# Patient Record
Sex: MALE | Race: WHITE | NOT HISPANIC OR LATINO | Employment: OTHER | URBAN - METROPOLITAN AREA
[De-identification: names, ages, dates, MRNs, and addresses within clinical notes are randomized per-mention and may not be internally consistent; named-entity substitution may affect disease eponyms.]

---

## 2017-03-13 ENCOUNTER — OFFICE VISIT (OUTPATIENT)
Dept: FAMILY MEDICINE CLINIC | Facility: CLINIC | Age: 62
End: 2017-03-13

## 2017-03-13 VITALS
TEMPERATURE: 98.1 F | RESPIRATION RATE: 16 BRPM | HEIGHT: 70 IN | DIASTOLIC BLOOD PRESSURE: 79 MMHG | BODY MASS INDEX: 34.36 KG/M2 | SYSTOLIC BLOOD PRESSURE: 138 MMHG | WEIGHT: 240 LBS | HEART RATE: 76 BPM

## 2017-03-13 DIAGNOSIS — M15.9 PRIMARY OSTEOARTHRITIS INVOLVING MULTIPLE JOINTS: ICD-10-CM

## 2017-03-13 DIAGNOSIS — F51.01 PRIMARY INSOMNIA: Primary | ICD-10-CM

## 2017-03-13 DIAGNOSIS — E03.9 HYPOTHYROIDISM, ACQUIRED: ICD-10-CM

## 2017-03-13 DIAGNOSIS — Z00.00 ANNUAL PHYSICAL EXAM: ICD-10-CM

## 2017-03-13 PROCEDURE — 99214 OFFICE O/P EST MOD 30 MIN: CPT | Performed by: FAMILY MEDICINE

## 2017-03-13 RX ORDER — LEVOTHYROXINE SODIUM 0.07 MG/1
75 TABLET ORAL DAILY
Qty: 30 TABLET | Refills: 5 | Status: SHIPPED | OUTPATIENT
Start: 2017-03-13 | End: 2017-10-03 | Stop reason: SDUPTHER

## 2017-03-13 RX ORDER — ZOLPIDEM TARTRATE 10 MG/1
10 TABLET ORAL NIGHTLY PRN
Qty: 30 TABLET | Refills: 2 | Status: SHIPPED | OUTPATIENT
Start: 2017-03-13 | End: 2017-06-14 | Stop reason: SDUPTHER

## 2017-03-13 RX ORDER — MELOXICAM 15 MG/1
15 TABLET ORAL DAILY
Qty: 30 TABLET | Refills: 5 | Status: SHIPPED | OUTPATIENT
Start: 2017-03-13 | End: 2017-08-16 | Stop reason: HOSPADM

## 2017-03-13 NOTE — PROGRESS NOTES
"Subjective   Luigi Steel is a 61 y.o. male.     History of Present Illness     Chief Complaint:   Chief Complaint   Patient presents with   • Hypothyroidism     MED REFILL - NO LABS DONE - NO DAILY ASA / NON SMOKER / PHQ2 DONE    • Insomnia   • Arthritis       Luigi Steel 61 y.o. male who presents today for Medical Management of the below listed issues and medication refills.  he has a history of   Patient Active Problem List   Diagnosis   • Colon cancer   • Elevated blood pressure reading without diagnosis of hypertension   • Hypothyroidism, acquired   • Insomnia   • Osteoarthritis, multiple sites   • Hyperlipidemia   .  Since the last visit, he has overall felt well.  he has been compliant with   Current Outpatient Prescriptions:   •  levothyroxine (SYNTHROID, LEVOTHROID) 75 MCG tablet, Take 1 tablet by mouth Daily., Disp: 30 tablet, Rfl: 5  •  meloxicam (MOBIC) 15 MG tablet, Take 1 tablet by mouth Daily., Disp: 30 tablet, Rfl: 5  •  zolpidem (AMBIEN) 10 MG tablet, Take 1 tablet by mouth At Night As Needed for sleep., Disp: 30 tablet, Rfl: 2.  he denies medication side effects.    All of the chronic condition(s) listed above are stable w/o issues.    Visit Vitals   • /79   • Pulse 76   • Temp 98.1 °F (36.7 °C) (Oral)   • Resp 16   • Ht 70\" (177.8 cm)   • Wt 240 lb (109 kg)   • BMI 34.44 kg/m2       Results for orders placed or performed in visit on 03/28/16   CBC and Differential   Result Value Ref Range    WBC 6.82 4.50 - 10.70 10*3/mm3    RBC 4.74 4.60 - 6.00 10*6/mm3    Hemoglobin 14.5 13.7 - 17.6 g/dL    Hematocrit 44.5 40.4 - 52.2 %    MCV 93.9 79.8 - 96.2 fL    MCH 30.6 27.0 - 32.7 pg    MCHC 32.6 32.6 - 36.4 g/dL    RDW 13.2 11.5 - 14.5 %    Platelets 151 140 - 500 10*3/mm3    Neutrophil Rel % 61.0 42.7 - 76.0 %    Lymphocyte Rel % 28.0 19.6 - 45.3 %    Monocyte Rel % 10.0 5.0 - 12.0 %    Eosinophil Rel % 0.6 0.3 - 6.2 %    Basophil Rel % 0.1 0.0 - 1.5 %    Neutrophils Absolute 4.16 1.90 - 8.10 " 10*3/mm3    Lymphocytes Absolute 1.91 0.90 - 4.80 10*3/mm3    Monocytes Absolute 0.68 0.20 - 1.20 10*3/mm3    Eosinophils Absolute 0.04 0.00 - 0.70 10*3/mm3    Basophils Absolute 0.01 0.00 - 0.20 10*3/mm3    Immature Granulocyte Rel % 0.3 0.0 - 0.5 %    Immature Grans Absolute 0.02 0.00 - 0.03 10*3/mm3         The following portions of the patient's history were reviewed and updated as appropriate: allergies, current medications, past family history, past medical history, past social history, past surgical history and problem list.    Review of Systems   Constitutional: Negative for activity change, chills, fatigue and fever.   Respiratory: Negative for cough and chest tightness.    Cardiovascular: Negative for chest pain and palpitations.   Gastrointestinal: Negative for abdominal pain and nausea.   Endocrine: Negative for cold intolerance and polydipsia.   Psychiatric/Behavioral: Negative for behavioral problems and dysphoric mood.   All other systems reviewed and are negative.      Objective   Physical Exam   Constitutional: He appears well-developed and well-nourished.   Neck: Neck supple. No thyromegaly present.   Cardiovascular: Normal rate and regular rhythm.    No murmur heard.  Pulmonary/Chest: Effort normal and breath sounds normal.   Abdominal: Bowel sounds are normal.   Psychiatric: He has a normal mood and affect. His behavior is normal.   Nursing note and vitals reviewed.    The patient has read and signed the [facility] Controlled Substance Contract.  I will continue to see patient for regular follow up appointments.  They are well controlled on their medication.  JESUS has been reviewed by me and is updated every 3 months. The patient is aware of the potential for addiction and dependence.      Assessment/Plan   Luigi was seen today for hypothyroidism, insomnia and arthritis.    Diagnoses and all orders for this visit:    Primary insomnia  -     zolpidem (AMBIEN) 10 MG tablet; Take 1 tablet by  mouth At Night As Needed for sleep.    Hypothyroidism, acquired  -     levothyroxine (SYNTHROID, LEVOTHROID) 75 MCG tablet; Take 1 tablet by mouth Daily.  -     TSH  -     T4, Free    Primary osteoarthritis involving multiple joints  -     meloxicam (MOBIC) 15 MG tablet; Take 1 tablet by mouth Daily.    Annual physical exam  Comments:  for labs only  Orders:  -     Comprehensive metabolic panel  -     Lipid panel  -     CBC and Differential  -     PSA

## 2017-03-28 LAB
ALBUMIN SERPL-MCNC: 4.8 G/DL (ref 3.5–5.2)
ALBUMIN/GLOB SERPL: 1.9 G/DL
ALP SERPL-CCNC: 93 U/L (ref 39–117)
ALT SERPL-CCNC: 21 U/L (ref 1–41)
AST SERPL-CCNC: 17 U/L (ref 1–40)
BASOPHILS # BLD AUTO: 0 10*3/MM3 (ref 0–0.2)
BASOPHILS NFR BLD AUTO: 0 % (ref 0–1.5)
BILIRUB SERPL-MCNC: 0.6 MG/DL (ref 0.1–1.2)
BUN SERPL-MCNC: 19 MG/DL (ref 8–23)
BUN/CREAT SERPL: 14.7 (ref 7–25)
CALCIUM SERPL-MCNC: 10.2 MG/DL (ref 8.6–10.5)
CHLORIDE SERPL-SCNC: 103 MMOL/L (ref 98–107)
CHOLEST SERPL-MCNC: 204 MG/DL (ref 0–200)
CO2 SERPL-SCNC: 25.4 MMOL/L (ref 22–29)
CREAT SERPL-MCNC: 1.29 MG/DL (ref 0.76–1.27)
EOSINOPHIL # BLD AUTO: 0.05 10*3/MM3 (ref 0–0.7)
EOSINOPHIL NFR BLD AUTO: 0.7 % (ref 0.3–6.2)
ERYTHROCYTE [DISTWIDTH] IN BLOOD BY AUTOMATED COUNT: 13.3 % (ref 11.5–14.5)
GLOBULIN SER CALC-MCNC: 2.5 GM/DL
GLUCOSE SERPL-MCNC: 82 MG/DL (ref 65–99)
HCT VFR BLD AUTO: 45.8 % (ref 40.4–52.2)
HDLC SERPL-MCNC: 37 MG/DL (ref 40–60)
HGB BLD-MCNC: 14.9 G/DL (ref 13.7–17.6)
IMM GRANULOCYTES # BLD: 0.02 10*3/MM3 (ref 0–0.03)
IMM GRANULOCYTES NFR BLD: 0.3 % (ref 0–0.5)
LDLC SERPL CALC-MCNC: 138 MG/DL (ref 0–100)
LYMPHOCYTES # BLD AUTO: 2.29 10*3/MM3 (ref 0.9–4.8)
LYMPHOCYTES NFR BLD AUTO: 33.9 % (ref 19.6–45.3)
MCH RBC QN AUTO: 30.5 PG (ref 27–32.7)
MCHC RBC AUTO-ENTMCNC: 32.5 G/DL (ref 32.6–36.4)
MCV RBC AUTO: 93.9 FL (ref 79.8–96.2)
MONOCYTES # BLD AUTO: 0.58 10*3/MM3 (ref 0.2–1.2)
MONOCYTES NFR BLD AUTO: 8.6 % (ref 5–12)
NEUTROPHILS # BLD AUTO: 3.81 10*3/MM3 (ref 1.9–8.1)
NEUTROPHILS NFR BLD AUTO: 56.5 % (ref 42.7–76)
PLATELET # BLD AUTO: 153 10*3/MM3 (ref 140–500)
POTASSIUM SERPL-SCNC: 4.8 MMOL/L (ref 3.5–5.2)
PROT SERPL-MCNC: 7.3 G/DL (ref 6–8.5)
PSA SERPL-MCNC: 0.34 NG/ML (ref 0–4)
RBC # BLD AUTO: 4.88 10*6/MM3 (ref 4.6–6)
SODIUM SERPL-SCNC: 143 MMOL/L (ref 136–145)
T4 FREE SERPL-MCNC: 1.23 NG/DL (ref 0.93–1.7)
TRIGL SERPL-MCNC: 143 MG/DL (ref 0–150)
TSH SERPL DL<=0.005 MIU/L-ACNC: 4 MIU/ML (ref 0.27–4.2)
VLDLC SERPL CALC-MCNC: 28.6 MG/DL (ref 5–40)
WBC # BLD AUTO: 6.75 10*3/MM3 (ref 4.5–10.7)

## 2017-06-14 ENCOUNTER — OFFICE VISIT (OUTPATIENT)
Dept: FAMILY MEDICINE CLINIC | Facility: CLINIC | Age: 62
End: 2017-06-14

## 2017-06-14 VITALS
DIASTOLIC BLOOD PRESSURE: 80 MMHG | SYSTOLIC BLOOD PRESSURE: 132 MMHG | RESPIRATION RATE: 16 BRPM | WEIGHT: 239 LBS | BODY MASS INDEX: 34.22 KG/M2 | HEIGHT: 70 IN | TEMPERATURE: 98.1 F | HEART RATE: 78 BPM

## 2017-06-14 DIAGNOSIS — J40 BRONCHITIS: Primary | ICD-10-CM

## 2017-06-14 DIAGNOSIS — F51.01 PRIMARY INSOMNIA: ICD-10-CM

## 2017-06-14 PROCEDURE — 99213 OFFICE O/P EST LOW 20 MIN: CPT | Performed by: FAMILY MEDICINE

## 2017-06-14 RX ORDER — ZOLPIDEM TARTRATE 10 MG/1
10 TABLET ORAL NIGHTLY PRN
Qty: 30 TABLET | Refills: 2 | Status: SHIPPED | OUTPATIENT
Start: 2017-06-14 | End: 2017-09-08 | Stop reason: SDUPTHER

## 2017-06-14 RX ORDER — AZITHROMYCIN 250 MG/1
TABLET, FILM COATED ORAL
Qty: 6 TABLET | Refills: 0 | Status: SHIPPED | OUTPATIENT
Start: 2017-06-14 | End: 2017-08-16 | Stop reason: HOSPADM

## 2017-06-14 NOTE — PROGRESS NOTES
"Subjective   Luigi Steel is a 62 y.o. male.     History of Present Illness     Chief Complaint:   Chief Complaint   Patient presents with   • Insomnia     med refill - nora / csa updated    • Nasal Congestion   • chest congestion   • Cough       Luigi Steel 62 y.o. male who presents today for Medical Management of the below listed issues and medication refills.  he has a history of   Patient Active Problem List   Diagnosis   • Colon cancer   • Elevated blood pressure reading without diagnosis of hypertension   • Hypothyroidism, acquired   • Insomnia   • Osteoarthritis, multiple sites   • Hyperlipidemia   .  Since the last visit, he has overall felt well until 10 days ago when he developed chest congestion and cough (colored sputum) that is just not letting up. No F/C.  he has been compliant with   Current Outpatient Prescriptions:   •  zolpidem (AMBIEN) 10 MG tablet, Take 1 tablet by mouth At Night As Needed for Sleep., Disp: 30 tablet, Rfl: 2  •  azithromycin (ZITHROMAX Z-PEGGY) 250 MG tablet, Take 2 tablets the first day, then 1 tablet daily for 4 days., Disp: 6 tablet, Rfl: 0  •  levothyroxine (SYNTHROID, LEVOTHROID) 75 MCG tablet, Take 1 tablet by mouth Daily., Disp: 30 tablet, Rfl: 5  •  meloxicam (MOBIC) 15 MG tablet, Take 1 tablet by mouth Daily., Disp: 30 tablet, Rfl: 5.  he denies medication side effects.    All of the chronic condition(s) listed above are stable w/o issues.    /80  Pulse 78  Temp 98.1 °F (36.7 °C) (Oral)   Resp 16  Ht 70\" (177.8 cm)  Wt 239 lb (108 kg)  BMI 34.29 kg/m2    Results for orders placed or performed in visit on 03/13/17   Comprehensive metabolic panel   Result Value Ref Range    Glucose 82 65 - 99 mg/dL    BUN 19 8 - 23 mg/dL    Creatinine 1.29 (H) 0.76 - 1.27 mg/dL    eGFR Non African Am 56 (L) >60 mL/min/1.73    eGFR African Am 68 >60 mL/min/1.73    BUN/Creatinine Ratio 14.7 7.0 - 25.0    Sodium 143 136 - 145 mmol/L    Potassium 4.8 3.5 - 5.2 mmol/L    " Chloride 103 98 - 107 mmol/L    Total CO2 25.4 22.0 - 29.0 mmol/L    Calcium 10.2 8.6 - 10.5 mg/dL    Total Protein 7.3 6.0 - 8.5 g/dL    Albumin 4.80 3.50 - 5.20 g/dL    Globulin 2.5 gm/dL    A/G Ratio 1.9 g/dL    Total Bilirubin 0.6 0.1 - 1.2 mg/dL    Alkaline Phosphatase 93 39 - 117 U/L    AST (SGOT) 17 1 - 40 U/L    ALT (SGPT) 21 1 - 41 U/L   Lipid panel   Result Value Ref Range    Total Cholesterol 204 (H) 0 - 200 mg/dL    Triglycerides 143 0 - 150 mg/dL    HDL Cholesterol 37 (L) 40 - 60 mg/dL    VLDL Cholesterol 28.6 5 - 40 mg/dL    LDL Cholesterol  138 (H) 0 - 100 mg/dL   TSH   Result Value Ref Range    TSH 4.000 0.270 - 4.200 mIU/mL   PSA   Result Value Ref Range    PSA 0.344 0.000 - 4.000 ng/mL   T4, Free   Result Value Ref Range    Free T4 1.23 0.93 - 1.70 ng/dL   CBC and Differential   Result Value Ref Range    WBC 6.75 4.50 - 10.70 10*3/mm3    RBC 4.88 4.60 - 6.00 10*6/mm3    Hemoglobin 14.9 13.7 - 17.6 g/dL    Hematocrit 45.8 40.4 - 52.2 %    MCV 93.9 79.8 - 96.2 fL    MCH 30.5 27.0 - 32.7 pg    MCHC 32.5 (L) 32.6 - 36.4 g/dL    RDW 13.3 11.5 - 14.5 %    Platelets 153 140 - 500 10*3/mm3    Neutrophil Rel % 56.5 42.7 - 76.0 %    Lymphocyte Rel % 33.9 19.6 - 45.3 %    Monocyte Rel % 8.6 5.0 - 12.0 %    Eosinophil Rel % 0.7 0.3 - 6.2 %    Basophil Rel % 0.0 0.0 - 1.5 %    Neutrophils Absolute 3.81 1.90 - 8.10 10*3/mm3    Lymphocytes Absolute 2.29 0.90 - 4.80 10*3/mm3    Monocytes Absolute 0.58 0.20 - 1.20 10*3/mm3    Eosinophils Absolute 0.05 0.00 - 0.70 10*3/mm3    Basophils Absolute 0.00 0.00 - 0.20 10*3/mm3    Immature Granulocyte Rel % 0.3 0.0 - 0.5 %    Immature Grans Absolute 0.02 0.00 - 0.03 10*3/mm3         The following portions of the patient's history were reviewed and updated as appropriate: allergies, current medications, past family history, past medical history, past social history, past surgical history and problem list.    Review of Systems   Constitutional: Negative for activity change,  chills, fatigue and fever.   HENT: Positive for congestion and postnasal drip. Negative for sinus pressure.    Respiratory: Positive for cough. Negative for chest tightness.    Cardiovascular: Negative for chest pain and palpitations.   Gastrointestinal: Negative for abdominal pain and nausea.   Endocrine: Negative for cold intolerance and polydipsia.   Psychiatric/Behavioral: Negative for behavioral problems and dysphoric mood.   All other systems reviewed and are negative.      Objective   Physical Exam   Constitutional: He appears well-developed and well-nourished.   HENT:   Nose: Right sinus exhibits no maxillary sinus tenderness. Left sinus exhibits no maxillary sinus tenderness.   Neck: Neck supple. No thyromegaly present.   Cardiovascular: Normal rate and regular rhythm.    No murmur heard.  Pulmonary/Chest: Effort normal and breath sounds normal.   Abdominal: Bowel sounds are normal.   Psychiatric: He has a normal mood and affect. His behavior is normal.   Nursing note and vitals reviewed.    The patient has read and signed the [facility] Controlled Substance Contract.  I will continue to see patient for regular follow up appointments.  They are well controlled on their medication.  JESUS has been reviewed by me and is updated every 3 months. The patient is aware of the potential for addiction and dependence.    Assessment/Plan   Luigi was seen today for insomnia, nasal congestion, chest congestion and cough.    Diagnoses and all orders for this visit:    Bronchitis  -     azithromycin (ZITHROMAX Z-PEGGY) 250 MG tablet; Take 2 tablets the first day, then 1 tablet daily for 4 days.    Primary insomnia  -     zolpidem (AMBIEN) 10 MG tablet; Take 1 tablet by mouth At Night As Needed for Sleep.

## 2017-08-13 ENCOUNTER — APPOINTMENT (OUTPATIENT)
Dept: CARDIOLOGY | Facility: HOSPITAL | Age: 62
End: 2017-08-13
Attending: INTERNAL MEDICINE

## 2017-08-13 ENCOUNTER — HOSPITAL ENCOUNTER (INPATIENT)
Facility: HOSPITAL | Age: 62
LOS: 3 days | Discharge: HOME | End: 2017-08-16
Attending: EMERGENCY MEDICINE | Admitting: INTERNAL MEDICINE

## 2017-08-13 ENCOUNTER — APPOINTMENT (OUTPATIENT)
Dept: GENERAL RADIOLOGY | Facility: HOSPITAL | Age: 62
End: 2017-08-13

## 2017-08-13 DIAGNOSIS — I21.3 ACUTE ST ELEVATION MYOCARDIAL INFARCTION (STEMI), UNSPECIFIED ARTERY (HCC): Primary | ICD-10-CM

## 2017-08-13 PROBLEM — I21.09 ANTERIOR MYOCARDIAL INFARCTION (HCC): Status: ACTIVE | Noted: 2017-08-13

## 2017-08-13 LAB
ALBUMIN SERPL-MCNC: 4.1 G/DL (ref 3.5–5.2)
ALBUMIN/GLOB SERPL: 1.6 G/DL
ALP SERPL-CCNC: 85 U/L (ref 39–117)
ALT SERPL W P-5'-P-CCNC: 20 U/L (ref 1–41)
ANION GAP SERPL CALCULATED.3IONS-SCNC: 10.1 MMOL/L
ANION GAP SERPL CALCULATED.3IONS-SCNC: 13.2 MMOL/L
AST SERPL-CCNC: 16 U/L (ref 1–40)
BASOPHILS # BLD AUTO: 0.01 10*3/MM3 (ref 0–0.2)
BASOPHILS NFR BLD AUTO: 0.1 % (ref 0–1.5)
BH CV ECHO MEAS - ACS: 2 CM
BH CV ECHO MEAS - AI DEC SLOPE: 106 CM/SEC^2
BH CV ECHO MEAS - AI MAX PG: 9 MMHG
BH CV ECHO MEAS - AI MAX VEL: 150 CM/SEC
BH CV ECHO MEAS - AI P1/2T: 414.5 MSEC
BH CV ECHO MEAS - AO MAX PG: 4 MMHG
BH CV ECHO MEAS - AO MEAN PG (FULL): -1 MMHG
BH CV ECHO MEAS - AO MEAN PG: 1 MMHG
BH CV ECHO MEAS - AO ROOT AREA (BSA CORRECTED): 1.5
BH CV ECHO MEAS - AO ROOT AREA: 9.6 CM^2
BH CV ECHO MEAS - AO ROOT DIAM: 3.5 CM
BH CV ECHO MEAS - AO V2 MAX: 80 CM/SEC
BH CV ECHO MEAS - AO V2 MEAN: 48.9 CM/SEC
BH CV ECHO MEAS - AO V2 VTI: 14 CM
BH CV ECHO MEAS - AVA(I,A): 4.8 CM^2
BH CV ECHO MEAS - AVA(I,D): 4.8 CM^2
BH CV ECHO MEAS - BSA(HAYCOCK): 2.4 M^2
BH CV ECHO MEAS - BSA: 2.3 M^2
BH CV ECHO MEAS - BZI_BMI: 35.3 KILOGRAMS/M^2
BH CV ECHO MEAS - BZI_METRIC_HEIGHT: 177.8 CM
BH CV ECHO MEAS - BZI_METRIC_WEIGHT: 111.6 KG
BH CV ECHO MEAS - CONTRAST EF (2CH): 60.7 ML/M^2
BH CV ECHO MEAS - CONTRAST EF 4CH: 45.1 ML/M^2
BH CV ECHO MEAS - EDV(CUBED): 148.9 ML
BH CV ECHO MEAS - EDV(MOD-SP2): 117 ML
BH CV ECHO MEAS - EDV(MOD-SP4): 164 ML
BH CV ECHO MEAS - EDV(TEICH): 135.3 ML
BH CV ECHO MEAS - EF(CUBED): 89.5 %
BH CV ECHO MEAS - EF(MOD-SP2): 60.7 %
BH CV ECHO MEAS - EF(MOD-SP4): 45.1 %
BH CV ECHO MEAS - EF(TEICH): 83.5 %
BH CV ECHO MEAS - ESV(CUBED): 15.6 ML
BH CV ECHO MEAS - ESV(MOD-SP2): 46 ML
BH CV ECHO MEAS - ESV(MOD-SP4): 90 ML
BH CV ECHO MEAS - ESV(TEICH): 22.3 ML
BH CV ECHO MEAS - FS: 52.8 %
BH CV ECHO MEAS - IVS/LVPW: 1
BH CV ECHO MEAS - IVSD: 1.1 CM
BH CV ECHO MEAS - LAT PEAK E' VEL: 5 CM/SEC
BH CV ECHO MEAS - LV DIASTOLIC VOL/BSA (35-75): 72 ML/M^2
BH CV ECHO MEAS - LV MASS(C)D: 227.7 GRAMS
BH CV ECHO MEAS - LV MASS(C)DI: 99.9 GRAMS/M^2
BH CV ECHO MEAS - LV MEAN PG: 2 MMHG
BH CV ECHO MEAS - LV SYSTOLIC VOL/BSA (12-30): 39.5 ML/M^2
BH CV ECHO MEAS - LV V1 MEAN: 58 CM/SEC
BH CV ECHO MEAS - LV V1 VTI: 16.1 CM
BH CV ECHO MEAS - LVIDD: 5.3 CM
BH CV ECHO MEAS - LVIDS: 2.5 CM
BH CV ECHO MEAS - LVLD AP2: 8.7 CM
BH CV ECHO MEAS - LVLD AP4: 8.2 CM
BH CV ECHO MEAS - LVLS AP2: 7.6 CM
BH CV ECHO MEAS - LVLS AP4: 7.5 CM
BH CV ECHO MEAS - LVOT AREA (M): 4.2 CM^2
BH CV ECHO MEAS - LVOT AREA: 4.2 CM^2
BH CV ECHO MEAS - LVOT DIAM: 2.3 CM
BH CV ECHO MEAS - LVPWD: 1.1 CM
BH CV ECHO MEAS - MED PEAK E' VEL: 6 CM/SEC
BH CV ECHO MEAS - MR MAX PG: 25.2 MMHG
BH CV ECHO MEAS - MR MAX VEL: 251 CM/SEC
BH CV ECHO MEAS - MV A DUR: 123 SEC
BH CV ECHO MEAS - MV A MAX VEL: 88.8 CM/SEC
BH CV ECHO MEAS - MV DEC SLOPE: 220 CM/SEC^2
BH CV ECHO MEAS - MV DEC TIME: 225 SEC
BH CV ECHO MEAS - MV E MAX VEL: 55.4 CM/SEC
BH CV ECHO MEAS - MV E/A: 0.62
BH CV ECHO MEAS - MV MEAN PG: 1 MMHG
BH CV ECHO MEAS - MV P1/2T MAX VEL: 68.2 CM/SEC
BH CV ECHO MEAS - MV P1/2T: 90.8 MSEC
BH CV ECHO MEAS - MV V2 MEAN: 52.8 CM/SEC
BH CV ECHO MEAS - MV V2 VTI: 25.8 CM
BH CV ECHO MEAS - MVA P1/2T LCG: 3.2 CM^2
BH CV ECHO MEAS - MVA(P1/2T): 2.4 CM^2
BH CV ECHO MEAS - MVA(VTI): 2.6 CM^2
BH CV ECHO MEAS - PA ACC SLOPE: 1051 CM/SEC^2
BH CV ECHO MEAS - PA ACC TIME: 0.09 SEC
BH CV ECHO MEAS - PA MAX PG: 3.8 MMHG
BH CV ECHO MEAS - PA PR(ACCEL): 37.6 MMHG
BH CV ECHO MEAS - PA V2 MAX: 97 CM/SEC
BH CV ECHO MEAS - PULM A REVS DUR: 123 SEC
BH CV ECHO MEAS - PULM A REVS VEL: 26.2 CM/SEC
BH CV ECHO MEAS - PULM DIAS VEL: 39.3 CM/SEC
BH CV ECHO MEAS - PULM S/D: 1.1
BH CV ECHO MEAS - PULM SYS VEL: 42.4 CM/SEC
BH CV ECHO MEAS - QP/QS: 1.4
BH CV ECHO MEAS - RAP SYSTOLE: 15 MMHG
BH CV ECHO MEAS - RV MEAN PG: 2 MMHG
BH CV ECHO MEAS - RV V1 MEAN: 61.2 CM/SEC
BH CV ECHO MEAS - RV V1 VTI: 18.9 CM
BH CV ECHO MEAS - RVOT AREA: 4.9 CM^2
BH CV ECHO MEAS - RVOT DIAM: 2.5 CM
BH CV ECHO MEAS - SI(AO): 59.1 ML/M^2
BH CV ECHO MEAS - SI(CUBED): 58.5 ML/M^2
BH CV ECHO MEAS - SI(LVOT): 29.3 ML/M^2
BH CV ECHO MEAS - SI(MOD-SP2): 31.2 ML/M^2
BH CV ECHO MEAS - SI(MOD-SP4): 32.5 ML/M^2
BH CV ECHO MEAS - SI(TEICH): 49.6 ML/M^2
BH CV ECHO MEAS - SV(AO): 134.7 ML
BH CV ECHO MEAS - SV(CUBED): 133.3 ML
BH CV ECHO MEAS - SV(LVOT): 66.9 ML
BH CV ECHO MEAS - SV(MOD-SP2): 71 ML
BH CV ECHO MEAS - SV(MOD-SP4): 74 ML
BH CV ECHO MEAS - SV(RVOT): 92.8 ML
BH CV ECHO MEAS - SV(TEICH): 113 ML
BH CV ECHO MEAS - TAPSE (>1.6): 2.7 CM2
BH CV VAS BP RIGHT ARM: NORMAL MMHG
BH CV XLRA - RV BASE: 4.3 CM
BH CV XLRA - RV LENGTH: 7.2 CM
BH CV XLRA - RV MID: 3.3 CM
BH CV XLRA - TDI S': 19 CM/SEC
BH CV XLRA MEAS - DIST GSV CALF DIST LEFT: 0.18 CM
BH CV XLRA MEAS - DIST GSV CALF DIST RIGHT: 0.18 CM
BH CV XLRA MEAS - DIST GSV THIGH DIST LEFT: 0.24 CM
BH CV XLRA MEAS - DIST GSV THIGH DIST RIGHT: 0.23 CM
BH CV XLRA MEAS - GSV ANKLE DIST LEFT: 0.26 CM
BH CV XLRA MEAS - GSV ANKLE DIST RIGHT: 0.26 CM
BH CV XLRA MEAS - GSV KNEE DIST LEFT: 0.26 CM
BH CV XLRA MEAS - GSV KNEE DIST RIGHT: 0.26 CM
BH CV XLRA MEAS - GSV ORIGIN DIST LEFT: 0.4 CM
BH CV XLRA MEAS - GSV ORIGIN DIST RIGHT: 0.17 CM
BH CV XLRA MEAS - MID GSV CALF LEFT: 0.17 CM
BH CV XLRA MEAS - MID GSV CALF RIGHT: 0.21 CM
BH CV XLRA MEAS - MID GSV THIGH  LEFT: 0.25 CM
BH CV XLRA MEAS - MID GSV THIGH  RIGHT: 0.2 CM
BH CV XLRA MEAS - PROX GSV CALF DIST LEFT: 0.25 CM
BH CV XLRA MEAS - PROX GSV CALF DIST RIGHT: 0.26 CM
BH CV XLRA MEAS - PROX GSV THIGH  LEFT: 0.24 CM
BH CV XLRA MEAS - PROX GSV THIGH  RIGHT: 0.2 CM
BILIRUB SERPL-MCNC: 0.5 MG/DL (ref 0.1–1.2)
BUN BLD-MCNC: 18 MG/DL (ref 8–23)
BUN BLD-MCNC: 20 MG/DL (ref 8–23)
BUN/CREAT SERPL: 14.4 (ref 7–25)
BUN/CREAT SERPL: 16.5 (ref 7–25)
CALCIUM SPEC-SCNC: 9.5 MG/DL (ref 8.6–10.5)
CALCIUM SPEC-SCNC: 9.6 MG/DL (ref 8.6–10.5)
CHLORIDE SERPL-SCNC: 106 MMOL/L (ref 98–107)
CHLORIDE SERPL-SCNC: 108 MMOL/L (ref 98–107)
CHOLEST SERPL-MCNC: 158 MG/DL (ref 0–200)
CO2 SERPL-SCNC: 22.8 MMOL/L (ref 22–29)
CO2 SERPL-SCNC: 24.9 MMOL/L (ref 22–29)
CREAT BLD-MCNC: 1.21 MG/DL (ref 0.76–1.27)
CREAT BLD-MCNC: 1.25 MG/DL (ref 0.76–1.27)
DEPRECATED RDW RBC AUTO: 43.9 FL (ref 37–54)
DEPRECATED RDW RBC AUTO: 44.4 FL (ref 37–54)
E/E' RATIO: 11
EOSINOPHIL # BLD AUTO: 0.07 10*3/MM3 (ref 0–0.7)
EOSINOPHIL NFR BLD AUTO: 0.9 % (ref 0.3–6.2)
ERYTHROCYTE [DISTWIDTH] IN BLOOD BY AUTOMATED COUNT: 12.9 % (ref 11.5–14.5)
ERYTHROCYTE [DISTWIDTH] IN BLOOD BY AUTOMATED COUNT: 12.9 % (ref 11.5–14.5)
GFR SERPL CREATININE-BSD FRML MDRD: 59 ML/MIN/1.73
GFR SERPL CREATININE-BSD FRML MDRD: 61 ML/MIN/1.73
GLOBULIN UR ELPH-MCNC: 2.5 GM/DL
GLUCOSE BLD-MCNC: 123 MG/DL (ref 65–99)
GLUCOSE BLD-MCNC: 135 MG/DL (ref 65–99)
GLUCOSE BLDC GLUCOMTR-MCNC: 125 MG/DL (ref 70–130)
HBA1C MFR BLD: 5.51 % (ref 4.8–5.6)
HCT VFR BLD AUTO: 42.6 % (ref 40.4–52.2)
HCT VFR BLD AUTO: 43.2 % (ref 40.4–52.2)
HDLC SERPL-MCNC: 36 MG/DL (ref 40–60)
HGB BLD-MCNC: 13.8 G/DL (ref 13.7–17.6)
HGB BLD-MCNC: 14.2 G/DL (ref 13.7–17.6)
HOLD SPECIMEN: NORMAL
HOLD SPECIMEN: NORMAL
IMM GRANULOCYTES # BLD: 0.02 10*3/MM3 (ref 0–0.03)
IMM GRANULOCYTES NFR BLD: 0.3 % (ref 0–0.5)
INR PPP: 0.93 (ref 0.9–1.1)
LDLC SERPL CALC-MCNC: 96 MG/DL (ref 0–100)
LDLC/HDLC SERPL: 2.67 {RATIO}
LEFT ATRIUM VOLUME INDEX: 13 ML/M2
LV EF 2D ECHO EST: 45 %
LYMPHOCYTES # BLD AUTO: 1.88 10*3/MM3 (ref 0.9–4.8)
LYMPHOCYTES NFR BLD AUTO: 24.2 % (ref 19.6–45.3)
MCH RBC QN AUTO: 30.7 PG (ref 27–32.7)
MCH RBC QN AUTO: 30.8 PG (ref 27–32.7)
MCHC RBC AUTO-ENTMCNC: 32.4 G/DL (ref 32.6–36.4)
MCHC RBC AUTO-ENTMCNC: 32.9 G/DL (ref 32.6–36.4)
MCV RBC AUTO: 93.7 FL (ref 79.8–96.2)
MCV RBC AUTO: 94.9 FL (ref 79.8–96.2)
MONOCYTES # BLD AUTO: 0.52 10*3/MM3 (ref 0.2–1.2)
MONOCYTES NFR BLD AUTO: 6.7 % (ref 5–12)
NEUTROPHILS # BLD AUTO: 5.26 10*3/MM3 (ref 1.9–8.1)
NEUTROPHILS NFR BLD AUTO: 67.8 % (ref 42.7–76)
PLATELET # BLD AUTO: 121 10*3/MM3 (ref 140–500)
PLATELET # BLD AUTO: 133 10*3/MM3 (ref 140–500)
PMV BLD AUTO: 12 FL (ref 6–12)
PMV BLD AUTO: 12.1 FL (ref 6–12)
POTASSIUM BLD-SCNC: 4.1 MMOL/L (ref 3.5–5.2)
POTASSIUM BLD-SCNC: 5 MMOL/L (ref 3.5–5.2)
PROT SERPL-MCNC: 6.6 G/DL (ref 6–8.5)
PROTHROMBIN TIME: 12.1 SECONDS (ref 11.7–14.2)
RBC # BLD AUTO: 4.49 10*6/MM3 (ref 4.6–6)
RBC # BLD AUTO: 4.61 10*6/MM3 (ref 4.6–6)
SODIUM BLD-SCNC: 141 MMOL/L (ref 136–145)
SODIUM BLD-SCNC: 144 MMOL/L (ref 136–145)
TRIGL SERPL-MCNC: 129 MG/DL (ref 0–150)
TROPONIN T SERPL-MCNC: 0.01 NG/ML (ref 0–0.03)
TROPONIN T SERPL-MCNC: 0.62 NG/ML (ref 0–0.03)
VLDLC SERPL-MCNC: 25.8 MG/DL (ref 5–40)
WBC NRBC COR # BLD: 7.19 10*3/MM3 (ref 4.5–10.7)
WBC NRBC COR # BLD: 7.76 10*3/MM3 (ref 4.5–10.7)
WHOLE BLOOD HOLD SPECIMEN: NORMAL
WHOLE BLOOD HOLD SPECIMEN: NORMAL

## 2017-08-13 PROCEDURE — 84484 ASSAY OF TROPONIN QUANT: CPT | Performed by: INTERNAL MEDICINE

## 2017-08-13 PROCEDURE — B2151ZZ FLUOROSCOPY OF LEFT HEART USING LOW OSMOLAR CONTRAST: ICD-10-PCS | Performed by: INTERNAL MEDICINE

## 2017-08-13 PROCEDURE — 85025 COMPLETE CBC W/AUTO DIFF WBC: CPT | Performed by: EMERGENCY MEDICINE

## 2017-08-13 PROCEDURE — 85610 PROTHROMBIN TIME: CPT | Performed by: EMERGENCY MEDICINE

## 2017-08-13 PROCEDURE — 25010000002 ONDANSETRON PER 1 MG: Performed by: EMERGENCY MEDICINE

## 2017-08-13 PROCEDURE — C1725 CATH, TRANSLUMIN NON-LASER: HCPCS | Performed by: INTERNAL MEDICINE

## 2017-08-13 PROCEDURE — 93010 ELECTROCARDIOGRAM REPORT: CPT | Performed by: INTERNAL MEDICINE

## 2017-08-13 PROCEDURE — C1769 GUIDE WIRE: HCPCS | Performed by: INTERNAL MEDICINE

## 2017-08-13 PROCEDURE — 93306 TTE W/DOPPLER COMPLETE: CPT | Performed by: INTERNAL MEDICINE

## 2017-08-13 PROCEDURE — 84484 ASSAY OF TROPONIN QUANT: CPT | Performed by: EMERGENCY MEDICINE

## 2017-08-13 PROCEDURE — 80053 COMPREHEN METABOLIC PANEL: CPT | Performed by: EMERGENCY MEDICINE

## 2017-08-13 PROCEDURE — B2111ZZ FLUOROSCOPY OF MULTIPLE CORONARY ARTERIES USING LOW OSMOLAR CONTRAST: ICD-10-PCS | Performed by: INTERNAL MEDICINE

## 2017-08-13 PROCEDURE — 4A023N7 MEASUREMENT OF CARDIAC SAMPLING AND PRESSURE, LEFT HEART, PERCUTANEOUS APPROACH: ICD-10-PCS | Performed by: INTERNAL MEDICINE

## 2017-08-13 PROCEDURE — 80061 LIPID PANEL: CPT | Performed by: INTERNAL MEDICINE

## 2017-08-13 PROCEDURE — 83036 HEMOGLOBIN GLYCOSYLATED A1C: CPT | Performed by: INTERNAL MEDICINE

## 2017-08-13 PROCEDURE — 93458 L HRT ARTERY/VENTRICLE ANGIO: CPT | Performed by: INTERNAL MEDICINE

## 2017-08-13 PROCEDURE — 94799 UNLISTED PULMONARY SVC/PX: CPT

## 2017-08-13 PROCEDURE — C1725 CATH, TRANSLUMIN NON-LASER: HCPCS

## 2017-08-13 PROCEDURE — 25010000002 HEPARIN (PORCINE) PER 1000 UNITS: Performed by: EMERGENCY MEDICINE

## 2017-08-13 PROCEDURE — C1887 CATHETER, GUIDING: HCPCS | Performed by: INTERNAL MEDICINE

## 2017-08-13 PROCEDURE — C1894 INTRO/SHEATH, NON-LASER: HCPCS | Performed by: INTERNAL MEDICINE

## 2017-08-13 PROCEDURE — 93306 TTE W/DOPPLER COMPLETE: CPT

## 2017-08-13 PROCEDURE — 0 IOPAMIDOL PER 1 ML: Performed by: INTERNAL MEDICINE

## 2017-08-13 PROCEDURE — 92941 PRQ TRLML REVSC TOT OCCL AMI: CPT | Performed by: INTERNAL MEDICINE

## 2017-08-13 PROCEDURE — 71010 HC CHEST PA OR AP: CPT

## 2017-08-13 PROCEDURE — 93005 ELECTROCARDIOGRAM TRACING: CPT | Performed by: INTERNAL MEDICINE

## 2017-08-13 PROCEDURE — 93005 ELECTROCARDIOGRAM TRACING: CPT | Performed by: EMERGENCY MEDICINE

## 2017-08-13 PROCEDURE — C9606 PERC D-E COR REVASC W AMI S: HCPCS | Performed by: INTERNAL MEDICINE

## 2017-08-13 PROCEDURE — 25010000002 HEPARIN (PORCINE) PER 1000 UNITS: Performed by: INTERNAL MEDICINE

## 2017-08-13 PROCEDURE — 80048 BASIC METABOLIC PNL TOTAL CA: CPT | Performed by: INTERNAL MEDICINE

## 2017-08-13 PROCEDURE — 25010000002 MORPHINE PER 10 MG: Performed by: EMERGENCY MEDICINE

## 2017-08-13 PROCEDURE — 99284 EMERGENCY DEPT VISIT MOD MDM: CPT

## 2017-08-13 PROCEDURE — C1757 CATH, THROMBECTOMY/EMBOLECT: HCPCS | Performed by: INTERNAL MEDICINE

## 2017-08-13 PROCEDURE — 027034Z DILATION OF CORONARY ARTERY, ONE ARTERY WITH DRUG-ELUTING INTRALUMINAL DEVICE, PERCUTANEOUS APPROACH: ICD-10-PCS | Performed by: INTERNAL MEDICINE

## 2017-08-13 PROCEDURE — 99222 1ST HOSP IP/OBS MODERATE 55: CPT | Performed by: INTERNAL MEDICINE

## 2017-08-13 PROCEDURE — 85347 COAGULATION TIME ACTIVATED: CPT

## 2017-08-13 PROCEDURE — 82962 GLUCOSE BLOOD TEST: CPT

## 2017-08-13 PROCEDURE — 85027 COMPLETE CBC AUTOMATED: CPT | Performed by: INTERNAL MEDICINE

## 2017-08-13 PROCEDURE — C1874 STENT, COATED/COV W/DEL SYS: HCPCS | Performed by: INTERNAL MEDICINE

## 2017-08-13 DEVICE — XIENCE ALPINE EVEROLIMUS ELUTING CORONARY STENT SYSTEM 3.00 MM X 15 MM / RAPID-EXCHANGE
Type: IMPLANTABLE DEVICE | Site: CORONARY | Status: FUNCTIONAL
Brand: XIENCE ALPINE

## 2017-08-13 RX ORDER — SODIUM CHLORIDE 9 MG/ML
INJECTION, SOLUTION INTRAVENOUS CONTINUOUS PRN
Status: DISCONTINUED | OUTPATIENT
Start: 2017-08-13 | End: 2017-08-13 | Stop reason: HOSPADM

## 2017-08-13 RX ORDER — CLOPIDOGREL BISULFATE 75 MG/1
600 TABLET ORAL ONCE
Status: COMPLETED | OUTPATIENT
Start: 2017-08-13 | End: 2017-08-13

## 2017-08-13 RX ORDER — HEPARIN SODIUM 1000 [USP'U]/ML
INJECTION, SOLUTION INTRAVENOUS; SUBCUTANEOUS AS NEEDED
Status: DISCONTINUED | OUTPATIENT
Start: 2017-08-13 | End: 2017-08-13 | Stop reason: HOSPADM

## 2017-08-13 RX ORDER — HYDRALAZINE HYDROCHLORIDE 20 MG/ML
10 INJECTION INTRAMUSCULAR; INTRAVENOUS EVERY 4 HOURS PRN
Status: DISCONTINUED | OUTPATIENT
Start: 2017-08-13 | End: 2017-08-16 | Stop reason: HOSPADM

## 2017-08-13 RX ORDER — CLOPIDOGREL BISULFATE 75 MG/1
75 TABLET ORAL DAILY
Status: DISCONTINUED | OUTPATIENT
Start: 2017-08-14 | End: 2017-08-16 | Stop reason: HOSPADM

## 2017-08-13 RX ORDER — SODIUM CHLORIDE 0.9 % (FLUSH) 0.9 %
10 SYRINGE (ML) INJECTION AS NEEDED
Status: DISCONTINUED | OUTPATIENT
Start: 2017-08-13 | End: 2017-08-16 | Stop reason: HOSPADM

## 2017-08-13 RX ORDER — ONDANSETRON 2 MG/ML
INJECTION INTRAMUSCULAR; INTRAVENOUS
Status: DISPENSED
Start: 2017-08-13 | End: 2017-08-13

## 2017-08-13 RX ORDER — LIDOCAINE HYDROCHLORIDE 20 MG/ML
INJECTION, SOLUTION INFILTRATION; PERINEURAL AS NEEDED
Status: DISCONTINUED | OUTPATIENT
Start: 2017-08-13 | End: 2017-08-13 | Stop reason: HOSPADM

## 2017-08-13 RX ORDER — ACETAMINOPHEN 325 MG/1
650 TABLET ORAL EVERY 4 HOURS PRN
Status: DISCONTINUED | OUTPATIENT
Start: 2017-08-13 | End: 2017-08-16 | Stop reason: HOSPADM

## 2017-08-13 RX ORDER — METOPROLOL TARTRATE 5 MG/5ML
INJECTION INTRAVENOUS AS NEEDED
Status: DISCONTINUED | OUTPATIENT
Start: 2017-08-13 | End: 2017-08-13 | Stop reason: HOSPADM

## 2017-08-13 RX ORDER — ATORVASTATIN CALCIUM 20 MG/1
20 TABLET, FILM COATED ORAL NIGHTLY
Status: DISCONTINUED | OUTPATIENT
Start: 2017-08-13 | End: 2017-08-16 | Stop reason: HOSPADM

## 2017-08-13 RX ORDER — NITROGLYCERIN 20 MG/100ML
10-50 INJECTION INTRAVENOUS
Status: DISCONTINUED | OUTPATIENT
Start: 2017-08-13 | End: 2017-08-13

## 2017-08-13 RX ORDER — ASPIRIN 81 MG/1
81 TABLET, CHEWABLE ORAL DAILY
Status: DISCONTINUED | OUTPATIENT
Start: 2017-08-14 | End: 2017-08-16 | Stop reason: HOSPADM

## 2017-08-13 RX ORDER — ONDANSETRON 2 MG/ML
4 INJECTION INTRAMUSCULAR; INTRAVENOUS ONCE
Status: COMPLETED | OUTPATIENT
Start: 2017-08-13 | End: 2017-08-13

## 2017-08-13 RX ORDER — SODIUM CHLORIDE 9 MG/ML
100 INJECTION, SOLUTION INTRAVENOUS CONTINUOUS
Status: ACTIVE | OUTPATIENT
Start: 2017-08-13 | End: 2017-08-13

## 2017-08-13 RX ORDER — HEPARIN SODIUM 5000 [USP'U]/ML
37.4 INJECTION, SOLUTION INTRAVENOUS; SUBCUTANEOUS ONCE
Status: COMPLETED | OUTPATIENT
Start: 2017-08-13 | End: 2017-08-13

## 2017-08-13 RX ORDER — NITROGLYCERIN 5 MG/ML
INJECTION, SOLUTION INTRAVENOUS AS NEEDED
Status: DISCONTINUED | OUTPATIENT
Start: 2017-08-13 | End: 2017-08-13 | Stop reason: HOSPADM

## 2017-08-13 RX ORDER — CARVEDILOL 3.12 MG/1
3.12 TABLET ORAL 2 TIMES DAILY
Status: DISCONTINUED | OUTPATIENT
Start: 2017-08-13 | End: 2017-08-14

## 2017-08-13 RX ORDER — LEVOTHYROXINE SODIUM 0.07 MG/1
75 TABLET ORAL
Status: DISCONTINUED | OUTPATIENT
Start: 2017-08-13 | End: 2017-08-16 | Stop reason: HOSPADM

## 2017-08-13 RX ORDER — ZOLPIDEM TARTRATE 5 MG/1
10 TABLET ORAL NIGHTLY PRN
Status: DISCONTINUED | OUTPATIENT
Start: 2017-08-13 | End: 2017-08-16 | Stop reason: HOSPADM

## 2017-08-13 RX ADMIN — SODIUM CHLORIDE 100 ML/HR: 9 INJECTION, SOLUTION INTRAVENOUS at 06:49

## 2017-08-13 RX ADMIN — CARVEDILOL 3.12 MG: 3.12 TABLET, FILM COATED ORAL at 08:11

## 2017-08-13 RX ADMIN — CLOPIDOGREL BISULFATE 600 MG: 300 TABLET, FILM COATED ORAL at 00:16

## 2017-08-13 RX ADMIN — CARVEDILOL 3.12 MG: 3.12 TABLET, FILM COATED ORAL at 18:29

## 2017-08-13 RX ADMIN — NITROGLYCERIN 10 MCG/MIN: 20 INJECTION INTRAVENOUS at 00:17

## 2017-08-13 RX ADMIN — HEPARIN SODIUM 4000 UNITS: 5000 INJECTION, SOLUTION INTRAVENOUS; SUBCUTANEOUS at 00:20

## 2017-08-13 RX ADMIN — LEVOTHYROXINE SODIUM 75 MCG: 75 TABLET ORAL at 08:11

## 2017-08-13 RX ADMIN — ONDANSETRON 4 MG: 2 INJECTION INTRAMUSCULAR; INTRAVENOUS at 00:19

## 2017-08-13 RX ADMIN — ATORVASTATIN CALCIUM 20 MG: 20 TABLET, FILM COATED ORAL at 20:45

## 2017-08-13 RX ADMIN — SODIUM CHLORIDE 100 ML/HR: 9 INJECTION, SOLUTION INTRAVENOUS at 01:58

## 2017-08-13 RX ADMIN — MORPHINE SULFATE 4 MG: 4 INJECTION, SOLUTION INTRAMUSCULAR; INTRAVENOUS at 00:19

## 2017-08-13 NOTE — H&P
History And Physical Assessment    Patient Name: Luigi Steel  Age/Sex: 62 y.o. male  : 1955  MRN: [id number]    Date of Hospital Admission: 2017  Date of Encounter Visit: 17  Encounter Provider: Edyta Ye MD  Place of Service: [facility] CARDIOLOGY  Patient Care Team:  Jake Echeverria MD as PCP - General    Subjective:     Chief Complaint:  Anterior MI    History of Present Illness:  Luigi Steel is a 62 y.o. male with a history of hypothyroidism, osteoarthritis, prior colon cancer status post partial colectomy who was brought by EMS for anterior myocardial infarction.  The patient reports he had sudden onset of pain 2 hours before his arrival.  Describes the pain as occurring across his chest.  No prior chest pain or dyspnea before this presentation.  No prior cardiac history.  On arrival he was brought directly to cardiac catheterization laboratory where he was found to have a thrombotic stenosis of his mid LAD.  Additionally he was noted to have proximal LAD, left circumflex, and JOSEY stenosis.  Due to his presentation proceeded with PCI and drug eluting stent placement of his mid LAD.  His pain improved by the end of the procedure.     Past Medical History:  Past Medical History:   Diagnosis Date   • Colon cancer    • Elevated blood pressure reading without diagnosis of hypertension    • H/O complete eye exam    • Hypothyroidism, acquired    • Insomnia    • Osteoarthritis, multiple sites        Past Surgical History:   Procedure Laterality Date   • COLECTOMY PARTIAL / TOTAL     • COLONOSCOPY  2009   • HERNIA REPAIR  2009    hiatal       Home Medications:   Prescriptions Prior to Admission   Medication Sig Dispense Refill Last Dose   • levothyroxine (SYNTHROID, LEVOTHROID) 75 MCG tablet Take 1 tablet by mouth Daily. 30 tablet 5    • meloxicam (MOBIC) 15 MG tablet Take 1 tablet by mouth Daily. 30 tablet 5    • Solifenacin Succinate  (VESICARE PO) Take  by mouth.      • zolpidem (AMBIEN) 10 MG tablet Take 1 tablet by mouth At Night As Needed for Sleep. 30 tablet 2    • azithromycin (ZITHROMAX Z-PEGGY) 250 MG tablet Take 2 tablets the first day, then 1 tablet daily for 4 days. 6 tablet 0        Allergies:  Allergies   Allergen Reactions   • Hydromorphone Nausea And Vomiting and Shortness Of Breath   • Demerol [Meperidine] Nausea And Vomiting       Past Social History:  Social History     Social History   • Marital status:      Spouse name: N/A   • Number of children: N/A   • Years of education: N/A     Social History Main Topics   • Smoking status: Never Smoker   • Smokeless tobacco: None   • Alcohol use No   • Drug use: None   • Sexual activity: Not Asked     Other Topics Concern   • None     Social History Narrative       Past Family History:  Family History   Problem Relation Age of Onset   • Cancer Other      colon   • Heart disease Other    • Hypertension Other    • Gallbladder disease Other        Review of Systems:   All systems reviewed. Pertinent positives identified in HPI. All other systems are negative.    Objective:   Temp:  [95.7 °F (35.4 °C)] 95.7 °F (35.4 °C)  Heart Rate:  [77] 77  Resp:  [18-20] 18  BP: (126)/(115) 126/115    Intake/Output Summary (Last 24 hours) at 08/13/17 0116  Last data filed at 08/13/17 0110   Gross per 24 hour   Intake              300 ml   Output                0 ml   Net              300 ml     Body mass index is 33.72 kg/(m^2).  Last 3 weights    08/13/17  0012   Weight: 235 lb (107 kg)     Weight change:     Physical Exam:   General Appearance:    Alert, cooperative, in no acute distress   Head:    Normocephalic, without obvious abnormality, atraumatic   Eyes:            Lids and lashes normal, conjunctivae and sclerae normal, no   icterus, no pallor, corneas clear, PERRLA   Ears:    Ears appear intact with no abnormalities noted   Neck:   No adenopathy, supple, trachea midline, no thyromegaly, no    carotid bruit, no JVD   Lungs:     Clear to auscultation,respirations regular, even and unlabored    Heart:    Regular rhythm and normal rate, normal S1 and S2, no murmur, no gallop, no rub, no click   Chest Wall:    No abnormalities observed   Abdomen:     Normal bowel sounds, no masses, no organomegaly, soft        non-tender, non-distended, no guarding, no rebound  tenderness   Extremities:   Moves all extremities well, no edema, no cyanosis, no redness   Pulses:   Pulses palpable and equal bilaterally. Normal radial, carotid, femoral, dorsalis pedis and posterior tibial pulses bilaterally. Normal abdominal aorta   Skin:  Psychiatric:   No bleeding, bruising or rash    Alert and oriented x 3, normal mood and affect       Lab Review:     Results from last 7 days  Lab Units 08/13/17  0016   SODIUM mmol/L 144   POTASSIUM mmol/L 4.1   CHLORIDE mmol/L 108*   CO2 mmol/L 22.8   BUN mg/dL 20   CREATININE mg/dL 1.21   GLUCOSE mg/dL 135*   CALCIUM mg/dL 9.6   AST (SGOT) U/L 16   ALT (SGPT) U/L 20       Results from last 7 days  Lab Units 08/13/17  0016   TROPONIN T ng/mL 0.011       Results from last 7 days  Lab Units 08/13/17  0016   WBC 10*3/mm3 7.76   HEMOGLOBIN g/dL 14.2   HEMATOCRIT % 43.2   PLATELETS 10*3/mm3 133*       Results from last 7 days  Lab Units 08/13/17  0016   INR  0.93                     Imaging:  Imaging Results (most recent)     Procedure Component Value Units Date/Time    XR Chest 1 View [[id number]] Collected:  08/13/17 0029     Updated:  08/13/17 0034    Narrative:       PORTABLE CHEST X-RAY     HISTORY: Acute STEMI traige protocol     COMPARISON: None.     FINDINGS: Portable AP view of the chest was obtained. Lungs are poorly  expanded . A small left pleural effusion is noted with probable adjacent  atelectasis. Right lung is under aerated but clear.  Heart size and  pulmonary vasculature are likely normal considering poor inspiration.    Regional osseous structures appear intact.  Consider  dedicated skeletal  imaging of any affected areas if clinically indicated.        Impression:          Poor inspiration with small left effusion and probable adjacent  atelectasis     This report was finalized on 8/13/2017 12:30 AM by Aris Hernández MD.           I personally viewed and interpreted the patient's EKG    Assessment/Plan:     1. Anterior myocardial infarction. Status post drug eluting stent placement of mid LAD.    2. Multivessel CAD  3. Hypertension  4. Hypothyroidism    - Will continue dual antiplatelet therapy for now  - Start carvedilol for hypertension  - Start statin  - Continue levothyroxine  -  Check echocardiogram    Edyta Ye MD  08/13/17  1:16 AM

## 2017-08-13 NOTE — PROGRESS NOTES
Follow up from anterior MI status post drug eluting stent placement of mid LAD this morning.  He is chest pain free without dyspnea.  Mild thrombocytopenia is a little worse this morning.  Will monitor.  Otherwise continue current management.  Follow up echocardiogram.  Will review films again and determine best option to address his remaining coronary disease.  Will keep in CCU for now but can be transferred to telemetry if beds needed.

## 2017-08-13 NOTE — ED PROVIDER NOTES
EMERGENCY DEPARTMENT ENCOUNTER    CHIEF COMPLAINT  Chief Complaint: chest pain   History given by: pt, EMS  History limited by: none  Room Number: 336/1  PMD: Jake Echeverria MD      HPI:  Pt is a 62 y.o. male who presents complaining of central chest pain for the past 2 hours. He describes he pain as tight and burning. Pain does not radiate and has no known modifying factors.  Associated sx include SOA, diaphoresis, nausea and two episodes of vomiting. PTA he was given four baby aspirin and 2 NTG by EMS which improved his pain from a 10 to a 6. He denies cough, fever, leg swelling and other complaints at this time.       Duration:  2 hours   Onset: gradual   Timing: constant   Location: central chest   Radiation: none  Quality: tightness, burning   Intensity/Severity: moderate   Progression: unchanged   Associated Symptoms: SOA, diaphoresis, nausea, vomiting   Aggravating Factors: none  Alleviating Factors: none  Previous Episodes: No prior episodes reported  Treatment before arrival: PTA he was given four baby aspirin and 2 NTG by EMS which improved his pain from a 10 to a 6.       PAST MEDICAL HISTORY  Active Ambulatory Problems     Diagnosis Date Noted   • Colon cancer    • Elevated blood pressure reading without diagnosis of hypertension    • Hypothyroidism, acquired    • Insomnia    • Osteoarthritis, multiple sites    • Hyperlipidemia 03/22/2016     Resolved Ambulatory Problems     Diagnosis Date Noted   • No Resolved Ambulatory Problems     Past Medical History:   Diagnosis Date   • Colon cancer    • Elevated blood pressure reading without diagnosis of hypertension    • H/O complete eye exam 2016   • Hypothyroidism, acquired    • Insomnia    • Osteoarthritis, multiple sites        PAST SURGICAL HISTORY  Past Surgical History:   Procedure Laterality Date   • COLECTOMY PARTIAL / TOTAL  1994   • COLONOSCOPY  03/18/2009   • HERNIA REPAIR  04/02/2009    hiatal       FAMILY HISTORY  Family History   Problem  Relation Age of Onset   • Cancer Other      colon   • Heart disease Other    • Hypertension Other    • Gallbladder disease Other        SOCIAL HISTORY  Social History     Social History   • Marital status:      Spouse name: N/A   • Number of children: N/A   • Years of education: N/A     Occupational History   • Not on file.     Social History Main Topics   • Smoking status: Never Smoker   • Smokeless tobacco: Not on file   • Alcohol use No   • Drug use: Not on file   • Sexual activity: Not on file     Other Topics Concern   • Not on file     Social History Narrative       ALLERGIES  Hydromorphone and Demerol [meperidine]    REVIEW OF SYSTEMS  Review of Systems   Constitutional: Positive for diaphoresis. Negative for activity change, appetite change and fever.   HENT: Negative for congestion and sore throat.    Eyes: Negative.    Respiratory: Positive for shortness of breath. Negative for cough.    Cardiovascular: Positive for chest pain. Negative for leg swelling.   Gastrointestinal: Positive for nausea and vomiting. Negative for abdominal pain and diarrhea.   Endocrine: Negative.    Genitourinary: Negative for decreased urine volume and dysuria.   Musculoskeletal: Negative for neck pain.   Skin: Negative for rash and wound.   Allergic/Immunologic: Negative.    Neurological: Negative for weakness, numbness and headaches.   Hematological: Negative.    Psychiatric/Behavioral: Negative.    All other systems reviewed and are negative.      PHYSICAL EXAM  ED Triage Vitals   Temp Heart Rate Resp BP SpO2   -- 08/13/17 0008 08/13/17 0008 08/13/17 0008 08/13/17 0008    77 20 126/115 95 %      Temp src Heart Rate Source Patient Position BP Location FiO2 (%)   -- 08/13/17 0008 -- -- --    Monitor          Physical Exam   Constitutional: He is oriented to person, place, and time.   appears uncomfortable        HENT:   Head: Normocephalic and atraumatic.   Eyes: EOM are normal. Pupils are equal, round, and reactive to  light.   Neck: Normal range of motion. Neck supple.   Cardiovascular: Normal rate, regular rhythm and normal heart sounds.    Equal radial and pedal pulses    Pulmonary/Chest: Effort normal and breath sounds normal. No respiratory distress.   Abdominal: Soft. There is no tenderness. There is no rebound and no guarding.   Obese    Musculoskeletal: Normal range of motion. He exhibits no edema or tenderness (calf).   Neurological: He is alert and oriented to person, place, and time. He has normal sensation and normal strength.   Skin: Skin is warm and dry.   Psychiatric: Mood and affect normal.   Nursing note and vitals reviewed.      LAB RESULTS  Lab Results (last 24 hours)     Procedure Component Value Units Date/Time    CBC & Differential [[id number]] Collected:  08/13/17 0016    Specimen:  Blood Updated:  08/13/17 0035    Narrative:       The following orders were created for panel order CBC & Differential.  Procedure                               Abnormality         Status                     ---------                               -----------         ------                     CBC Auto Differential[[id number]]        Abnormal            Final result                 Please view results for these tests on the individual orders.    Troponin [[id number]]  (Normal) Collected:  08/13/17 0016    Specimen:  Blood Updated:  08/13/17 0056     Troponin T 0.011 ng/mL     Narrative:       Troponin T Reference Ranges:  Less than 0.03 ng/mL:    Negative for AMI  0.03 to 0.09 ng/mL:      Indeterminant for AMI  Greater than 0.09 ng/mL: Positive for AMI    Comprehensive Metabolic Panel [[id number]]  (Abnormal) Collected:  08/13/17 0016    Specimen:  Blood Updated:  08/13/17 0056     Glucose 135 (H) mg/dL      BUN 20 mg/dL      Creatinine 1.21 mg/dL      Sodium 144 mmol/L      Potassium 4.1 mmol/L      Chloride 108 (H) mmol/L      CO2 22.8 mmol/L      Calcium 9.6 mg/dL      Total Protein 6.6 g/dL      Albumin 4.10 g/dL      ALT (SGPT)  20 U/L      AST (SGOT) 16 U/L      Alkaline Phosphatase 85 U/L      Total Bilirubin 0.5 mg/dL      eGFR Non African Amer 61 mL/min/1.73      Globulin 2.5 gm/dL      A/G Ratio 1.6 g/dL      BUN/Creatinine Ratio 16.5     Anion Gap 13.2 mmol/L     Protime-INR [[id number]]  (Normal) Collected:  08/13/17 0016    Specimen:  Blood Updated:  08/13/17 0044     Protime 12.1 Seconds      INR 0.93    CBC Auto Differential [[id number]]  (Abnormal) Collected:  08/13/17 0016    Specimen:  Blood Updated:  08/13/17 0035     WBC 7.76 10*3/mm3      RBC 4.61 10*6/mm3      Hemoglobin 14.2 g/dL      Hematocrit 43.2 %      MCV 93.7 fL      MCH 30.8 pg      MCHC 32.9 g/dL      RDW 12.9 %      RDW-SD 43.9 fl      MPV 12.0 fL      Platelets 133 (L) 10*3/mm3      Neutrophil % 67.8 %      Lymphocyte % 24.2 %      Monocyte % 6.7 %      Eosinophil % 0.9 %      Basophil % 0.1 %      Immature Grans % 0.3 %      Neutrophils, Absolute 5.26 10*3/mm3      Lymphocytes, Absolute 1.88 10*3/mm3      Monocytes, Absolute 0.52 10*3/mm3      Eosinophils, Absolute 0.07 10*3/mm3      Basophils, Absolute 0.01 10*3/mm3      Immature Grans, Absolute 0.02 10*3/mm3     POC Glucose Fingerstick [[id number]]  (Normal) Collected:  08/13/17 0149    Specimen:  Blood Updated:  08/13/17 0209     Glucose 125 mg/dL     Narrative:       Meter: [id number] : [id number] Levine Jenna          I ordered the above labs and reviewed the results    RADIOLOGY  XR Chest 1 View   Final Result       Poor inspiration with small left effusion and probable adjacent   atelectasis       This report was finalized on 8/13/2017 12:30 AM by Aris Hernández MD.               I ordered the above noted radiological studies. Interpreted by radiologist.Reviewed by me in PACS.       PROCEDURES  Critical Care  Performed by: NEHEMIAH ROLON  Authorized by: NEHEMIAH ROLON   Total critical care time: 30 minutes  Critical care time was exclusive of separately billable procedures and treating other  patients.  Critical care was necessary to treat or prevent imminent or life-threatening deterioration of the following conditions: cardiac failure and circulatory failure.  Critical care was time spent personally by me on the following activities: development of treatment plan with patient or surrogate, discussions with consultants, evaluation of patient's response to treatment, examination of patient, obtaining history from patient or surrogate, ordering and performing treatments and interventions, ordering and review of laboratory studies, ordering and review of radiographic studies, pulse oximetry and re-evaluation of patient's condition.          EKG           EKG time: 00:11  Rhythm/Rate: NSR, 63  P waves and HI: normal  QRS, axis: normal axis   ST and T waves: ST elevation in V1, V2, with ST depression laterally and inferiorly      Interpreted Contemporaneously by me, independently viewed  No old available       PROGRESS AND CONSULTS  ED Course       Received pre hospital EKG at 23:36 and it showed acute anterior STEMI. Cath lab was called at that time.     23:53 Discussed case and EKG findings with Dr Ye (cardiology).     00:14 Team C protocol initiated.   Ordered blood work, Troponin, PT with INR, CXR and EKG for further evaluation.   EKG showed STEMI. Ordered Morphine, Zofran, Nitro drip, Plavix, Heparin.     00:20 Patient's chest pain is improving.  He is being taken to the Cath Lab.      MEDICAL DECISION MAKING  Results were reviewed/discussed with the patient and they were also made aware of online access. Pt also made aware that some labs, such as cultures, will not be resulted during ER visit and follow up with PMD is necessary.     MDM  Number of Diagnoses or Management Options  Acute ST elevation myocardial infarction (STEMI), unspecified artery:   Diagnosis management comments: Prehospital EKG showed evidence of an acute STEMI.  The cardiac catheterization lab was activated.  Case was discussed  with Dr. Ye prior to his arrival in the ER..  Upon arrival to the ER, repeat EKG confirmed acute STEMI.  Patient was given aspirin prior to arrival.  He was started on nitro drip.  He was given Plavix, IV morphine, IV Zofran, and a heparin bolus.  Chest x-ray showed possible small left effusion and adjacent atelectasis.  Patient was taken emergently to the cardiac catheterization lab.  Critical care was performed on this patient.       Amount and/or Complexity of Data Reviewed  Clinical lab tests: ordered  Tests in the radiology section of CPT®: ordered  Tests in the medicine section of CPT®: ordered and reviewed (EKG)  Obtain history from someone other than the patient: yes (EMS)  Discuss the patient with other providers: yes (Dr Gonzáles (cardiology). )    Critical Care  Total time providing critical care: 30-74 minutes    Patient Progress  Patient progress: stable         DIAGNOSIS  Final diagnoses:   Acute ST elevation myocardial infarction (STEMI), unspecified artery       DISPOSITION  To cath lab     Latest Documented Vital Signs:  As of 2:11 AM  BP- (!) 159/102 HR- 62 Temp- 97.9 °F (36.6 °C) (Oral) O2 sat- 97%    --  Documentation assistance provided by geoff Melara for Dr. Gant.  Information recorded by the scribe was done at my direction and has been verified and validated by me.     Patsy Melara  08/13/17 0030       Henry Gant MD  08/13/17 0217

## 2017-08-13 NOTE — PLAN OF CARE
Problem: Patient Care Overview (Adult)  Goal: Plan of Care Review  Outcome: Ongoing (interventions implemented as appropriate)    08/13/17 1700   Coping/Psychosocial Response Interventions   Plan Of Care Reviewed With patient;spouse   Patient Care Overview   Progress improving   Outcome Evaluation   Outcome Summary/Follow up Plan Pt was a STEMI overnight. Now doing well. Radial band taken off of right wrist this AM without complication. Vitals stable. Continue to monitor. Transfer to tele in AM.       Goal: Adult Individualization and Mutuality  Outcome: Ongoing (interventions implemented as appropriate)    08/13/17 1700   Individualization   Patient Specific Goals Vitals stable, transfer to tele bed in AM.   Patient Specific Interventions Meds and treatments as ordered.         Problem: Cardiac Catheterization with/without PCI (Adult)  Goal: Signs and Symptoms of Listed Potential Problems Will be Absent or Manageable (Cardiac Catheterization with/without PCI)  Outcome: Ongoing (interventions implemented as appropriate)    08/13/17 1700   Cardiac Catheterization with/without PCI   Problems Assessed (Cardiac Catheterization) all   Problems Present (Cardiac Catheterization) none         Problem: Fall Risk (Adult)  Goal: Identify Related Risk Factors and Signs and Symptoms  Outcome: Outcome(s) achieved Date Met:  08/13/17 08/13/17 1700   Fall Risk   Fall Risk: Related Risk Factors culprit medication(s);environment unfamiliar;slipper/uneven surfaces   Fall Risk: Signs and Symptoms presence of risk factors       Goal: Absence of Falls  Outcome: Ongoing (interventions implemented as appropriate)    08/13/17 1700   Fall Risk (Adult)   Absence of Falls making progress toward outcome

## 2017-08-13 NOTE — ED TRIAGE NOTES
"Pt c/o \"chest tightness\" all the way across chest that began 2 hours ago. He also c/o SOA. Pt got 2 Nitro and 324 ASA, which dropped pain from 10 to 7.  "

## 2017-08-14 LAB
ACT BLD: 274 SECONDS (ref 82–152)
ANION GAP SERPL CALCULATED.3IONS-SCNC: 11.2 MMOL/L
BUN BLD-MCNC: 13 MG/DL (ref 8–23)
BUN/CREAT SERPL: 14.3 (ref 7–25)
CALCIUM SPEC-SCNC: 7.3 MG/DL (ref 8.6–10.5)
CHLORIDE SERPL-SCNC: 112 MMOL/L (ref 98–107)
CO2 SERPL-SCNC: 21.8 MMOL/L (ref 22–29)
CREAT BLD-MCNC: 0.91 MG/DL (ref 0.76–1.27)
DEPRECATED RDW RBC AUTO: 44.6 FL (ref 37–54)
ERYTHROCYTE [DISTWIDTH] IN BLOOD BY AUTOMATED COUNT: 13 % (ref 11.5–14.5)
GFR SERPL CREATININE-BSD FRML MDRD: 84 ML/MIN/1.73
GLUCOSE BLD-MCNC: 88 MG/DL (ref 65–99)
GLUCOSE BLDC GLUCOMTR-MCNC: 84 MG/DL (ref 70–130)
HCT VFR BLD AUTO: 38.4 % (ref 40.4–52.2)
HGB BLD-MCNC: 12.3 G/DL (ref 13.7–17.6)
MCH RBC QN AUTO: 30.4 PG (ref 27–32.7)
MCHC RBC AUTO-ENTMCNC: 32 G/DL (ref 32.6–36.4)
MCV RBC AUTO: 94.8 FL (ref 79.8–96.2)
PLATELET # BLD AUTO: 104 10*3/MM3 (ref 140–500)
PMV BLD AUTO: 12.1 FL (ref 6–12)
POTASSIUM BLD-SCNC: 3.5 MMOL/L (ref 3.5–5.2)
RBC # BLD AUTO: 4.05 10*6/MM3 (ref 4.6–6)
SODIUM BLD-SCNC: 145 MMOL/L (ref 136–145)
TROPONIN T SERPL-MCNC: 0.33 NG/ML (ref 0–0.03)
WBC NRBC COR # BLD: 6.12 10*3/MM3 (ref 4.5–10.7)

## 2017-08-14 PROCEDURE — 93010 ELECTROCARDIOGRAM REPORT: CPT | Performed by: INTERNAL MEDICINE

## 2017-08-14 PROCEDURE — 80048 BASIC METABOLIC PNL TOTAL CA: CPT | Performed by: INTERNAL MEDICINE

## 2017-08-14 PROCEDURE — 99232 SBSQ HOSP IP/OBS MODERATE 35: CPT | Performed by: INTERNAL MEDICINE

## 2017-08-14 PROCEDURE — 82962 GLUCOSE BLOOD TEST: CPT

## 2017-08-14 PROCEDURE — 93005 ELECTROCARDIOGRAM TRACING: CPT | Performed by: INTERNAL MEDICINE

## 2017-08-14 PROCEDURE — 85027 COMPLETE CBC AUTOMATED: CPT | Performed by: INTERNAL MEDICINE

## 2017-08-14 PROCEDURE — 84484 ASSAY OF TROPONIN QUANT: CPT | Performed by: INTERNAL MEDICINE

## 2017-08-14 RX ORDER — LISINOPRIL 2.5 MG/1
2.5 TABLET ORAL
Status: DISCONTINUED | OUTPATIENT
Start: 2017-08-14 | End: 2017-08-16 | Stop reason: HOSPADM

## 2017-08-14 RX ORDER — CARVEDILOL 6.25 MG/1
6.25 TABLET ORAL 2 TIMES DAILY
Status: DISCONTINUED | OUTPATIENT
Start: 2017-08-15 | End: 2017-08-16 | Stop reason: HOSPADM

## 2017-08-14 RX ADMIN — ATORVASTATIN CALCIUM 20 MG: 20 TABLET, FILM COATED ORAL at 20:14

## 2017-08-14 RX ADMIN — LISINOPRIL 2.5 MG: 2.5 TABLET ORAL at 20:14

## 2017-08-14 RX ADMIN — CARVEDILOL 3.12 MG: 3.12 TABLET, FILM COATED ORAL at 18:22

## 2017-08-14 RX ADMIN — ASPIRIN 81 MG: 81 TABLET, CHEWABLE ORAL at 08:01

## 2017-08-14 RX ADMIN — LEVOTHYROXINE SODIUM 75 MCG: 75 TABLET ORAL at 07:49

## 2017-08-14 RX ADMIN — CARVEDILOL 3.12 MG: 3.12 TABLET, FILM COATED ORAL at 08:01

## 2017-08-14 RX ADMIN — CLOPIDOGREL 75 MG: 75 TABLET, FILM COATED ORAL at 08:01

## 2017-08-14 NOTE — PLAN OF CARE
Problem: Patient Care Overview (Adult)  Goal: Plan of Care Review  Outcome: Ongoing (interventions implemented as appropriate)    08/14/17 0610   Coping/Psychosocial Response Interventions   Plan Of Care Reviewed With patient   Patient Care Overview   Progress improving   Outcome Evaluation   Outcome Summary/Follow up Plan Pt remains in CCU, no c/o chest pain. VSS, no issues noted overnight. Will continue to monitor.         Problem: Cardiac Catheterization with/without PCI (Adult)  Goal: Signs and Symptoms of Listed Potential Problems Will be Absent or Manageable (Cardiac Catheterization with/without PCI)  Outcome: Ongoing (interventions implemented as appropriate)    Problem: Fall Risk (Adult)  Goal: Absence of Falls  Outcome: Ongoing (interventions implemented as appropriate)

## 2017-08-14 NOTE — PLAN OF CARE
Problem: Patient Care Overview (Adult)  Goal: Plan of Care Review  Outcome: Ongoing (interventions implemented as appropriate)    08/14/17 1504   Coping/Psychosocial Response Interventions   Plan Of Care Reviewed With patient;spouse   Patient Care Overview   Progress improving   Outcome Evaluation   Outcome Summary/Follow up Plan Vitals stable. Remains in SR. No c/o chest pain or SOA. No issues with right radial site. Continue to monitor. Transfer to CVI/tele bed when one becomes available.       Goal: Adult Individualization and Mutuality  Outcome: Ongoing (interventions implemented as appropriate)    08/14/17 1504   Individualization   Patient Specific Goals Transfer to tele.   Patient Specific Interventions Meds and treatments as ordered. Risk modification for cardiac health.         Problem: Cardiac Catheterization with/without PCI (Adult)  Goal: Signs and Symptoms of Listed Potential Problems Will be Absent or Manageable (Cardiac Catheterization with/without PCI)  Outcome: Ongoing (interventions implemented as appropriate)    08/14/17 1504   Cardiac Catheterization with/without PCI   Problems Assessed (Cardiac Catheterization) all   Problems Present (Cardiac Catheterization) none         Problem: Fall Risk (Adult)  Goal: Absence of Falls  Outcome: Ongoing (interventions implemented as appropriate)    08/14/17 1504   Fall Risk (Adult)   Absence of Falls making progress toward outcome

## 2017-08-14 NOTE — PROGRESS NOTES
Hospital Follow Up        Chief Complaint: Follow up anterior MI    Interval History: No chest pain or dyspnea.    Objective:     Objective:  Temp:  [97.8 °F (36.6 °C)-98.3 °F (36.8 °C)] 97.9 °F (36.6 °C)  Heart Rate:  [61-90] 76  Resp:  [16-17] 17  BP: (138-158)/(81-98) 148/98     Intake/Output Summary (Last 24 hours) at 08/14/17 1715  Last data filed at 08/14/17 1150   Gross per 24 hour   Intake             1020 ml   Output              925 ml   Net               95 ml     Body mass index is 35.34 kg/(m^2).  Last 3 weights    08/13/17  0012 08/13/17  0159 08/14/17  1629   Weight: 235 lb (107 kg) 246 lb 3.2 oz (112 kg) 246 lb 5 oz (112 kg)     Weight change:       Physical Exam:   General : Alert, cooperative, in no acute distress.  Neuro: alert,cooperative and oriented  Lungs: CTAB. Normal respiratory effort and rate.  CV:: Regular rate and rhythm, normal S1 and S2, no murmurs, gallops or rubs.  ABD: Soft, nontender, non-distended. positive bowel sounds  Extr: No edema or cyanosis, moves all extremities    Lab Review:     Results from last 7 days  Lab Units 08/14/17 0449 08/13/17  0508 08/13/17  0016   SODIUM mmol/L 145 141 144   POTASSIUM mmol/L 3.5 5.0 4.1   CHLORIDE mmol/L 112* 106 108*   CO2 mmol/L 21.8* 24.9 22.8   BUN mg/dL 13 18 20   CREATININE mg/dL 0.91 1.25 1.21   GLUCOSE mg/dL 88 123* 135*   CALCIUM mg/dL 7.3* 9.5 9.6   AST (SGOT) U/L  --   --  16   ALT (SGPT) U/L  --   --  20       Results from last 7 days  Lab Units 08/14/17 0449 08/13/17  0508 08/13/17  0016   TROPONIN T ng/mL 0.326* 0.621* 0.011       Results from last 7 days  Lab Units 08/14/17 0449 08/13/17  0508   WBC 10*3/mm3 6.12 7.19   HEMOGLOBIN g/dL 12.3* 13.8   HEMATOCRIT % 38.4* 42.6   PLATELETS 10*3/mm3 104* 121*       Results from last 7 days  Lab Units 08/13/17  0016   INR  0.93           Results from last 7 days  Lab Units 08/13/17  0508   CHOLESTEROL mg/dL 158   TRIGLYCERIDES mg/dL 129   HDL CHOL mg/dL 36*   LDL CHOL mg/dL 96              I reviewed the patient's new clinical results.  I personally viewed and interpreted the patient's EKG  Current Medications:   Scheduled Meds:  aspirin 81 mg Oral Daily   atorvastatin 20 mg Oral Nightly   carvedilol 3.125 mg Oral BID   clopidogrel 75 mg Oral Daily   levothyroxine 75 mcg Oral Q AM     Continuous Infusions:     Allergies:  Allergies   Allergen Reactions   • Hydromorphone Nausea And Vomiting and Shortness Of Breath   • Demerol [Meperidine] Nausea And Vomiting       Assessment/Plan:     1. Anterior myocardial infarction. Status post drug eluting stent placement of mid LAD.    2. ICMP. EF 45%.  On carvedilol.   3. Multivessel CAD  4. Hypertension  5. Hypothyroidism     - Reviewed cath again- I think CABG may be the better option.  Will consult CT surgery.   - Increase carvedilol and add low dose lisinopril      Edyta Ye MD  08/14/17  5:15 PM

## 2017-08-14 NOTE — NURSING NOTE
"Met with nice man and his wife at bedside.  Explained purpose of cardiac rehab.  Pt and wife are interested, but say [facility] is closer to where they live.  Both said doctors are still deciding what to do about other blocked arteries.  He may need another PCI or OPH surgery.  Provided them with booklet, \"Understanding Cardiac Rehabilitation\"  Explained when program would start if he needed OPH surgery.  They have [facility] and our programs' phone numbers to call and set up appointment.  "

## 2017-08-14 NOTE — PAYOR COMM NOTE
"Luigi Palumbo (62 y.o. Male)          ATTENTION; PENDING REF#[id number], CLINICALS FOR YOUR REVIEW, REPLY TO UR DEPT , ROSANNA MONTAÑO N  OR UR  [phone]       Date of Birth Social Security Number Address Home Phone MRN    1955  [id number] [facility] [address] [phone] [id number]    Confucianist Marital Status          Bahai        Admission Date Admission Type Admitting Provider Attending Provider Department, Room/Bed    17 Emergency Edyta Ye MD Gondi, Sreedevi, MD [facility] CORONARY CARE, 336/    Discharge Date Discharge Disposition Discharge Destination                      Attending Provider: Edyta Ye MD     Allergies:  Hydromorphone, Demerol [Meperidine]    Isolation:  None   Infection:  None   Code Status:  FULL    Ht:  70\" (177.8 cm)   Wt:  246 lb 3.2 oz (112 kg)    Admission Cmt:  None   Principal Problem:  None                Active Insurance as of 2017     Primary Coverage     Payor Plan Insurance Group Employer/Plan Group    [employer] BLUE CROSS AN[employer] BLUE CROSS BLUE SHIELD PPO [id number]     Payor Plan Address Payor Plan Phone Number Effective From Effective To    [address] [phone] 2014     [place] [address]       Subscriber Name Subscriber Birth Date Member ID       LUIGI PALUMBO 1955 [id number]                 Emergency Contacts      (Rel.) Home Phone Work Phone Mobile Phone    Brittany Palumbo (Spouse) [phone] -- [phone]               History & Physical      Edyta Ye MD at 2017  1:16 AM              History And Physical Assessment    Patient Name: Luigi Palumbo  Age/Sex: 62 y.o. male  : 1955  MRN: [id number]    Date of Hospital Admission: 2017  Date of Encounter Visit: 17  Encounter Provider: Edyta Ye MD  Place of Service: [facility]  Patient Care Team:  Jake Echeverria MD as PCP " - General    Subjective:     Chief Complaint:  Anterior MI    History of Present Illness:  Luigi Steel is a 62 y.o. male with a history of hypothyroidism, osteoarthritis, prior colon cancer status post partial colectomy who was brought by EMS for anterior myocardial infarction.  The patient reports he had sudden onset of pain 2 hours before his arrival.  Describes the pain as occurring across his chest.  No prior chest pain or dyspnea before this presentation.  No prior cardiac history.  On arrival he was brought directly to cardiac catheterization laboratory where he was found to have a thrombotic stenosis of his mid LAD.  Additionally he was noted to have proximal LAD, left circumflex, and JOSEY stenosis.  Due to his presentation proceeded with PCI and drug eluting stent placement of his mid LAD.  His pain improved by the end of the procedure.     Past Medical History:  Past Medical History:   Diagnosis Date   • Colon cancer    • Elevated blood pressure reading without diagnosis of hypertension    • H/O complete eye exam 2016   • Hypothyroidism, acquired    • Insomnia    • Osteoarthritis, multiple sites        Past Surgical History:   Procedure Laterality Date   • COLECTOMY PARTIAL / TOTAL  1994   • COLONOSCOPY  03/18/2009   • HERNIA REPAIR  04/02/2009    hiatal       Home Medications:   Prescriptions Prior to Admission   Medication Sig Dispense Refill Last Dose   • levothyroxine (SYNTHROID, LEVOTHROID) 75 MCG tablet Take 1 tablet by mouth Daily. 30 tablet 5    • meloxicam (MOBIC) 15 MG tablet Take 1 tablet by mouth Daily. 30 tablet 5    • Solifenacin Succinate (VESICARE PO) Take  by mouth.      • zolpidem (AMBIEN) 10 MG tablet Take 1 tablet by mouth At Night As Needed for Sleep. 30 tablet 2    • azithromycin (ZITHROMAX Z-PEGGY) 250 MG tablet Take 2 tablets the first day, then 1 tablet daily for 4 days. 6 tablet 0        Allergies:  Allergies   Allergen Reactions   • Hydromorphone Nausea And Vomiting and Shortness  Of Breath   • Demerol [Meperidine] Nausea And Vomiting       Past Social History:  Social History     Social History   • Marital status:      Spouse name: N/A   • Number of children: N/A   • Years of education: N/A     Social History Main Topics   • Smoking status: Never Smoker   • Smokeless tobacco: None   • Alcohol use No   • Drug use: None   • Sexual activity: Not Asked         Past Family History:  Family History   Problem Relation Age of Onset   • Cancer Other      colon   • Heart disease Other    • Hypertension Other    • Gallbladder disease Other        Review of Systems:   All systems reviewed. Pertinent positives identified in HPI. All other systems are negative.    Objective:   Temp:  [95.7 °F (35.4 °C)] 95.7 °F (35.4 °C)  Heart Rate:  [77] 77  Resp:  [18-20] 18  BP: (126)/(115) 126/115    Intake/Output Summary (Last 24 hours) at 08/13/17 0116  Last data filed at 08/13/17 0110   Gross per 24 hour   Intake              300 ml   Output                0 ml   Net              300 ml     Body mass index is 33.72 kg/(m^2).  Last 3 weights    08/13/17  0012   Weight: 235 lb (107 kg)     Weight change:     Physical Exam:   General Appearance:    Alert, cooperative, in no acute distress   Head:    Normocephalic, without obvious abnormality, atraumatic   Eyes:            Lids and lashes normal, conjunctivae and sclerae normal, no   icterus, no pallor, corneas clear, PERRLA   Ears:    Ears appear intact with no abnormalities noted   Neck:   No adenopathy, supple, trachea midline, no thyromegaly, no   carotid bruit, no JVD   Lungs:     Clear to auscultation,respirations regular, even and unlabored    Heart:    Regular rhythm and normal rate, normal S1 and S2, no murmur, no gallop, no rub, no click   Chest Wall:    No abnormalities observed   Abdomen:     Normal bowel sounds, no masses, no organomegaly, soft        non-tender, non-distended, no guarding, no rebound  tenderness   Extremities:   Moves all  extremities well, no edema, no cyanosis, no redness   Pulses:   Pulses palpable and equal bilaterally. Normal radial, carotid, femoral, dorsalis pedis and posterior tibial pulses bilaterally. Normal abdominal aorta   Skin:  Psychiatric:   No bleeding, bruising or rash    Alert and oriented x 3, normal mood and affect       Lab Review:     Results from last 7 days  Lab Units 08/13/17  0016   SODIUM mmol/L 144   POTASSIUM mmol/L 4.1   CHLORIDE mmol/L 108*   CO2 mmol/L 22.8   BUN mg/dL 20   CREATININE mg/dL 1.21   GLUCOSE mg/dL 135*   CALCIUM mg/dL 9.6   AST (SGOT) U/L 16   ALT (SGPT) U/L 20       Results from last 7 days  Lab Units 08/13/17  0016   TROPONIN T ng/mL 0.011       Results from last 7 days  Lab Units 08/13/17  0016   WBC 10*3/mm3 7.76   HEMOGLOBIN g/dL 14.2   HEMATOCRIT % 43.2   PLATELETS 10*3/mm3 133*       Results from last 7 days  Lab Units 08/13/17  0016   INR  0.93                     Imaging:  Imaging Results (most recent)     Procedure Component Value Units Date/Time    XR Chest 1 View [[id number]] Collected:  08/13/17 0029     Updated:  08/13/17 0034    Narrative:       PORTABLE CHEST X-RAY     HISTORY: Acute STEMI traige protocol     COMPARISON: None.     FINDINGS: Portable AP view of the chest was obtained. Lungs are poorly  expanded . A small left pleural effusion is noted with probable adjacent  atelectasis. Right lung is under aerated but clear.  Heart size and  pulmonary vasculature are likely normal considering poor inspiration.    Regional osseous structures appear intact.  Consider dedicated skeletal  imaging of any affected areas if clinically indicated.        Impression:          Poor inspiration with small left effusion and probable adjacent  atelectasis     This report was finalized on 8/13/2017 12:30 AM by Aris Hernández MD.           I personally viewed and interpreted the patient's EKG    Assessment/Plan:     1. Anterior myocardial infarction. Status post drug eluting stent  placement of mid LAD.    2. Multivessel CAD  3. Hypertension  4. Hypothyroidism    - Will continue dual antiplatelet therapy for now  - Start carvedilol for hypertension  - Start statin  - Continue levothyroxine  -  Check echocardiogram    Edyta Ye MD  08/13/17  1:16 AM                           Electronically signed by Edyta Ye MD at 8/13/2017  2:04 AM           Emergency Department Notes      Henry Gant MD at 8/13/2017 12:09 AM      Procedure Orders:    1. Critical Care [[id number]] ordered by Henry Gant MD at 08/13/17 0214                 EMERGENCY DEPARTMENT ENCOUNTER    CHIEF COMPLAINT  Chief Complaint: chest pain   History given by: pt, EMS  History limited by: none  Room Number: 336/1  PMD: Jake Echeverria MD      HPI:  Pt is a 62 y.o. male who presents complaining of central chest pain for the past 2 hours. He describes he pain as tight and burning. Pain does not radiate and has no known modifying factors.  Associated sx include SOA, diaphoresis, nausea and two episodes of vomiting. PTA he was given four baby aspirin and 2 NTG by EMS which improved his pain from a 10 to a 6. He denies cough, fever, leg swelling and other complaints at this time.       Duration:  2 hours   Onset: gradual   Timing: constant   Location: central chest   Radiation: none  Quality: tightness, burning   Intensity/Severity: moderate   Progression: unchanged   Associated Symptoms: SOA, diaphoresis, nausea, vomiting   Aggravating Factors: none  Alleviating Factors: none  Previous Episodes: No prior episodes reported  Treatment before arrival: PTA he was given four baby aspirin and 2 NTG by EMS which improved his pain from a 10 to a 6.       PAST MEDICAL HISTORY  Active Ambulatory Problems     Diagnosis Date Noted   • Colon cancer    • Elevated blood pressure reading without diagnosis of hypertension    • Hypothyroidism, acquired    • Insomnia    • Osteoarthritis, multiple sites    • Hyperlipidemia 03/22/2016      Resolved Ambulatory Problems     Diagnosis Date Noted   • No Resolved Ambulatory Problems     Past Medical History:   Diagnosis Date   • Colon cancer    • Elevated blood pressure reading without diagnosis of hypertension    • H/O complete eye exam 2016   • Hypothyroidism, acquired    • Insomnia    • Osteoarthritis, multiple sites        PAST SURGICAL HISTORY  Past Surgical History:   Procedure Laterality Date   • COLECTOMY PARTIAL / TOTAL  1994   • COLONOSCOPY  03/18/2009   • HERNIA REPAIR  04/02/2009    hiatal       FAMILY HISTORY  Family History   Problem Relation Age of Onset   • Cancer Other      colon   • Heart disease Other    • Hypertension Other    • Gallbladder disease Other        SOCIAL HISTORY  Social History     Social History   • Marital status:      Spouse name: N/A   • Number of children: N/A   • Years of education: N/A       Social History Main Topics   • Smoking status: Never Smoker   • Smokeless tobacco: Not on file   • Alcohol use No   • Drug use: Not on file   • Sexual activity: Not on file         ALLERGIES  Hydromorphone and Demerol [meperidine]    REVIEW OF SYSTEMS  Review of Systems   Constitutional: Positive for diaphoresis. Negative for activity change, appetite change and fever.   HENT: Negative for congestion and sore throat.    Eyes: Negative.    Respiratory: Positive for shortness of breath. Negative for cough.    Cardiovascular: Positive for chest pain. Negative for leg swelling.   Gastrointestinal: Positive for nausea and vomiting. Negative for abdominal pain and diarrhea.   Endocrine: Negative.    Genitourinary: Negative for decreased urine volume and dysuria.   Musculoskeletal: Negative for neck pain.   Skin: Negative for rash and wound.   Allergic/Immunologic: Negative.    Neurological: Negative for weakness, numbness and headaches.   Hematological: Negative.    Psychiatric/Behavioral: Negative.    All other systems reviewed and are negative.      PHYSICAL EXAM  ED  Triage Vitals   Temp Heart Rate Resp BP SpO2   -- 08/13/17 0008 08/13/17 0008 08/13/17 0008 08/13/17 0008    77 20 126/115 95 %      Temp src Heart Rate Source Patient Position BP Location FiO2 (%)   -- 08/13/17 0008 -- -- --    Monitor          Physical Exam   Constitutional: He is oriented to person, place, and time.   appears uncomfortable        HENT:   Head: Normocephalic and atraumatic.   Eyes: EOM are normal. Pupils are equal, round, and reactive to light.   Neck: Normal range of motion. Neck supple.   Cardiovascular: Normal rate, regular rhythm and normal heart sounds.    Equal radial and pedal pulses    Pulmonary/Chest: Effort normal and breath sounds normal. No respiratory distress.   Abdominal: Soft. There is no tenderness. There is no rebound and no guarding.   Obese    Musculoskeletal: Normal range of motion. He exhibits no edema or tenderness (calf).   Neurological: He is alert and oriented to person, place, and time. He has normal sensation and normal strength.   Skin: Skin is warm and dry.   Psychiatric: Mood and affect normal.   Nursing note and vitals reviewed.      LAB RESULTS  Lab Results (last 24 hours)     Procedure Component Value Units Date/Time    CBC & Differential [[id number]] Collected:  08/13/17 0016    Specimen:  Blood Updated:  08/13/17 0035    Narrative:       The following orders were created for panel order CBC & Differential.  Procedure                               Abnormality         Status                     ---------                               -----------         ------                     CBC Auto Differential[[id number]]        Abnormal            Final result                 Please view results for these tests on the individual orders.    Troponin [[id number]]  (Normal) Collected:  08/13/17 0016    Specimen:  Blood Updated:  08/13/17 0056     Troponin T 0.011 ng/mL     Narrative:       Troponin T Reference Ranges:  Less than 0.03 ng/mL:    Negative for AMI  0.03 to 0.09  ng/mL:      Indeterminant for AMI  Greater than 0.09 ng/mL: Positive for AMI    Comprehensive Metabolic Panel [[id number]]  (Abnormal) Collected:  08/13/17 0016    Specimen:  Blood Updated:  08/13/17 0056     Glucose 135 (H) mg/dL      BUN 20 mg/dL      Creatinine 1.21 mg/dL      Sodium 144 mmol/L      Potassium 4.1 mmol/L      Chloride 108 (H) mmol/L      CO2 22.8 mmol/L      Calcium 9.6 mg/dL      Total Protein 6.6 g/dL      Albumin 4.10 g/dL      ALT (SGPT) 20 U/L      AST (SGOT) 16 U/L      Alkaline Phosphatase 85 U/L      Total Bilirubin 0.5 mg/dL      eGFR Non African Amer 61 mL/min/1.73      Globulin 2.5 gm/dL      A/G Ratio 1.6 g/dL      BUN/Creatinine Ratio 16.5     Anion Gap 13.2 mmol/L     Protime-INR [[id number]]  (Normal) Collected:  08/13/17 0016    Specimen:  Blood Updated:  08/13/17 0044     Protime 12.1 Seconds      INR 0.93    CBC Auto Differential [[id number]]  (Abnormal) Collected:  08/13/17 0016    Specimen:  Blood Updated:  08/13/17 0035     WBC 7.76 10*3/mm3      RBC 4.61 10*6/mm3      Hemoglobin 14.2 g/dL      Hematocrit 43.2 %      MCV 93.7 fL      MCH 30.8 pg      MCHC 32.9 g/dL      RDW 12.9 %      RDW-SD 43.9 fl      MPV 12.0 fL      Platelets 133 (L) 10*3/mm3      Neutrophil % 67.8 %      Lymphocyte % 24.2 %      Monocyte % 6.7 %      Eosinophil % 0.9 %      Basophil % 0.1 %      Immature Grans % 0.3 %      Neutrophils, Absolute 5.26 10*3/mm3      Lymphocytes, Absolute 1.88 10*3/mm3      Monocytes, Absolute 0.52 10*3/mm3      Eosinophils, Absolute 0.07 10*3/mm3      Basophils, Absolute 0.01 10*3/mm3      Immature Grans, Absolute 0.02 10*3/mm3     POC Glucose Fingerstick [[id number]]  (Normal) Collected:  08/13/17 0149    Specimen:  Blood Updated:  08/13/17 0209     Glucose 125 mg/dL     Narrative:       Meter: [id number] : [id number] Abner West I ordered the above labs and reviewed the results    RADIOLOGY  XR Chest 1 View   Final Result       Poor inspiration with small  left effusion and probable adjacent   atelectasis       This report was finalized on 8/13/2017 12:30 AM by Aris Hernández MD.               I ordered the above noted radiological studies. Interpreted by radiologist.Reviewed by me in PACS.       PROCEDURES  Critical Care  Performed by: NEHEMIAH ROLON  Authorized by: NEHEMIAH ROLON   Total critical care time: 30 minutes  Critical care time was exclusive of separately billable procedures and treating other patients.  Critical care was necessary to treat or prevent imminent or life-threatening deterioration of the following conditions: cardiac failure and circulatory failure.  Critical care was time spent personally by me on the following activities: development of treatment plan with patient or surrogate, discussions with consultants, evaluation of patient's response to treatment, examination of patient, obtaining history from patient or surrogate, ordering and performing treatments and interventions, ordering and review of laboratory studies, ordering and review of radiographic studies, pulse oximetry and re-evaluation of patient's condition.          EKG           EKG time: 00:11  Rhythm/Rate: NSR, 63  P waves and NH: normal  QRS, axis: normal axis   ST and T waves: ST elevation in V1, V2, with ST depression laterally and inferiorly      Interpreted Contemporaneously by me, independently viewed  No old available       PROGRESS AND CONSULTS  ED Course       Received pre hospital EKG at 23:36 and it showed acute anterior STEMI. Cath lab was called at that time.     23:53 Discussed case and EKG findings with Dr Ye (cardiology).     00:14 Team C protocol initiated.   Ordered blood work, Troponin, PT with INR, CXR and EKG for further evaluation.   EKG showed STEMI. Ordered Morphine, Zofran, Nitro drip, Plavix, Heparin.     00:20 Patient's chest pain is improving.  He is being taken to the Cath Lab.      MEDICAL DECISION MAKING  Results were reviewed/discussed  with the patient and they were also made aware of online access. Pt also made aware that some labs, such as cultures, will not be resulted during ER visit and follow up with PMD is necessary.     MDM  Number of Diagnoses or Management Options  Acute ST elevation myocardial infarction (STEMI), unspecified artery:   Diagnosis management comments: Prehospital EKG showed evidence of an acute STEMI.  The cardiac catheterization lab was activated.  Case was discussed with Dr. Ye prior to his arrival in the ER..  Upon arrival to the ER, repeat EKG confirmed acute STEMI.  Patient was given aspirin prior to arrival.  He was started on nitro drip.  He was given Plavix, IV morphine, IV Zofran, and a heparin bolus.  Chest x-ray showed possible small left effusion and adjacent atelectasis.  Patient was taken emergently to the cardiac catheterization lab.  Critical care was performed on this patient.       Amount and/or Complexity of Data Reviewed  Clinical lab tests: ordered  Tests in the radiology section of CPT®:  ordered  Tests in the medicine section of CPT®:  ordered and reviewed (EKG)  Obtain history from someone other than the patient: yes (EMS)  Discuss the patient with other providers: yes (Dr Gonzáles (cardiology). )    Critical Care  Total time providing critical care: 30-74 minutes    Patient Progress  Patient progress: stable         DIAGNOSIS  Final diagnoses:   Acute ST elevation myocardial infarction (STEMI), unspecified artery       DISPOSITION  To cath lab     Latest Documented Vital Signs:  As of 2:11 AM  BP- (!) 159/102 HR- 62 Temp- 97.9 °F (36.6 °C) (Oral) O2 sat- 97%    --  Documentation assistance provided by geoff Melara for Dr. Gant.  Information recorded by the geoff was done at my direction and has been verified and validated by me.     Patsy Melara  08/13/17 0030       Henry Gant MD  08/13/17 0217       Electronically signed by Henry Gant MD at 8/13/2017  2:17 AM     "  Didi Foster RN at 8/13/2017 12:09 AM          Pt c/o \"chest tightness\" all the way across chest that began 2 hours ago. He also c/o SOA. Pt got 2 Nitro and 324 ASA, which dropped pain from 10 to 7.     Electronically signed by Didi Foster RN at 8/13/2017 12:12 AM        Lines, Drains & Airways    Active LDAs     Name:   Placement date:   Placement time:   Site:   Days:    Peripheral IV Line - Single Lumen 08/13/17 0013 median cubital vein (antecubital fossa), left 18 gauge  08/13/17    0013      1         Inactive LDAs     Name:   Placement date:   Placement time:   Removal date:   Removal time:   Site:   Days:    [REMOVED] Peripheral IV Line - Single Lumen 08/13/17 0013 metacarpal vein (top of hand), right 22 gauge  08/13/17    0013    08/14/17    0759      1    [REMOVED] Arterial Sheath 6 Fr. Right Radial  08/13/17 08/13/17    0113    Radial    less than 1                Hospital Medications (all)       Dose Frequency Start End    acetaminophen (TYLENOL) tablet 650 mg 650 mg Every 4 Hours PRN 8/13/2017     Sig - Route: Take 2 tablets by mouth Every 4 (Four) Hours As Needed for Mild Pain (1-3) or Fever (temperature greater than 101F). - Oral    aspirin chewable tablet 81 mg 81 mg Daily 8/14/2017     Sig - Route: Chew 1 tablet Daily. - Oral    atorvastatin (LIPITOR) tablet 20 mg 20 mg Nightly 8/13/2017     Sig - Route: Take 1 tablet by mouth Every Night. - Oral    carvedilol (COREG) tablet 3.125 mg 3.125 mg 2 Times Daily 8/13/2017     Sig - Route: Take 1 tablet by mouth 2 (Two) Times a Day. - Oral    clopidogrel (PLAVIX) tablet 600 mg 600 mg Once 8/13/2017 8/13/2017    Sig - Route: Take 8 tablets by mouth 1 (One) Time. - Oral    clopidogrel (PLAVIX) tablet 75 mg 75 mg Daily 8/14/2017     Sig - Route: Take 1 tablet by mouth Daily. - Oral    heparin (porcine) 5000 UNIT/ML injection 4,000 Units 37.4 Units/kg × 107 kg Once 8/13/2017 8/13/2017    Sig - Route: Infuse 0.8 mL into a venous catheter 1 (One) " Time. - Intravenous    hydrALAZINE (APRESOLINE) injection 10 mg 10 mg Every 4 Hours PRN 8/13/2017     Sig - Route: Infuse 0.5 mL into a venous catheter Every 4 (Four) Hours As Needed for High Blood Pressure. - Intravenous    levothyroxine (SYNTHROID, LEVOTHROID) tablet 75 mcg 75 mcg Every Early Morning 8/13/2017     Sig - Route: Take 1 tablet by mouth Every Morning. - Oral    morphine injection 4 mg 4 mg Once 8/13/2017 8/13/2017    Sig - Route: Infuse 1 mL into a venous catheter 1 (One) Time. - Intravenous    ondansetron (ZOFRAN) injection 4 mg 4 mg Once 8/13/2017 8/13/2017    Sig - Route: Infuse 2 mL into a venous catheter 1 (One) Time. - Intravenous    sodium chloride 0.9 % flush 10 mL 10 mL As Needed 8/13/2017     Sig - Route: Infuse 10 mL into a venous catheter As Needed for Line Care. - Intravenous    Cosign for Ordering: Accepted by Henry Gant MD on 8/13/2017 12:14 AM    sodium chloride 0.9 % infusion 100 mL/hr Continuous 8/13/2017 8/13/2017    Sig - Route: Infuse 100 mL/hr into a venous catheter Continuous. - Intravenous    zolpidem (AMBIEN) tablet 10 mg 10 mg Nightly PRN 8/13/2017     Sig - Route: Take 2 tablets by mouth At Night As Needed for Sleep. - Oral    heparin (porcine) injection (Discontinued)  As Needed 8/13/2017 8/13/2017    Sig: As Needed.    Reason for Discontinue: Patient Discharge    iopamidol (ISOVUE-370) 76 % injection (Discontinued)  As Needed 8/13/2017 8/13/2017    Sig: As Needed.    Reason for Discontinue: Patient Discharge    lidocaine (XYLOCAINE) 2% injection (Discontinued)  As Needed 8/13/2017 8/13/2017    Sig: As Needed.    Reason for Discontinue: Patient Discharge    metoprolol tartrate (LOPRESSOR) injection (Discontinued)  As Needed 8/13/2017 8/13/2017    Sig: As Needed.    Reason for Discontinue: Patient Discharge    nitroglycerin (TRIDIL) injection (Discontinued)  As Needed 8/13/2017 8/13/2017    Sig: As Needed.    Reason for Discontinue: Patient Discharge    nitroglycerin  50 mg/250 mL (0.2 mg/mL) infusion (Discontinued) 10-50 mcg/min Titrated 2017    Sig - Route: Infuse 10-50 mcg/min into a venous catheter Dose Adjusted By Provider As Needed. - Intravenous    sodium chloride 0.9 % infusion (Discontinued)  Continuous PRN 2017    Sig: Continuous As Needed.    Reason for Discontinue: Patient Discharge          [facility] CATH LAB  [address] [facility] [address] [phone]             Luigi Steel   Cardiac Catheterization/Vascular Study   Order# [id number]   Reading physician: Edyta Ye MD Ordering physician: Edyta Ye MD Study date: 17   Patient Information   Patient Name MRN Sex  (Age)   Luigi Steel [id number] Male 1955 (62 y.o.)   Date of Birth Sex Race Ethnicity Encounter Category   Mar 24, 1955 Male White or  Not  or  Emergency   Physicians   [facility] Referring Physician Case Authorizing Physician   Edyta Ye MD (Primary)     Procedures   Left Heart Cath   Stent RK coronary   Pre-procedure Diagnosis   STEMI       Conclusion   CARDIAC CATHETERIZATION REPORT      DATE OF PROCEDURE: 2017     INDICATION FOR PROCEDURE: Anterior myocardial infarction     PROCEDURE PERFORMED:   1. Left heart catheterization  2. Selective coronary angiography  3. Left ventriculography  4. Drug eluting stent placement of the mid LAD with a Xience Alpine 3.0 x 15 mm stent        PROCEDURE COMMENTS:      Patient was brought emergently to the cardiac catheterization laboratory after field activation for anterior ST elevation MI based on EKG performed in the field.  On arrival to the cardiac catheterization laboratory he was still having 4 out of 10 chest pain.     Upon arrival to the cardiac catheterization laboratory his right wrist was prepped and draped in a sterile manner.  Patient was placed under conscious sedation with Versed and fentanyl.  1 mL of 2% lidocaine was infused  into the right wrist.  Access to the right radial arteries obtained using a micropuncture needle and under ultrasound guidance followed by placement of a 6 Burkinan glide sheath.  Selective coronary angiograms were then performed using a 5 Burkinan FR4 diagnostic catheter and a 6 Burkinan XB 3.5 guiding catheter.          INTERVENTION:     Based on the findings of thrombotic stenosis of the mid LAD that appeared to be a culprit lesion I proceeded with PCI.  He received an additional 7000 units of unfractionated heparin with a resulting ACT of 270.  Prior to arriving to the cardiac catheterization laboratory he received aspirin 324 mg and clopidogrel 600 mg.  A 0.014 BMW wire was used to cross the mid LAD stenosis.  Due to thrombotic nature of the lesion did consider using an aspiration catheter but was concerned that would not be able to advance this due to the proximal LAD stenosis and small caliber this vessel.  Instead proceeded with balloon angioplasty using a Trek 2.5 x 12 mm balloon which is deployed at a maximum pressure of 8 jacob.  Repeat angiograms continued to show a 90% mid LAD stenosis.  At this point decided to proceed with drug-eluting stent placement of the mid LAD using a Xience Alpine 3.0 x 15 mm stent.  I was unable to advance the stent initially pass the calcified proximal LAD stenosis.  At this point placed a second guiding wire using a 0.014 Runthrough wire.  Over the Runthrough wire I was able to advance the stent across the lesion.  The BMW wire was then pulled back into the guide and the stent was deployed at a maximum pressure of 12 jacob.  The stent was then postdilated using an NC Trek 3.0 x 12 mm balloon at a maximum pressure of 14 jacob.  Repeat angiograms showed good results.  The mid LAD stent appeared to be well sized and well opposed.  There was no evidence of dissection, perforation, or intramural hematoma.  There was JORDAN-3 flow seen distally.        Prior to completion of the procedure I  proceeded with a left heart catheterization and left ventricular angiogram using a 5 Malian radial pigtail catheter.  Following completion of the procedure all wires and catheters removed.  The 6 Malian radial sheath was removed and a comfort band place.  After hemostasis was achieved the patient was transferred to the coronary care unit in stable condition.  There were no immediate complications and the patient tolerated the procedure well.        FINDINGS:     CORONARY ANGIOGRAPHY:   1.  Left main: 0% stenosis.  The vessel trifurcates into a left anterior descending, ramus intermedius, and left circumflex arteries.  2.  Left anterior descending artery: 70% calcified ostial stenosis.  There is a small first diagonal branch with a 90% proximal stenosis.  Just prior to the origin of a moderate size second diagonal branch there is a hazy, thrombotic appearing 99% stenosis with JORDAN 2 flow distally.  30% distal LAD stenosis.  3.  Ramus intermedius: Large vessel with 30% proximal stenosis.  4.  Left circumflex artery: 60-70% proximal stenosis.  It then gives off a large first obtuse marginal branch a focal distal 95% stenosis.  5.  Right coronary artery: Scattered proximal to mid 10% stenosis.  30% distal stenosis.  The vessel bifurcates into a moderate-sized posterior descending and small posterior lateral arteries.  The PDA appears to have no significant disease.  A large secondary branch of the JOSEY artery appears to be occluded at the origin.  This appears to be filled by left-sided collaterals.        LEFT VENTRICULAR HEMODYNAMICS:    1.  Left ventricular pressure: 174/39  2.  Aortic pressure: 173/82     LEFT VENTRICULOGRAPHY:   Normal left ventricular systolic function and wall motion with an estimated ejection fraction of 55%.        CORONARY INTERVENTION FINDINGS:     PCI CORONARY SEGMENT: mid LAD  PRE-STENOSIS: 99%  POST-STENOSIS: 0%  LESION TYPE: C  JORDAN FLOW PRE/POST: 2/3  CULPRIT LESION: yes  DISSECTION:  none        POST-PROCEDURE DIAGNOSIS:   1. Anterior myocardial infarction status post drug eluting stent placement of mid LAD  2. Multivessel coronary artery disease        RECOMMENDATIONS:   Continue dual antiplatelet therapy for now.  Will determine how to readdress remaining CAD with either CABG, staged PCI or medical management.      Radiation      Event Details User   1:13 AM Radiation Tracking Cumulative Air Kerma: Total Dose (mGy) = 795.080  Physician: Edyta Ye MD  Dose (mGy) = 795.080  Fluoro Time (mins) = 7.0    Stent Inflated      Event Details User   12:57 AM Stent Inflated Stent Xience Alpine Johny Rx [id number] - First balloon inflation max pressure = 10 jacob. First balloon inflation duration = 10 seconds.     Balloon Inflated      Event Details User   12:43 AM Balloon Inflated No Supply Selected - First balloon inflation max pressure = 8 jacob. First balloon inflation duration = 12 seconds.     1:00 AM Balloon Inflated Baln Trek Nc Rx 3x15mm - First balloon inflation max pressure = 12 jacob. First balloon inflation duration = 4 seconds. Second inflation of balloon - Max pressure = 15 jacob. 2nd Inflation of balloon - Duration = 4 seconds.     Medications   As of 08/13/17 0130   (Filter:  CV CONTRAST GROUPER Medications Shown)   iopamidol (ISOVUE-370) 76 % injection (mL)   Total dose:  182 mL    Dose Action Route Admin Date/Time    Admin User   182 mL Given Intravenous 08/13/17 0109  Edyta Ye MD         Printable Result Report   Result Report    Encounter   View Encounter      PACS Images   Show images for Cardiac Catheterization/Vascular Study   Signed   Electronically signed by Edyta Ye MD on 8/13/17 at 0158 EDT   Encounter-Level Cardiology Documents:   There are no encounter-level cardiology documents.    Link to Procedure Log   Procedure Log      Order-Level Documents:   Scan on 8/13/2017 12:21 AM : CARDIAC CATH HEMO DATA - SCANScan on 8/13/2017 12:21 AM : CARDIAC CATH HEMO DATA  - SCAN   Results Routing Tracking   View Results Routing Information   Cardiac Catheterization/Vascular Study [CATH01] (Order [id number])   Cardiac Cath   Date: 8/13/2017 Department: [facility] CORONARY CARE Released By: Alice Alfred Authorizing: Edyta Ye MD   Order-Level Documents:   Scan on 8/13/2017 12:21 AM : CARDIAC CATH HEMO DATA - SCANScan on 8/13/2017 12:21 AM : CARDIAC CATH HEMO DATA - SCAN   Order History   Inpatient   Date/Time Action Taken User Additional Information   08/13/17 0015 Release Alice Alfred From Order: [id number]   08/13/17 0027 Result Mindy Bowens RN In process   08/13/17 0122 Result Edyta Ye MD Final   08/14/17 1210 Complete Marli G Lloyd    Order Details   Frequency Duration Priority Order Class   Once 1  occurrence STAT Hospital Performed   Start Date/Time   Start Date Start Time   08/13/17 0016   Procedures Performed   Code Procedure Name   CATH27 LEFT HEART CATH   CATH16 STENT RK - CORONARY   Completion Info   User Date/Time   Marli G Lloyd 08/14/17 1210   Acknowledgement Info   For At Acknowledged By Acknowledged On   Placing Order 08/13/17 0015 David Joseph RN 08/13/17 0208   Reprint Order Requisition   Cardiac Catheterization/Vascular Study (Order #[id number]) on 8/13/17   Encounter-Level Cardiology Documents:   There are no encounter-level cardiology documents.   Encounter   View Encounter   Appointments for this Order   8/13/2017  [facility] Cath Lab   Order Provider Info       Office phone Pager E-mail   Ordering User Alice Alfred -- -- --   Authorizing Provider Edyta Ye [phone] -- --   Attending Provider Edyta Ye [phone] -- --   Billing Provider Edyta Ye [phone] -- --   Linked Charges   No charges linked to this procedure   Order Transmittal Tracking   Cardiac Catheterization/Vascular Study (Order #[id number]) on 8/13/17   Authorized by:  Edyta  MD Cassi  (NPI: [id number])       Reviewed By Tio Echeverria MD on 8/13/2017  5:41 PM   Edyta Ye MD on 8/13/2017  2:04 AM

## 2017-08-14 NOTE — PROGRESS NOTES
Discharge Planning Assessment  [facility]     Patient Name: Luigi Steel  MRN: [id number]  Today's Date: 8/14/2017    Admit Date: 8/13/2017          Discharge Needs Assessment       08/14/17 1434    Living Environment    Lives With spouse    Living Arrangements house    Transportation Available family or friend will provide    Living Environment    Provides Primary Care For no one    Quality Of Family Relationships supportive    Able to Return to Prior Living Arrangements yes    Discharge Needs Assessment    Concerns To Be Addressed denies needs/concerns at this time    Readmission Within The Last 30 Days no previous admission in last 30 days    Anticipated Changes Related to Illness none    Equipment Currently Used at Home none    Equipment Needed After Discharge none    Discharge Facility/Level Of Care Needs other (see comments)    Current Discharge Risk dependent with mobility/activities of daily living    Discharge Disposition still a patient            Discharge Plan       08/14/17 1435    Case Management/Social Work Plan    Plan Home with family    Patient/Family In Agreement With Plan yes    Additional Comments Spoke with pt at bedside.  Introduced self and explained role.  CCP contact information provided.  Face sheet verified.  Pt denies any prior need for HH or SNF.  He denies any dc needs at this time.  CCP will follow.        Discharge Placement     No information found                Demographic Summary       08/14/17 1434    Referral Information    Admission Type inpatient    Arrived From admitted as an inpatient;home or self-care    Primary Care Physician Information    Name Jake Echeverria MD            Functional Status       08/14/17 1434    Functional Status Prior    Ambulation 0-->independent    Transferring 0-->independent    Toileting 0-->independent    Bathing 0-->independent    Dressing 0-->independent    Eating 0-->independent    Communication 0-->understands/communicates without  difficulty    Swallowing 0-->swallows foods/liquids without difficulty    IADL    Medications independent    Meal Preparation independent    Housekeeping independent    Laundry independent    Shopping independent    Oral Care independent            Psychosocial     None            Abuse/Neglect     None            Legal     None            Substance Abuse     None            Patient Forms     None          Brittany Brunson RN

## 2017-08-15 ENCOUNTER — APPOINTMENT (OUTPATIENT)
Dept: CARDIOLOGY | Facility: HOSPITAL | Age: 62
End: 2017-08-15

## 2017-08-15 LAB
ANION GAP SERPL CALCULATED.3IONS-SCNC: 14.1 MMOL/L
BUN BLD-MCNC: 17 MG/DL (ref 8–23)
BUN/CREAT SERPL: 14.8 (ref 7–25)
CALCIUM SPEC-SCNC: 9.2 MG/DL (ref 8.6–10.5)
CHLORIDE SERPL-SCNC: 106 MMOL/L (ref 98–107)
CO2 SERPL-SCNC: 22.9 MMOL/L (ref 22–29)
CREAT BLD-MCNC: 1.15 MG/DL (ref 0.76–1.27)
DEPRECATED RDW RBC AUTO: 45.4 FL (ref 37–54)
ERYTHROCYTE [DISTWIDTH] IN BLOOD BY AUTOMATED COUNT: 13.1 % (ref 11.5–14.5)
GFR SERPL CREATININE-BSD FRML MDRD: 64 ML/MIN/1.73
GLUCOSE BLD-MCNC: 97 MG/DL (ref 65–99)
HCT VFR BLD AUTO: 44.6 % (ref 40.4–52.2)
HGB BLD-MCNC: 14 G/DL (ref 13.7–17.6)
MCH RBC QN AUTO: 30 PG (ref 27–32.7)
MCHC RBC AUTO-ENTMCNC: 31.4 G/DL (ref 32.6–36.4)
MCV RBC AUTO: 95.7 FL (ref 79.8–96.2)
PLATELET # BLD AUTO: 126 10*3/MM3 (ref 140–500)
PMV BLD AUTO: 11.5 FL (ref 6–12)
POTASSIUM BLD-SCNC: 4.1 MMOL/L (ref 3.5–5.2)
RBC # BLD AUTO: 4.66 10*6/MM3 (ref 4.6–6)
SODIUM BLD-SCNC: 143 MMOL/L (ref 136–145)
WBC NRBC COR # BLD: 6.63 10*3/MM3 (ref 4.5–10.7)

## 2017-08-15 PROCEDURE — 80048 BASIC METABOLIC PNL TOTAL CA: CPT | Performed by: INTERNAL MEDICINE

## 2017-08-15 PROCEDURE — 99232 SBSQ HOSP IP/OBS MODERATE 35: CPT | Performed by: INTERNAL MEDICINE

## 2017-08-15 PROCEDURE — 85027 COMPLETE CBC AUTOMATED: CPT | Performed by: INTERNAL MEDICINE

## 2017-08-15 PROCEDURE — 93005 ELECTROCARDIOGRAM TRACING: CPT | Performed by: INTERNAL MEDICINE

## 2017-08-15 PROCEDURE — 93880 EXTRACRANIAL BILAT STUDY: CPT

## 2017-08-15 PROCEDURE — 93010 ELECTROCARDIOGRAM REPORT: CPT | Performed by: INTERNAL MEDICINE

## 2017-08-15 PROCEDURE — 99254 IP/OBS CNSLTJ NEW/EST MOD 60: CPT | Performed by: NURSE PRACTITIONER

## 2017-08-15 PROCEDURE — 93970 EXTREMITY STUDY: CPT

## 2017-08-15 RX ADMIN — LEVOTHYROXINE SODIUM 75 MCG: 75 TABLET ORAL at 09:25

## 2017-08-15 RX ADMIN — CARVEDILOL 6.25 MG: 6.25 TABLET, FILM COATED ORAL at 18:53

## 2017-08-15 RX ADMIN — CLOPIDOGREL 75 MG: 75 TABLET, FILM COATED ORAL at 09:25

## 2017-08-15 RX ADMIN — ATORVASTATIN CALCIUM 20 MG: 20 TABLET, FILM COATED ORAL at 20:51

## 2017-08-15 RX ADMIN — ASPIRIN 81 MG: 81 TABLET, CHEWABLE ORAL at 09:20

## 2017-08-15 RX ADMIN — CARVEDILOL 6.25 MG: 6.25 TABLET, FILM COATED ORAL at 09:20

## 2017-08-15 RX ADMIN — LISINOPRIL 2.5 MG: 2.5 TABLET ORAL at 09:20

## 2017-08-15 NOTE — PLAN OF CARE
Problem: Patient Care Overview (Adult)  Goal: Plan of Care Review  Outcome: Ongoing (interventions implemented as appropriate)    08/15/17 1755   Coping/Psychosocial Response Interventions   Plan Of Care Reviewed With patient   Patient Care Overview   Progress no change   Outcome Evaluation   Outcome Summary/Follow up Plan Pt s/p cardiac cath. VSS. No complaints. CABG preop diagnostics, surgery scheduling pending results and platelet count. Will continue to monitor.        Goal: Adult Individualization and Mutuality  Outcome: Ongoing (interventions implemented as appropriate)    08/15/17 1755   Individualization   Patient Specific Preferences Patient watches the Young [employer]       Goal: Discharge Needs Assessment  Outcome: Ongoing (interventions implemented as appropriate)    08/14/17 1434 08/15/17 1755   Discharge Needs Assessment   Concerns To Be Addressed --  denies needs/concerns at this time   Readmission Within The Last 30 Days --  no previous admission in last 30 days   Living Environment   Transportation Available family or friend will provide --          Problem: Cardiac Catheterization with/without PCI (Adult)  Goal: Signs and Symptoms of Listed Potential Problems Will be Absent or Manageable (Cardiac Catheterization with/without PCI)  Outcome: Ongoing (interventions implemented as appropriate)    Problem: Fall Risk (Adult)  Goal: Absence of Falls  Outcome: Ongoing (interventions implemented as appropriate)    08/15/17 1755   Fall Risk (Adult)   Absence of Falls making progress toward outcome         Problem: Acute Coronary Syndrome (ACS) (Adult)  Goal: Signs and Symptoms of Listed Potential Problems Will be Absent or Manageable (Acute Coronary Syndrome)  Outcome: Ongoing (interventions implemented as appropriate)    08/15/17 1755   Acute Coronary Syndrome (ACS)   Problems Assessed (Acute Coronary Syndrome (ACS)) all   Problems Present (Acute Coronary Syndrome (ACS)) none

## 2017-08-15 NOTE — CONSULTS
"  Patient Care Team:  Jake Echeverria MD as PCP - General    Chief complaint:  Chest pain    Subjective     History of Present Illness:  Mr. Steel is a 62-year-old male who we've been asked to see by Dr. Ye for coronary artery disease and possible surgical intervention.  The patient states that he had diffuse chest pain without radiation that felt like \"fire\" and \"sitting on his chest\", he had associated shortness of breath, diaphoresis, nausea and vomiting.  He denies other associated symptoms such as syncope, recent edema, or lightheadedness.  He states that he has had 2 previous episodes of \"tightness\" over the last month that he attributed to GERD.  The patient was admitted on 2017 with a STEMI he subsequently underwent PCI to the LAD with a drug-eluting stent, and was found to have multivessel disease during that catheterization.  We have been asked to see him for possible surgical intervention of his multivessel disease.  Of note the patient is allergic to Dilaudid and Demerol which he states causes diaphoresis, nausea, and vomiting.    Primary medical history:  Hypothyroidism, osteoarthritis, colon cancer 20 years ago status post chemotherapy and surgery, kidney stone, BPH, SBO after colectomy, probable ANGELES that is not diagnosed    Surgical history:  Partial colectomy 20 years ago, hiatal hernia repair, tonsillectomy, lithotripsy ×2 two years ago    Social history:  Lives with wife of 25 years, is a  for [employer], denies ever smoking, denies EtOH, denies illicits, denies herbal use    Family history: Brother  at age 58 with an Crista, mother  at age 74 with CVA, father first MI at age 60    Review of Systems   Constitutional: Positive for diaphoresis. Negative for activity change, chills, fatigue and fever.   HENT: Negative for dental problem and mouth sores.    Respiratory: Positive for apnea, chest tightness and shortness of breath. Negative for cough and wheezing.  "   Cardiovascular: Positive for chest pain and palpitations. Negative for leg swelling.   Gastrointestinal: Negative for abdominal pain, blood in stool, constipation, diarrhea, nausea and vomiting.   Genitourinary: Positive for frequency. Negative for difficulty urinating, dysuria, hematuria and urgency.   Musculoskeletal: Positive for arthralgias and myalgias. Negative for back pain, gait problem and joint swelling.   Skin: Negative for pallor, rash and wound.   Neurological: Negative for dizziness, seizures, syncope, weakness, light-headedness and headaches.   Hematological: Does not bruise/bleed easily.   Psychiatric/Behavioral: Negative.         Past Medical History:   Diagnosis Date   • Arthritis    • Colon cancer    • Elevated blood pressure reading without diagnosis of hypertension    • H/O complete eye exam 2016   • Hypertension    • Hypothyroidism, acquired    • Insomnia    • Kidney stones    • Osteoarthritis, multiple sites      Past Surgical History:   Procedure Laterality Date   • CARDIAC CATHETERIZATION N/A 8/13/2017    Procedure: Left Heart Cath;  Surgeon: Edyta Ye MD;  Location:  [facility];  Service:    • CARDIAC CATHETERIZATION N/A 8/13/2017    Procedure: Stent RK coronary;  Surgeon: Edyta Ye MD;  Location:  [facility];  Service:    • COLECTOMY PARTIAL / TOTAL  1994   • COLONOSCOPY  03/18/2009   • HERNIA REPAIR  04/02/2009    hiatal     Family History   Problem Relation Age of Onset   • Cancer Other      colon   • Heart disease Other    • Hypertension Other    • Gallbladder disease Other      Social History   Substance Use Topics   • Smoking status: Never Smoker   • Smokeless tobacco: None   • Alcohol use No     Prescriptions Prior to Admission   Medication Sig Dispense Refill Last Dose   • levothyroxine (SYNTHROID, LEVOTHROID) 75 MCG tablet Take 1 tablet by mouth Daily. 30 tablet 5    • meloxicam (MOBIC) 15 MG tablet Take 1 tablet by mouth Daily. 30  "tablet 5    • Solifenacin Succinate (VESICARE PO) Take  by mouth.      • zolpidem (AMBIEN) 10 MG tablet Take 1 tablet by mouth At Night As Needed for Sleep. 30 tablet 2    • azithromycin (ZITHROMAX Z-PEGGY) 250 MG tablet Take 2 tablets the first day, then 1 tablet daily for 4 days. 6 tablet 0        aspirin 81 mg Oral Daily   atorvastatin 20 mg Oral Nightly   carvedilol 6.25 mg Oral BID   clopidogrel 75 mg Oral Daily   levothyroxine 75 mcg Oral Q AM   lisinopril 2.5 mg Oral Q24H     Allergies:  Hydromorphone and Demerol [meperidine]    Objective      Vital Signs  Temp:  [97.6 °F (36.4 °C)-98.1 °F (36.7 °C)] 98.1 °F (36.7 °C)  Heart Rate:  [75-93] 79  Resp:  [16-17] 16  BP: (120-148)/(73-98) 130/82    Flowsheet Rows         First Filed Value    Admission Height  70\" (177.8 cm) Documented at 08/13/2017 0012    Admission Weight  235 lb (107 kg) Documented at 08/13/2017 0012        70\" (177.8 cm)    Physical Exam   Constitutional: He is oriented to person, place, and time. He appears well-developed and well-nourished.   HENT:   Head: Normocephalic and atraumatic.   Eyes: No scleral icterus.   Cardiovascular: Normal rate, regular rhythm, normal heart sounds and intact distal pulses.  Exam reveals no gallop and no friction rub.    No murmur heard.  Pulmonary/Chest: Effort normal and breath sounds normal. He has no wheezes. He has no rales.   Abdominal: Soft. Bowel sounds are normal. He exhibits no distension and no mass. There is no tenderness. There is no guarding.   Musculoskeletal: He exhibits no edema, tenderness or deformity.   Neurological: He is alert and oriented to person, place, and time.   Skin: Skin is warm and dry. No rash noted. No erythema.   Psychiatric: He has a normal mood and affect. His behavior is normal. Judgment and thought content normal.       Results Review:   Lab Results (last 24 hours)     Procedure Component Value Units Date/Time    Basic Metabolic Panel [[id number]] Collected:  08/15/17 0445    " Specimen:  Blood Updated:  08/15/17 0526     Glucose 97 mg/dL      BUN 17 mg/dL      Creatinine 1.15 mg/dL      Sodium 143 mmol/L      Potassium 4.1 mmol/L      Chloride 106 mmol/L      CO2 22.9 mmol/L      Calcium 9.2 mg/dL      eGFR Non African Amer 64 mL/min/1.73      BUN/Creatinine Ratio 14.8     Anion Gap 14.1 mmol/L     Narrative:       GFR Normal >60  Chronic Kidney Disease <60  Kidney Failure <15    CBC (No Diff) [[id number]]  (Abnormal) Collected:  08/15/17 0924    Specimen:  Blood Updated:  08/15/17 0942     WBC 6.63 10*3/mm3      RBC 4.66 10*6/mm3      Hemoglobin 14.0 g/dL      Hematocrit 44.6 %      MCV 95.7 fL      MCH 30.0 pg      MCHC 31.4 (L) g/dL      RDW 13.1 %      RDW-SD 45.4 fl      MPV 11.5 fL      Platelets 126 (L) 10*3/mm3               Assessment/Plan     Active Problems:    Anterior myocardial infarction      Assessment & Plan    STEMI/CAD----on asa/Plavix  ICM, EF 45% (by echo)---no significant valve dysfxn  TCP---Dr. Ye following, will consult heme if continues to decline  HL  NEW HTN  OSTEOARTHRITIS----on Meloxicam at home, last dose 8/12/17  ?ANGELES  HYPOTHYROIDISM  HX KIDNEY STONES  BPH  HX COLON CA WITH PARTIAL COLECTOMY    Dr. Lynn has reviewed his catheter films.  The plan is to let the patient recover from his MI, and if he continues to be stable, discharge home.  We would bring him back in a few weeks and bridge with Integrilin prior to surgery.  We will start the preop testing process.  He will need a repeat echo prior to surgery to evaluate his LV function.  Thank you for allowing us to participate in the care of this patient.      Sadie Amos, APRN  08/15/17  9:54 AM     Above findings confirmed, agree with assessment. Agree with plan.

## 2017-08-15 NOTE — PLAN OF CARE
Problem: Patient Care Overview (Adult)  Goal: Plan of Care Review  Outcome: Ongoing (interventions implemented as appropriate)    08/15/17 0146   Coping/Psychosocial Response Interventions   Plan Of Care Reviewed With patient   Patient Care Overview   Progress improving   Outcome Evaluation   Outcome Summary/Follow up Plan patient denies any complaints at time. appears to be resting well. some slight bruising to the right radial site. patient remains SR on the monitor. will continue to monitor.

## 2017-08-15 NOTE — PROGRESS NOTES
Hospital Follow Up        Chief Complaint: Follow up s/p anterior MI, garett mid LAD, multivessel CAD    Interval History: No chest pain or dyspnea.     Objective:     Objective:  Temp:  [97.6 °F (36.4 °C)-98.1 °F (36.7 °C)] 98.1 °F (36.7 °C)  Heart Rate:  [75-93] 79  Resp:  [16-17] 16  BP: (120-148)/(73-98) 130/82     Intake/Output Summary (Last 24 hours) at 08/15/17 0805  Last data filed at 08/15/17 0555   Gross per 24 hour   Intake              480 ml   Output                0 ml   Net              480 ml     Body mass index is 35.34 kg/(m^2).  Last 3 weights    08/13/17  0012 08/13/17  0159 08/14/17  1629   Weight: 235 lb (107 kg) 246 lb 3.2 oz (112 kg) 246 lb 5 oz (112 kg)     Weight change:       Physical Exam:   General : Alert, cooperative, in no acute distress.  Neuro: alert,cooperative and oriented  Lungs: CTAB. Normal respiratory effort and rate.  CV:: Regular rate and rhythm, normal S1 and S2, no murmurs, gallops or rubs.  ABD: Soft, nontender, non-distended. positive bowel sounds  Extr: No edema or cyanosis, moves all extremities    Lab Review:     Results from last 7 days  Lab Units 08/15/17  0445 08/14/17  0449  08/13/17  0016   SODIUM mmol/L 143 145  < > 144   POTASSIUM mmol/L 4.1 3.5  < > 4.1   CHLORIDE mmol/L 106 112*  < > 108*   CO2 mmol/L 22.9 21.8*  < > 22.8   BUN mg/dL 17 13  < > 20   CREATININE mg/dL 1.15 0.91  < > 1.21   GLUCOSE mg/dL 97 88  < > 135*   CALCIUM mg/dL 9.2 7.3*  < > 9.6   AST (SGOT) U/L  --   --   --  16   ALT (SGPT) U/L  --   --   --  20   < > = values in this interval not displayed.    Results from last 7 days  Lab Units 08/14/17  0449 08/13/17  0508 08/13/17  0016   TROPONIN T ng/mL 0.326* 0.621* 0.011       Results from last 7 days  Lab Units 08/14/17  0449 08/13/17  0508   WBC 10*3/mm3 6.12 7.19   HEMOGLOBIN g/dL 12.3* 13.8   HEMATOCRIT % 38.4* 42.6   PLATELETS 10*3/mm3 104* 121*       Results from last 7 days  Lab Units 08/13/17  0016   INR  0.93           Results from  last 7 days  Lab Units 08/13/17  0508   CHOLESTEROL mg/dL 158   TRIGLYCERIDES mg/dL 129   HDL CHOL mg/dL 36*   LDL CHOL mg/dL 96             I reviewed the patient's new clinical results.  I personally viewed and interpreted the patient's EKG  Current Medications:   Scheduled Meds:  aspirin 81 mg Oral Daily   atorvastatin 20 mg Oral Nightly   carvedilol 6.25 mg Oral BID   clopidogrel 75 mg Oral Daily   levothyroxine 75 mcg Oral Q AM   lisinopril 2.5 mg Oral Q24H     Continuous Infusions:     Allergies:  Allergies   Allergen Reactions   • Hydromorphone Nausea And Vomiting and Shortness Of Breath   • Demerol [Meperidine] Nausea And Vomiting       Assessment/Plan:     1. Anterior myocardial infarction. Status post drug eluting stent placement of mid LAD.  Troponin trending down.  EKG showing expected changes post MI.   2. ICMP. EF 45%.  On carvedilol, lisinopril.   3. Multivessel CAD. Including ostial LAD.  CT surgery consulted today.   4. Thrombocytopenia. Mild.  Trending down yesterday.   4. Hypertension. Well controlled.   5. Hypothyroidism    - Will recheck platelets today and consider Hematology consult if continues to decline  - Continue current management and await CT surgery evaluation today      Edyta Ye MD  08/15/17  8:05 AM

## 2017-08-16 VITALS
HEIGHT: 70 IN | RESPIRATION RATE: 16 BRPM | SYSTOLIC BLOOD PRESSURE: 126 MMHG | WEIGHT: 246.31 LBS | BODY MASS INDEX: 35.26 KG/M2 | OXYGEN SATURATION: 90 % | TEMPERATURE: 98 F | HEART RATE: 83 BPM | DIASTOLIC BLOOD PRESSURE: 73 MMHG

## 2017-08-16 PROBLEM — D69.6 THROMBOCYTOPENIA (HCC): Status: ACTIVE | Noted: 2017-08-16

## 2017-08-16 PROBLEM — E53.8 VITAMIN B12 DEFICIENCY: Status: ACTIVE | Noted: 2017-08-16

## 2017-08-16 LAB
BH CV XLRA MEAS LEFT CCA RATIO VEL: -94.4 CM/SEC
BH CV XLRA MEAS LEFT DIST CCA EDV: -25.8 CM/SEC
BH CV XLRA MEAS LEFT DIST CCA PSV: -94.4 CM/SEC
BH CV XLRA MEAS LEFT DIST ICA EDV: -28.9 CM/SEC
BH CV XLRA MEAS LEFT DIST ICA PSV: -69.3 CM/SEC
BH CV XLRA MEAS LEFT ICA RATIO VEL: 110 CM/SEC
BH CV XLRA MEAS LEFT ICA/CCA RATIO: -1.2
BH CV XLRA MEAS LEFT MID ICA EDV: -28.7 CM/SEC
BH CV XLRA MEAS LEFT MID ICA PSV: -82.1 CM/SEC
BH CV XLRA MEAS LEFT PROX CCA EDV: 21.1 CM/SEC
BH CV XLRA MEAS LEFT PROX CCA PSV: 90.3 CM/SEC
BH CV XLRA MEAS LEFT PROX ECA EDV: -24.2 CM/SEC
BH CV XLRA MEAS LEFT PROX ECA PSV: -135 CM/SEC
BH CV XLRA MEAS LEFT PROX ICA EDV: 23.6 CM/SEC
BH CV XLRA MEAS LEFT PROX ICA PSV: 110 CM/SEC
BH CV XLRA MEAS LEFT PROX SCLA PSV: 157 CM/SEC
BH CV XLRA MEAS LEFT VERTEBRAL A EDV: 18 CM/SEC
BH CV XLRA MEAS LEFT VERTEBRAL A PSV: 53.9 CM/SEC
BH CV XLRA MEAS RIGHT CCA RATIO VEL: 76.2 CM/SEC
BH CV XLRA MEAS RIGHT DIST CCA EDV: 22.3 CM/SEC
BH CV XLRA MEAS RIGHT DIST CCA PSV: 76.2 CM/SEC
BH CV XLRA MEAS RIGHT DIST ICA EDV: -26.7 CM/SEC
BH CV XLRA MEAS RIGHT DIST ICA PSV: -80.5 CM/SEC
BH CV XLRA MEAS RIGHT ICA RATIO VEL: -80.5 CM/SEC
BH CV XLRA MEAS RIGHT ICA/CCA RATIO: -1.1
BH CV XLRA MEAS RIGHT MID ICA EDV: -28.1 CM/SEC
BH CV XLRA MEAS RIGHT MID ICA PSV: -69.8 CM/SEC
BH CV XLRA MEAS RIGHT PROX CCA EDV: 18.2 CM/SEC
BH CV XLRA MEAS RIGHT PROX CCA PSV: 76.8 CM/SEC
BH CV XLRA MEAS RIGHT PROX ECA EDV: -16 CM/SEC
BH CV XLRA MEAS RIGHT PROX ECA PSV: -103 CM/SEC
BH CV XLRA MEAS RIGHT PROX ICA EDV: 22.3 CM/SEC
BH CV XLRA MEAS RIGHT PROX ICA PSV: 68.6 CM/SEC
BH CV XLRA MEAS RIGHT PROX SCLA PSV: 86.4 CM/SEC
BH CV XLRA MEAS RIGHT VERTEBRAL A EDV: 18.2 CM/SEC
BH CV XLRA MEAS RIGHT VERTEBRAL A PSV: 59.8 CM/SEC
CHROMATIN AB SERPL-ACNC: <10 IU/ML (ref 0–14)
DEPRECATED RDW RBC AUTO: 45 FL (ref 37–54)
ERYTHROCYTE [DISTWIDTH] IN BLOOD BY AUTOMATED COUNT: 13.2 % (ref 11.5–14.5)
HCT VFR BLD AUTO: 44.1 % (ref 40.4–52.2)
HGB BLD-MCNC: 14.1 G/DL (ref 13.7–17.6)
LDH SERPL-CCNC: 210 U/L (ref 135–225)
LEFT ARM BP: NORMAL MMHG
MCH RBC QN AUTO: 30.1 PG (ref 27–32.7)
MCHC RBC AUTO-ENTMCNC: 32 G/DL (ref 32.6–36.4)
MCV RBC AUTO: 94.2 FL (ref 79.8–96.2)
PLATELET # BLD AUTO: 119 10*3/MM3 (ref 140–500)
PLATELET # BLD AUTO: 125 10*3/MM3 (ref 140–500)
PLATELETS.RETICULATED NFR BLD AUTO: 6.8 % (ref 0.9–6.5)
PMV BLD AUTO: 11.9 FL (ref 6–12)
RBC # BLD AUTO: 4.68 10*6/MM3 (ref 4.6–6)
RIGHT ARM BP: NORMAL MMHG
VIT B12 BLD-MCNC: 261 PG/ML (ref 211–946)
WBC NRBC COR # BLD: 5.6 10*3/MM3 (ref 4.5–10.7)

## 2017-08-16 PROCEDURE — 99232 SBSQ HOSP IP/OBS MODERATE 35: CPT | Performed by: NURSE PRACTITIONER

## 2017-08-16 PROCEDURE — 86235 NUCLEAR ANTIGEN ANTIBODY: CPT | Performed by: INTERNAL MEDICINE

## 2017-08-16 PROCEDURE — 83615 LACTATE (LD) (LDH) ENZYME: CPT | Performed by: INTERNAL MEDICINE

## 2017-08-16 PROCEDURE — 83516 IMMUNOASSAY NONANTIBODY: CPT | Performed by: INTERNAL MEDICINE

## 2017-08-16 PROCEDURE — 85055 RETICULATED PLATELET ASSAY: CPT | Performed by: INTERNAL MEDICINE

## 2017-08-16 PROCEDURE — 85027 COMPLETE CBC AUTOMATED: CPT | Performed by: INTERNAL MEDICINE

## 2017-08-16 PROCEDURE — 86225 DNA ANTIBODY NATIVE: CPT | Performed by: INTERNAL MEDICINE

## 2017-08-16 PROCEDURE — 86334 IMMUNOFIX E-PHORESIS SERUM: CPT | Performed by: INTERNAL MEDICINE

## 2017-08-16 PROCEDURE — 82607 VITAMIN B-12: CPT | Performed by: INTERNAL MEDICINE

## 2017-08-16 PROCEDURE — 99253 IP/OBS CNSLTJ NEW/EST LOW 45: CPT | Performed by: INTERNAL MEDICINE

## 2017-08-16 PROCEDURE — 25010000002 CYANOCOBALAMIN PER 1000 MCG: Performed by: INTERNAL MEDICINE

## 2017-08-16 PROCEDURE — 99239 HOSP IP/OBS DSCHRG MGMT >30: CPT | Performed by: INTERNAL MEDICINE

## 2017-08-16 PROCEDURE — 86431 RHEUMATOID FACTOR QUANT: CPT | Performed by: INTERNAL MEDICINE

## 2017-08-16 RX ORDER — LISINOPRIL 2.5 MG/1
2.5 TABLET ORAL
Qty: 30 TABLET | Refills: 5 | Status: SHIPPED | OUTPATIENT
Start: 2017-08-16 | End: 2017-08-30 | Stop reason: HOSPADM

## 2017-08-16 RX ORDER — ATORVASTATIN CALCIUM 20 MG/1
20 TABLET, FILM COATED ORAL NIGHTLY
Qty: 30 TABLET | Refills: 5 | Status: SHIPPED | OUTPATIENT
Start: 2017-08-16 | End: 2017-09-27 | Stop reason: SDUPTHER

## 2017-08-16 RX ORDER — CARVEDILOL 6.25 MG/1
6.25 TABLET ORAL 2 TIMES DAILY
Qty: 60 TABLET | Refills: 5 | Status: SHIPPED | OUTPATIENT
Start: 2017-08-16 | End: 2017-08-30 | Stop reason: HOSPADM

## 2017-08-16 RX ORDER — ASPIRIN 81 MG/1
81 TABLET, CHEWABLE ORAL DAILY
Start: 2017-08-16

## 2017-08-16 RX ORDER — CLOPIDOGREL BISULFATE 75 MG/1
75 TABLET ORAL DAILY
Qty: 30 TABLET | Refills: 0 | Status: SHIPPED | OUTPATIENT
Start: 2017-08-16 | End: 2017-09-27 | Stop reason: SDUPTHER

## 2017-08-16 RX ORDER — CLOPIDOGREL BISULFATE 75 MG/1
75 TABLET ORAL DAILY
Qty: 30 TABLET | Refills: 5 | Status: SHIPPED | OUTPATIENT
Start: 2017-08-16 | End: 2017-08-16

## 2017-08-16 RX ORDER — CYANOCOBALAMIN 1000 UG/ML
1000 INJECTION, SOLUTION INTRAMUSCULAR; SUBCUTANEOUS ONCE
Status: COMPLETED | OUTPATIENT
Start: 2017-08-16 | End: 2017-08-16

## 2017-08-16 RX ADMIN — LEVOTHYROXINE SODIUM 75 MCG: 75 TABLET ORAL at 08:36

## 2017-08-16 RX ADMIN — LISINOPRIL 2.5 MG: 2.5 TABLET ORAL at 08:37

## 2017-08-16 RX ADMIN — ASPIRIN 81 MG: 81 TABLET, CHEWABLE ORAL at 08:37

## 2017-08-16 RX ADMIN — CARVEDILOL 6.25 MG: 6.25 TABLET, FILM COATED ORAL at 08:37

## 2017-08-16 RX ADMIN — Medication 10 ML: at 08:37

## 2017-08-16 RX ADMIN — CYANOCOBALAMIN 1000 MCG: 1000 INJECTION, SOLUTION INTRAMUSCULAR at 18:15

## 2017-08-16 RX ADMIN — CLOPIDOGREL 75 MG: 75 TABLET, FILM COATED ORAL at 08:37

## 2017-08-16 NOTE — CONSULTS
Subjective .     REASON FOR CONSULTATION:     Provide an opinion on any further workup or treatment of thrombocytopenia.                               REQUESTING PHYSICIAN: Edyta Ye M.D.       RECORDS OBTAINED:  Records of the patients history including those obtained from the referring provider were reviewed and summarized in detail.    HISTORY OF PRESENT ILLNESS:  The patient is a 62 y.o. year old male whom we are consulted for evaluation with the above-mentioned history.    Early on I spoke to patient, and obtain the laboratory study results, arrange patient to come back next week for official consultation for his mild thrombocytopenia, however because of change of surgery plan, he will be admitted early next week to hospital for scheduled open heart surgery next Tuesday, he will not make the follow-up appointment with me.  Start come back to see patient for outpatient consult now, with only very limited laboratory results available.     This patient to was admitted for first episodes of heart attack.  Patient had cardiac stent placement this hospitalization.  Currently has no chest pain.  This patient has no history of coronary artery disease previously and was not taking aspirin or Plavix.     At a time of this admission, in the very early morning on 8/13/2017, laboratory study reported platelets 133,000, hemoglobin 14.2 and WBC 7700 including neutrophils 5200 and lymphocytes 1900.  His chemistry lab reported creatinine 1.29, normal electrolytes in the liver function panel, mildly elevated glucose 135.  Patient had cardiac catheterization and drug-eluting stent placement in the morning of August 13, 2017.  In the morning of 8/14/2017, repeated CBC showed a decreased platelets at 104,000, hemoglobin 12.3 and WBC 6100 without a differentiation.  Repeated laboratory study on 8/15/2017 reported a platelets 126,000 and hemoglobin 14, WBC 6600.  Today on 8/16/2017, laboratory study showed a platelets  119,000, hemoglobin 14.1 and a WBC 5600.  I obtained laboratory study in the afternoon, showed a marginally elevated IPF 6.8%, platelet counts 125,000.  His vitamin B12 level was very marginal at 261 pg/mL, negative for rheumatoid factor, and had normal LDH at 210.    I reviewed her previous laboratory study available in the [facility] electronic medical records, dating back to 3/25/2016 where he had platelets 132,000, hemoglobin 13.8 and WBC 5700 with normal distribution.  Lab results on 4/25/2016 showed a platelets 151,000, hemoglobin 14.5 and WBC 6800.  On 3/20/2017 his platelets was 153,000, hemoglobin 14.9 and WBC 6700.  He also had a normal thyroid screening test in March 2016 and March 2017.  His chemistries study showed a stable creatinine level between 1.2 and 1.3.     I also searched medical records in the [facility], back on 8/21/2014 he had a CBC showed a platelets 117,000 and hemoglobin 14.1 WBC 5000 with normal distribution.      On 10/20/2014 he had platelets 141,000 and hemoglobin 15.4 WBC 10,700 including neutrophils 9100.  His abdomen CT scan examination on 10/20/2014 reported no splenomegaly no hepatomegaly.  It is seems that he had visited to emergency room at that time because of chosen no cold, about 1 months after he had lithotripsy for kidney stone.  Urinalysis was positive for RBC and a trace of bacteria.     Patient reports that he has no easy bleeding bruising.  He previously had a multiple surgery noticeably with colon resection for colon cancer 27 years ago as well as repair for abdominal wall hernia, however he had no significant postoperatively bleeding.  He did receive chemotherapy for 6 months treated by Dr. Jasso at that time.         Past Medical History:   Diagnosis Date   • Arthritis    • Colon cancer    • Elevated blood pressure reading without diagnosis of hypertension    • H/O complete eye exam 2016   • Hypertension    • Hypothyroidism, acquired    • Insomnia     • Kidney stones    • Osteoarthritis, multiple sites      Past Surgical History:   Procedure Laterality Date   • CARDIAC CATHETERIZATION N/A 8/13/2017    Procedure: Left Heart Cath;  Surgeon: Edyta Ye MD;  Location:  [facility];  Service:    • CARDIAC CATHETERIZATION N/A 8/13/2017    Procedure: Stent RK coronary;  Surgeon: Edyta Ye MD;  Location:  [facility];  Service:    • COLECTOMY PARTIAL / TOTAL  1994   • COLONOSCOPY  03/18/2009   • HERNIA REPAIR  04/02/2009    hiatal       HEMATOLOGIC/ONCOLOGIC HISTORY:  (History from previous dates can be found in the separate document.)  See history of present illness    MEDICATIONS    Current Facility-Administered Medications:   •  acetaminophen (TYLENOL) tablet 650 mg, 650 mg, Oral, Q4H PRN, Edyta Ye MD  •  aspirin chewable tablet 81 mg, 81 mg, Oral, Daily, Edyta Ye MD, 81 mg at 08/16/17 0837  •  atorvastatin (LIPITOR) tablet 20 mg, 20 mg, Oral, Nightly, Edyta Ye MD, 20 mg at 08/15/17 2051  •  carvedilol (COREG) tablet 6.25 mg, 6.25 mg, Oral, BID, Edyta Ye MD, 6.25 mg at 08/16/17 0837  •  clopidogrel (PLAVIX) tablet 75 mg, 75 mg, Oral, Daily, Edyta Ye MD, 75 mg at 08/16/17 0837  •  cyanocobalamin injection 1,000 mcg, 1,000 mcg, Intramuscular, Once, Leeann Mena MD PhD  •  hydrALAZINE (APRESOLINE) injection 10 mg, 10 mg, Intravenous, Q4H PRN, Edyta Ye MD  •  levothyroxine (SYNTHROID, LEVOTHROID) tablet 75 mcg, 75 mcg, Oral, Q AM, Edyta Ye MD, 75 mcg at 08/16/17 0836  •  lisinopril (PRINIVIL,ZESTRIL) tablet 2.5 mg, 2.5 mg, Oral, Q24H, Edyta Ye MD, 2.5 mg at 08/16/17 0837  •  sodium chloride 0.9 % flush 10 mL, 10 mL, Intravenous, PRN, Henry Gant MD, 10 mL at 08/16/17 0837  •  zolpidem (AMBIEN) tablet 10 mg, 10 mg, Oral, Nightly PRN, Edyta Ye MD    ALLERGIES:     Allergies   Allergen Reactions   • Hydromorphone Nausea And Vomiting and Shortness Of Breath    • Demerol [Meperidine] Nausea And Vomiting       SOCIAL HISTORY:       Social History     Social History   • Marital status:      Spouse name: N/A   • Number of children: N/A   • Years of education: N/A     Social History Main Topics   • Smoking status: Never Smoker   • Smokeless tobacco: Not on file   • Alcohol use No   • Drug use: Not on file   • Sexual activity: Not on file         FAMILY HISTORY:  Family History   Problem Relation Age of Onset   • Cancer Other      colon   • Heart disease Other    • Hypertension Other    • Gallbladder disease Other        REVIEW OF SYSTEMS:  GENERAL: No change in appetite or weight;   No fevers, chills, sweats.    SKIN: No nonhealing lesions.   No rashes.  HEME/LYMPH: No easy bruising, bleeding.   No swollen nodes.   EYES: No vision changes or diplopia.   ENT: No tinnitus, hearing loss, gum bleeding, epistaxis, hoarseness or dysphagia.   RESPIRATORY: No cough, shortness of breath, hemoptysis or wheezing.   CVS:  admitted for chest pain and myocardial infarction.    GI: No melena or hematochezia.   No abdominal pain.  No nausea, vomiting, constipation, diarrhea  : No lower tract obstructive symptoms, dysuria or hematuria.   MUSCULOSKELETAL: No bone pain.  No joint stiffness.   NEUROLOGICAL: No global weakness, loss of consciousness or seizures.   PSYCHIATRIC: No increased nervousness, mood changes or depression.     Objective    Vitals:    08/16/17 0753 08/16/17 0836 08/16/17 1224 08/16/17 1616   BP: 130/78 120/71 109/60 126/73   BP Location: Right arm  Right arm Right arm   Patient Position: Lying  Lying Lying   Pulse: 79 88 85 83   Resp: 16 16 16   Temp: 97.4 °F (36.3 °C)  97.6 °F (36.4 °C) 98 °F (36.7 °C)   TempSrc: Oral  Oral Oral   SpO2: 90%      Weight:       Height:          PHYSICAL EXAM:    GENERAL:  Well-developed, well-nourished male in no acute distress.   SKIN:  Warm, dry without rashes, purpura or petechiae.  EYES:    Conjunctivae normal.  EARS:  Hearing  intact.  NOSE:   No excoriations or nasal discharge.  MOUTH:  Tongue is well-papillated; no stomatitis or ulcers.  Lips normal.  THROAT:  Oropharynx without lesions or exudates.  NECK:  Supple with good range of motion; no thyromegaly or masses.  LYMPHATICS:  No cervical, supraclavicular adenopathy.  CHEST:  Lungs clear to auscultation. Good airflow.  CARDIAC:  Regular rate and rhythm without murmurs, rubs or gallops. Normal S1,S2.  ABDOMEN:  Soft, nontender with no hepatosplenomegaly or masses.  Bowel sounds present.   EXTREMITIES:  No clubbing, cyanosis or edema.  NEUROLOGICAL:  Cranial Nerves II-XII grossly intact.  No focal neurological deficits.  PSYCHIATRIC:  Normal affect and mood.    RECENT LABS:        Lab Results   Component Value Date    NEUTROABS 5.26 08/13/2017     WBC   Date Value Ref Range Status   08/16/2017 5.60 4.50 - 10.70 10*3/mm3 Final   08/15/2017 6.63 4.50 - 10.70 10*3/mm3 Final   08/14/2017 6.12 4.50 - 10.70 10*3/mm3 Final     Hemoglobin   Date Value Ref Range Status   08/16/2017 14.1 13.7 - 17.6 g/dL Final   08/15/2017 14.0 13.7 - 17.6 g/dL Final   08/14/2017 12.3 (L) 13.7 - 17.6 g/dL Final     Platelets   Date Value Ref Range Status   08/16/2017 125 (L) 140 - 500 10*3/mm3 Final   08/16/2017 119 (L) 140 - 500 10*3/mm3 Final   08/15/2017 126 (L) 140 - 500 10*3/mm3 Final   08/14/2017 104 (L) 140 - 500 10*3/mm3 Final   IPF 6.8%     Lab Results   Component Value Date    EITNDGCJ24 261 08/16/2017     Rheumatoid factor < 10, ,       Results from last 7 days  Lab Units 08/15/17  0445 08/14/17  0449 08/13/17  0508 08/13/17  0016   SODIUM mmol/L 143 145 141 144   POTASSIUM mmol/L 4.1 3.5 5.0 4.1   CHLORIDE mmol/L 106 112* 106 108*   CO2 mmol/L 22.9 21.8* 24.9 22.8   BUN mg/dL 17 13 18 20   CREATININE mg/dL 1.15 0.91 1.25 1.21   CALCIUM mg/dL 9.2 7.3* 9.5 9.6   BILIRUBIN mg/dL  --   --   --  0.5   ALK PHOS U/L  --   --   --  85   ALT (SGPT) U/L  --   --   --  20   AST (SGOT) U/L  --   --   --   16   GLUCOSE mg/dL 97 88 123* 135*       Pending studies: Comprehensive REBA panel, serum protein immunofixation      Assessment/Plan     Mild thrombocytopenia, asymptomatic.  Review to this patient laboratory studies dating back to 2014 where most of them with results available.  He has baseline level platelets since that time, around 150,000, with occasional lower counts.  This time his platelets has been fluctuating between 104,000 and 133,000.  This 104,000 results is likely reactive due to his cardiac catheterization today earlier.  Nevertheless he has mild to thrombocytopenia although asymptomatic.  This patient should be safe to go on Plavix and aspirin together, and also should be safe to go through the scheduled open-heart surgery next Tuesday.     Only got a back part of the study I requested, which he has negative rheumatoid factor and a normal level LDH.  He does have marginal level of vitamin B12, suspect that he has functional deficiency.  I will start him on vitamin B12 injection today at 1000 µg, and I instructed him to go home by over-the-counter vitamin B12, take at 5000 µg daily.  It is very cheap and safe without apparent side effects.     When he come back next week for open-heart surgery, if needed, can always request us to see him again.     I will make him appointment for follow-up in about 3-4 weeks in our office.  Discussed with the patient and his wife today.  I also spoke to his nurse today.    Thank you Dr. Renteria for letting us participate in care of this patient.        ALEX ALBRIGHT M.D., Ph.D.    08/16/2017 at 17:50 PM    Dragon disclaimer:  Much of this encounter note is an electronic transcription/translation of spoken language to printed text. The electronic translation of spoken language may permit erroneous, or at times, nonsensical words or phrases to be inadvertently transcribed; Although I have reviewed the note for such errors, some may still exist.

## 2017-08-16 NOTE — CONSULTS
Labs reviewed, borderline platelets at 150,000 in 2016.  Currently platelets has a fluctuating between 100,000 and 120,000.  I ordered laboratory tests to be done today, and I will see him officially for consult next week in office.     ALEX ALBRIGHT M.D., Ph.D.

## 2017-08-16 NOTE — PLAN OF CARE
Problem: Patient Care Overview (Adult)  Goal: Plan of Care Review  Outcome: Ongoing (interventions implemented as appropriate)    08/16/17 0051   Coping/Psychosocial Response Interventions   Plan Of Care Reviewed With patient   Patient Care Overview   Progress no change   Outcome Evaluation   Outcome Summary/Follow up Plan Pt denies any complaints. some CABG preop testing completed. will wait for reeval from the doctor to determine discharge date. will continue to monitor.

## 2017-08-16 NOTE — DISCHARGE SUMMARY
Hospital Discharge    Patient Name: Luigi Steel  Age/Sex: 62 y.o. male  : 1955  MRN: [id number]    Encounter Provider: Edyta Ye MD  Referring Provider: No ref. provider found  Place of Service: [facility] CARDIOLOGY  Patient Care Team:  Jake Echeverria MD as PCP - General         Date of Discharge:  2017   Date of Admit: 2017    Discharge Condition: Stable    Discharge Diagnosis:  1. Anterior myocardial infarction status post drug eluting stent placement of mid LAD  2. Multivessel coronary artery disease including ostial LAD  3. ICMP, EF 45%  4. Mild thrombocytopenia  5. Hypertension  6. Hypothyroidism      Hospital Course:   Luigi Steel is a 62 y.o. male with a history of hypothyroidism, osteoarthritis, prior colon cancer status post partial colectomy who was brought by EMS for anterior myocardial infarction on 2017.  The patient reported a sudden onset of pain 2 hours before his arrival.  EKG done in the field showed evidence of anterior myocardial infarction.  On arrival to the hospital he was brought directly to cardiac catheterization laboratory where he was found to have a thrombotic stenosis of his mid LAD.  Additionally he was noted to significant stenosis of his proximal LAD, left circumflex, and PDA stenosis.  Due to his presentation we proceeded with PCI and drug eluting stent placement of his mid LAD with improvement of his pain.     The patient remained stable and chest pain free through the remainder of his admission.  An echocardiogram was performed showing a mildly depressed left ventricular systolic function with an ejection fraction of 45%.   CT surgery was consulted regarding his remaining multivessel CAD the plan was to allow him to recover from MI this week and return on next week for surgery.   Hematology was consulted before discharge and lab work was ordered with plans to follow up next week.     The patient was instructed to  take last dose of clopidogrel on 8/20 with plans for admission on 8/21 and surgery on 8/22.       Objective:  Temp:  [97.4 °F (36.3 °C)-98 °F (36.7 °C)] 98 °F (36.7 °C)  Heart Rate:  [74-97] 83  Resp:  [16-18] 16  BP: (109-133)/(60-78) 126/73  No intake or output data in the 24 hours ending 08/16/17 1632  Body mass index is 35.34 kg/(m^2).  Last 3 weights    08/13/17  0012 08/13/17  0159 08/14/17  1629   Weight: 235 lb (107 kg) 246 lb 3.2 oz (112 kg) 246 lb 5 oz (112 kg)     Weight change:     Physical Exam:  See office note today    Procedures Performed  Procedure(s):  Left Heart Cath  Stent KR coronary       Consults:  Consults     Date and Time Order Name Status Description    8/16/2017 0819 Inpatient Consult to Hematology & Oncology Completed     8/13/2017 0012 Consult Interventional Cardiology and Notify Cath Lab Completed           Pertinent Test Results:    Results from last 7 days  Lab Units 08/15/17  0445 08/14/17  0449 08/13/17  0508 08/13/17  0016   SODIUM mmol/L 143 145 141 144   POTASSIUM mmol/L 4.1 3.5 5.0 4.1   CHLORIDE mmol/L 106 112* 106 108*   CO2 mmol/L 22.9 21.8* 24.9 22.8   BUN mg/dL 17 13 18 20   CREATININE mg/dL 1.15 0.91 1.25 1.21   GLUCOSE mg/dL 97 88 123* 135*   CALCIUM mg/dL 9.2 7.3* 9.5 9.6   AST (SGOT) U/L  --   --   --  16   ALT (SGPT) U/L  --   --   --  20       Results from last 7 days  Lab Units 08/14/17  0449 08/13/17  0508 08/13/17  0016   TROPONIN T ng/mL 0.326* 0.621* 0.011       Results from last 7 days  Lab Units 08/16/17  1549 08/16/17  0508 08/15/17  0924 08/14/17  0449 08/13/17  0508 08/13/17  0016   WBC 10*3/mm3  --  5.60 6.63 6.12 7.19 7.76   HEMOGLOBIN g/dL  --  14.1 14.0 12.3* 13.8 14.2   HEMATOCRIT %  --  44.1 44.6 38.4* 42.6 43.2   PLATELETS 10*3/mm3 125* 119* 126* 104* 121* 133*       Results from last 7 days  Lab Units 08/13/17  0016   INR  0.93           Results from last 7 days  Lab Units 08/13/17  0508   CHOLESTEROL mg/dL 158   TRIGLYCERIDES mg/dL 129   HDL CHOL  mg/dL 36*   LDL CHOL mg/dL 96               Discharge Medications   Luigi Steel   Home Medication Instructions NATTY:[id number]    Printed on:08/16/17 1632   Medication Information                      aspirin 81 MG chewable tablet  Chew 1 tablet Daily.             atorvastatin (LIPITOR) 20 MG tablet  Take 1 tablet by mouth Every Night.             carvedilol (COREG) 6.25 MG tablet  Take 1 tablet by mouth 2 (Two) Times a Day.             clopidogrel (PLAVIX) 75 MG tablet  Take 1 tablet by mouth Daily.             levothyroxine (SYNTHROID, LEVOTHROID) 75 MCG tablet  Take 1 tablet by mouth Daily.             lisinopril (PRINIVIL,ZESTRIL) 2.5 MG tablet  Take 1 tablet by mouth Daily.             Solifenacin Succinate (VESICARE PO)  Take  by mouth.             zolpidem (AMBIEN) 10 MG tablet  Take 1 tablet by mouth At Night As Needed for Sleep.                 Discharge Diet:         Dietary Orders            Start     Ordered    08/14/17 0829  Diet Regular; Cardiac  Diet Effective Now     Question Answer Comment   Diet Texture / Consistency Regular    Common Modifiers Cardiac        08/14/17 0828          Activity at Discharge:  as instructed     Discharge disposition: home     Discharge Instructions and Follow ups:  Future Appointments  Date Time Provider Department Center   8/23/2017 10:30 AM VASILIY Soler [facility] None   9/13/2017 11:15 AM Edyta Ye MD [facility] None     Additional Instructions for the Follow-ups that You Need to Schedule     Discharge Follow-Up With Specified Provider    As directed    To:  Return on 8/21/2017 for CABG to be performed on 8/22/2017   Follow Up Details:  Dr. Lynn's office will call with instructions.       Discharge Follow-up with Specialty    As directed    Specialty:  cardiology   Follow Up:  1 Week   Follow Up Details:  Our office will call you to schedule a follow up appointment with SHILOH Sheldon for next week. Please call [phone] with questions.              Follow-up Information     Follow up with Leeann Mena MD PhD. Schedule an appointment as soon as possible for a visit in 1 week(s).    Specialties:  Hematology and Oncology, Hematology, Oncology    Why:  Follow-up next week with Dr. Mena to discuss the results of your lab work.     Contact information:    [facility] [address] [place] [id number] [phone]          Follow up with Jake Echeverria MD .    Specialty:  Family Medicine    Contact information:    [address] [place] [id number] [phone]                 Edyta Ye MD  08/16/17  4:32 PM    Time: Discharge 50 min

## 2017-08-16 NOTE — PROGRESS NOTES
Hospital Follow Up        Chief Complaint: Follow up s/p anterior MI, garett mid LAD, multivessel CAD    Interval History: No complaints.     Objective:     Objective:  Temp:  [97.4 °F (36.3 °C)-97.7 °F (36.5 °C)] 97.4 °F (36.3 °C)  Heart Rate:  [74-97] 79  Resp:  [16-18] 16  BP: (112-133)/(63-78) 130/78   No intake or output data in the 24 hours ending 08/16/17 0818  Body mass index is 35.34 kg/(m^2).  Last 3 weights    08/13/17  0012 08/13/17  0159 08/14/17  1629   Weight: 235 lb (107 kg) 246 lb 3.2 oz (112 kg) 246 lb 5 oz (112 kg)     Weight change:       Physical Exam:   General : Alert, cooperative, in no acute distress.  Neuro: alert,cooperative and oriented  Lungs: CTAB. Normal respiratory effort and rate.  CV:: Regular rate and rhythm, normal S1 and S2, no murmurs, gallops or rubs.  ABD: Soft, nontender, non-distended. positive bowel sounds  Extr: No edema or cyanosis, moves all extremities    Lab Review:     Results from last 7 days  Lab Units 08/15/17  0445 08/14/17  0449  08/13/17  0016   SODIUM mmol/L 143 145  < > 144   POTASSIUM mmol/L 4.1 3.5  < > 4.1   CHLORIDE mmol/L 106 112*  < > 108*   CO2 mmol/L 22.9 21.8*  < > 22.8   BUN mg/dL 17 13  < > 20   CREATININE mg/dL 1.15 0.91  < > 1.21   GLUCOSE mg/dL 97 88  < > 135*   CALCIUM mg/dL 9.2 7.3*  < > 9.6   AST (SGOT) U/L  --   --   --  16   ALT (SGPT) U/L  --   --   --  20   < > = values in this interval not displayed.    Results from last 7 days  Lab Units 08/14/17  0449 08/13/17  0508 08/13/17  0016   TROPONIN T ng/mL 0.326* 0.621* 0.011       Results from last 7 days  Lab Units 08/16/17  0508 08/15/17  0924   WBC 10*3/mm3 5.60 6.63   HEMOGLOBIN g/dL 14.1 14.0   HEMATOCRIT % 44.1 44.6   PLATELETS 10*3/mm3 119* 126*       Results from last 7 days  Lab Units 08/13/17  0016   INR  0.93           Results from last 7 days  Lab Units 08/13/17  0508   CHOLESTEROL mg/dL 158   TRIGLYCERIDES mg/dL 129   HDL CHOL mg/dL 36*   LDL CHOL mg/dL 96             I reviewed  the patient's new clinical results.  I personally viewed and interpreted the patient's EKG  Current Medications:   Scheduled Meds:  aspirin 81 mg Oral Daily   atorvastatin 20 mg Oral Nightly   carvedilol 6.25 mg Oral BID   clopidogrel 75 mg Oral Daily   levothyroxine 75 mcg Oral Q AM   lisinopril 2.5 mg Oral Q24H     Continuous Infusions:     Allergies:  Allergies   Allergen Reactions   • Hydromorphone Nausea And Vomiting and Shortness Of Breath   • Demerol [Meperidine] Nausea And Vomiting       Assessment/Plan:     1. Anterior myocardial infarction. Status post drug eluting stent placement of mid LAD.  Troponin trending down.  EKG showing expected changes post MI.   2. ICMP. EF 45%.  On carvedilol, lisinopril.   3. Multivessel CAD. Including ostial LAD.  Plan for CABG in near future.  May be able to go home and return for surgery with plans to bridge with integrillin.    4. Thrombocytopenia. Mild.  Trending down today.  4. Hypertension. Well controlled.   5. Hypothyroidism     - With persistently low platelets that are trending down again will consult Hematology to ensure no further work up needed before his CABG   -  Since he remains stable and chest pain free I think he may be able to go home and come back for CABG.  Will plan for home once hematology has the chance to evaluate.       Edyta Ye MD  08/16/17  8:18 AM

## 2017-08-16 NOTE — PROGRESS NOTES
" LOS: 3 days   Patient Care Team:  Jake Echeverria MD as PCP - General    Chief Complaint: s/p MI    Subjective  Patient denies chest pain, SOB or palpitations     Vital Signs  Temp:  [97.4 °F (36.3 °C)-97.7 °F (36.5 °C)] 97.4 °F (36.3 °C)  Heart Rate:  [74-97] 88  Resp:  [16-18] 16  BP: (112-133)/(63-78) 120/71  Body mass index is 35.34 kg/(m^2).  No intake or output data in the 24 hours ending 08/16/17 1124       Last 3 weights    08/13/17  0012 08/13/17  0159 08/14/17  1629   Weight: 235 lb (107 kg) 246 lb 3.2 oz (112 kg) 246 lb 5 oz (112 kg)         Objective:  General Appearance:  Comfortable and in no acute distress.    Vital signs: (most recent): Blood pressure 120/71, pulse 88, temperature 97.4 °F (36.3 °C), temperature source Oral, resp. rate 16, height 70\" (177.8 cm), weight 246 lb 5 oz (112 kg), SpO2 90 %.  Vital signs are normal.  No fever.    Output: Producing urine and producing stool.    Lungs:  Normal respiratory rate and normal effort.  Breath sounds clear to auscultation.    Heart: Normal rate.  Regular rhythm.  S1 normal and S2 normal.    Abdomen: Abdomen is soft.    Extremities: Normal range of motion.    Pulses: Distal pulses are intact.    Neurological: Patient is alert and oriented to person, place and time.    Pupils:  Pupils are equal, round, and reactive to light.    Skin:  Warm and dry.              Results Review:        WBC WBC   Date Value Ref Range Status   08/16/2017 5.60 4.50 - 10.70 10*3/mm3 Final   08/15/2017 6.63 4.50 - 10.70 10*3/mm3 Final   08/14/2017 6.12 4.50 - 10.70 10*3/mm3 Final      HGB Hemoglobin   Date Value Ref Range Status   08/16/2017 14.1 13.7 - 17.6 g/dL Final   08/15/2017 14.0 13.7 - 17.6 g/dL Final   08/14/2017 12.3 (L) 13.7 - 17.6 g/dL Final      HCT Hematocrit   Date Value Ref Range Status   08/16/2017 44.1 40.4 - 52.2 % Final   08/15/2017 44.6 40.4 - 52.2 % Final   08/14/2017 38.4 (L) 40.4 - 52.2 % Final      Platelets Platelets   Date Value Ref Range Status "   08/16/2017 119 (L) 140 - 500 10*3/mm3 Final   08/15/2017 126 (L) 140 - 500 10*3/mm3 Final   08/14/2017 104 (L) 140 - 500 10*3/mm3 Final        PT/INR:  No results found for: PROTIME/No results found for: INR    Sodium Sodium   Date Value Ref Range Status   08/15/2017 143 136 - 145 mmol/L Final   08/14/2017 145 136 - 145 mmol/L Final      Potassium Potassium   Date Value Ref Range Status   08/15/2017 4.1 3.5 - 5.2 mmol/L Final   08/14/2017 3.5 3.5 - 5.2 mmol/L Final      Chloride Chloride   Date Value Ref Range Status   08/15/2017 106 98 - 107 mmol/L Final   08/14/2017 112 (H) 98 - 107 mmol/L Final      Bicarbonate CO2   Date Value Ref Range Status   08/15/2017 22.9 22.0 - 29.0 mmol/L Final   08/14/2017 21.8 (L) 22.0 - 29.0 mmol/L Final      BUN BUN   Date Value Ref Range Status   08/15/2017 17 8 - 23 mg/dL Final   08/14/2017 13 8 - 23 mg/dL Final      Creatinine Creatinine   Date Value Ref Range Status   08/15/2017 1.15 0.76 - 1.27 mg/dL Final   08/14/2017 0.91 0.76 - 1.27 mg/dL Final      Calcium Calcium   Date Value Ref Range Status   08/15/2017 9.2 8.6 - 10.5 mg/dL Final   08/14/2017 7.3 (L) 8.6 - 10.5 mg/dL Final                aspirin 81 mg Oral Daily   atorvastatin 20 mg Oral Nightly   carvedilol 6.25 mg Oral BID   clopidogrel 75 mg Oral Daily   levothyroxine 75 mcg Oral Q AM   lisinopril 2.5 mg Oral Q24H          Patient Active Problem List   Diagnosis Code   • Colon cancer C18.9   • Elevated blood pressure reading without diagnosis of hypertension R03.0   • Hypothyroidism, acquired E03.9   • Insomnia G47.00   • Osteoarthritis, multiple sites M15.9   • Hyperlipidemia E78.5   • Anterior myocardial infarction I21.09       Assessment & Plan    STEMI/CAD----on asa/Plavix  Denies chest pain  ICM, EF 45% (by echo)---no significant valve dysfxn  Thrombocytopenia- trending down today 119-- hematology to see   HL  NEW HTN  OSTEOARTHRITIS----on Meloxicam at home, last dose 8/12/17  ?ANGELES  HYPOTHYROIDISM  HX KIDNEY  STONES  BPH  HX COLON CA WITH PARTIAL COLECTOMY     Dr. Lynn has reviewed his cath films.    The plan is to let the patient recover from his MI, and if he continues to be stable, discharge home.    We would bring him back in a few weeks and bridge with Integrilin prior to surgery.    Carotid and vein mapping ordered--  He will need a repeat echo prior to surgery to evaluate his LV function -     Thank you for allowing us to participate in the care of this patient.             VASILIY Hunt  08/16/17  11:24 AM      Plan for CABG next week. Bridge with integrelin gtt.

## 2017-08-16 NOTE — PLAN OF CARE
Problem: Patient Care Overview (Adult)  Goal: Plan of Care Review  Outcome: Outcome(s) achieved Date Met:  08/16/17 08/16/17 4049   Coping/Psychosocial Response Interventions   Plan Of Care Reviewed With patient;spouse   Patient Care Overview   Progress improving   Outcome Evaluation   Outcome Summary/Follow up Plan Patient being discharged. Denies complaints. Wife to take patient home. Discharge instructions given and reviewed.

## 2017-08-17 ENCOUNTER — TELEPHONE (OUTPATIENT)
Dept: CARDIAC SURGERY | Facility: CLINIC | Age: 62
End: 2017-08-17

## 2017-08-17 ENCOUNTER — PREP FOR SURGERY (OUTPATIENT)
Dept: OTHER | Facility: HOSPITAL | Age: 62
End: 2017-08-17

## 2017-08-17 ENCOUNTER — HOSPITAL ENCOUNTER (OUTPATIENT)
Facility: HOSPITAL | Age: 62
Setting detail: SURGERY ADMIT
End: 2017-08-17
Attending: THORACIC SURGERY (CARDIOTHORACIC VASCULAR SURGERY) | Admitting: THORACIC SURGERY (CARDIOTHORACIC VASCULAR SURGERY)

## 2017-08-17 DIAGNOSIS — I25.10 CAD (CORONARY ARTERY DISEASE): Primary | ICD-10-CM

## 2017-08-18 ENCOUNTER — TELEPHONE (OUTPATIENT)
Dept: SURGERY | Facility: HOSPITAL | Age: 62
End: 2017-08-18

## 2017-08-18 LAB
CENTROMERE B AB SER-ACNC: <0.2 AI (ref 0–0.9)
CHROMATIN AB SERPL-ACNC: <0.2 AI (ref 0–0.9)
DSDNA AB SER-ACNC: <1 IU/ML (ref 0–9)
ENA JO1 AB SER-ACNC: <0.2 AI (ref 0–0.9)
ENA RNP AB SER-ACNC: 0.2 AI (ref 0–0.9)
ENA SCL70 AB SER-ACNC: <0.2 AI (ref 0–0.9)
ENA SM AB SER-ACNC: <0.2 AI (ref 0–0.9)
ENA SS-A AB SER-ACNC: <0.2 AI (ref 0–0.9)
ENA SS-B AB SER-ACNC: <0.2 AI (ref 0–0.9)
IGA SERPL-MCNC: 91 MG/DL (ref 61–437)
IGG SERPL-MCNC: 675 MG/DL (ref 700–1600)
IGM SERPL-MCNC: 104 MG/DL (ref 20–172)
Lab: NORMAL
PROT PATTERN SERPL IFE-IMP: ABNORMAL
RIBOSOMAL P AB SER-ACNC: <0.2 AI (ref 0–0.9)
RIBOSOMAL P AB SER-ACNC: <0.2 AI (ref 0–0.9)

## 2017-08-21 ENCOUNTER — APPOINTMENT (OUTPATIENT)
Dept: CARDIOLOGY | Facility: HOSPITAL | Age: 62
End: 2017-08-21

## 2017-08-21 ENCOUNTER — APPOINTMENT (OUTPATIENT)
Dept: GENERAL RADIOLOGY | Facility: HOSPITAL | Age: 62
End: 2017-08-21

## 2017-08-21 ENCOUNTER — HOSPITAL ENCOUNTER (INPATIENT)
Facility: HOSPITAL | Age: 62
LOS: 9 days | Discharge: HOME HEALTH | End: 2017-08-30
Attending: THORACIC SURGERY (CARDIOTHORACIC VASCULAR SURGERY) | Admitting: THORACIC SURGERY (CARDIOTHORACIC VASCULAR SURGERY)

## 2017-08-21 DIAGNOSIS — Z95.1 S/P CABG (CORONARY ARTERY BYPASS GRAFT): ICD-10-CM

## 2017-08-21 DIAGNOSIS — I25.118 CORONARY ARTERY DISEASE INVOLVING NATIVE CORONARY ARTERY OF NATIVE HEART WITH OTHER FORM OF ANGINA PECTORIS (HCC): Primary | ICD-10-CM

## 2017-08-21 DIAGNOSIS — R53.1 GENERALIZED WEAKNESS: ICD-10-CM

## 2017-08-21 PROBLEM — I25.10 CAD (CORONARY ARTERY DISEASE): Status: ACTIVE | Noted: 2017-08-17

## 2017-08-21 LAB
CLOSE TME COLL+ADP + EPINEP PNL BLD: 70 % (ref 86–100)
DEPRECATED RDW RBC AUTO: 43.4 FL (ref 37–54)
ERYTHROCYTE [DISTWIDTH] IN BLOOD BY AUTOMATED COUNT: 12.8 % (ref 11.5–14.5)
HCT VFR BLD AUTO: 40.1 % (ref 40.4–52.2)
HGB BLD-MCNC: 13.2 G/DL (ref 13.7–17.6)
MCH RBC QN AUTO: 31.1 PG (ref 27–32.7)
MCHC RBC AUTO-ENTMCNC: 32.9 G/DL (ref 32.6–36.4)
MCV RBC AUTO: 94.4 FL (ref 79.8–96.2)
PLATELET # BLD AUTO: 127 10*3/MM3 (ref 140–500)
PMV BLD AUTO: 11.5 FL (ref 6–12)
RBC # BLD AUTO: 4.25 10*6/MM3 (ref 4.6–6)
WBC NRBC COR # BLD: 5.95 10*3/MM3 (ref 4.5–10.7)

## 2017-08-21 PROCEDURE — 85027 COMPLETE CBC AUTOMATED: CPT | Performed by: NURSE PRACTITIONER

## 2017-08-21 PROCEDURE — 93308 TTE F-UP OR LMTD: CPT

## 2017-08-21 PROCEDURE — 93010 ELECTROCARDIOGRAM REPORT: CPT | Performed by: INTERNAL MEDICINE

## 2017-08-21 PROCEDURE — 25010000002 EPTIFIBATIDE PER 5 MG: Performed by: NURSE PRACTITIONER

## 2017-08-21 PROCEDURE — 93308 TTE F-UP OR LMTD: CPT | Performed by: INTERNAL MEDICINE

## 2017-08-21 PROCEDURE — 93005 ELECTROCARDIOGRAM TRACING: CPT | Performed by: THORACIC SURGERY (CARDIOTHORACIC VASCULAR SURGERY)

## 2017-08-21 PROCEDURE — 85576 BLOOD PLATELET AGGREGATION: CPT | Performed by: THORACIC SURGERY (CARDIOTHORACIC VASCULAR SURGERY)

## 2017-08-21 PROCEDURE — 71020 HC CHEST PA AND LATERAL: CPT

## 2017-08-21 PROCEDURE — 99251 PR INITL INPATIENT CONSULT NEW/ESTAB PT 20 MIN: CPT | Performed by: INTERNAL MEDICINE

## 2017-08-21 RX ORDER — CHLORHEXIDINE GLUCONATE 500 MG/1
1 CLOTH TOPICAL EVERY 12 HOURS
Status: DISCONTINUED | OUTPATIENT
Start: 2017-08-24 | End: 2017-08-22

## 2017-08-21 RX ORDER — DIPHENHYDRAMINE HCL 25 MG
25 CAPSULE ORAL NIGHTLY PRN
Status: DISCONTINUED | OUTPATIENT
Start: 2017-08-21 | End: 2017-08-25

## 2017-08-21 RX ORDER — LISINOPRIL 2.5 MG/1
2.5 TABLET ORAL
Status: DISCONTINUED | OUTPATIENT
Start: 2017-08-21 | End: 2017-08-24

## 2017-08-21 RX ORDER — ALPRAZOLAM 0.25 MG/1
0.25 TABLET ORAL EVERY 8 HOURS PRN
Status: DISCONTINUED | OUTPATIENT
Start: 2017-08-21 | End: 2017-08-25

## 2017-08-21 RX ORDER — ACETAMINOPHEN 325 MG/1
650 TABLET ORAL EVERY 4 HOURS PRN
Status: DISCONTINUED | OUTPATIENT
Start: 2017-08-21 | End: 2017-08-25

## 2017-08-21 RX ORDER — ZOLPIDEM TARTRATE 5 MG/1
10 TABLET ORAL NIGHTLY PRN
Status: DISCONTINUED | OUTPATIENT
Start: 2017-08-21 | End: 2017-08-25

## 2017-08-21 RX ORDER — LEVOTHYROXINE SODIUM 0.07 MG/1
75 TABLET ORAL DAILY
Status: DISCONTINUED | OUTPATIENT
Start: 2017-08-21 | End: 2017-08-25

## 2017-08-21 RX ORDER — ASPIRIN 81 MG/1
81 TABLET, CHEWABLE ORAL DAILY
Status: DISCONTINUED | OUTPATIENT
Start: 2017-08-21 | End: 2017-08-25

## 2017-08-21 RX ORDER — CHLORHEXIDINE GLUCONATE 0.12 MG/ML
15 RINSE ORAL EVERY 12 HOURS SCHEDULED
Status: DISCONTINUED | OUTPATIENT
Start: 2017-08-24 | End: 2017-08-22

## 2017-08-21 RX ORDER — CARVEDILOL 6.25 MG/1
6.25 TABLET ORAL 2 TIMES DAILY
Status: DISCONTINUED | OUTPATIENT
Start: 2017-08-21 | End: 2017-08-25

## 2017-08-21 RX ORDER — ATORVASTATIN CALCIUM 20 MG/1
20 TABLET, FILM COATED ORAL NIGHTLY
Status: DISCONTINUED | OUTPATIENT
Start: 2017-08-21 | End: 2017-08-25

## 2017-08-21 RX ORDER — NITROGLYCERIN 0.4 MG/1
0.4 TABLET SUBLINGUAL
Status: DISCONTINUED | OUTPATIENT
Start: 2017-08-21 | End: 2017-08-25

## 2017-08-21 RX ORDER — TEMAZEPAM 15 MG/1
15 CAPSULE ORAL NIGHTLY PRN
Status: DISCONTINUED | OUTPATIENT
Start: 2017-08-21 | End: 2017-08-25

## 2017-08-21 RX ORDER — EPTIFIBATIDE 0.75 MG/ML
2 INJECTION, SOLUTION INTRAVENOUS CONTINUOUS
Status: DISPENSED | OUTPATIENT
Start: 2017-08-21 | End: 2017-08-24

## 2017-08-21 RX ORDER — CEFAZOLIN SODIUM 2 G/100ML
2 INJECTION, SOLUTION INTRAVENOUS
Status: DISCONTINUED | OUTPATIENT
Start: 2017-08-25 | End: 2017-08-22

## 2017-08-21 RX ADMIN — CARVEDILOL 6.25 MG: 6.25 TABLET, FILM COATED ORAL at 18:09

## 2017-08-21 RX ADMIN — EPTIFIBATIDE 2 MCG/KG/MIN: 0.75 INJECTION, SOLUTION INTRAVENOUS at 21:24

## 2017-08-21 RX ADMIN — ATORVASTATIN CALCIUM 20 MG: 20 TABLET, FILM COATED ORAL at 21:26

## 2017-08-21 NOTE — TELEPHONE ENCOUNTER
8/18-Pt called office with concerns about when surgery was and why he was being admitted prior to.  d/t pt being on plavix the surgery will be delayed until Friday 8/25 with admission on Monday to bridge. pt verbalized understanding.

## 2017-08-22 LAB
ALBUMIN SERPL-MCNC: 4.1 G/DL (ref 3.5–5.2)
ALBUMIN/GLOB SERPL: 1.8 G/DL
ALP SERPL-CCNC: 76 U/L (ref 39–117)
ALT SERPL W P-5'-P-CCNC: 22 U/L (ref 1–41)
ANION GAP SERPL CALCULATED.3IONS-SCNC: 12.8 MMOL/L
ARTERIAL PATENCY WRIST A: POSITIVE
AST SERPL-CCNC: 14 U/L (ref 1–40)
ATMOSPHERIC PRESS: 747.1 MMHG
BASE EXCESS BLDA CALC-SCNC: 0.2 MMOL/L (ref 0–2)
BASOPHILS # BLD AUTO: 0.01 10*3/MM3 (ref 0–0.2)
BASOPHILS NFR BLD AUTO: 0.2 % (ref 0–1.5)
BDY SITE: ABNORMAL
BH CV ECHO MEAS - BSA(HAYCOCK): 2.3 M^2
BH CV ECHO MEAS - BSA: 2.2 M^2
BH CV ECHO MEAS - BZI_BMI: 34.1 KILOGRAMS/M^2
BH CV ECHO MEAS - BZI_METRIC_HEIGHT: 177.8 CM
BH CV ECHO MEAS - BZI_METRIC_WEIGHT: 108 KG
BH CV ECHO MEAS - CONTRAST EF (2CH): 46.2 ML/M^2
BH CV ECHO MEAS - CONTRAST EF 4CH: 42.7 ML/M^2
BH CV ECHO MEAS - EDV(CUBED): 216 ML
BH CV ECHO MEAS - EDV(MOD-SP2): 119 ML
BH CV ECHO MEAS - EDV(MOD-SP4): 211 ML
BH CV ECHO MEAS - EDV(TEICH): 180 ML
BH CV ECHO MEAS - EF(CUBED): 87.5 %
BH CV ECHO MEAS - EF(MOD-SP2): 46.2 %
BH CV ECHO MEAS - EF(MOD-SP4): 42.7 %
BH CV ECHO MEAS - EF(TEICH): 80.6 %
BH CV ECHO MEAS - ESV(CUBED): 27 ML
BH CV ECHO MEAS - ESV(MOD-SP2): 64 ML
BH CV ECHO MEAS - ESV(MOD-SP4): 121 ML
BH CV ECHO MEAS - ESV(TEICH): 35 ML
BH CV ECHO MEAS - FS: 50 %
BH CV ECHO MEAS - IVS/LVPW: 1
BH CV ECHO MEAS - IVSD: 1 CM
BH CV ECHO MEAS - LV DIASTOLIC VOL/BSA (35-75): 93.9 ML/M^2
BH CV ECHO MEAS - LV MASS(C)D: 246.9 GRAMS
BH CV ECHO MEAS - LV MASS(C)DI: 109.8 GRAMS/M^2
BH CV ECHO MEAS - LV SYSTOLIC VOL/BSA (12-30): 53.8 ML/M^2
BH CV ECHO MEAS - LVIDD: 6 CM
BH CV ECHO MEAS - LVIDS: 3 CM
BH CV ECHO MEAS - LVLD AP2: 8.3 CM
BH CV ECHO MEAS - LVLD AP4: 9 CM
BH CV ECHO MEAS - LVLS AP2: 7.4 CM
BH CV ECHO MEAS - LVLS AP4: 8.2 CM
BH CV ECHO MEAS - LVPWD: 1 CM
BH CV ECHO MEAS - SI(CUBED): 84.1 ML/M^2
BH CV ECHO MEAS - SI(MOD-SP2): 24.5 ML/M^2
BH CV ECHO MEAS - SI(MOD-SP4): 40 ML/M^2
BH CV ECHO MEAS - SI(TEICH): 64.5 ML/M^2
BH CV ECHO MEAS - SV(CUBED): 189 ML
BH CV ECHO MEAS - SV(MOD-SP2): 55 ML
BH CV ECHO MEAS - SV(MOD-SP4): 90 ML
BH CV ECHO MEAS - SV(TEICH): 145 ML
BH CV VAS BP RIGHT ARM: NORMAL MMHG
BILIRUB SERPL-MCNC: 1.1 MG/DL (ref 0.1–1.2)
BILIRUB UR QL STRIP: NEGATIVE
BUN BLD-MCNC: 22 MG/DL (ref 8–23)
BUN/CREAT SERPL: 18 (ref 7–25)
CALCIUM SPEC-SCNC: 9.1 MG/DL (ref 8.6–10.5)
CHLORIDE SERPL-SCNC: 105 MMOL/L (ref 98–107)
CLARITY UR: CLEAR
CLOSE TME COLL+ADP + EPINEP PNL BLD: 6 % (ref 86–100)
CLOSE TME COLL+ADP + EPINEP PNL BLD: 9 % (ref 86–100)
CO2 SERPL-SCNC: 22.2 MMOL/L (ref 22–29)
COLOR UR: YELLOW
CREAT BLD-MCNC: 1.22 MG/DL (ref 0.76–1.27)
DEPRECATED RDW RBC AUTO: 43.1 FL (ref 37–54)
EOSINOPHIL # BLD AUTO: 0.08 10*3/MM3 (ref 0–0.7)
EOSINOPHIL NFR BLD AUTO: 1.2 % (ref 0.3–6.2)
ERYTHROCYTE [DISTWIDTH] IN BLOOD BY AUTOMATED COUNT: 12.7 % (ref 11.5–14.5)
GFR SERPL CREATININE-BSD FRML MDRD: 60 ML/MIN/1.73
GLOBULIN UR ELPH-MCNC: 2.3 GM/DL
GLUCOSE BLD-MCNC: 95 MG/DL (ref 65–99)
GLUCOSE UR STRIP-MCNC: NEGATIVE MG/DL
HCO3 BLDA-SCNC: 24.8 MMOL/L (ref 22–28)
HCT VFR BLD AUTO: 41.4 % (ref 40.4–52.2)
HGB BLD-MCNC: 13.6 G/DL (ref 13.7–17.6)
HGB UR QL STRIP.AUTO: NEGATIVE
IMM GRANULOCYTES # BLD: 0 10*3/MM3 (ref 0–0.03)
IMM GRANULOCYTES NFR BLD: 0 % (ref 0–0.5)
KETONES UR QL STRIP: NEGATIVE
LEUKOCYTE ESTERASE UR QL STRIP.AUTO: NEGATIVE
LYMPHOCYTES # BLD AUTO: 1.76 10*3/MM3 (ref 0.9–4.8)
LYMPHOCYTES NFR BLD AUTO: 27.1 % (ref 19.6–45.3)
MCH RBC QN AUTO: 30.8 PG (ref 27–32.7)
MCHC RBC AUTO-ENTMCNC: 32.9 G/DL (ref 32.6–36.4)
MCV RBC AUTO: 93.7 FL (ref 79.8–96.2)
MODALITY: ABNORMAL
MONOCYTES # BLD AUTO: 0.57 10*3/MM3 (ref 0.2–1.2)
MONOCYTES NFR BLD AUTO: 8.8 % (ref 5–12)
NEUTROPHILS # BLD AUTO: 4.07 10*3/MM3 (ref 1.9–8.1)
NEUTROPHILS NFR BLD AUTO: 62.7 % (ref 42.7–76)
NITRITE UR QL STRIP: NEGATIVE
PCO2 BLDA: 38.9 MM HG (ref 35–45)
PH BLDA: 7.41 PH UNITS (ref 7.35–7.45)
PH UR STRIP.AUTO: 6.5 [PH] (ref 5–8)
PLATELET # BLD AUTO: 123 10*3/MM3 (ref 140–500)
PMV BLD AUTO: 12 FL (ref 6–12)
PO2 BLDA: 75.1 MM HG (ref 80–100)
POTASSIUM BLD-SCNC: 4.2 MMOL/L (ref 3.5–5.2)
PROT SERPL-MCNC: 6.4 G/DL (ref 6–8.5)
PROT UR QL STRIP: NEGATIVE
RBC # BLD AUTO: 4.42 10*6/MM3 (ref 4.6–6)
SAO2 % BLDCOA: 95.1 % (ref 92–99)
SODIUM BLD-SCNC: 140 MMOL/L (ref 136–145)
SP GR UR STRIP: 1.02 (ref 1–1.03)
TOTAL RATE: 18 BREATHS/MINUTE
UROBILINOGEN UR QL STRIP: NORMAL
WBC NRBC COR # BLD: 6.49 10*3/MM3 (ref 4.5–10.7)

## 2017-08-22 PROCEDURE — 85025 COMPLETE CBC W/AUTO DIFF WBC: CPT | Performed by: NURSE PRACTITIONER

## 2017-08-22 PROCEDURE — 94799 UNLISTED PULMONARY SVC/PX: CPT

## 2017-08-22 PROCEDURE — 25010000002 EPTIFIBATIDE PER 5 MG: Performed by: THORACIC SURGERY (CARDIOTHORACIC VASCULAR SURGERY)

## 2017-08-22 PROCEDURE — 85576 BLOOD PLATELET AGGREGATION: CPT | Performed by: THORACIC SURGERY (CARDIOTHORACIC VASCULAR SURGERY)

## 2017-08-22 PROCEDURE — 82803 BLOOD GASES ANY COMBINATION: CPT

## 2017-08-22 PROCEDURE — 85576 BLOOD PLATELET AGGREGATION: CPT | Performed by: NURSE PRACTITIONER

## 2017-08-22 PROCEDURE — 25010000002 EPTIFIBATIDE PER 5 MG: Performed by: NURSE PRACTITIONER

## 2017-08-22 PROCEDURE — 36600 WITHDRAWAL OF ARTERIAL BLOOD: CPT

## 2017-08-22 PROCEDURE — 81003 URINALYSIS AUTO W/O SCOPE: CPT | Performed by: THORACIC SURGERY (CARDIOTHORACIC VASCULAR SURGERY)

## 2017-08-22 PROCEDURE — 80053 COMPREHEN METABOLIC PANEL: CPT | Performed by: NURSE PRACTITIONER

## 2017-08-22 PROCEDURE — 99232 SBSQ HOSP IP/OBS MODERATE 35: CPT | Performed by: INTERNAL MEDICINE

## 2017-08-22 RX ORDER — ACETAMINOPHEN 325 MG/1
650 TABLET ORAL EVERY 4 HOURS PRN
Status: DISCONTINUED | OUTPATIENT
Start: 2017-08-22 | End: 2017-08-25

## 2017-08-22 RX ORDER — ALPRAZOLAM 0.25 MG/1
0.25 TABLET ORAL EVERY 8 HOURS PRN
Status: DISCONTINUED | OUTPATIENT
Start: 2017-08-22 | End: 2017-08-25

## 2017-08-22 RX ORDER — CHLORHEXIDINE GLUCONATE 0.12 MG/ML
15 RINSE ORAL EVERY 12 HOURS SCHEDULED
Status: COMPLETED | OUTPATIENT
Start: 2017-08-24 | End: 2017-08-25

## 2017-08-22 RX ORDER — CHLORHEXIDINE GLUCONATE 500 MG/1
1 CLOTH TOPICAL EVERY 12 HOURS
Status: DISCONTINUED | OUTPATIENT
Start: 2017-08-24 | End: 2017-08-25

## 2017-08-22 RX ORDER — CEFAZOLIN SODIUM 2 G/100ML
2 INJECTION, SOLUTION INTRAVENOUS
Status: DISCONTINUED | OUTPATIENT
Start: 2017-08-23 | End: 2017-08-23

## 2017-08-22 RX ADMIN — EPTIFIBATIDE 2 MCG/KG/MIN: 0.75 INJECTION, SOLUTION INTRAVENOUS at 12:37

## 2017-08-22 RX ADMIN — EPTIFIBATIDE 2 MCG/KG/MIN: 0.75 INJECTION, SOLUTION INTRAVENOUS at 02:28

## 2017-08-22 RX ADMIN — LISINOPRIL 2.5 MG: 2.5 TABLET ORAL at 08:12

## 2017-08-22 RX ADMIN — EPTIFIBATIDE 2 MCG/KG/MIN: 0.75 INJECTION, SOLUTION INTRAVENOUS at 07:30

## 2017-08-22 RX ADMIN — CARVEDILOL 6.25 MG: 6.25 TABLET, FILM COATED ORAL at 21:38

## 2017-08-22 RX ADMIN — CARVEDILOL 6.25 MG: 6.25 TABLET, FILM COATED ORAL at 08:12

## 2017-08-22 RX ADMIN — LEVOTHYROXINE SODIUM 75 MCG: 75 TABLET ORAL at 08:12

## 2017-08-22 RX ADMIN — ASPIRIN 81 MG: 81 TABLET, CHEWABLE ORAL at 08:12

## 2017-08-22 RX ADMIN — EPTIFIBATIDE 2 MCG/KG/MIN: 0.75 INJECTION, SOLUTION INTRAVENOUS at 22:54

## 2017-08-22 RX ADMIN — ATORVASTATIN CALCIUM 20 MG: 20 TABLET, FILM COATED ORAL at 21:38

## 2017-08-23 LAB
ABO GROUP BLD: NORMAL
ALBUMIN SERPL-MCNC: 4.3 G/DL (ref 3.5–5.2)
ALBUMIN/GLOB SERPL: 1.7 G/DL
ALP SERPL-CCNC: 80 U/L (ref 39–117)
ALT SERPL W P-5'-P-CCNC: 25 U/L (ref 1–41)
ANION GAP SERPL CALCULATED.3IONS-SCNC: 9.4 MMOL/L
APTT PPP: 28 SECONDS (ref 22.7–35.4)
AST SERPL-CCNC: 16 U/L (ref 1–40)
BASOPHILS # BLD AUTO: 0 10*3/MM3 (ref 0–0.2)
BASOPHILS NFR BLD AUTO: 0 % (ref 0–1.5)
BILIRUB SERPL-MCNC: 1.6 MG/DL (ref 0.1–1.2)
BLD GP AB SCN SERPL QL: NEGATIVE
BUN BLD-MCNC: 19 MG/DL (ref 8–23)
BUN/CREAT SERPL: 14.4 (ref 7–25)
CALCIUM SPEC-SCNC: 9.5 MG/DL (ref 8.6–10.5)
CHLORIDE SERPL-SCNC: 105 MMOL/L (ref 98–107)
CLOSE TME COLL+ADP + EPINEP PNL BLD: 2 % (ref 86–100)
CO2 SERPL-SCNC: 26.6 MMOL/L (ref 22–29)
CREAT BLD-MCNC: 1.32 MG/DL (ref 0.76–1.27)
DEPRECATED RDW RBC AUTO: 43.3 FL (ref 37–54)
EOSINOPHIL # BLD AUTO: 0.08 10*3/MM3 (ref 0–0.7)
EOSINOPHIL NFR BLD AUTO: 1.5 % (ref 0.3–6.2)
ERYTHROCYTE [DISTWIDTH] IN BLOOD BY AUTOMATED COUNT: 12.7 % (ref 11.5–14.5)
GFR SERPL CREATININE-BSD FRML MDRD: 55 ML/MIN/1.73
GLOBULIN UR ELPH-MCNC: 2.5 GM/DL
GLUCOSE BLD-MCNC: 97 MG/DL (ref 65–99)
HCT VFR BLD AUTO: 42.9 % (ref 40.4–52.2)
HGB BLD-MCNC: 14.1 G/DL (ref 13.7–17.6)
IMM GRANULOCYTES # BLD: 0 10*3/MM3 (ref 0–0.03)
IMM GRANULOCYTES NFR BLD: 0 % (ref 0–0.5)
INR PPP: 1.03 (ref 0.9–1.1)
LYMPHOCYTES # BLD AUTO: 1.4 10*3/MM3 (ref 0.9–4.8)
LYMPHOCYTES NFR BLD AUTO: 25.6 % (ref 19.6–45.3)
MAGNESIUM SERPL-MCNC: 2.5 MG/DL (ref 1.6–2.4)
MCH RBC QN AUTO: 30.9 PG (ref 27–32.7)
MCHC RBC AUTO-ENTMCNC: 32.9 G/DL (ref 32.6–36.4)
MCV RBC AUTO: 93.9 FL (ref 79.8–96.2)
MONOCYTES # BLD AUTO: 0.46 10*3/MM3 (ref 0.2–1.2)
MONOCYTES NFR BLD AUTO: 8.4 % (ref 5–12)
NEUTROPHILS # BLD AUTO: 3.53 10*3/MM3 (ref 1.9–8.1)
NEUTROPHILS NFR BLD AUTO: 64.5 % (ref 42.7–76)
PLATELET # BLD AUTO: 135 10*3/MM3 (ref 140–500)
PMV BLD AUTO: 12 FL (ref 6–12)
POTASSIUM BLD-SCNC: 4.6 MMOL/L (ref 3.5–5.2)
PROT SERPL-MCNC: 6.8 G/DL (ref 6–8.5)
PROTHROMBIN TIME: 13.1 SECONDS (ref 11.7–14.2)
RBC # BLD AUTO: 4.57 10*6/MM3 (ref 4.6–6)
RH BLD: POSITIVE
SODIUM BLD-SCNC: 141 MMOL/L (ref 136–145)
WBC NRBC COR # BLD: 5.47 10*3/MM3 (ref 4.5–10.7)

## 2017-08-23 PROCEDURE — 85730 THROMBOPLASTIN TIME PARTIAL: CPT | Performed by: THORACIC SURGERY (CARDIOTHORACIC VASCULAR SURGERY)

## 2017-08-23 PROCEDURE — 86901 BLOOD TYPING SEROLOGIC RH(D): CPT | Performed by: THORACIC SURGERY (CARDIOTHORACIC VASCULAR SURGERY)

## 2017-08-23 PROCEDURE — 86900 BLOOD TYPING SEROLOGIC ABO: CPT

## 2017-08-23 PROCEDURE — 86923 COMPATIBILITY TEST ELECTRIC: CPT

## 2017-08-23 PROCEDURE — 85610 PROTHROMBIN TIME: CPT | Performed by: THORACIC SURGERY (CARDIOTHORACIC VASCULAR SURGERY)

## 2017-08-23 PROCEDURE — 99231 SBSQ HOSP IP/OBS SF/LOW 25: CPT | Performed by: NURSE PRACTITIONER

## 2017-08-23 PROCEDURE — 85025 COMPLETE CBC W/AUTO DIFF WBC: CPT | Performed by: THORACIC SURGERY (CARDIOTHORACIC VASCULAR SURGERY)

## 2017-08-23 PROCEDURE — 86850 RBC ANTIBODY SCREEN: CPT | Performed by: THORACIC SURGERY (CARDIOTHORACIC VASCULAR SURGERY)

## 2017-08-23 PROCEDURE — 25010000002 EPTIFIBATIDE PER 5 MG: Performed by: THORACIC SURGERY (CARDIOTHORACIC VASCULAR SURGERY)

## 2017-08-23 PROCEDURE — 99232 SBSQ HOSP IP/OBS MODERATE 35: CPT | Performed by: INTERNAL MEDICINE

## 2017-08-23 PROCEDURE — 80053 COMPREHEN METABOLIC PANEL: CPT | Performed by: THORACIC SURGERY (CARDIOTHORACIC VASCULAR SURGERY)

## 2017-08-23 PROCEDURE — 86900 BLOOD TYPING SEROLOGIC ABO: CPT | Performed by: THORACIC SURGERY (CARDIOTHORACIC VASCULAR SURGERY)

## 2017-08-23 PROCEDURE — 86901 BLOOD TYPING SEROLOGIC RH(D): CPT

## 2017-08-23 PROCEDURE — 85576 BLOOD PLATELET AGGREGATION: CPT | Performed by: THORACIC SURGERY (CARDIOTHORACIC VASCULAR SURGERY)

## 2017-08-23 PROCEDURE — 83735 ASSAY OF MAGNESIUM: CPT | Performed by: THORACIC SURGERY (CARDIOTHORACIC VASCULAR SURGERY)

## 2017-08-23 RX ORDER — CEFAZOLIN SODIUM 2 G/100ML
2 INJECTION, SOLUTION INTRAVENOUS
Status: DISCONTINUED | OUTPATIENT
Start: 2017-08-24 | End: 2017-08-25

## 2017-08-23 RX ADMIN — CARVEDILOL 6.25 MG: 6.25 TABLET, FILM COATED ORAL at 08:46

## 2017-08-23 RX ADMIN — LEVOTHYROXINE SODIUM 75 MCG: 75 TABLET ORAL at 08:46

## 2017-08-23 RX ADMIN — EPTIFIBATIDE 2 MCG/KG/MIN: 0.75 INJECTION, SOLUTION INTRAVENOUS at 05:09

## 2017-08-23 RX ADMIN — CARVEDILOL 6.25 MG: 6.25 TABLET, FILM COATED ORAL at 20:55

## 2017-08-23 RX ADMIN — ASPIRIN 81 MG: 81 TABLET, CHEWABLE ORAL at 08:46

## 2017-08-23 RX ADMIN — EPTIFIBATIDE 2 MCG/KG/MIN: 0.75 INJECTION, SOLUTION INTRAVENOUS at 11:44

## 2017-08-23 RX ADMIN — LISINOPRIL 2.5 MG: 2.5 TABLET ORAL at 08:46

## 2017-08-23 RX ADMIN — ATORVASTATIN CALCIUM 20 MG: 20 TABLET, FILM COATED ORAL at 20:55

## 2017-08-24 LAB
ALBUMIN SERPL-MCNC: 4 G/DL (ref 3.5–5.2)
ALBUMIN/GLOB SERPL: 1.6 G/DL
ALP SERPL-CCNC: 80 U/L (ref 39–117)
ALT SERPL W P-5'-P-CCNC: 22 U/L (ref 1–41)
ANION GAP SERPL CALCULATED.3IONS-SCNC: 9.6 MMOL/L
AST SERPL-CCNC: 19 U/L (ref 1–40)
BILIRUB SERPL-MCNC: 1.3 MG/DL (ref 0.1–1.2)
BUN BLD-MCNC: 18 MG/DL (ref 8–23)
BUN/CREAT SERPL: 12.9 (ref 7–25)
CALCIUM SPEC-SCNC: 9.6 MG/DL (ref 8.6–10.5)
CHLORIDE SERPL-SCNC: 105 MMOL/L (ref 98–107)
CO2 SERPL-SCNC: 27.4 MMOL/L (ref 22–29)
CREAT BLD-MCNC: 1.39 MG/DL (ref 0.76–1.27)
DEPRECATED RDW RBC AUTO: 42.9 FL (ref 37–54)
ERYTHROCYTE [DISTWIDTH] IN BLOOD BY AUTOMATED COUNT: 12.6 % (ref 11.5–14.5)
GFR SERPL CREATININE-BSD FRML MDRD: 52 ML/MIN/1.73
GLOBULIN UR ELPH-MCNC: 2.5 GM/DL
GLUCOSE BLD-MCNC: 96 MG/DL (ref 65–99)
HCT VFR BLD AUTO: 41.7 % (ref 40.4–52.2)
HGB BLD-MCNC: 13.5 G/DL (ref 13.7–17.6)
MCH RBC QN AUTO: 30.4 PG (ref 27–32.7)
MCHC RBC AUTO-ENTMCNC: 32.4 G/DL (ref 32.6–36.4)
MCV RBC AUTO: 93.9 FL (ref 79.8–96.2)
PLATELET # BLD AUTO: 129 10*3/MM3 (ref 140–500)
PMV BLD AUTO: 12.2 FL (ref 6–12)
POTASSIUM BLD-SCNC: 4.4 MMOL/L (ref 3.5–5.2)
PROT SERPL-MCNC: 6.5 G/DL (ref 6–8.5)
RBC # BLD AUTO: 4.44 10*6/MM3 (ref 4.6–6)
SODIUM BLD-SCNC: 142 MMOL/L (ref 136–145)
WBC NRBC COR # BLD: 6.6 10*3/MM3 (ref 4.5–10.7)

## 2017-08-24 PROCEDURE — 25010000002 EPTIFIBATIDE PER 5 MG: Performed by: THORACIC SURGERY (CARDIOTHORACIC VASCULAR SURGERY)

## 2017-08-24 PROCEDURE — 99232 SBSQ HOSP IP/OBS MODERATE 35: CPT | Performed by: INTERNAL MEDICINE

## 2017-08-24 PROCEDURE — 80053 COMPREHEN METABOLIC PANEL: CPT | Performed by: NURSE PRACTITIONER

## 2017-08-24 PROCEDURE — 99231 SBSQ HOSP IP/OBS SF/LOW 25: CPT | Performed by: NURSE PRACTITIONER

## 2017-08-24 PROCEDURE — 85027 COMPLETE CBC AUTOMATED: CPT | Performed by: NURSE PRACTITIONER

## 2017-08-24 RX ADMIN — CARVEDILOL 6.25 MG: 6.25 TABLET, FILM COATED ORAL at 08:15

## 2017-08-24 RX ADMIN — EPTIFIBATIDE 2 MCG/KG/MIN: 0.75 INJECTION, SOLUTION INTRAVENOUS at 12:18

## 2017-08-24 RX ADMIN — CARVEDILOL 6.25 MG: 6.25 TABLET, FILM COATED ORAL at 21:42

## 2017-08-24 RX ADMIN — LEVOTHYROXINE SODIUM 75 MCG: 75 TABLET ORAL at 08:15

## 2017-08-24 RX ADMIN — ASPIRIN 81 MG: 81 TABLET, CHEWABLE ORAL at 08:15

## 2017-08-24 RX ADMIN — ATORVASTATIN CALCIUM 20 MG: 20 TABLET, FILM COATED ORAL at 21:42

## 2017-08-24 RX ADMIN — CHLORHEXIDINE GLUCONATE 15 ML: 1.2 RINSE ORAL at 21:43

## 2017-08-24 RX ADMIN — EPTIFIBATIDE 2 MCG/KG/MIN: 0.75 INJECTION, SOLUTION INTRAVENOUS at 06:25

## 2017-08-24 RX ADMIN — MUPIROCIN 1 APPLICATION: 20 OINTMENT TOPICAL at 21:43

## 2017-08-24 RX ADMIN — ALPRAZOLAM 0.25 MG: 0.25 TABLET ORAL at 21:43

## 2017-08-24 RX ADMIN — EPTIFIBATIDE 2 MCG/KG/MIN: 0.75 INJECTION, SOLUTION INTRAVENOUS at 01:24

## 2017-08-25 ENCOUNTER — ANESTHESIA EVENT (OUTPATIENT)
Dept: PERIOP | Facility: HOSPITAL | Age: 62
End: 2017-08-25

## 2017-08-25 ENCOUNTER — ANESTHESIA (OUTPATIENT)
Dept: PERIOP | Facility: HOSPITAL | Age: 62
End: 2017-08-25

## 2017-08-25 ENCOUNTER — APPOINTMENT (OUTPATIENT)
Dept: GENERAL RADIOLOGY | Facility: HOSPITAL | Age: 62
End: 2017-08-25

## 2017-08-25 ENCOUNTER — APPOINTMENT (OUTPATIENT)
Dept: PERIOP | Facility: HOSPITAL | Age: 62
End: 2017-08-25
Attending: ANESTHESIOLOGY

## 2017-08-25 LAB
ALBUMIN SERPL-MCNC: 4 G/DL (ref 3.5–5.2)
ALBUMIN SERPL-MCNC: 4.8 G/DL (ref 3.5–5.2)
ALBUMIN SERPL-MCNC: 5.5 G/DL (ref 3.5–5.2)
ALBUMIN/GLOB SERPL: 1.5 G/DL
ALP SERPL-CCNC: 82 U/L (ref 39–117)
ALT SERPL W P-5'-P-CCNC: 26 U/L (ref 1–41)
ANION GAP SERPL CALCULATED.3IONS-SCNC: 12 MMOL/L
ANION GAP SERPL CALCULATED.3IONS-SCNC: 15.1 MMOL/L
ANION GAP SERPL CALCULATED.3IONS-SCNC: 15.8 MMOL/L
APTT PPP: 27.1 SECONDS (ref 22.7–35.4)
ARTERIAL PATENCY WRIST A: ABNORMAL
ARTERIAL PATENCY WRIST A: ABNORMAL
AST SERPL-CCNC: 19 U/L (ref 1–40)
ATMOSPHERIC PRESS: 751.7 MMHG
ATMOSPHERIC PRESS: 752.6 MMHG
BASE EXCESS BLDA CALC-SCNC: 0.3 MMOL/L (ref 0–2)
BASE EXCESS BLDA CALC-SCNC: 0.4 MMOL/L (ref 0–2)
BASOPHILS # BLD AUTO: 0 10*3/MM3 (ref 0–0.2)
BASOPHILS NFR BLD AUTO: 0 % (ref 0–1.5)
BDY SITE: ABNORMAL
BDY SITE: ABNORMAL
BILIRUB SERPL-MCNC: 0.9 MG/DL (ref 0.1–1.2)
BUN BLD-MCNC: 22 MG/DL (ref 8–23)
BUN BLD-MCNC: 22 MG/DL (ref 8–23)
BUN BLD-MCNC: 23 MG/DL (ref 8–23)
BUN/CREAT SERPL: 13.8 (ref 7–25)
BUN/CREAT SERPL: 14.1 (ref 7–25)
BUN/CREAT SERPL: 15 (ref 7–25)
CA-I BLD-MCNC: 4.7 MG/DL (ref 4.6–5.4)
CA-I SERPL ISE-MCNC: 1.18 MMOL/L (ref 1.1–1.35)
CALCIUM SPEC-SCNC: 8.8 MG/DL (ref 8.6–10.5)
CALCIUM SPEC-SCNC: 9.3 MG/DL (ref 8.6–10.5)
CALCIUM SPEC-SCNC: 9.6 MG/DL (ref 8.6–10.5)
CHLORIDE SERPL-SCNC: 103 MMOL/L (ref 98–107)
CHLORIDE SERPL-SCNC: 106 MMOL/L (ref 98–107)
CHLORIDE SERPL-SCNC: 107 MMOL/L (ref 98–107)
CLOSE TME COLL+ADP + EPINEP PNL BLD: 74 % (ref 86–100)
CO2 SERPL-SCNC: 22.9 MMOL/L (ref 22–29)
CO2 SERPL-SCNC: 24.2 MMOL/L (ref 22–29)
CO2 SERPL-SCNC: 26 MMOL/L (ref 22–29)
CREAT BLD-MCNC: 1.47 MG/DL (ref 0.76–1.27)
CREAT BLD-MCNC: 1.6 MG/DL (ref 0.76–1.27)
CREAT BLD-MCNC: 1.63 MG/DL (ref 0.76–1.27)
DEPRECATED RDW RBC AUTO: 43.5 FL (ref 37–54)
DEPRECATED RDW RBC AUTO: 43.9 FL (ref 37–54)
DEPRECATED RDW RBC AUTO: 44.3 FL (ref 37–54)
EOSINOPHIL # BLD AUTO: 0.02 10*3/MM3 (ref 0–0.7)
EOSINOPHIL NFR BLD AUTO: 0.1 % (ref 0.3–6.2)
ERYTHROCYTE [DISTWIDTH] IN BLOOD BY AUTOMATED COUNT: 12.7 % (ref 11.5–14.5)
ERYTHROCYTE [DISTWIDTH] IN BLOOD BY AUTOMATED COUNT: 12.9 % (ref 11.5–14.5)
ERYTHROCYTE [DISTWIDTH] IN BLOOD BY AUTOMATED COUNT: 12.9 % (ref 11.5–14.5)
GFR SERPL CREATININE-BSD FRML MDRD: 43 ML/MIN/1.73
GFR SERPL CREATININE-BSD FRML MDRD: 44 ML/MIN/1.73
GFR SERPL CREATININE-BSD FRML MDRD: 49 ML/MIN/1.73
GLOBULIN UR ELPH-MCNC: 2.7 GM/DL
GLUCOSE BLD-MCNC: 150 MG/DL (ref 65–99)
GLUCOSE BLD-MCNC: 161 MG/DL (ref 65–99)
GLUCOSE BLD-MCNC: 96 MG/DL (ref 65–99)
GLUCOSE BLDC GLUCOMTR-MCNC: 124 MG/DL (ref 70–130)
GLUCOSE BLDC GLUCOMTR-MCNC: 127 MG/DL (ref 70–130)
GLUCOSE BLDC GLUCOMTR-MCNC: 132 MG/DL (ref 70–130)
GLUCOSE BLDC GLUCOMTR-MCNC: 136 MG/DL (ref 70–130)
GLUCOSE BLDC GLUCOMTR-MCNC: 140 MG/DL (ref 70–130)
GLUCOSE BLDC GLUCOMTR-MCNC: 146 MG/DL (ref 70–130)
GLUCOSE BLDC GLUCOMTR-MCNC: 152 MG/DL (ref 70–130)
GLUCOSE BLDC GLUCOMTR-MCNC: 157 MG/DL (ref 70–130)
GLUCOSE BLDC GLUCOMTR-MCNC: 158 MG/DL (ref 70–130)
GLUCOSE BLDC GLUCOMTR-MCNC: 162 MG/DL (ref 70–130)
HCO3 BLDA-SCNC: 25.4 MMOL/L (ref 22–28)
HCO3 BLDA-SCNC: 26 MMOL/L (ref 22–28)
HCT VFR BLD AUTO: 31.4 % (ref 40.4–52.2)
HCT VFR BLD AUTO: 36.4 % (ref 40.4–52.2)
HCT VFR BLD AUTO: 40.2 % (ref 40.4–52.2)
HGB BLD-MCNC: 10.2 G/DL (ref 13.7–17.6)
HGB BLD-MCNC: 12 G/DL (ref 13.7–17.6)
HGB BLD-MCNC: 13.1 G/DL (ref 13.7–17.6)
HOROWITZ INDEX BLD+IHG-RTO: 100 %
HOROWITZ INDEX BLD+IHG-RTO: 40 %
IMM GRANULOCYTES # BLD: 0.07 10*3/MM3 (ref 0–0.03)
IMM GRANULOCYTES NFR BLD: 0.5 % (ref 0–0.5)
INR PPP: 1.26 (ref 0.9–1.1)
LYMPHOCYTES # BLD AUTO: 1.23 10*3/MM3 (ref 0.9–4.8)
LYMPHOCYTES NFR BLD AUTO: 9 % (ref 19.6–45.3)
MAGNESIUM SERPL-MCNC: 2.8 MG/DL (ref 1.6–2.4)
MAGNESIUM SERPL-MCNC: 3 MG/DL (ref 1.6–2.4)
MCH RBC QN AUTO: 30.6 PG (ref 27–32.7)
MCH RBC QN AUTO: 30.7 PG (ref 27–32.7)
MCH RBC QN AUTO: 31 PG (ref 27–32.7)
MCHC RBC AUTO-ENTMCNC: 32.5 G/DL (ref 32.6–36.4)
MCHC RBC AUTO-ENTMCNC: 32.6 G/DL (ref 32.6–36.4)
MCHC RBC AUTO-ENTMCNC: 33 G/DL (ref 32.6–36.4)
MCV RBC AUTO: 93.9 FL (ref 79.8–96.2)
MCV RBC AUTO: 94.1 FL (ref 79.8–96.2)
MCV RBC AUTO: 94.6 FL (ref 79.8–96.2)
MODALITY: ABNORMAL
MODALITY: ABNORMAL
MONOCYTES # BLD AUTO: 0.67 10*3/MM3 (ref 0.2–1.2)
MONOCYTES NFR BLD AUTO: 4.9 % (ref 5–12)
NEUTROPHILS # BLD AUTO: 11.71 10*3/MM3 (ref 1.9–8.1)
NEUTROPHILS NFR BLD AUTO: 85.5 % (ref 42.7–76)
O2 A-A PPRESDIFF RESPIRATORY: 0.4 MMHG
O2 A-A PPRESDIFF RESPIRATORY: 0.5 MMHG
PCO2 BLDA: 41.8 MM HG (ref 35–45)
PCO2 BLDA: 45.5 MM HG (ref 35–45)
PEEP RESPIRATORY: 7.5 CM[H2O]
PEEP RESPIRATORY: 7.5 CM[H2O]
PH BLDA: 7.37 PH UNITS (ref 7.35–7.45)
PH BLDA: 7.39 PH UNITS (ref 7.35–7.45)
PHOSPHATE SERPL-MCNC: 2.1 MG/DL (ref 2.5–4.5)
PHOSPHATE SERPL-MCNC: 3.1 MG/DL (ref 2.5–4.5)
PLATELET # BLD AUTO: 102 10*3/MM3 (ref 140–500)
PLATELET # BLD AUTO: 106 10*3/MM3 (ref 140–500)
PLATELET # BLD AUTO: 128 10*3/MM3 (ref 140–500)
PMV BLD AUTO: 11.5 FL (ref 6–12)
PMV BLD AUTO: 11.6 FL (ref 6–12)
PMV BLD AUTO: 11.8 FL (ref 6–12)
PO2 BLDA: 100.3 MM HG (ref 80–100)
PO2 BLDA: 332.9 MM HG (ref 80–100)
POTASSIUM BLD-SCNC: 3.8 MMOL/L (ref 3.5–5.2)
POTASSIUM BLD-SCNC: 4.2 MMOL/L (ref 3.5–5.2)
POTASSIUM BLD-SCNC: 4.4 MMOL/L (ref 3.5–5.2)
POTASSIUM BLD-SCNC: 4.8 MMOL/L (ref 3.5–5.2)
PROT SERPL-MCNC: 6.7 G/DL (ref 6–8.5)
PROTHROMBIN TIME: 15.3 SECONDS (ref 11.7–14.2)
PSV: 8 CMH2O
RBC # BLD AUTO: 3.32 10*6/MM3 (ref 4.6–6)
RBC # BLD AUTO: 3.87 10*6/MM3 (ref 4.6–6)
RBC # BLD AUTO: 4.28 10*6/MM3 (ref 4.6–6)
SAO2 % BLDCOA: 97.5 % (ref 92–99)
SAO2 % BLDCOA: 99.9 % (ref 92–99)
SET MECH RESP RATE: 14
SET MECH RESP RATE: 15
SODIUM BLD-SCNC: 141 MMOL/L (ref 136–145)
SODIUM BLD-SCNC: 144 MMOL/L (ref 136–145)
SODIUM BLD-SCNC: 147 MMOL/L (ref 136–145)
TOTAL RATE: 14 BREATHS/MINUTE
VENTILATOR MODE: ABNORMAL
VENTILATOR MODE: ABNORMAL
VT ON VENT VENT: 550 ML
VT ON VENT VENT: 700 ML
WBC NRBC COR # BLD: 12.7 10*3/MM3 (ref 4.5–10.7)
WBC NRBC COR # BLD: 13.7 10*3/MM3 (ref 4.5–10.7)
WBC NRBC COR # BLD: 6.12 10*3/MM3 (ref 4.5–10.7)

## 2017-08-25 PROCEDURE — 94002 VENT MGMT INPAT INIT DAY: CPT

## 2017-08-25 PROCEDURE — P9041 ALBUMIN (HUMAN),5%, 50ML: HCPCS | Performed by: THORACIC SURGERY (CARDIOTHORACIC VASCULAR SURGERY)

## 2017-08-25 PROCEDURE — 25010000002 PROPOFOL 10 MG/ML EMULSION: Performed by: ANESTHESIOLOGY

## 2017-08-25 PROCEDURE — C1713 ANCHOR/SCREW BN/BN,TIS/BN: HCPCS | Performed by: THORACIC SURGERY (CARDIOTHORACIC VASCULAR SURGERY)

## 2017-08-25 PROCEDURE — 5A1221Z PERFORMANCE OF CARDIAC OUTPUT, CONTINUOUS: ICD-10-PCS | Performed by: THORACIC SURGERY (CARDIOTHORACIC VASCULAR SURGERY)

## 2017-08-25 PROCEDURE — 82947 ASSAY GLUCOSE BLOOD QUANT: CPT

## 2017-08-25 PROCEDURE — 33533 CABG ARTERIAL SINGLE: CPT | Performed by: THORACIC SURGERY (CARDIOTHORACIC VASCULAR SURGERY)

## 2017-08-25 PROCEDURE — 25010000002 HEPARIN (PORCINE) PER 1000 UNITS: Performed by: THORACIC SURGERY (CARDIOTHORACIC VASCULAR SURGERY)

## 2017-08-25 PROCEDURE — 85576 BLOOD PLATELET AGGREGATION: CPT | Performed by: THORACIC SURGERY (CARDIOTHORACIC VASCULAR SURGERY)

## 2017-08-25 PROCEDURE — 94799 UNLISTED PULMONARY SVC/PX: CPT

## 2017-08-25 PROCEDURE — 85730 THROMBOPLASTIN TIME PARTIAL: CPT | Performed by: THORACIC SURGERY (CARDIOTHORACIC VASCULAR SURGERY)

## 2017-08-25 PROCEDURE — 25010000003 CEFAZOLIN IN DEXTROSE 2-4 GM/100ML-% SOLUTION: Performed by: THORACIC SURGERY (CARDIOTHORACIC VASCULAR SURGERY)

## 2017-08-25 PROCEDURE — 33533 CABG ARTERIAL SINGLE: CPT | Performed by: PHYSICIAN ASSISTANT

## 2017-08-25 PROCEDURE — 82803 BLOOD GASES ANY COMBINATION: CPT

## 2017-08-25 PROCEDURE — 84132 ASSAY OF SERUM POTASSIUM: CPT | Performed by: THORACIC SURGERY (CARDIOTHORACIC VASCULAR SURGERY)

## 2017-08-25 PROCEDURE — 93010 ELECTROCARDIOGRAM REPORT: CPT | Performed by: INTERNAL MEDICINE

## 2017-08-25 PROCEDURE — 25010000002 ALBUMIN HUMAN 5% PER 50 ML: Performed by: THORACIC SURGERY (CARDIOTHORACIC VASCULAR SURGERY)

## 2017-08-25 PROCEDURE — 25010000002 PHENYLEPHRINE PER 1 ML: Performed by: ANESTHESIOLOGY

## 2017-08-25 PROCEDURE — C1751 CATH, INF, PER/CENT/MIDLINE: HCPCS | Performed by: ANESTHESIOLOGY

## 2017-08-25 PROCEDURE — A4648 IMPLANTABLE TISSUE MARKER: HCPCS | Performed by: THORACIC SURGERY (CARDIOTHORACIC VASCULAR SURGERY)

## 2017-08-25 PROCEDURE — 25010000002 FENTANYL CITRATE (PF) 100 MCG/2ML SOLUTION: Performed by: ANESTHESIOLOGY

## 2017-08-25 PROCEDURE — 33518 CABG ARTERY-VEIN TWO: CPT | Performed by: PHYSICIAN ASSISTANT

## 2017-08-25 PROCEDURE — 85014 HEMATOCRIT: CPT

## 2017-08-25 PROCEDURE — 93318 ECHO TRANSESOPHAGEAL INTRAOP: CPT

## 2017-08-25 PROCEDURE — 25010000002 ALBUMIN HUMAN 5% PER 50 ML: Performed by: ANESTHESIOLOGY

## 2017-08-25 PROCEDURE — 76998 US GUIDE INTRAOP: CPT | Performed by: THORACIC SURGERY (CARDIOTHORACIC VASCULAR SURGERY)

## 2017-08-25 PROCEDURE — 25010000002 MIDAZOLAM PER 1 MG: Performed by: ANESTHESIOLOGY

## 2017-08-25 PROCEDURE — 25010000002 VANCOMYCIN PER 500 MG: Performed by: THORACIC SURGERY (CARDIOTHORACIC VASCULAR SURGERY)

## 2017-08-25 PROCEDURE — 25010000002 HEPARIN (PORCINE) PER 1000 UNITS: Performed by: ANESTHESIOLOGY

## 2017-08-25 PROCEDURE — 85025 COMPLETE CBC W/AUTO DIFF WBC: CPT | Performed by: THORACIC SURGERY (CARDIOTHORACIC VASCULAR SURGERY)

## 2017-08-25 PROCEDURE — 85027 COMPLETE CBC AUTOMATED: CPT | Performed by: NURSE PRACTITIONER

## 2017-08-25 PROCEDURE — 85347 COAGULATION TIME ACTIVATED: CPT

## 2017-08-25 PROCEDURE — 25010000002 ALBUMIN HUMAN 25% PER 50 ML

## 2017-08-25 PROCEDURE — 02100Z9 BYPASS CORONARY ARTERY, ONE ARTERY FROM LEFT INTERNAL MAMMARY, OPEN APPROACH: ICD-10-PCS | Performed by: THORACIC SURGERY (CARDIOTHORACIC VASCULAR SURGERY)

## 2017-08-25 PROCEDURE — 25010000002 ONDANSETRON PER 1 MG: Performed by: ANESTHESIOLOGY

## 2017-08-25 PROCEDURE — 25010000002 METOCLOPRAMIDE PER 10 MG: Performed by: THORACIC SURGERY (CARDIOTHORACIC VASCULAR SURGERY)

## 2017-08-25 PROCEDURE — 93005 ELECTROCARDIOGRAM TRACING: CPT | Performed by: THORACIC SURGERY (CARDIOTHORACIC VASCULAR SURGERY)

## 2017-08-25 PROCEDURE — B246ZZ4 ULTRASONOGRAPHY OF RIGHT AND LEFT HEART, TRANSESOPHAGEAL: ICD-10-PCS | Performed by: THORACIC SURGERY (CARDIOTHORACIC VASCULAR SURGERY)

## 2017-08-25 PROCEDURE — 33518 CABG ARTERY-VEIN TWO: CPT | Performed by: THORACIC SURGERY (CARDIOTHORACIC VASCULAR SURGERY)

## 2017-08-25 PROCEDURE — 25010000002 FUROSEMIDE PER 20 MG

## 2017-08-25 PROCEDURE — 33508 ENDOSCOPIC VEIN HARVEST: CPT | Performed by: THORACIC SURGERY (CARDIOTHORACIC VASCULAR SURGERY)

## 2017-08-25 PROCEDURE — 85027 COMPLETE CBC AUTOMATED: CPT | Performed by: THORACIC SURGERY (CARDIOTHORACIC VASCULAR SURGERY)

## 2017-08-25 PROCEDURE — 25010000002 HEPARIN (PORCINE) PER 1000 UNITS

## 2017-08-25 PROCEDURE — 80053 COMPREHEN METABOLIC PANEL: CPT | Performed by: NURSE PRACTITIONER

## 2017-08-25 PROCEDURE — 25010000002 PROMETHAZINE PER 50 MG: Performed by: THORACIC SURGERY (CARDIOTHORACIC VASCULAR SURGERY)

## 2017-08-25 PROCEDURE — 83735 ASSAY OF MAGNESIUM: CPT | Performed by: THORACIC SURGERY (CARDIOTHORACIC VASCULAR SURGERY)

## 2017-08-25 PROCEDURE — 25010000002 MAGNESIUM SULFATE PER 500 MG OF MAGNESIUM: Performed by: ANESTHESIOLOGY

## 2017-08-25 PROCEDURE — 25010000002 ONDANSETRON PER 1 MG: Performed by: THORACIC SURGERY (CARDIOTHORACIC VASCULAR SURGERY)

## 2017-08-25 PROCEDURE — 06BP4ZZ EXCISION OF RIGHT SAPHENOUS VEIN, PERCUTANEOUS ENDOSCOPIC APPROACH: ICD-10-PCS | Performed by: THORACIC SURGERY (CARDIOTHORACIC VASCULAR SURGERY)

## 2017-08-25 PROCEDURE — 80069 RENAL FUNCTION PANEL: CPT | Performed by: THORACIC SURGERY (CARDIOTHORACIC VASCULAR SURGERY)

## 2017-08-25 PROCEDURE — 33508 ENDOSCOPIC VEIN HARVEST: CPT | Performed by: PHYSICIAN ASSISTANT

## 2017-08-25 PROCEDURE — 021109W BYPASS CORONARY ARTERY, TWO ARTERIES FROM AORTA WITH AUTOLOGOUS VENOUS TISSUE, OPEN APPROACH: ICD-10-PCS | Performed by: THORACIC SURGERY (CARDIOTHORACIC VASCULAR SURGERY)

## 2017-08-25 PROCEDURE — 85018 HEMOGLOBIN: CPT

## 2017-08-25 PROCEDURE — 82962 GLUCOSE BLOOD TEST: CPT

## 2017-08-25 PROCEDURE — 25010000002 CEFAZOLIN PER 500 MG: Performed by: ANESTHESIOLOGY

## 2017-08-25 PROCEDURE — C1729 CATH, DRAINAGE: HCPCS | Performed by: THORACIC SURGERY (CARDIOTHORACIC VASCULAR SURGERY)

## 2017-08-25 PROCEDURE — 25010000002 PROTAMINE SULFATE PER 10 MG: Performed by: ANESTHESIOLOGY

## 2017-08-25 PROCEDURE — 25010000002 INSULIN REGULAR HUMAN PER 5 UNITS: Performed by: THORACIC SURGERY (CARDIOTHORACIC VASCULAR SURGERY)

## 2017-08-25 PROCEDURE — L3060 FOOT ARCH SUPP LONGITUD/META: HCPCS | Performed by: THORACIC SURGERY (CARDIOTHORACIC VASCULAR SURGERY)

## 2017-08-25 PROCEDURE — 93318 ECHO TRANSESOPHAGEAL INTRAOP: CPT | Performed by: ANESTHESIOLOGY

## 2017-08-25 PROCEDURE — P9046 ALBUMIN (HUMAN), 25%, 20 ML: HCPCS

## 2017-08-25 PROCEDURE — 71010 HC CHEST PA OR AP: CPT

## 2017-08-25 PROCEDURE — P9041 ALBUMIN (HUMAN),5%, 50ML: HCPCS | Performed by: ANESTHESIOLOGY

## 2017-08-25 PROCEDURE — 85610 PROTHROMBIN TIME: CPT | Performed by: THORACIC SURGERY (CARDIOTHORACIC VASCULAR SURGERY)

## 2017-08-25 PROCEDURE — 82330 ASSAY OF CALCIUM: CPT | Performed by: THORACIC SURGERY (CARDIOTHORACIC VASCULAR SURGERY)

## 2017-08-25 RX ORDER — SODIUM CHLORIDE 9 MG/ML
INJECTION, SOLUTION INTRAVENOUS CONTINUOUS PRN
Status: DISCONTINUED | OUTPATIENT
Start: 2017-08-25 | End: 2017-08-25 | Stop reason: SURG

## 2017-08-25 RX ORDER — ALBUMIN, HUMAN INJ 5% 5 %
SOLUTION INTRAVENOUS CONTINUOUS PRN
Status: DISCONTINUED | OUTPATIENT
Start: 2017-08-25 | End: 2017-08-25 | Stop reason: SURG

## 2017-08-25 RX ORDER — FAMOTIDINE 20 MG/1
20 TABLET, FILM COATED ORAL 2 TIMES DAILY
Status: DISCONTINUED | OUTPATIENT
Start: 2017-08-25 | End: 2017-08-30 | Stop reason: HOSPADM

## 2017-08-25 RX ORDER — FENTANYL CITRATE 50 UG/ML
INJECTION, SOLUTION INTRAMUSCULAR; INTRAVENOUS AS NEEDED
Status: DISCONTINUED | OUTPATIENT
Start: 2017-08-25 | End: 2017-08-25 | Stop reason: SURG

## 2017-08-25 RX ORDER — POTASSIUM CHLORIDE 7.45 MG/ML
10 INJECTION INTRAVENOUS
Status: DISCONTINUED | OUTPATIENT
Start: 2017-08-25 | End: 2017-08-30 | Stop reason: HOSPADM

## 2017-08-25 RX ORDER — PAPAVERINE HYDROCHLORIDE 30 MG/ML
INJECTION INTRAMUSCULAR; INTRAVENOUS AS NEEDED
Status: DISCONTINUED | OUTPATIENT
Start: 2017-08-25 | End: 2017-08-25 | Stop reason: HOSPADM

## 2017-08-25 RX ORDER — AMINOCAPROIC ACID 250 MG/ML
INJECTION, SOLUTION INTRAVENOUS AS NEEDED
Status: DISCONTINUED | OUTPATIENT
Start: 2017-08-25 | End: 2017-08-25 | Stop reason: SURG

## 2017-08-25 RX ORDER — ALPRAZOLAM 0.25 MG/1
0.25 TABLET ORAL EVERY 8 HOURS PRN
Status: DISCONTINUED | OUTPATIENT
Start: 2017-08-25 | End: 2017-08-30 | Stop reason: HOSPADM

## 2017-08-25 RX ORDER — FAMOTIDINE 10 MG/ML
20 INJECTION, SOLUTION INTRAVENOUS
Status: CANCELLED | OUTPATIENT
Start: 2017-08-25 | End: 2017-08-26

## 2017-08-25 RX ORDER — MILRINONE LACTATE 0.2 MG/ML
.25-.75 INJECTION, SOLUTION INTRAVENOUS CONTINUOUS PRN
Status: DISCONTINUED | OUTPATIENT
Start: 2017-08-25 | End: 2017-08-26

## 2017-08-25 RX ORDER — MAGNESIUM SULFATE HEPTAHYDRATE 40 MG/ML
2 INJECTION, SOLUTION INTRAVENOUS AS NEEDED
Status: DISCONTINUED | OUTPATIENT
Start: 2017-08-25 | End: 2017-08-26

## 2017-08-25 RX ORDER — ALBUMIN, HUMAN INJ 5% 5 %
1500 SOLUTION INTRAVENOUS AS NEEDED
Status: DISCONTINUED | OUTPATIENT
Start: 2017-08-25 | End: 2017-08-26

## 2017-08-25 RX ORDER — SODIUM CHLORIDE 9 MG/ML
30 INJECTION, SOLUTION INTRAVENOUS CONTINUOUS PRN
Status: DISCONTINUED | OUTPATIENT
Start: 2017-08-25 | End: 2017-08-30 | Stop reason: HOSPADM

## 2017-08-25 RX ORDER — ASPIRIN 81 MG/1
81 TABLET ORAL DAILY
Status: DISCONTINUED | OUTPATIENT
Start: 2017-08-26 | End: 2017-08-30 | Stop reason: HOSPADM

## 2017-08-25 RX ORDER — MAGNESIUM SULFATE HEPTAHYDRATE 40 MG/ML
4 INJECTION, SOLUTION INTRAVENOUS AS NEEDED
Status: DISCONTINUED | OUTPATIENT
Start: 2017-08-25 | End: 2017-08-26

## 2017-08-25 RX ORDER — HEPARIN SODIUM 5000 [USP'U]/ML
INJECTION, SOLUTION INTRAVENOUS; SUBCUTANEOUS AS NEEDED
Status: DISCONTINUED | OUTPATIENT
Start: 2017-08-25 | End: 2017-08-25 | Stop reason: HOSPADM

## 2017-08-25 RX ORDER — PROMETHAZINE HYDROCHLORIDE 25 MG/1
12.5 TABLET ORAL EVERY 6 HOURS PRN
Status: DISCONTINUED | OUTPATIENT
Start: 2017-08-25 | End: 2017-08-26

## 2017-08-25 RX ORDER — ROCURONIUM BROMIDE 10 MG/ML
INJECTION, SOLUTION INTRAVENOUS AS NEEDED
Status: DISCONTINUED | OUTPATIENT
Start: 2017-08-25 | End: 2017-08-25 | Stop reason: SURG

## 2017-08-25 RX ORDER — OXYCODONE HYDROCHLORIDE 5 MG/1
10 TABLET ORAL EVERY 4 HOURS PRN
Status: DISCONTINUED | OUTPATIENT
Start: 2017-08-25 | End: 2017-08-30 | Stop reason: HOSPADM

## 2017-08-25 RX ORDER — MIDAZOLAM HYDROCHLORIDE 1 MG/ML
INJECTION INTRAMUSCULAR; INTRAVENOUS AS NEEDED
Status: DISCONTINUED | OUTPATIENT
Start: 2017-08-25 | End: 2017-08-25 | Stop reason: SURG

## 2017-08-25 RX ORDER — FAMOTIDINE 10 MG/ML
20 INJECTION, SOLUTION INTRAVENOUS 2 TIMES DAILY
Status: DISCONTINUED | OUTPATIENT
Start: 2017-08-25 | End: 2017-08-26

## 2017-08-25 RX ORDER — NALOXONE HCL 0.4 MG/ML
0.1 VIAL (ML) INJECTION
Status: DISCONTINUED | OUTPATIENT
Start: 2017-08-25 | End: 2017-08-26

## 2017-08-25 RX ORDER — NITROGLYCERIN 20 MG/100ML
5-200 INJECTION INTRAVENOUS CONTINUOUS PRN
Status: DISCONTINUED | OUTPATIENT
Start: 2017-08-25 | End: 2017-08-26

## 2017-08-25 RX ORDER — ACETAMINOPHEN 650 MG/1
650 SUPPOSITORY RECTAL EVERY 4 HOURS PRN
Status: DISCONTINUED | OUTPATIENT
Start: 2017-08-25 | End: 2017-08-26

## 2017-08-25 RX ORDER — HYDROCODONE BITARTRATE AND ACETAMINOPHEN 5; 325 MG/1; MG/1
2 TABLET ORAL EVERY 4 HOURS PRN
Status: DISCONTINUED | OUTPATIENT
Start: 2017-08-25 | End: 2017-08-30 | Stop reason: HOSPADM

## 2017-08-25 RX ORDER — PROPOFOL 10 MG/ML
VIAL (ML) INTRAVENOUS CONTINUOUS PRN
Status: DISCONTINUED | OUTPATIENT
Start: 2017-08-25 | End: 2017-08-25 | Stop reason: SURG

## 2017-08-25 RX ORDER — SODIUM CHLORIDE 0.9 % (FLUSH) 0.9 %
30 SYRINGE (ML) INJECTION ONCE AS NEEDED
Status: COMPLETED | OUTPATIENT
Start: 2017-08-25 | End: 2017-08-28

## 2017-08-25 RX ORDER — DEXMEDETOMIDINE HYDROCHLORIDE 4 UG/ML
.2-1.5 INJECTION, SOLUTION INTRAVENOUS
Status: DISCONTINUED | OUTPATIENT
Start: 2017-08-25 | End: 2017-08-26

## 2017-08-25 RX ORDER — FUROSEMIDE 10 MG/ML
40 INJECTION INTRAMUSCULAR; INTRAVENOUS EVERY 6 HOURS PRN
Status: DISCONTINUED | OUTPATIENT
Start: 2017-08-25 | End: 2017-08-26

## 2017-08-25 RX ORDER — PROMETHAZINE HYDROCHLORIDE 25 MG/ML
12.5 INJECTION, SOLUTION INTRAMUSCULAR; INTRAVENOUS EVERY 6 HOURS PRN
Status: DISCONTINUED | OUTPATIENT
Start: 2017-08-25 | End: 2017-08-28

## 2017-08-25 RX ORDER — PROTAMINE SULFATE 10 MG/ML
INJECTION, SOLUTION INTRAVENOUS AS NEEDED
Status: DISCONTINUED | OUTPATIENT
Start: 2017-08-25 | End: 2017-08-25 | Stop reason: SURG

## 2017-08-25 RX ORDER — HEPARIN SODIUM 1000 [USP'U]/ML
INJECTION, SOLUTION INTRAVENOUS; SUBCUTANEOUS AS NEEDED
Status: DISCONTINUED | OUTPATIENT
Start: 2017-08-25 | End: 2017-08-25 | Stop reason: SURG

## 2017-08-25 RX ORDER — SODIUM CHLORIDE 9 MG/ML
30 INJECTION, SOLUTION INTRAVENOUS CONTINUOUS
Status: DISCONTINUED | OUTPATIENT
Start: 2017-08-25 | End: 2017-08-30 | Stop reason: HOSPADM

## 2017-08-25 RX ORDER — DOPAMINE HYDROCHLORIDE 160 MG/100ML
2-20 INJECTION, SOLUTION INTRAVENOUS CONTINUOUS PRN
Status: DISCONTINUED | OUTPATIENT
Start: 2017-08-25 | End: 2017-08-26

## 2017-08-25 RX ORDER — POTASSIUM CHLORIDE 29.8 MG/ML
20 INJECTION INTRAVENOUS
Status: DISCONTINUED | OUTPATIENT
Start: 2017-08-25 | End: 2017-08-30 | Stop reason: HOSPADM

## 2017-08-25 RX ORDER — MIDAZOLAM HYDROCHLORIDE 1 MG/ML
2 INJECTION INTRAMUSCULAR; INTRAVENOUS
Status: CANCELLED | OUTPATIENT
Start: 2017-08-25 | End: 2017-08-26

## 2017-08-25 RX ORDER — FENTANYL CITRATE 50 UG/ML
25 INJECTION, SOLUTION INTRAMUSCULAR; INTRAVENOUS
Status: DISCONTINUED | OUTPATIENT
Start: 2017-08-25 | End: 2017-08-26

## 2017-08-25 RX ORDER — CEFAZOLIN SODIUM 2 G/100ML
2 INJECTION, SOLUTION INTRAVENOUS EVERY 8 HOURS
Status: COMPLETED | OUTPATIENT
Start: 2017-08-25 | End: 2017-08-27

## 2017-08-25 RX ORDER — NALOXONE HCL 0.4 MG/ML
0.4 VIAL (ML) INJECTION
Status: DISCONTINUED | OUTPATIENT
Start: 2017-08-25 | End: 2017-08-26

## 2017-08-25 RX ORDER — ONDANSETRON 2 MG/ML
INJECTION INTRAMUSCULAR; INTRAVENOUS AS NEEDED
Status: DISCONTINUED | OUTPATIENT
Start: 2017-08-25 | End: 2017-08-25 | Stop reason: SURG

## 2017-08-25 RX ORDER — ONDANSETRON 2 MG/ML
4 INJECTION INTRAMUSCULAR; INTRAVENOUS EVERY 6 HOURS PRN
Status: DISCONTINUED | OUTPATIENT
Start: 2017-08-25 | End: 2017-08-30 | Stop reason: HOSPADM

## 2017-08-25 RX ORDER — METOCLOPRAMIDE HYDROCHLORIDE 5 MG/ML
10 INJECTION INTRAMUSCULAR; INTRAVENOUS EVERY 6 HOURS
Status: DISPENSED | OUTPATIENT
Start: 2017-08-25 | End: 2017-08-26

## 2017-08-25 RX ORDER — BISACODYL 10 MG
10 SUPPOSITORY, RECTAL RECTAL DAILY PRN
Status: DISCONTINUED | OUTPATIENT
Start: 2017-08-26 | End: 2017-08-30 | Stop reason: HOSPADM

## 2017-08-25 RX ORDER — MAGNESIUM SULFATE HEPTAHYDRATE 500 MG/ML
INJECTION, SOLUTION INTRAMUSCULAR; INTRAVENOUS AS NEEDED
Status: DISCONTINUED | OUTPATIENT
Start: 2017-08-25 | End: 2017-08-25 | Stop reason: SURG

## 2017-08-25 RX ORDER — MAGNESIUM SULFATE 1 G/100ML
1 INJECTION INTRAVENOUS EVERY 8 HOURS
Status: ACTIVE | OUTPATIENT
Start: 2017-08-25 | End: 2017-08-26

## 2017-08-25 RX ORDER — CEFAZOLIN SODIUM 500 MG/2.2ML
INJECTION, POWDER, FOR SOLUTION INTRAMUSCULAR; INTRAVENOUS AS NEEDED
Status: DISCONTINUED | OUTPATIENT
Start: 2017-08-25 | End: 2017-08-25 | Stop reason: SURG

## 2017-08-25 RX ORDER — CHLORHEXIDINE GLUCONATE 0.12 MG/ML
15 RINSE ORAL EVERY 12 HOURS
Status: DISCONTINUED | OUTPATIENT
Start: 2017-08-25 | End: 2017-08-26

## 2017-08-25 RX ORDER — ATORVASTATIN CALCIUM 20 MG/1
40 TABLET, FILM COATED ORAL NIGHTLY
Status: DISCONTINUED | OUTPATIENT
Start: 2017-08-25 | End: 2017-08-30 | Stop reason: HOSPADM

## 2017-08-25 RX ORDER — CYCLOBENZAPRINE HCL 10 MG
10 TABLET ORAL EVERY 8 HOURS PRN
Status: DISCONTINUED | OUTPATIENT
Start: 2017-08-26 | End: 2017-08-30 | Stop reason: HOSPADM

## 2017-08-25 RX ORDER — SENNA AND DOCUSATE SODIUM 50; 8.6 MG/1; MG/1
2 TABLET, FILM COATED ORAL NIGHTLY
Status: DISCONTINUED | OUTPATIENT
Start: 2017-08-26 | End: 2017-08-30 | Stop reason: HOSPADM

## 2017-08-25 RX ORDER — BISACODYL 5 MG/1
10 TABLET, DELAYED RELEASE ORAL DAILY PRN
Status: DISCONTINUED | OUTPATIENT
Start: 2017-08-25 | End: 2017-08-30 | Stop reason: HOSPADM

## 2017-08-25 RX ORDER — POTASSIUM CHLORIDE 750 MG/1
40 CAPSULE, EXTENDED RELEASE ORAL AS NEEDED
Status: DISCONTINUED | OUTPATIENT
Start: 2017-08-25 | End: 2017-08-30 | Stop reason: HOSPADM

## 2017-08-25 RX ORDER — ACETAMINOPHEN 10 MG/ML
1000 INJECTION, SOLUTION INTRAVENOUS ONCE
Status: COMPLETED | OUTPATIENT
Start: 2017-08-25 | End: 2017-08-25

## 2017-08-25 RX ORDER — ACETAMINOPHEN 325 MG/1
650 TABLET ORAL EVERY 4 HOURS PRN
Status: DISCONTINUED | OUTPATIENT
Start: 2017-08-25 | End: 2017-08-30 | Stop reason: HOSPADM

## 2017-08-25 RX ORDER — POTASSIUM CHLORIDE 1.5 G/1.77G
40 POWDER, FOR SOLUTION ORAL AS NEEDED
Status: DISCONTINUED | OUTPATIENT
Start: 2017-08-25 | End: 2017-08-30 | Stop reason: HOSPADM

## 2017-08-25 RX ORDER — PROPOFOL 10 MG/ML
VIAL (ML) INTRAVENOUS AS NEEDED
Status: DISCONTINUED | OUTPATIENT
Start: 2017-08-25 | End: 2017-08-25 | Stop reason: SURG

## 2017-08-25 RX ORDER — MIDAZOLAM HYDROCHLORIDE 1 MG/ML
2 INJECTION INTRAMUSCULAR; INTRAVENOUS
Status: DISCONTINUED | OUTPATIENT
Start: 2017-08-25 | End: 2017-08-26

## 2017-08-25 RX ADMIN — ROCURONIUM BROMIDE 20 MG: 10 INJECTION INTRAVENOUS at 08:50

## 2017-08-25 RX ADMIN — PROTAMINE SULFATE 300 MG: 10 INJECTION, SOLUTION INTRAVENOUS at 11:11

## 2017-08-25 RX ADMIN — ONDANSETRON 4 MG: 2 INJECTION INTRAMUSCULAR; INTRAVENOUS at 11:28

## 2017-08-25 RX ADMIN — CEFAZOLIN SODIUM 2 G: 500 INJECTION, POWDER, FOR SOLUTION INTRAMUSCULAR; INTRAVENOUS at 11:37

## 2017-08-25 RX ADMIN — ROCURONIUM BROMIDE 20 MG: 10 INJECTION INTRAVENOUS at 10:23

## 2017-08-25 RX ADMIN — HYDROCODONE BITARTRATE AND ACETAMINOPHEN 1 TABLET: 5; 325 TABLET ORAL at 18:45

## 2017-08-25 RX ADMIN — DEXMEDETOMIDINE HYDROCHLORIDE 0.2 MCG/KG/HR: 4 INJECTION, SOLUTION INTRAVENOUS at 14:01

## 2017-08-25 RX ADMIN — SODIUM CHLORIDE 0.25 MCG/KG/MIN: 0.9 INJECTION, SOLUTION INTRAVENOUS at 10:59

## 2017-08-25 RX ADMIN — MAGNESIUM SULFATE HEPTAHYDRATE 2 G: 500 INJECTION, SOLUTION INTRAMUSCULAR; INTRAVENOUS at 10:47

## 2017-08-25 RX ADMIN — ALBUMIN HUMAN 250 ML: 0.05 INJECTION, SOLUTION INTRAVENOUS at 18:38

## 2017-08-25 RX ADMIN — FENTANYL CITRATE 350 MCG: 50 INJECTION, SOLUTION INTRAMUSCULAR; INTRAVENOUS at 08:00

## 2017-08-25 RX ADMIN — CHLORHEXIDINE GLUCONATE 15 ML: 1.2 RINSE ORAL at 05:46

## 2017-08-25 RX ADMIN — ALBUMIN HUMAN 500 ML: 0.05 INJECTION, SOLUTION INTRAVENOUS at 13:57

## 2017-08-25 RX ADMIN — OXYCODONE HYDROCHLORIDE 10 MG: 5 TABLET ORAL at 16:30

## 2017-08-25 RX ADMIN — SODIUM CHLORIDE: 9 INJECTION, SOLUTION INTRAVENOUS at 07:32

## 2017-08-25 RX ADMIN — FENTANYL CITRATE 150 MCG: 50 INJECTION, SOLUTION INTRAMUSCULAR; INTRAVENOUS at 07:08

## 2017-08-25 RX ADMIN — ONDANSETRON 4 MG: 2 INJECTION INTRAMUSCULAR; INTRAVENOUS at 14:48

## 2017-08-25 RX ADMIN — METOCLOPRAMIDE 10 MG: 5 INJECTION, SOLUTION INTRAMUSCULAR; INTRAVENOUS at 17:14

## 2017-08-25 RX ADMIN — ALBUMIN (HUMAN): 0.05 SOLUTION INTRAVENOUS at 11:53

## 2017-08-25 RX ADMIN — ACETAMINOPHEN 650 MG: 325 TABLET ORAL at 17:14

## 2017-08-25 RX ADMIN — ROCURONIUM BROMIDE 30 MG: 10 INJECTION INTRAVENOUS at 09:36

## 2017-08-25 RX ADMIN — PHENYLEPHRINE HYDROCHLORIDE 0.4 MCG/KG/MIN: 10 INJECTION, SOLUTION INTRAMUSCULAR; INTRAVENOUS; SUBCUTANEOUS at 09:41

## 2017-08-25 RX ADMIN — ALBUMIN HUMAN 500 ML: 0.05 INJECTION, SOLUTION INTRAVENOUS at 12:51

## 2017-08-25 RX ADMIN — CEFAZOLIN SODIUM 2 G: 2 INJECTION, SOLUTION INTRAVENOUS at 18:49

## 2017-08-25 RX ADMIN — FENTANYL CITRATE 50 MCG: 50 INJECTION, SOLUTION INTRAMUSCULAR; INTRAVENOUS at 12:32

## 2017-08-25 RX ADMIN — MIDAZOLAM HYDROCHLORIDE 2 MG: 1 INJECTION, SOLUTION INTRAMUSCULAR; INTRAVENOUS at 06:49

## 2017-08-25 RX ADMIN — ACETAMINOPHEN 1000 MG: 10 INJECTION, SOLUTION INTRAVENOUS at 12:51

## 2017-08-25 RX ADMIN — ATORVASTATIN CALCIUM 40 MG: 20 TABLET, FILM COATED ORAL at 20:21

## 2017-08-25 RX ADMIN — METOPROLOL TARTRATE 12.5 MG: 25 TABLET ORAL at 05:46

## 2017-08-25 RX ADMIN — SODIUM CHLORIDE 30 ML/HR: 9 INJECTION, SOLUTION INTRAVENOUS at 15:14

## 2017-08-25 RX ADMIN — SODIUM CHLORIDE 30 ML/HR: 9 INJECTION, SOLUTION INTRAVENOUS at 12:25

## 2017-08-25 RX ADMIN — MUPIROCIN 1 APPLICATION: 20 OINTMENT TOPICAL at 05:46

## 2017-08-25 RX ADMIN — MILRINONE LACTATE 0.12 MCG/KG/MIN: 200 INJECTION, SOLUTION INTRAVENOUS at 22:56

## 2017-08-25 RX ADMIN — HEPARIN SODIUM 30000 UNITS: 1000 INJECTION, SOLUTION INTRAVENOUS; SUBCUTANEOUS at 08:52

## 2017-08-25 RX ADMIN — AMINOCAPROIC ACID 10 G: 250 INJECTION, SOLUTION INTRAVENOUS at 11:17

## 2017-08-25 RX ADMIN — PROMETHAZINE HYDROCHLORIDE 12.5 MG: 25 INJECTION INTRAMUSCULAR; INTRAVENOUS at 15:54

## 2017-08-25 RX ADMIN — HYDROCODONE BITARTRATE AND ACETAMINOPHEN 2 TABLET: 5; 325 TABLET ORAL at 22:57

## 2017-08-25 RX ADMIN — PROPOFOL 25 MCG/KG/MIN: 10 INJECTION, EMULSION INTRAVENOUS at 09:36

## 2017-08-25 RX ADMIN — FAMOTIDINE 20 MG: 10 INJECTION, SOLUTION INTRAVENOUS at 13:05

## 2017-08-25 RX ADMIN — SODIUM CHLORIDE 2 UNITS/HR: 9 INJECTION, SOLUTION INTRAVENOUS at 15:13

## 2017-08-25 RX ADMIN — AMINOCAPROIC ACID 10 G: 250 INJECTION, SOLUTION INTRAVENOUS at 07:45

## 2017-08-25 RX ADMIN — FENTANYL CITRATE 50 MCG: 50 INJECTION, SOLUTION INTRAMUSCULAR; INTRAVENOUS at 12:01

## 2017-08-25 RX ADMIN — PROPOFOL 100 MG: 10 INJECTION, EMULSION INTRAVENOUS at 07:08

## 2017-08-25 RX ADMIN — SODIUM CHLORIDE: 9 INJECTION, SOLUTION INTRAVENOUS at 06:46

## 2017-08-25 RX ADMIN — ROCURONIUM BROMIDE 40 MG: 10 INJECTION INTRAVENOUS at 07:08

## 2017-08-25 RX ADMIN — ROCURONIUM BROMIDE 10 MG: 10 INJECTION INTRAVENOUS at 07:59

## 2017-08-25 NOTE — ANESTHESIA PROCEDURE NOTES
Central Line    Patient location during procedure: OR  Indications: central pressure monitoring, vascular access and MD/Surgeon request  Staff  Anesthesiologist: AMARIS FAM  Preanesthetic Checklist  Completed: patient identified and risks and benefits discussed  Central Line Prep  Sterile Tech:cap, gloves, gown, mask and sterile barriers  Prep: chloraprep  Patient monitoring: blood pressure monitoring, continuous pulse oximetry and EKG  Central Line Procedure  Laterality:right  Location:internal jugular  Catheter Type:McGraw-Jesus  Catheter Size:7.5 Fr  Guidance:landmark technique  Assessment  Post procedure:biopatch applied, line sutured, occlusive dressing applied and secured with tape  Assessement:blood return through all ports, free fluid flow and chest x-ray ordered  Complications:no  Patient Tolerance:patient tolerated the procedure well with no apparent complications

## 2017-08-25 NOTE — ANESTHESIA PROCEDURE NOTES
Arterial Line    Patient location during procedure: OR  Start time: 8/25/2017 6:56 AM  Stop Time:8/25/2017 6:59 AM       Line placed for hemodynamic monitoring.  Performed By   Anesthesiologist: AMARIS FAM  Preanesthetic Checklist  Completed: patient identified and risks and benefits discussed  Arterial Line Prep   Sterile Tech: gloves  Prep: ChloraPrep  Patient monitoring: blood pressure monitoring, continuous pulse oximetry and EKG  Arterial Line Procedure   Laterality:left  Location:  radial artery  Catheter size: 20 G   Guidance: landmark technique and palpation technique  Successful placement: yes          Post Assessment   Dressing Type: occlusive dressing applied and secured with tape.   Complications no  Patient Tolerance: patient tolerated the procedure well with no apparent complications

## 2017-08-25 NOTE — ANESTHESIA PROCEDURE NOTES
Airway  Airway not difficult    General Information and Staff    Anesthesiologist: AMARIS FAM    Indications and Patient Condition    Preoxygenated: yes  Mask difficulty assessment: 1 - vent by mask    Final Airway Details  Final airway type: endotracheal airway      Successful airway: ETT  Cuffed: yes   Successful intubation technique: direct laryngoscopy  Endotracheal tube insertion site: oral  Blade: Roe  Blade size: #3  ETT size: 8.0 mm  Cormack-Lehane Classification: grade I - full view of glottis  Placement verified by: chest auscultation and capnometry   Measured from: teeth  ETT to teeth (cm): 23    Additional Comments  Teeth checked after intubation, no damage noted.

## 2017-08-25 NOTE — ANESTHESIA PREPROCEDURE EVALUATION
Anesthesia Evaluation     NPO Solid Status: > 8 hours       Airway   Mallampati: II  no difficulty expected  Dental      Pulmonary - normal exam   (-) COPD, asthma, sleep apnea, not a smoker  Cardiovascular - normal exam    (+) hypertension, past MI , CAD, hyperlipidemia      Neuro/Psych  GI/Hepatic/Renal/Endo    (+) obesity,  renal disease CRI, hypothyroidism,     Musculoskeletal     Abdominal    Substance History      OB/GYN          Other                                        Anesthesia Plan    ASA 4     general     Anesthetic plan and risks discussed with patient.

## 2017-08-25 NOTE — ANESTHESIA PROCEDURE NOTES
Procedure Performed: Emergent/Open-Heart Anesthesia JAYLON     Start Time:        End Time:      Preanesthesia Checklist:  Patient identified and procedure being performed at surgeon's request.    General Procedure Information  Diagnostic Indications for Echo:  assessment of ascending aorta and hemodynamic monitoring  Physician Requesting Echo: MARCELA COSTA  CPT Code:  Aortic valve regurgitation, left ventricular hypokinesis  Location performed:  OR  Intubated  Heart visualized  Probe Insertion:  Easy  Probe Type:  Multiplane  Modalities:  2D only, color flow mapping, continuous wave Doppler, contrast and pulse wave Doppler    Echocardiographic and Doppler Measurements    Ventricles    Right Ventricle:  Cavity size normal.  Global function normal.    Left Ventricle:  Cavity size normal.  Global Function mildly impaired.  Ejection Fraction 40%.      Ventricular Regional Function:  1- Basal Anteroseptal:  normal  2- Basal Anterior:  normal  3- Basal Anterolateral:  normal  4- Basal Inferolateral:  hypokinetic  5- Basal Inferior:  normal  6- Basal Inferoseptal:  normal  7- Mid Anteroseptal:  normal  8- Mid Anterior:  normal  9- Mid Anterolateral:  normal  10- Mid Inferolateral:  hypokinetic  11- Mid Inferior:  normal  12- Mid Inferoseptal:  normal  13- Apical Anterior:  normal  14- Apical Lateral:  normal  15- Apical Inferior:  normal  16- Apical Septal:  hypokinetic  17- [place]:  hypokinetic      Valves    Aortic Valve:  Annulus normal.  Stenosis not present.  Regurgitation trace.  Leaflets normal.  Leaflet motions normal.      Mitral Valve:  Annulus normal.  Stenosis not present.  Regurgitation absent.  Leaflets normal.  Leaflet motions normal.      Tricuspid Valve:  Annulus normal.  Stenosis not present.  Regurgitation absent.  Leaflets normal.  Leaflet motions normal.    Pulmonic Valve:  Annulus normal.  Regurgitation absent.          Aorta    Ascending Aorta:  Size normal.  Diameter 3.2 cm.  Plaque thickness less  than 3 mm.    Descending Aorta:  Size normal.  Diameter 2.6 cm.  Plaque thickness less than 3 mm.          Atria    Right Atrium:  Size normal.    Left Atrium:  Size normal.  Left atrial appendage normal.      Septa    Atrial Septum:  Intra-atrial septal morphology normal.      Ventricular Septum:  Intra-ventricular septum morphology normal.          Other Findings  Pericardium:  normal  Pleural Effusion:  none  Pulmonary Arteries:  normal  Pulmonary Venous Flow:  normal    Anesthesia Information  Performed Personally  Anesthesiologist:  AMARIS FAM

## 2017-08-25 NOTE — ANESTHESIA PROCEDURE NOTES
Central Line    Patient location during procedure: OR  Start time: 8/25/2017 7:22 AM  Stop Time:8/25/2017 7:32 AM  Indications: vascular access  Staff  Anesthesiologist: AMARIS FAM  Preanesthetic Checklist  Completed: patient identified and risks and benefits discussed  Central Line Prep  Sterile Tech:cap, gloves, gown, mask and sterile barriers  Prep: chloraprep  Patient monitoring: blood pressure monitoring, continuous pulse oximetry and EKG  Central Line Procedure  Laterality:right  Location:internal jugular  Catheter Type:Cordis  Catheter Size:9 Fr  Guidance:landmark technique  Assessment  Post procedure:biopatch applied, line sutured, occlusive dressing applied and secured with tape  Assessement:blood return through all ports, free fluid flow and chest x-ray ordered  Complications:no  Patient Tolerance:patient tolerated the procedure well with no apparent complications  Additional Notes  Wire seen in SVC/RA on JAYLON

## 2017-08-26 ENCOUNTER — APPOINTMENT (OUTPATIENT)
Dept: GENERAL RADIOLOGY | Facility: HOSPITAL | Age: 62
End: 2017-08-26

## 2017-08-26 LAB
ALBUMIN SERPL-MCNC: 4.7 G/DL (ref 3.5–5.2)
ANION GAP SERPL CALCULATED.3IONS-SCNC: 14.5 MMOL/L
BASOPHILS # BLD AUTO: 0 10*3/MM3 (ref 0–0.2)
BASOPHILS NFR BLD AUTO: 0 % (ref 0–1.5)
BUN BLD-MCNC: 22 MG/DL (ref 8–23)
BUN/CREAT SERPL: 14.8 (ref 7–25)
CALCIUM SPEC-SCNC: 8.5 MG/DL (ref 8.6–10.5)
CHLORIDE SERPL-SCNC: 112 MMOL/L (ref 98–107)
CO2 SERPL-SCNC: 24.5 MMOL/L (ref 22–29)
CREAT BLD-MCNC: 1.49 MG/DL (ref 0.76–1.27)
DEPRECATED RDW RBC AUTO: 44.7 FL (ref 37–54)
EOSINOPHIL # BLD AUTO: 0.01 10*3/MM3 (ref 0–0.7)
EOSINOPHIL NFR BLD AUTO: 0.1 % (ref 0.3–6.2)
ERYTHROCYTE [DISTWIDTH] IN BLOOD BY AUTOMATED COUNT: 13 % (ref 11.5–14.5)
GFR SERPL CREATININE-BSD FRML MDRD: 48 ML/MIN/1.73
GLUCOSE BLD-MCNC: 131 MG/DL (ref 65–99)
GLUCOSE BLDC GLUCOMTR-MCNC: 115 MG/DL (ref 70–130)
GLUCOSE BLDC GLUCOMTR-MCNC: 117 MG/DL (ref 70–130)
GLUCOSE BLDC GLUCOMTR-MCNC: 117 MG/DL (ref 70–130)
GLUCOSE BLDC GLUCOMTR-MCNC: 119 MG/DL (ref 70–130)
GLUCOSE BLDC GLUCOMTR-MCNC: 120 MG/DL (ref 70–130)
GLUCOSE BLDC GLUCOMTR-MCNC: 124 MG/DL (ref 70–130)
GLUCOSE BLDC GLUCOMTR-MCNC: 124 MG/DL (ref 70–130)
GLUCOSE BLDC GLUCOMTR-MCNC: 125 MG/DL (ref 70–130)
GLUCOSE BLDC GLUCOMTR-MCNC: 137 MG/DL (ref 70–130)
GLUCOSE BLDC GLUCOMTR-MCNC: 142 MG/DL (ref 70–130)
GLUCOSE BLDC GLUCOMTR-MCNC: 173 MG/DL (ref 70–130)
HCT VFR BLD AUTO: 28.4 % (ref 40.4–52.2)
HGB BLD-MCNC: 9.3 G/DL (ref 13.7–17.6)
IMM GRANULOCYTES # BLD: 0 10*3/MM3 (ref 0–0.03)
IMM GRANULOCYTES NFR BLD: 0 % (ref 0–0.5)
INR PPP: 1.26 (ref 0.9–1.1)
LYMPHOCYTES # BLD AUTO: 1.35 10*3/MM3 (ref 0.9–4.8)
LYMPHOCYTES NFR BLD AUTO: 14.7 % (ref 19.6–45.3)
MAGNESIUM SERPL-MCNC: 2.6 MG/DL (ref 1.6–2.4)
MCH RBC QN AUTO: 31.1 PG (ref 27–32.7)
MCHC RBC AUTO-ENTMCNC: 32.7 G/DL (ref 32.6–36.4)
MCV RBC AUTO: 95 FL (ref 79.8–96.2)
MONOCYTES # BLD AUTO: 0.81 10*3/MM3 (ref 0.2–1.2)
MONOCYTES NFR BLD AUTO: 8.8 % (ref 5–12)
NEUTROPHILS # BLD AUTO: 7.03 10*3/MM3 (ref 1.9–8.1)
NEUTROPHILS NFR BLD AUTO: 76.4 % (ref 42.7–76)
PHOSPHATE SERPL-MCNC: 4.6 MG/DL (ref 2.5–4.5)
PLATELET # BLD AUTO: 97 10*3/MM3 (ref 140–500)
PMV BLD AUTO: 11.7 FL (ref 6–12)
POTASSIUM BLD-SCNC: 3.8 MMOL/L (ref 3.5–5.2)
PROTHROMBIN TIME: 15.3 SECONDS (ref 11.7–14.2)
RBC # BLD AUTO: 2.99 10*6/MM3 (ref 4.6–6)
SODIUM BLD-SCNC: 151 MMOL/L (ref 136–145)
WBC NRBC COR # BLD: 9.2 10*3/MM3 (ref 4.5–10.7)

## 2017-08-26 PROCEDURE — 63710000001 INSULIN ASPART PER 5 UNITS: Performed by: THORACIC SURGERY (CARDIOTHORACIC VASCULAR SURGERY)

## 2017-08-26 PROCEDURE — 93010 ELECTROCARDIOGRAM REPORT: CPT | Performed by: INTERNAL MEDICINE

## 2017-08-26 PROCEDURE — 97110 THERAPEUTIC EXERCISES: CPT

## 2017-08-26 PROCEDURE — 97162 PT EVAL MOD COMPLEX 30 MIN: CPT

## 2017-08-26 PROCEDURE — 80069 RENAL FUNCTION PANEL: CPT | Performed by: THORACIC SURGERY (CARDIOTHORACIC VASCULAR SURGERY)

## 2017-08-26 PROCEDURE — 99232 SBSQ HOSP IP/OBS MODERATE 35: CPT | Performed by: INTERNAL MEDICINE

## 2017-08-26 PROCEDURE — 93005 ELECTROCARDIOGRAM TRACING: CPT | Performed by: THORACIC SURGERY (CARDIOTHORACIC VASCULAR SURGERY)

## 2017-08-26 PROCEDURE — 85610 PROTHROMBIN TIME: CPT | Performed by: THORACIC SURGERY (CARDIOTHORACIC VASCULAR SURGERY)

## 2017-08-26 PROCEDURE — 25010000003 CEFAZOLIN IN DEXTROSE 2-4 GM/100ML-% SOLUTION: Performed by: THORACIC SURGERY (CARDIOTHORACIC VASCULAR SURGERY)

## 2017-08-26 PROCEDURE — 82962 GLUCOSE BLOOD TEST: CPT

## 2017-08-26 PROCEDURE — 83735 ASSAY OF MAGNESIUM: CPT | Performed by: THORACIC SURGERY (CARDIOTHORACIC VASCULAR SURGERY)

## 2017-08-26 PROCEDURE — 85025 COMPLETE CBC W/AUTO DIFF WBC: CPT | Performed by: THORACIC SURGERY (CARDIOTHORACIC VASCULAR SURGERY)

## 2017-08-26 PROCEDURE — 71010 HC CHEST PA OR AP: CPT

## 2017-08-26 PROCEDURE — 25010000002 METOCLOPRAMIDE PER 10 MG: Performed by: THORACIC SURGERY (CARDIOTHORACIC VASCULAR SURGERY)

## 2017-08-26 RX ORDER — DEXTROSE MONOHYDRATE 25 G/50ML
25 INJECTION, SOLUTION INTRAVENOUS
Status: DISCONTINUED | OUTPATIENT
Start: 2017-08-26 | End: 2017-08-28

## 2017-08-26 RX ORDER — NICOTINE POLACRILEX 4 MG
15 LOZENGE BUCCAL
Status: DISCONTINUED | OUTPATIENT
Start: 2017-08-26 | End: 2017-08-28

## 2017-08-26 RX ORDER — SOLIFENACIN SUCCINATE 5 MG/1
5 TABLET, FILM COATED ORAL DAILY
Status: DISCONTINUED | OUTPATIENT
Start: 2017-08-26 | End: 2017-08-26 | Stop reason: CLARIF

## 2017-08-26 RX ORDER — LEVOTHYROXINE SODIUM 0.07 MG/1
75 TABLET ORAL
Status: DISCONTINUED | OUTPATIENT
Start: 2017-08-26 | End: 2017-08-30 | Stop reason: HOSPADM

## 2017-08-26 RX ADMIN — MUPIROCIN 1 APPLICATION: 20 OINTMENT TOPICAL at 08:39

## 2017-08-26 RX ADMIN — METOCLOPRAMIDE 10 MG: 5 INJECTION, SOLUTION INTRAMUSCULAR; INTRAVENOUS at 08:41

## 2017-08-26 RX ADMIN — METOCLOPRAMIDE 10 MG: 5 INJECTION, SOLUTION INTRAMUSCULAR; INTRAVENOUS at 01:18

## 2017-08-26 RX ADMIN — LEVOTHYROXINE SODIUM 75 MCG: 75 TABLET ORAL at 14:27

## 2017-08-26 RX ADMIN — METOPROLOL TARTRATE 12.5 MG: 25 TABLET ORAL at 20:53

## 2017-08-26 RX ADMIN — ASPIRIN 81 MG: 81 TABLET ORAL at 08:39

## 2017-08-26 RX ADMIN — CEFAZOLIN SODIUM 2 G: 2 INJECTION, SOLUTION INTRAVENOUS at 18:54

## 2017-08-26 RX ADMIN — CEFAZOLIN SODIUM 2 G: 2 INJECTION, SOLUTION INTRAVENOUS at 02:25

## 2017-08-26 RX ADMIN — HYDROCODONE BITARTRATE AND ACETAMINOPHEN 2 TABLET: 5; 325 TABLET ORAL at 10:09

## 2017-08-26 RX ADMIN — ATORVASTATIN CALCIUM 40 MG: 20 TABLET, FILM COATED ORAL at 20:53

## 2017-08-26 RX ADMIN — FAMOTIDINE 20 MG: 20 TABLET, FILM COATED ORAL at 08:39

## 2017-08-26 RX ADMIN — FAMOTIDINE 20 MG: 20 TABLET, FILM COATED ORAL at 18:54

## 2017-08-26 RX ADMIN — CEFAZOLIN SODIUM 2 G: 2 INJECTION, SOLUTION INTRAVENOUS at 10:09

## 2017-08-26 RX ADMIN — DOCUSATE SODIUM -SENNOSIDES 2 TABLET: 50; 8.6 TABLET, COATED ORAL at 20:53

## 2017-08-26 RX ADMIN — HYDROCODONE BITARTRATE AND ACETAMINOPHEN 2 TABLET: 5; 325 TABLET ORAL at 04:21

## 2017-08-26 RX ADMIN — CHLORHEXIDINE GLUCONATE 15 ML: 1.2 RINSE ORAL at 01:18

## 2017-08-26 RX ADMIN — INSULIN ASPART 2 UNITS: 100 INJECTION, SOLUTION INTRAVENOUS; SUBCUTANEOUS at 20:53

## 2017-08-26 RX ADMIN — HYDROCODONE BITARTRATE AND ACETAMINOPHEN 2 TABLET: 5; 325 TABLET ORAL at 23:21

## 2017-08-26 RX ADMIN — HYDROCODONE BITARTRATE AND ACETAMINOPHEN 2 TABLET: 5; 325 TABLET ORAL at 18:37

## 2017-08-26 RX ADMIN — HYDROCODONE BITARTRATE AND ACETAMINOPHEN 2 TABLET: 5; 325 TABLET ORAL at 14:23

## 2017-08-27 ENCOUNTER — APPOINTMENT (OUTPATIENT)
Dept: GENERAL RADIOLOGY | Facility: HOSPITAL | Age: 62
End: 2017-08-27

## 2017-08-27 LAB
ABO + RH BLD: NORMAL
ABO + RH BLD: NORMAL
ANION GAP SERPL CALCULATED.3IONS-SCNC: 12.5 MMOL/L
BH BB BLOOD EXPIRATION DATE: NORMAL
BH BB BLOOD EXPIRATION DATE: NORMAL
BH BB BLOOD TYPE BARCODE: 5100
BH BB BLOOD TYPE BARCODE: 5100
BH BB DISPENSE STATUS: NORMAL
BH BB DISPENSE STATUS: NORMAL
BH BB PRODUCT CODE: NORMAL
BH BB PRODUCT CODE: NORMAL
BH BB UNIT NUMBER: NORMAL
BH BB UNIT NUMBER: NORMAL
BUN BLD-MCNC: 20 MG/DL (ref 8–23)
BUN/CREAT SERPL: 16.5 (ref 7–25)
CALCIUM SPEC-SCNC: 9 MG/DL (ref 8.6–10.5)
CHLORIDE SERPL-SCNC: 107 MMOL/L (ref 98–107)
CO2 SERPL-SCNC: 24.5 MMOL/L (ref 22–29)
CREAT BLD-MCNC: 1.21 MG/DL (ref 0.76–1.27)
DEPRECATED RDW RBC AUTO: 46 FL (ref 37–54)
ERYTHROCYTE [DISTWIDTH] IN BLOOD BY AUTOMATED COUNT: 13.2 % (ref 11.5–14.5)
GFR SERPL CREATININE-BSD FRML MDRD: 61 ML/MIN/1.73
GLUCOSE BLD-MCNC: 127 MG/DL (ref 65–99)
GLUCOSE BLDC GLUCOMTR-MCNC: 108 MG/DL (ref 70–130)
GLUCOSE BLDC GLUCOMTR-MCNC: 132 MG/DL (ref 70–130)
GLUCOSE BLDC GLUCOMTR-MCNC: 133 MG/DL (ref 70–130)
GLUCOSE BLDC GLUCOMTR-MCNC: 163 MG/DL (ref 70–130)
HCT VFR BLD AUTO: 27.8 % (ref 40.4–52.2)
HGB BLD-MCNC: 8.9 G/DL (ref 13.7–17.6)
MAGNESIUM SERPL-MCNC: 2.5 MG/DL (ref 1.6–2.4)
MCH RBC QN AUTO: 30.8 PG (ref 27–32.7)
MCHC RBC AUTO-ENTMCNC: 32 G/DL (ref 32.6–36.4)
MCV RBC AUTO: 96.2 FL (ref 79.8–96.2)
PLATELET # BLD AUTO: 76 10*3/MM3 (ref 140–500)
PMV BLD AUTO: 11.5 FL (ref 6–12)
POTASSIUM BLD-SCNC: 3.8 MMOL/L (ref 3.5–5.2)
RBC # BLD AUTO: 2.89 10*6/MM3 (ref 4.6–6)
SODIUM BLD-SCNC: 144 MMOL/L (ref 136–145)
UNIT  ABO: NORMAL
UNIT  ABO: NORMAL
UNIT  RH: NORMAL
UNIT  RH: NORMAL
WBC NRBC COR # BLD: 9 10*3/MM3 (ref 4.5–10.7)

## 2017-08-27 PROCEDURE — 25010000003 CEFAZOLIN IN DEXTROSE 2-4 GM/100ML-% SOLUTION: Performed by: THORACIC SURGERY (CARDIOTHORACIC VASCULAR SURGERY)

## 2017-08-27 PROCEDURE — 85027 COMPLETE CBC AUTOMATED: CPT | Performed by: THORACIC SURGERY (CARDIOTHORACIC VASCULAR SURGERY)

## 2017-08-27 PROCEDURE — 25010000002 PROMETHAZINE PER 50 MG: Performed by: THORACIC SURGERY (CARDIOTHORACIC VASCULAR SURGERY)

## 2017-08-27 PROCEDURE — 25010000002 ONDANSETRON PER 1 MG: Performed by: THORACIC SURGERY (CARDIOTHORACIC VASCULAR SURGERY)

## 2017-08-27 PROCEDURE — 82962 GLUCOSE BLOOD TEST: CPT

## 2017-08-27 PROCEDURE — 93010 ELECTROCARDIOGRAM REPORT: CPT | Performed by: INTERNAL MEDICINE

## 2017-08-27 PROCEDURE — 71010 HC CHEST PA OR AP: CPT

## 2017-08-27 PROCEDURE — 83735 ASSAY OF MAGNESIUM: CPT | Performed by: THORACIC SURGERY (CARDIOTHORACIC VASCULAR SURGERY)

## 2017-08-27 PROCEDURE — 99232 SBSQ HOSP IP/OBS MODERATE 35: CPT | Performed by: INTERNAL MEDICINE

## 2017-08-27 PROCEDURE — 93005 ELECTROCARDIOGRAM TRACING: CPT | Performed by: THORACIC SURGERY (CARDIOTHORACIC VASCULAR SURGERY)

## 2017-08-27 PROCEDURE — 97110 THERAPEUTIC EXERCISES: CPT

## 2017-08-27 PROCEDURE — 80048 BASIC METABOLIC PNL TOTAL CA: CPT | Performed by: THORACIC SURGERY (CARDIOTHORACIC VASCULAR SURGERY)

## 2017-08-27 RX ORDER — CLOPIDOGREL BISULFATE 75 MG/1
75 TABLET ORAL DAILY
Status: DISCONTINUED | OUTPATIENT
Start: 2017-08-27 | End: 2017-08-30 | Stop reason: HOSPADM

## 2017-08-27 RX ORDER — FUROSEMIDE 40 MG/1
40 TABLET ORAL DAILY
Status: DISCONTINUED | OUTPATIENT
Start: 2017-08-27 | End: 2017-08-30 | Stop reason: HOSPADM

## 2017-08-27 RX ORDER — CARVEDILOL 6.25 MG/1
6.25 TABLET ORAL EVERY 12 HOURS SCHEDULED
Status: DISCONTINUED | OUTPATIENT
Start: 2017-08-27 | End: 2017-08-29

## 2017-08-27 RX ORDER — FAMOTIDINE 10 MG/ML
20 INJECTION, SOLUTION INTRAVENOUS ONCE
Status: COMPLETED | OUTPATIENT
Start: 2017-08-27 | End: 2017-08-27

## 2017-08-27 RX ORDER — POTASSIUM CHLORIDE 750 MG/1
20 CAPSULE, EXTENDED RELEASE ORAL DAILY
Status: DISCONTINUED | OUTPATIENT
Start: 2017-08-27 | End: 2017-08-30 | Stop reason: HOSPADM

## 2017-08-27 RX ADMIN — CEFAZOLIN SODIUM 2 G: 2 INJECTION, SOLUTION INTRAVENOUS at 03:43

## 2017-08-27 RX ADMIN — HYDROCODONE BITARTRATE AND ACETAMINOPHEN 2 TABLET: 5; 325 TABLET ORAL at 04:20

## 2017-08-27 RX ADMIN — FAMOTIDINE 20 MG: 10 INJECTION, SOLUTION INTRAVENOUS at 19:08

## 2017-08-27 RX ADMIN — METOPROLOL TARTRATE 12.5 MG: 25 TABLET ORAL at 08:44

## 2017-08-27 RX ADMIN — PROMETHAZINE HYDROCHLORIDE 12.5 MG: 25 INJECTION INTRAMUSCULAR; INTRAVENOUS at 13:47

## 2017-08-27 RX ADMIN — ONDANSETRON 4 MG: 2 INJECTION INTRAMUSCULAR; INTRAVENOUS at 08:57

## 2017-08-27 RX ADMIN — CARVEDILOL 6.25 MG: 6.25 TABLET, FILM COATED ORAL at 21:40

## 2017-08-27 RX ADMIN — ASPIRIN 81 MG: 81 TABLET ORAL at 08:44

## 2017-08-27 RX ADMIN — MUPIROCIN 1 APPLICATION: 20 OINTMENT TOPICAL at 08:45

## 2017-08-27 RX ADMIN — ATORVASTATIN CALCIUM 40 MG: 20 TABLET, FILM COATED ORAL at 21:40

## 2017-08-27 RX ADMIN — HYDROCODONE BITARTRATE AND ACETAMINOPHEN 2 TABLET: 5; 325 TABLET ORAL at 14:26

## 2017-08-27 RX ADMIN — DOCUSATE SODIUM -SENNOSIDES 2 TABLET: 50; 8.6 TABLET, COATED ORAL at 21:40

## 2017-08-27 RX ADMIN — FUROSEMIDE 40 MG: 40 TABLET ORAL at 14:26

## 2017-08-27 RX ADMIN — HYDROCODONE BITARTRATE AND ACETAMINOPHEN 2 TABLET: 5; 325 TABLET ORAL at 21:40

## 2017-08-27 RX ADMIN — CLOPIDOGREL 75 MG: 75 TABLET, FILM COATED ORAL at 14:27

## 2017-08-27 RX ADMIN — HYDROCODONE BITARTRATE AND ACETAMINOPHEN 2 TABLET: 5; 325 TABLET ORAL at 08:44

## 2017-08-27 RX ADMIN — FAMOTIDINE 20 MG: 20 TABLET, FILM COATED ORAL at 08:44

## 2017-08-27 RX ADMIN — POTASSIUM CHLORIDE 20 MEQ: 750 CAPSULE, EXTENDED RELEASE ORAL at 14:26

## 2017-08-27 RX ADMIN — LEVOTHYROXINE SODIUM 75 MCG: 75 TABLET ORAL at 07:17

## 2017-08-28 ENCOUNTER — APPOINTMENT (OUTPATIENT)
Dept: GENERAL RADIOLOGY | Facility: HOSPITAL | Age: 62
End: 2017-08-28
Attending: THORACIC SURGERY (CARDIOTHORACIC VASCULAR SURGERY)

## 2017-08-28 LAB
ANION GAP SERPL CALCULATED.3IONS-SCNC: 11.4 MMOL/L
BUN BLD-MCNC: 25 MG/DL (ref 8–23)
BUN/CREAT SERPL: 20.3 (ref 7–25)
CALCIUM SPEC-SCNC: 9.4 MG/DL (ref 8.6–10.5)
CHLORIDE SERPL-SCNC: 107 MMOL/L (ref 98–107)
CO2 SERPL-SCNC: 26.6 MMOL/L (ref 22–29)
CREAT BLD-MCNC: 1.23 MG/DL (ref 0.76–1.27)
DEPRECATED RDW RBC AUTO: 45.9 FL (ref 37–54)
ERYTHROCYTE [DISTWIDTH] IN BLOOD BY AUTOMATED COUNT: 13.1 % (ref 11.5–14.5)
GFR SERPL CREATININE-BSD FRML MDRD: 60 ML/MIN/1.73
GLUCOSE BLD-MCNC: 108 MG/DL (ref 65–99)
GLUCOSE BLDC GLUCOMTR-MCNC: 118 MG/DL (ref 70–130)
GLUCOSE BLDC GLUCOMTR-MCNC: 80 MG/DL (ref 70–130)
GLUCOSE BLDC GLUCOMTR-MCNC: 88 MG/DL (ref 70–130)
HCT VFR BLD AUTO: 28 % (ref 40.4–52.2)
HGB BLD-MCNC: 8.9 G/DL (ref 13.7–17.6)
MCH RBC QN AUTO: 30.6 PG (ref 27–32.7)
MCHC RBC AUTO-ENTMCNC: 31.8 G/DL (ref 32.6–36.4)
MCV RBC AUTO: 96.2 FL (ref 79.8–96.2)
PLATELET # BLD AUTO: 91 10*3/MM3 (ref 140–500)
PMV BLD AUTO: 11.8 FL (ref 6–12)
POTASSIUM BLD-SCNC: 4 MMOL/L (ref 3.5–5.2)
RBC # BLD AUTO: 2.91 10*6/MM3 (ref 4.6–6)
SODIUM BLD-SCNC: 145 MMOL/L (ref 136–145)
WBC NRBC COR # BLD: 8.49 10*3/MM3 (ref 4.5–10.7)

## 2017-08-28 PROCEDURE — 99024 POSTOP FOLLOW-UP VISIT: CPT | Performed by: NURSE PRACTITIONER

## 2017-08-28 PROCEDURE — 85027 COMPLETE CBC AUTOMATED: CPT | Performed by: THORACIC SURGERY (CARDIOTHORACIC VASCULAR SURGERY)

## 2017-08-28 PROCEDURE — 71020 HC CHEST PA AND LATERAL: CPT

## 2017-08-28 PROCEDURE — 99232 SBSQ HOSP IP/OBS MODERATE 35: CPT | Performed by: INTERNAL MEDICINE

## 2017-08-28 PROCEDURE — 82962 GLUCOSE BLOOD TEST: CPT

## 2017-08-28 PROCEDURE — 97110 THERAPEUTIC EXERCISES: CPT

## 2017-08-28 PROCEDURE — 80048 BASIC METABOLIC PNL TOTAL CA: CPT | Performed by: THORACIC SURGERY (CARDIOTHORACIC VASCULAR SURGERY)

## 2017-08-28 RX ORDER — POLYETHYLENE GLYCOL 3350 17 G/17G
17 POWDER, FOR SOLUTION ORAL DAILY
Status: DISCONTINUED | OUTPATIENT
Start: 2017-08-28 | End: 2017-08-30 | Stop reason: HOSPADM

## 2017-08-28 RX ADMIN — MUPIROCIN 1 APPLICATION: 20 OINTMENT TOPICAL at 18:12

## 2017-08-28 RX ADMIN — FAMOTIDINE 20 MG: 20 TABLET, FILM COATED ORAL at 09:09

## 2017-08-28 RX ADMIN — FUROSEMIDE 40 MG: 40 TABLET ORAL at 09:09

## 2017-08-28 RX ADMIN — HYDROCODONE BITARTRATE AND ACETAMINOPHEN 2 TABLET: 5; 325 TABLET ORAL at 20:08

## 2017-08-28 RX ADMIN — CARVEDILOL 6.25 MG: 6.25 TABLET, FILM COATED ORAL at 20:08

## 2017-08-28 RX ADMIN — ASPIRIN 81 MG: 81 TABLET ORAL at 09:09

## 2017-08-28 RX ADMIN — ATORVASTATIN CALCIUM 40 MG: 20 TABLET, FILM COATED ORAL at 20:08

## 2017-08-28 RX ADMIN — CLOPIDOGREL 75 MG: 75 TABLET, FILM COATED ORAL at 09:09

## 2017-08-28 RX ADMIN — LEVOTHYROXINE SODIUM 75 MCG: 75 TABLET ORAL at 07:05

## 2017-08-28 RX ADMIN — HYDROCODONE BITARTRATE AND ACETAMINOPHEN 2 TABLET: 5; 325 TABLET ORAL at 06:45

## 2017-08-28 RX ADMIN — Medication 30 ML: at 09:10

## 2017-08-28 RX ADMIN — FAMOTIDINE 20 MG: 20 TABLET, FILM COATED ORAL at 18:12

## 2017-08-28 RX ADMIN — MUPIROCIN CALCIUM 1 APPLICATION: 20 OINTMENT TOPICAL at 18:13

## 2017-08-28 RX ADMIN — DOCUSATE SODIUM -SENNOSIDES 2 TABLET: 50; 8.6 TABLET, COATED ORAL at 20:08

## 2017-08-28 RX ADMIN — CARVEDILOL 6.25 MG: 6.25 TABLET, FILM COATED ORAL at 09:09

## 2017-08-28 RX ADMIN — POTASSIUM CHLORIDE 20 MEQ: 750 CAPSULE, EXTENDED RELEASE ORAL at 09:09

## 2017-08-29 ENCOUNTER — APPOINTMENT (OUTPATIENT)
Dept: CARDIOLOGY | Facility: HOSPITAL | Age: 62
End: 2017-08-29

## 2017-08-29 LAB
ALBUMIN SERPL-MCNC: 3.8 G/DL (ref 3.5–5.2)
ALBUMIN/GLOB SERPL: 1.4 G/DL
ALP SERPL-CCNC: 60 U/L (ref 39–117)
ALT SERPL W P-5'-P-CCNC: 31 U/L (ref 1–41)
ANION GAP SERPL CALCULATED.3IONS-SCNC: 10.8 MMOL/L
ANION GAP SERPL CALCULATED.3IONS-SCNC: 13.7 MMOL/L
AST SERPL-CCNC: 46 U/L (ref 1–40)
BASOPHILS # BLD AUTO: 0.01 10*3/MM3 (ref 0–0.2)
BASOPHILS NFR BLD AUTO: 0.2 % (ref 0–1.5)
BH CV ECHO MEAS - ACS: 1.8 CM
BH CV ECHO MEAS - AO MEAN PG (FULL): 3 MMHG
BH CV ECHO MEAS - AO MEAN PG: 5 MMHG
BH CV ECHO MEAS - AO ROOT AREA (BSA CORRECTED): 1.7
BH CV ECHO MEAS - AO ROOT AREA: 11.3 CM^2
BH CV ECHO MEAS - AO ROOT DIAM: 3.8 CM
BH CV ECHO MEAS - AO V2 MEAN: 107 CM/SEC
BH CV ECHO MEAS - AO V2 VTI: 26.7 CM
BH CV ECHO MEAS - AVA(I,A): 2 CM^2
BH CV ECHO MEAS - AVA(I,D): 2 CM^2
BH CV ECHO MEAS - BSA(HAYCOCK): 2.3 M^2
BH CV ECHO MEAS - BSA: 2.2 M^2
BH CV ECHO MEAS - BZI_BMI: 32.6 KILOGRAMS/M^2
BH CV ECHO MEAS - BZI_METRIC_HEIGHT: 177.8 CM
BH CV ECHO MEAS - BZI_METRIC_WEIGHT: 103 KG
BH CV ECHO MEAS - CONTRAST EF (2CH): 71.4 ML/M^2
BH CV ECHO MEAS - CONTRAST EF 4CH: 69.6 ML/M^2
BH CV ECHO MEAS - EDV(CUBED): 110.6 ML
BH CV ECHO MEAS - EDV(MOD-SP2): 112 ML
BH CV ECHO MEAS - EDV(MOD-SP4): 135 ML
BH CV ECHO MEAS - EDV(TEICH): 107.5 ML
BH CV ECHO MEAS - EF(CUBED): 82.2 %
BH CV ECHO MEAS - EF(MOD-SP2): 71.4 %
BH CV ECHO MEAS - EF(MOD-SP4): 69.6 %
BH CV ECHO MEAS - EF(TEICH): 74.9 %
BH CV ECHO MEAS - ESV(CUBED): 19.7 ML
BH CV ECHO MEAS - ESV(MOD-SP2): 32 ML
BH CV ECHO MEAS - ESV(MOD-SP4): 41 ML
BH CV ECHO MEAS - ESV(TEICH): 27 ML
BH CV ECHO MEAS - FS: 43.8 %
BH CV ECHO MEAS - IVS/LVPW: 1.2
BH CV ECHO MEAS - IVSD: 1.3 CM
BH CV ECHO MEAS - LAT PEAK E' VEL: 8 CM/SEC
BH CV ECHO MEAS - LV DIASTOLIC VOL/BSA (35-75): 61.3 ML/M^2
BH CV ECHO MEAS - LV MASS(C)D: 219.1 GRAMS
BH CV ECHO MEAS - LV MASS(C)DI: 99.5 GRAMS/M^2
BH CV ECHO MEAS - LV MEAN PG: 2 MMHG
BH CV ECHO MEAS - LV SYSTOLIC VOL/BSA (12-30): 18.6 ML/M^2
BH CV ECHO MEAS - LV V1 MEAN: 64.6 CM/SEC
BH CV ECHO MEAS - LV V1 VTI: 17.1 CM
BH CV ECHO MEAS - LVIDD: 4.8 CM
BH CV ECHO MEAS - LVIDS: 2.7 CM
BH CV ECHO MEAS - LVLD AP2: 7.4 CM
BH CV ECHO MEAS - LVLD AP4: 8.4 CM
BH CV ECHO MEAS - LVLS AP2: 6 CM
BH CV ECHO MEAS - LVLS AP4: 5.6 CM
BH CV ECHO MEAS - LVOT AREA (M): 3.1 CM^2
BH CV ECHO MEAS - LVOT AREA: 3.1 CM^2
BH CV ECHO MEAS - LVOT DIAM: 2 CM
BH CV ECHO MEAS - LVPWD: 1.1 CM
BH CV ECHO MEAS - MED PEAK E' VEL: 4 CM/SEC
BH CV ECHO MEAS - MV A DUR: 0.12 SEC
BH CV ECHO MEAS - MV A MAX VEL: 62.7 CM/SEC
BH CV ECHO MEAS - MV DEC SLOPE: 243.5 CM/SEC^2
BH CV ECHO MEAS - MV DEC TIME: 0.25 SEC
BH CV ECHO MEAS - MV E MAX VEL: 63.2 CM/SEC
BH CV ECHO MEAS - MV E/A: 1
BH CV ECHO MEAS - MV MEAN PG: 1 MMHG
BH CV ECHO MEAS - MV P1/2T MAX VEL: 70.6 CM/SEC
BH CV ECHO MEAS - MV P1/2T: 84.9 MSEC
BH CV ECHO MEAS - MV V2 MEAN: 49.3 CM/SEC
BH CV ECHO MEAS - MV V2 VTI: 21.4 CM
BH CV ECHO MEAS - MVA P1/2T LCG: 3.1 CM^2
BH CV ECHO MEAS - MVA(P1/2T): 2.6 CM^2
BH CV ECHO MEAS - MVA(VTI): 2.5 CM^2
BH CV ECHO MEAS - PA ACC SLOPE: 64.3 CM/SEC^2
BH CV ECHO MEAS - PA ACC TIME: 0.06 SEC
BH CV ECHO MEAS - PA MAX PG (FULL): 1.8 MMHG
BH CV ECHO MEAS - PA MAX PG: 3.2 MMHG
BH CV ECHO MEAS - PA PR(ACCEL): 50.7 MMHG
BH CV ECHO MEAS - PA V2 MAX: 89.6 CM/SEC
BH CV ECHO MEAS - PI END-D VEL: 69.2 CM/SEC
BH CV ECHO MEAS - PULM DIAS VEL: 41.1 CM/SEC
BH CV ECHO MEAS - PULM S/D: 1.1
BH CV ECHO MEAS - PULM SYS VEL: 46.8 CM/SEC
BH CV ECHO MEAS - PVA(V,A): 2.3 CM^2
BH CV ECHO MEAS - PVA(V,D): 2.3 CM^2
BH CV ECHO MEAS - QP/QS: 0.59
BH CV ECHO MEAS - RV MAX PG: 1.4 MMHG
BH CV ECHO MEAS - RV MEAN PG: 1 MMHG
BH CV ECHO MEAS - RV V1 MAX: 58.7 CM/SEC
BH CV ECHO MEAS - RV V1 MEAN: 36.1 CM/SEC
BH CV ECHO MEAS - RV V1 VTI: 9.1 CM
BH CV ECHO MEAS - RVOT AREA: 3.5 CM^2
BH CV ECHO MEAS - RVOT DIAM: 2.1 CM
BH CV ECHO MEAS - SI(AO): 137.5 ML/M^2
BH CV ECHO MEAS - SI(CUBED): 41.3 ML/M^2
BH CV ECHO MEAS - SI(LVOT): 24.4 ML/M^2
BH CV ECHO MEAS - SI(MOD-SP2): 36.3 ML/M^2
BH CV ECHO MEAS - SI(MOD-SP4): 42.7 ML/M^2
BH CV ECHO MEAS - SI(TEICH): 36.5 ML/M^2
BH CV ECHO MEAS - SV(AO): 302.8 ML
BH CV ECHO MEAS - SV(CUBED): 90.9 ML
BH CV ECHO MEAS - SV(LVOT): 53.7 ML
BH CV ECHO MEAS - SV(MOD-SP2): 80 ML
BH CV ECHO MEAS - SV(MOD-SP4): 94 ML
BH CV ECHO MEAS - SV(RVOT): 31.6 ML
BH CV ECHO MEAS - SV(TEICH): 80.5 ML
BH CV ECHO MEAS - TAPSE (>1.6): 0.6 CM2
BH CV XLRA - RV BASE: 3 CM
BH CV XLRA - TDI S': 7 CM/SEC
BILIRUB SERPL-MCNC: 1 MG/DL (ref 0.1–1.2)
BUN BLD-MCNC: 25 MG/DL (ref 8–23)
BUN BLD-MCNC: 27 MG/DL (ref 8–23)
BUN/CREAT SERPL: 21.9 (ref 7–25)
BUN/CREAT SERPL: 24.8 (ref 7–25)
CALCIUM SPEC-SCNC: 9.1 MG/DL (ref 8.6–10.5)
CALCIUM SPEC-SCNC: 9.4 MG/DL (ref 8.6–10.5)
CHLORIDE SERPL-SCNC: 104 MMOL/L (ref 98–107)
CHLORIDE SERPL-SCNC: 105 MMOL/L (ref 98–107)
CO2 SERPL-SCNC: 25.3 MMOL/L (ref 22–29)
CO2 SERPL-SCNC: 27.2 MMOL/L (ref 22–29)
CREAT BLD-MCNC: 1.09 MG/DL (ref 0.76–1.27)
CREAT BLD-MCNC: 1.14 MG/DL (ref 0.76–1.27)
DEPRECATED RDW RBC AUTO: 43.5 FL (ref 37–54)
DEPRECATED RDW RBC AUTO: 44.9 FL (ref 37–54)
E/E' RATIO: 12
EOSINOPHIL # BLD AUTO: 0.06 10*3/MM3 (ref 0–0.7)
EOSINOPHIL NFR BLD AUTO: 0.9 % (ref 0.3–6.2)
ERYTHROCYTE [DISTWIDTH] IN BLOOD BY AUTOMATED COUNT: 12.9 % (ref 11.5–14.5)
ERYTHROCYTE [DISTWIDTH] IN BLOOD BY AUTOMATED COUNT: 13 % (ref 11.5–14.5)
GFR SERPL CREATININE-BSD FRML MDRD: 65 ML/MIN/1.73
GFR SERPL CREATININE-BSD FRML MDRD: 69 ML/MIN/1.73
GLOBULIN UR ELPH-MCNC: 2.8 GM/DL
GLUCOSE BLD-MCNC: 104 MG/DL (ref 65–99)
GLUCOSE BLD-MCNC: 96 MG/DL (ref 65–99)
GLUCOSE BLDC GLUCOMTR-MCNC: 104 MG/DL (ref 70–130)
GLUCOSE BLDC GLUCOMTR-MCNC: 106 MG/DL (ref 70–130)
GLUCOSE BLDC GLUCOMTR-MCNC: 111 MG/DL (ref 70–130)
HCT VFR BLD AUTO: 28.1 % (ref 40.4–52.2)
HCT VFR BLD AUTO: 28.7 % (ref 40.4–52.2)
HGB BLD-MCNC: 9 G/DL (ref 13.7–17.6)
HGB BLD-MCNC: 9.3 G/DL (ref 13.7–17.6)
IMM GRANULOCYTES # BLD: 0 10*3/MM3 (ref 0–0.03)
IMM GRANULOCYTES NFR BLD: 0 % (ref 0–0.5)
LEFT ATRIUM VOLUME INDEX: 24 ML/M2
LYMPHOCYTES # BLD AUTO: 1.01 10*3/MM3 (ref 0.9–4.8)
LYMPHOCYTES NFR BLD AUTO: 15.8 % (ref 19.6–45.3)
MAGNESIUM SERPL-MCNC: 2.2 MG/DL (ref 1.6–2.4)
MCH RBC QN AUTO: 30.2 PG (ref 27–32.7)
MCH RBC QN AUTO: 30.8 PG (ref 27–32.7)
MCHC RBC AUTO-ENTMCNC: 32 G/DL (ref 32.6–36.4)
MCHC RBC AUTO-ENTMCNC: 32.4 G/DL (ref 32.6–36.4)
MCV RBC AUTO: 93.2 FL (ref 79.8–96.2)
MCV RBC AUTO: 96.2 FL (ref 79.8–96.2)
MONOCYTES # BLD AUTO: 0.69 10*3/MM3 (ref 0.2–1.2)
MONOCYTES NFR BLD AUTO: 10.8 % (ref 5–12)
NEUTROPHILS # BLD AUTO: 4.63 10*3/MM3 (ref 1.9–8.1)
NEUTROPHILS NFR BLD AUTO: 72.3 % (ref 42.7–76)
PLATELET # BLD AUTO: 130 10*3/MM3 (ref 140–500)
PLATELET # BLD AUTO: 99 10*3/MM3 (ref 140–500)
PMV BLD AUTO: 10.8 FL (ref 6–12)
PMV BLD AUTO: 11.1 FL (ref 6–12)
POTASSIUM BLD-SCNC: 3.8 MMOL/L (ref 3.5–5.2)
POTASSIUM BLD-SCNC: 3.8 MMOL/L (ref 3.5–5.2)
PROT SERPL-MCNC: 6.6 G/DL (ref 6–8.5)
RBC # BLD AUTO: 2.92 10*6/MM3 (ref 4.6–6)
RBC # BLD AUTO: 3.08 10*6/MM3 (ref 4.6–6)
SODIUM BLD-SCNC: 143 MMOL/L (ref 136–145)
SODIUM BLD-SCNC: 143 MMOL/L (ref 136–145)
TROPONIN T SERPL-MCNC: 0.2 NG/ML (ref 0–0.03)
TROPONIN T SERPL-MCNC: 0.22 NG/ML (ref 0–0.03)
WBC NRBC COR # BLD: 6.4 10*3/MM3 (ref 4.5–10.7)
WBC NRBC COR # BLD: 6.48 10*3/MM3 (ref 4.5–10.7)

## 2017-08-29 PROCEDURE — 97110 THERAPEUTIC EXERCISES: CPT

## 2017-08-29 PROCEDURE — 99024 POSTOP FOLLOW-UP VISIT: CPT | Performed by: NURSE PRACTITIONER

## 2017-08-29 PROCEDURE — 85027 COMPLETE CBC AUTOMATED: CPT | Performed by: THORACIC SURGERY (CARDIOTHORACIC VASCULAR SURGERY)

## 2017-08-29 PROCEDURE — 82962 GLUCOSE BLOOD TEST: CPT

## 2017-08-29 PROCEDURE — 25010000002 PERFLUTREN (DEFINITY) 8.476 MG IN SODIUM CHLORIDE 10 ML INJECTION: Performed by: THORACIC SURGERY (CARDIOTHORACIC VASCULAR SURGERY)

## 2017-08-29 PROCEDURE — 93010 ELECTROCARDIOGRAM REPORT: CPT | Performed by: INTERNAL MEDICINE

## 2017-08-29 PROCEDURE — 85025 COMPLETE CBC W/AUTO DIFF WBC: CPT | Performed by: NURSE PRACTITIONER

## 2017-08-29 PROCEDURE — 93306 TTE W/DOPPLER COMPLETE: CPT | Performed by: INTERNAL MEDICINE

## 2017-08-29 PROCEDURE — 25010000002 MAGNESIUM SULFATE IN D5W 1G/100ML (PREMIX) 1-5 GM/100ML-% SOLUTION: Performed by: NURSE PRACTITIONER

## 2017-08-29 PROCEDURE — 83735 ASSAY OF MAGNESIUM: CPT | Performed by: NURSE PRACTITIONER

## 2017-08-29 PROCEDURE — 93005 ELECTROCARDIOGRAM TRACING: CPT | Performed by: NURSE PRACTITIONER

## 2017-08-29 PROCEDURE — C8929 TTE W OR WO FOL WCON,DOPPLER: HCPCS

## 2017-08-29 PROCEDURE — 84484 ASSAY OF TROPONIN QUANT: CPT | Performed by: NURSE PRACTITIONER

## 2017-08-29 PROCEDURE — 80048 BASIC METABOLIC PNL TOTAL CA: CPT | Performed by: THORACIC SURGERY (CARDIOTHORACIC VASCULAR SURGERY)

## 2017-08-29 PROCEDURE — 80053 COMPREHEN METABOLIC PANEL: CPT | Performed by: NURSE PRACTITIONER

## 2017-08-29 PROCEDURE — 99232 SBSQ HOSP IP/OBS MODERATE 35: CPT | Performed by: INTERNAL MEDICINE

## 2017-08-29 RX ORDER — MAGNESIUM SULFATE 1 G/100ML
1 INJECTION INTRAVENOUS ONCE
Status: COMPLETED | OUTPATIENT
Start: 2017-08-29 | End: 2017-08-29

## 2017-08-29 RX ORDER — HYDROCODONE BITARTRATE AND ACETAMINOPHEN 5; 325 MG/1; MG/1
1 TABLET ORAL EVERY 4 HOURS PRN
Qty: 70 TABLET | Refills: 0 | Status: SHIPPED | OUTPATIENT
Start: 2017-08-29 | End: 2017-12-27

## 2017-08-29 RX ADMIN — FAMOTIDINE 20 MG: 20 TABLET, FILM COATED ORAL at 10:21

## 2017-08-29 RX ADMIN — POTASSIUM CHLORIDE 20 MEQ: 750 CAPSULE, EXTENDED RELEASE ORAL at 10:20

## 2017-08-29 RX ADMIN — LEVOTHYROXINE SODIUM 75 MCG: 75 TABLET ORAL at 06:30

## 2017-08-29 RX ADMIN — MAGNESIUM SULFATE HEPTAHYDRATE 1 G: 1 INJECTION, SOLUTION INTRAVENOUS at 13:42

## 2017-08-29 RX ADMIN — ATORVASTATIN CALCIUM 40 MG: 20 TABLET, FILM COATED ORAL at 20:32

## 2017-08-29 RX ADMIN — CARVEDILOL 6.25 MG: 6.25 TABLET, FILM COATED ORAL at 10:20

## 2017-08-29 RX ADMIN — ASPIRIN 81 MG: 81 TABLET ORAL at 10:20

## 2017-08-29 RX ADMIN — FAMOTIDINE 20 MG: 20 TABLET, FILM COATED ORAL at 18:04

## 2017-08-29 RX ADMIN — FUROSEMIDE 40 MG: 40 TABLET ORAL at 10:21

## 2017-08-29 RX ADMIN — PERFLUTREN 4 ML: 6.52 INJECTION, SUSPENSION INTRAVENOUS at 13:38

## 2017-08-29 RX ADMIN — CLOPIDOGREL 75 MG: 75 TABLET, FILM COATED ORAL at 10:20

## 2017-08-29 NOTE — ANESTHESIA POSTPROCEDURE EVALUATION
"Patient: Luigi Steel    Procedure Summary     Date Anesthesia Start Anesthesia Stop Room / Location    08/25/17 0646 1230  [facility] /  [facility]       Procedure Diagnosis Surgeon Provider    JAYOLN STERNOTOMY CORONARY ARTERY BYPASS GRAFT TIMES 3 USING LEFT INTERNAL  MAMMARY ARTERY AND RIGHT SAPHENOUS VEIN GRAFT PER ENDOSCOPIC VEIN HARVESTING (N/A Chest) CAD (coronary artery disease)  (CAD (coronary artery disease) [I25.10]) MD Rudy Bowling MD          Anesthesia Type: general  Last vitals  BP        Temp        Pulse       Resp        SpO2          Post Anesthesia Care and Evaluation    Patient location during evaluation: bedside  Patient participation: complete - patient participated  Level of consciousness: awake  Pain management: adequate  Airway patency: patent  Anesthetic complications: No anesthetic complications    Cardiovascular status: acceptable  Respiratory status: acceptable  Hydration status: acceptable    Comments: /76 (BP Location: Right arm, Patient Position: Lying)  Pulse 86  Temp 36.7 °C (98 °F) (Oral)   Resp 15  Ht 70\" (177.8 cm)  Wt 227 lb 6.4 oz (103 kg)  SpO2 97%  BMI 32.63 kg/m2        "

## 2017-08-30 VITALS
OXYGEN SATURATION: 96 % | DIASTOLIC BLOOD PRESSURE: 76 MMHG | HEART RATE: 82 BPM | BODY MASS INDEX: 32.61 KG/M2 | RESPIRATION RATE: 16 BRPM | HEIGHT: 70 IN | WEIGHT: 227.8 LBS | SYSTOLIC BLOOD PRESSURE: 129 MMHG | TEMPERATURE: 98 F

## 2017-08-30 LAB
ANION GAP SERPL CALCULATED.3IONS-SCNC: 12.7 MMOL/L
BUN BLD-MCNC: 23 MG/DL (ref 8–23)
BUN/CREAT SERPL: 22.1 (ref 7–25)
CALCIUM SPEC-SCNC: 9.3 MG/DL (ref 8.6–10.5)
CHLORIDE SERPL-SCNC: 103 MMOL/L (ref 98–107)
CO2 SERPL-SCNC: 26.3 MMOL/L (ref 22–29)
CREAT BLD-MCNC: 1.04 MG/DL (ref 0.76–1.27)
GFR SERPL CREATININE-BSD FRML MDRD: 72 ML/MIN/1.73
GLUCOSE BLD-MCNC: 101 MG/DL (ref 65–99)
GLUCOSE BLDC GLUCOMTR-MCNC: 91 MG/DL (ref 70–130)
GLUCOSE BLDC GLUCOMTR-MCNC: 91 MG/DL (ref 70–130)
GLUCOSE BLDC GLUCOMTR-MCNC: 98 MG/DL (ref 70–130)
POTASSIUM BLD-SCNC: 3.5 MMOL/L (ref 3.5–5.2)
SODIUM BLD-SCNC: 142 MMOL/L (ref 136–145)
TROPONIN T SERPL-MCNC: 0.21 NG/ML (ref 0–0.03)

## 2017-08-30 PROCEDURE — 82962 GLUCOSE BLOOD TEST: CPT

## 2017-08-30 PROCEDURE — 97110 THERAPEUTIC EXERCISES: CPT

## 2017-08-30 PROCEDURE — 80048 BASIC METABOLIC PNL TOTAL CA: CPT | Performed by: THORACIC SURGERY (CARDIOTHORACIC VASCULAR SURGERY)

## 2017-08-30 PROCEDURE — 99238 HOSP IP/OBS DSCHRG MGMT 30/<: CPT | Performed by: NURSE PRACTITIONER

## 2017-08-30 PROCEDURE — 99232 SBSQ HOSP IP/OBS MODERATE 35: CPT | Performed by: INTERNAL MEDICINE

## 2017-08-30 RX ORDER — CARVEDILOL 3.12 MG/1
3.12 TABLET ORAL EVERY 12 HOURS SCHEDULED
Qty: 60 TABLET | Refills: 3 | Status: SHIPPED | OUTPATIENT
Start: 2017-08-30 | End: 2017-12-27 | Stop reason: SDUPTHER

## 2017-08-30 RX ORDER — CARVEDILOL 3.12 MG/1
3.12 TABLET ORAL EVERY 12 HOURS SCHEDULED
Status: DISCONTINUED | OUTPATIENT
Start: 2017-08-30 | End: 2017-08-30 | Stop reason: HOSPADM

## 2017-08-30 RX ORDER — POTASSIUM CHLORIDE 750 MG/1
20 CAPSULE, EXTENDED RELEASE ORAL DAILY
Qty: 30 CAPSULE | Refills: 3 | Status: SHIPPED | OUTPATIENT
Start: 2017-08-30 | End: 2017-09-05

## 2017-08-30 RX ORDER — FUROSEMIDE 40 MG/1
40 TABLET ORAL DAILY
Qty: 30 TABLET | Refills: 2 | Status: SHIPPED | OUTPATIENT
Start: 2017-08-30 | End: 2017-09-05

## 2017-08-30 RX ADMIN — MUPIROCIN 1 APPLICATION: 20 OINTMENT TOPICAL at 09:47

## 2017-08-30 RX ADMIN — POTASSIUM CHLORIDE 40 MEQ: 750 CAPSULE, EXTENDED RELEASE ORAL at 06:19

## 2017-08-30 RX ADMIN — ASPIRIN 81 MG: 81 TABLET ORAL at 09:47

## 2017-08-30 RX ADMIN — FUROSEMIDE 40 MG: 40 TABLET ORAL at 09:46

## 2017-08-30 RX ADMIN — CLOPIDOGREL 75 MG: 75 TABLET, FILM COATED ORAL at 09:46

## 2017-08-30 RX ADMIN — ACETAMINOPHEN 650 MG: 325 TABLET ORAL at 13:45

## 2017-08-30 RX ADMIN — FAMOTIDINE 20 MG: 20 TABLET, FILM COATED ORAL at 09:46

## 2017-08-30 RX ADMIN — CARVEDILOL 3.12 MG: 3.12 TABLET, FILM COATED ORAL at 09:46

## 2017-08-30 RX ADMIN — POTASSIUM CHLORIDE 20 MEQ: 750 CAPSULE, EXTENDED RELEASE ORAL at 09:46

## 2017-08-30 RX ADMIN — LEVOTHYROXINE SODIUM 75 MCG: 75 TABLET ORAL at 06:19

## 2017-08-31 ENCOUNTER — DOCUMENTATION (OUTPATIENT)
Dept: SURGERY | Facility: HOSPITAL | Age: 62
End: 2017-08-31

## 2017-09-01 LAB
ACT BLD: 114 SECONDS (ref 82–152)
ACT BLD: 120 SECONDS (ref 82–152)
ACT BLD: 390 SECONDS (ref 82–152)
ACT BLD: 439 SECONDS (ref 82–152)
ACT BLD: 532 SECONDS (ref 82–152)
ACT BLD: >1000 SECONDS (ref 82–152)
BASE EXCESS BLDA CALC-SCNC: -1 MMOL/L (ref -5–5)
BASE EXCESS BLDA CALC-SCNC: -2 MMOL/L (ref -5–5)
BASE EXCESS BLDA CALC-SCNC: 2 MMOL/L (ref -5–5)
BASE EXCESS BLDA CALC-SCNC: 3 MMOL/L (ref -5–5)
CO2 BLDA-SCNC: 26 MMOL/L (ref 24–29)
CO2 BLDA-SCNC: 27 MMOL/L (ref 24–29)
CO2 BLDA-SCNC: 29 MMOL/L (ref 24–29)
CO2 BLDA-SCNC: 29 MMOL/L (ref 24–29)
CO2 BLDA-SCNC: 30 MMOL/L (ref 24–29)
CO2 BLDA-SCNC: 30 MMOL/L (ref 24–29)
GLUCOSE BLDC GLUCOMTR-MCNC: 117 MG/DL (ref 70–130)
GLUCOSE BLDC GLUCOMTR-MCNC: 117 MG/DL (ref 70–130)
GLUCOSE BLDC GLUCOMTR-MCNC: 118 MG/DL (ref 70–130)
GLUCOSE BLDC GLUCOMTR-MCNC: 127 MG/DL (ref 70–130)
GLUCOSE BLDC GLUCOMTR-MCNC: 135 MG/DL (ref 70–130)
GLUCOSE BLDC GLUCOMTR-MCNC: 136 MG/DL (ref 70–130)
HCO3 BLDA-SCNC: 24.3 MMOL/L (ref 22–26)
HCO3 BLDA-SCNC: 25.7 MMOL/L (ref 22–26)
HCO3 BLDA-SCNC: 27.4 MMOL/L (ref 22–26)
HCO3 BLDA-SCNC: 27.4 MMOL/L (ref 22–26)
HCO3 BLDA-SCNC: 28.4 MMOL/L (ref 22–26)
HCO3 BLDA-SCNC: 28.7 MMOL/L (ref 22–26)
HCT VFR BLDA CALC: 26 % (ref 38–51)
HCT VFR BLDA CALC: 27 % (ref 38–51)
HCT VFR BLDA CALC: 28 % (ref 38–51)
HCT VFR BLDA CALC: 34 % (ref 38–51)
HGB BLDA-MCNC: 11.6 G/DL (ref 12–17)
HGB BLDA-MCNC: 8.8 G/DL (ref 12–17)
HGB BLDA-MCNC: 9.2 G/DL (ref 12–17)
HGB BLDA-MCNC: 9.5 G/DL (ref 12–17)
PCO2 BLDA: 48 MM HG (ref 35–45)
PCO2 BLDA: 49 MM HG (ref 35–45)
PCO2 BLDA: 49.1 MM HG (ref 35–45)
PCO2 BLDA: 49.2 MM HG (ref 35–45)
PCO2 BLDA: 56.5 MM HG (ref 35–45)
PCO2 BLDA: 58.9 MM HG (ref 35–45)
PH BLDA: 7.27 PH UNITS (ref 7.35–7.6)
PH BLDA: 7.3 PH UNITS (ref 7.35–7.6)
PH BLDA: 7.3 PH UNITS (ref 7.35–7.6)
PH BLDA: 7.36 PH UNITS (ref 7.35–7.6)
PH BLDA: 7.36 PH UNITS (ref 7.35–7.6)
PH BLDA: 7.38 PH UNITS (ref 7.35–7.6)
PO2 BLDA: 339 MMHG (ref 80–105)
PO2 BLDA: 383 MMHG (ref 80–105)
PO2 BLDA: 470 MMHG (ref 80–105)
PO2 BLDA: 473 MMHG (ref 80–105)
PO2 BLDA: 50 MMHG (ref 80–105)
PO2 BLDA: 73 MMHG (ref 80–105)
POTASSIUM BLDA-SCNC: 4.4 MMOL/L (ref 3.5–4.9)
POTASSIUM BLDA-SCNC: 5 MMOL/L (ref 3.5–4.9)
POTASSIUM BLDA-SCNC: 5.1 MMOL/L (ref 3.5–4.9)
POTASSIUM BLDA-SCNC: 5.3 MMOL/L (ref 3.5–4.9)
POTASSIUM BLDA-SCNC: 6.1 MMOL/L (ref 3.5–4.9)
POTASSIUM BLDA-SCNC: 6.7 MMOL/L (ref 3.5–4.9)
SAO2 % BLDA: 100 % (ref 95–98)
SAO2 % BLDA: 79 % (ref 95–98)
SAO2 % BLDA: 93 % (ref 95–98)

## 2017-09-05 ENCOUNTER — OFFICE VISIT (OUTPATIENT)
Dept: CARDIOLOGY | Facility: CLINIC | Age: 62
End: 2017-09-05

## 2017-09-05 ENCOUNTER — TRANSCRIBE ORDERS (OUTPATIENT)
Dept: CARDIOLOGY | Facility: CLINIC | Age: 62
End: 2017-09-05

## 2017-09-05 ENCOUNTER — LAB (OUTPATIENT)
Dept: LAB | Facility: HOSPITAL | Age: 62
End: 2017-09-05
Attending: INTERNAL MEDICINE

## 2017-09-05 VITALS
SYSTOLIC BLOOD PRESSURE: 128 MMHG | HEART RATE: 80 BPM | WEIGHT: 226 LBS | OXYGEN SATURATION: 98 % | BODY MASS INDEX: 32.35 KG/M2 | DIASTOLIC BLOOD PRESSURE: 78 MMHG | HEIGHT: 70 IN

## 2017-09-05 DIAGNOSIS — I25.10 CAD IN NATIVE ARTERY: Primary | ICD-10-CM

## 2017-09-05 DIAGNOSIS — Z95.1 S/P CABG (CORONARY ARTERY BYPASS GRAFT): ICD-10-CM

## 2017-09-05 DIAGNOSIS — I25.10 CAD IN NATIVE ARTERY: ICD-10-CM

## 2017-09-05 DIAGNOSIS — I25.10 CORONARY ARTERY DISEASE INVOLVING NATIVE CORONARY ARTERY OF NATIVE HEART WITHOUT ANGINA PECTORIS: Primary | ICD-10-CM

## 2017-09-05 PROBLEM — I21.09 ANTERIOR MYOCARDIAL INFARCTION (HCC): Status: RESOLVED | Noted: 2017-08-13 | Resolved: 2017-09-05

## 2017-09-05 LAB
BASOPHILS # BLD AUTO: 0.01 10*3/MM3 (ref 0–0.2)
BASOPHILS NFR BLD AUTO: 0.1 % (ref 0–1.5)
DEPRECATED RDW RBC AUTO: 45.4 FL (ref 37–54)
EOSINOPHIL # BLD AUTO: 0.14 10*3/MM3 (ref 0–0.7)
EOSINOPHIL NFR BLD AUTO: 1.9 % (ref 0.3–6.2)
ERYTHROCYTE [DISTWIDTH] IN BLOOD BY AUTOMATED COUNT: 13.4 % (ref 11.5–14.5)
HCT VFR BLD AUTO: 34.7 % (ref 40.4–52.2)
HGB BLD-MCNC: 10.8 G/DL (ref 13.7–17.6)
IMM GRANULOCYTES # BLD: 0.03 10*3/MM3 (ref 0–0.03)
IMM GRANULOCYTES NFR BLD: 0.4 % (ref 0–0.5)
LYMPHOCYTES # BLD AUTO: 1.33 10*3/MM3 (ref 0.9–4.8)
LYMPHOCYTES NFR BLD AUTO: 18.1 % (ref 19.6–45.3)
MCH RBC QN AUTO: 29.3 PG (ref 27–32.7)
MCHC RBC AUTO-ENTMCNC: 31.1 G/DL (ref 32.6–36.4)
MCV RBC AUTO: 94 FL (ref 79.8–96.2)
MONOCYTES # BLD AUTO: 0.65 10*3/MM3 (ref 0.2–1.2)
MONOCYTES NFR BLD AUTO: 8.9 % (ref 5–12)
NEUTROPHILS # BLD AUTO: 5.18 10*3/MM3 (ref 1.9–8.1)
NEUTROPHILS NFR BLD AUTO: 70.6 % (ref 42.7–76)
PLATELET # BLD AUTO: 341 10*3/MM3 (ref 140–500)
PMV BLD AUTO: 10.3 FL (ref 6–12)
RBC # BLD AUTO: 3.69 10*6/MM3 (ref 4.6–6)
WBC NRBC COR # BLD: 7.34 10*3/MM3 (ref 4.5–10.7)

## 2017-09-05 PROCEDURE — 93000 ELECTROCARDIOGRAM COMPLETE: CPT | Performed by: PHYSICIAN ASSISTANT

## 2017-09-05 PROCEDURE — 99213 OFFICE O/P EST LOW 20 MIN: CPT | Performed by: PHYSICIAN ASSISTANT

## 2017-09-05 PROCEDURE — 36415 COLL VENOUS BLD VENIPUNCTURE: CPT

## 2017-09-05 PROCEDURE — 85025 COMPLETE CBC W/AUTO DIFF WBC: CPT

## 2017-09-05 NOTE — PROGRESS NOTES
Date of Office Visit: 2017  Encounter Provider: SHILOH Bates  Place of Service: [facility] CARDIOLOGY  Patient Name: Luigi Steel  :1955    Chief Complaint   Patient presents with   • Coronary Artery Disease     1 week hospital follow up   :     HPI: Luigi Steel is a 62 y.o. male, new to me, who presents today for follow-up.  Old records have been obtained and reviewed by me.  He is a patient with a past medical history significant for hypertension, hyperlipidemia, colon cancer status post colectomy, and history of a STEMI with a stent to the mid LAD on 2017.  He did have multivessel coronary disease, and the plan was to send him home on Plavix and then readmitted with an Integrilin bridge for a CABG.  He was admitted to the hospital on 2017, bridged with Integrilin, and underwent a CABG ×3 by Dr. Lynn on 2017.  He did have some reduced heart function preoperatively, which improved to 69% postoperatively.  He was ready to be discharged home on postop day 4, however a syncopal episode after a bowel movement delayed his discharge for monitoring.  His carvedilol was decreased and it was felt that he had a vasovagal episode.  He was discharged home on 2017 in stable condition and is here today for follow-up.   Since she's been home he's been doing well.  He has weakness, fatigue, and a little shortness of breath on exertion.  Overall though he thinks he is improving.  He has been weighing himself daily at home and has remained about the same weight of 226 pounds.  His preop weight was 235.  He has not had any more syncopal episodes.    Past Medical History:   Diagnosis Date   • Anterior myocardial infarction 2017   • Arthritis    • Colon cancer    • Coronary artery disease    • Elevated blood pressure reading without diagnosis of hypertension    • H/O complete eye exam    • Hypertension    • Hypothyroidism, acquired    • Insomnia    •  Kidney stones    • Osteoarthritis, multiple sites        Past Surgical History:   Procedure Laterality Date   • CARDIAC CATHETERIZATION N/A 8/13/2017    Procedure: Left Heart Cath;  Surgeon: Edyta Ye MD;  Location: [facility] CATH INVASIVE LOCATION;  Service:    • CARDIAC CATHETERIZATION N/A 8/13/2017    Procedure: Stent RK coronary;  Surgeon: Edyta Ye MD;  Location:  [facility] CATH INVASIVE LOCATION;  Service:    • COLECTOMY PARTIAL / TOTAL  1994   • COLONOSCOPY  03/18/2009   • CORONARY ARTERY BYPASS GRAFT N/A 8/25/2017    Procedure: JAYLON STERNOTOMY CORONARY ARTERY BYPASS GRAFT TIMES 3 USING LEFT INTERNAL  MAMMARY ARTERY AND RIGHT SAPHENOUS VEIN GRAFT PER ENDOSCOPIC VEIN HARVESTING;  Surgeon: Ana Lynn MD;  Location: [facility] OR;  Service:    • HERNIA REPAIR  04/02/2009    hiatal       Social History     Social History   • Marital status:      Spouse name: N/A   • Number of children: N/A   • Years of education: N/A     Occupational History   • Not on file.     Social History Main Topics   • Smoking status: Never Smoker   • Smokeless tobacco: Not on file   • Alcohol use No   • Drug use: No   • Sexual activity: Defer     Other Topics Concern   • Not on file     Social History Narrative       Family History   Problem Relation Age of Onset   • Arthritis Mother    • Stroke Mother    • Heart attack Mother    • Hypertension Mother    • Gallbladder disease Mother    • Cancer Father    • Hypertension Father    • Gallbladder disease Father    • Gallbladder disease Sister    • Arthritis Brother    • Cancer Brother    • Heart attack Brother    • Hypertension Brother    • Gallbladder disease Brother    • No Known Problems Maternal Grandmother    • No Known Problems Maternal Grandfather    • No Known Problems Paternal Grandmother    • No Known Problems Paternal Grandfather        Review of Systems   Constitution: Positive for weakness and malaise/fatigue. Negative for chills, fever, weight gain and weight  loss.   HENT: Negative for ear pain, headaches, hearing loss, nosebleeds and sore throat.    Eyes: Negative for double vision, pain and visual disturbance.   Cardiovascular: Positive for dyspnea on exertion. Negative for chest pain, irregular heartbeat, leg swelling, near-syncope, orthopnea, palpitations, paroxysmal nocturnal dyspnea and syncope.   Respiratory: Negative for cough, shortness of breath, sleep disturbances due to breathing, snoring and wheezing.    Endocrine: Negative for cold intolerance, heat intolerance and polyuria.   Skin: Negative for itching and rash.   Musculoskeletal: Negative for joint pain, joint swelling and myalgias.   Gastrointestinal: Negative for abdominal pain, diarrhea, melena, nausea and vomiting.   Genitourinary: Negative for frequency, hematuria and hesitancy.   Neurological: Negative for excessive daytime sleepiness, light-headedness, numbness, paresthesias and seizures.   Psychiatric/Behavioral: Negative for altered mental status and depression.   Allergic/Immunologic: Negative.    All other systems reviewed and are negative.      Allergies   Allergen Reactions   • Hydromorphone Nausea And Vomiting and Shortness Of Breath   • Demerol [Meperidine] Nausea And Vomiting         Current Outpatient Prescriptions:   •  aspirin 81 MG chewable tablet, Chew 1 tablet Daily., Disp: , Rfl:   •  atorvastatin (LIPITOR) 20 MG tablet, Take 1 tablet by mouth Every Night., Disp: 30 tablet, Rfl: 5  •  carvedilol (COREG) 3.125 MG tablet, Take 1 tablet by mouth Every 12 (Twelve) Hours., Disp: 60 tablet, Rfl: 3  •  clopidogrel (PLAVIX) 75 MG tablet, Take 1 tablet by mouth Daily., Disp: 30 tablet, Rfl: 0  •  HYDROcodone-acetaminophen (NORCO) 5-325 MG per tablet, Take 1 tablet by mouth Every 4 (Four) Hours As Needed for Moderate Pain  for up to 70 doses., Disp: 70 tablet, Rfl: 0  •  levothyroxine (SYNTHROID, LEVOTHROID) 75 MCG tablet, Take 1 tablet by mouth Daily., Disp: 30 tablet, Rfl: 5  •  Solifenacin  "Succinate (VESICARE PO), Take 1 tablet/day by mouth Daily., Disp: , Rfl:   •  zolpidem (AMBIEN) 10 MG tablet, Take 1 tablet by mouth At Night As Needed for Sleep., Disp: 30 tablet, Rfl: 2     Objective:     Vitals:    09/05/17 1419 09/05/17 1446   BP: 122/78 128/78   BP Location: Right arm Left arm   Pulse: 80    SpO2: 98%    Weight: 226 lb (103 kg)    Height: 70\" (177.8 cm)      Body mass index is 32.43 kg/(m^2).    PHYSICAL EXAM:    Physical Exam   Constitutional: He is oriented to person, place, and time. He appears well-developed and well-nourished. No distress.   HENT:   Head: Normocephalic and atraumatic.   Eyes: Pupils are equal, round, and reactive to light.   Neck: No JVD present. No thyromegaly present.   Cardiovascular: Normal rate, regular rhythm, normal heart sounds and intact distal pulses.    No murmur heard.  Pulmonary/Chest: Effort normal and breath sounds normal. No respiratory distress.   Abdominal: Soft. Bowel sounds are normal. He exhibits no distension. There is no splenomegaly or hepatomegaly. There is no tenderness.   Musculoskeletal: Normal range of motion. He exhibits no edema.   Neurological: He is alert and oriented to person, place, and time.   Skin: Skin is warm and dry. He is not diaphoretic. No erythema.   Sternal incision and right lower extremity incision well healed without erythema or edema.   Psychiatric: He has a normal mood and affect. His behavior is normal. Judgment normal.         ECG 12 Lead  Date/Time: 9/5/2017 2:56 PM  Performed by: JOSSELIN BAILON.  Authorized by: JOSSELIN BAILON.   Comparison: compared with previous ECG from 8/29/2017  Similar to previous ECG  Rhythm: sinus rhythm  BPM: 80  T depression: aVL and V1-V6  Q waves: III  Clinical impression: abnormal ECG  Comments: Indication: Coronary artery disease, status post stent placement and CABG.              Assessment:       Diagnosis Plan   1. Coronary artery disease involving native coronary artery of native " heart without angina pectoris  ECG 12 Lead    Ambulatory Referral to Cardiac Rehab   2. S/P CABG (coronary artery bypass graft)  ECG 12 Lead    Ambulatory Referral to Cardiac Rehab     Orders Placed This Encounter   Procedures   • Ambulatory Referral to Cardiac Rehab     Referral Priority:   Routine     Referral Type:   Rehabilitation - Outpatient     Number of Visits Requested:   1   • ECG 12 Lead     This order was created via procedure documentation          Plan:       1.  Coronary Artery Disease  Assessment  • The patient has no angina  • There is a new diagnosis of stable angina in the past 12 months  • The patient is having symptoms consistent with unstable angina     Plan  • Lifestyle modifications discussed include adhering to a heart healthy diet, medication compliance, regular exercise and regular monitoring of cholesterol and blood pressure    Subjective - Objective  • There is a history of past MI  • There is a history of previous coronary artery bypass graft  • There has been a previous stent procedure using RK  • Current antiplatelet therapy includes aspirin 81 mg and clopidogrel 75 mg  • The patient qualifies for cardiac rehabilitation, and has been referred to cardiac rehab  • Overall he's doing well.  He does not have any swelling in his legs and his lungs sound clear, I'm going to discontinue his Lasix and potassium.  I've instructed him to keep taking his daily weights, and if his weight starts to creep up again give us a call and we can restart the Lasix and potassium.  I did explain to him the importance of staying on the aspirin and Plavix.  Although his LAD was bypassed distally, the stent in the mid LAD is helping to fill the LAD retrograde.  We'll go ahead and get him signed up to start cardiac rehabilitation in a few weeks.  We also talked about a heart healthy, plant-based diet.  He has been doing a good job watching his salt intake.    He will follow-up with Dr. Ye on  9/25/2017.    As always, it has been a pleasure to participate in your patient's care.      Sincerely,         Barbara Mcleod PA-C

## 2017-09-06 ENCOUNTER — OFFICE VISIT (OUTPATIENT)
Dept: FAMILY MEDICINE CLINIC | Facility: CLINIC | Age: 62
End: 2017-09-06

## 2017-09-06 VITALS
RESPIRATION RATE: 18 BRPM | SYSTOLIC BLOOD PRESSURE: 123 MMHG | HEART RATE: 85 BPM | TEMPERATURE: 97.7 F | BODY MASS INDEX: 32.5 KG/M2 | HEIGHT: 70 IN | DIASTOLIC BLOOD PRESSURE: 78 MMHG | WEIGHT: 227 LBS | OXYGEN SATURATION: 95 %

## 2017-09-06 DIAGNOSIS — I25.10 CORONARY ARTERY DISEASE INVOLVING NATIVE CORONARY ARTERY OF NATIVE HEART WITHOUT ANGINA PECTORIS: Primary | ICD-10-CM

## 2017-09-06 DIAGNOSIS — Z09 HOSPITAL DISCHARGE FOLLOW-UP: ICD-10-CM

## 2017-09-06 PROCEDURE — 99213 OFFICE O/P EST LOW 20 MIN: CPT | Performed by: FAMILY MEDICINE

## 2017-09-06 NOTE — PROGRESS NOTES
Subjective   Luigi Steel is a 62 y.o. male.     CC: Hospital F/U for CAD/CAGB    History of Present Illness     Pt returns today after two recent hospitalizations, first for AMI and then the second for CABG. That second visit was as follows:    Date of Admission:   Date of Discharge:  8/30/2017     Discharge Diagnosis: post op CABG     Presenting Problem/History of Present Illness  CAD (coronary artery disease) [I25.10]  CAD (coronary artery disease) [I25.10]                         Hospital Course  Patient is a 62 y.o. male with a history of HTN, HLD, multivessel disease, MI with stents, hypothyroidism, osteoarthritis, colon cancer s/p colectomy, presented to the hospital for Integrilin bridge prior to CABG.  Recently discharged from the hospital after a STEMI with stent to mid LAD on 8/13/2017.  Patient was also evaluated at that time with hematology for mild thrombocytopenia.  Patient went home, stopped plavix on 08/20/2017, was readmitted on 8/21 for Integrilin bridge with scheduled CABG for 8/25.  Patient underwent a CABG x 3.  Pre op patient was on bb and ACE, d/t post MI ICM, EF 43%, ace was held because JENISE which returned to baseline. Post op EF was shown to have improved to 69%. Patient tolerate post op therapy asa, bb, and statin therapy.  Patient progressed well, met PT goals and was deemed ready to go home POD #4, however, syncopal episode after bowel movement delayed discharge for  monitoring.  Carvedilol was decreased d/t probable vasovagal event.  POD #5 patient asymptomatic and deemed ready to go home with home health.    Patient will f/u with Dr. Lynn 9/27/17 at 11 am.    Current medication list is compared to recent hospital d/c and the medications are reconciled.    The following portions of the patient's history were reviewed and updated as appropriate: allergies, current medications, past family history, past medical history, past social history, past surgical history and problem  list.    Review of Systems   Constitutional: Negative for activity change, chills, fatigue and fever.   Respiratory: Negative for cough and shortness of breath.    Cardiovascular: Negative for chest pain and palpitations.   Gastrointestinal: Negative for abdominal pain.   Endocrine: Negative for cold intolerance.   Psychiatric/Behavioral: Negative for behavioral problems and dysphoric mood. The patient is not nervous/anxious.        Objective   Physical Exam   Constitutional: He appears well-developed and well-nourished.   Neck: Neck supple. No thyromegaly present.   Cardiovascular: Normal rate and regular rhythm.    No murmur heard.  Pulmonary/Chest: Effort normal and breath sounds normal.   Abdominal: Bowel sounds are normal.   Psychiatric: He has a normal mood and affect. His behavior is normal.   Nursing note and vitals reviewed.  Hospital records reviewed with pt confirming HPI.      Assessment/Plan   Luigi was seen today for coronary artery bipass surgery.    Diagnoses and all orders for this visit:    Coronary artery disease involving native coronary artery of native heart without angina pectoris    Hospital discharge follow-up    Diet/exercise and healthy living discussed.

## 2017-09-08 DIAGNOSIS — F51.01 PRIMARY INSOMNIA: ICD-10-CM

## 2017-09-08 RX ORDER — ZOLPIDEM TARTRATE 10 MG/1
10 TABLET ORAL NIGHTLY PRN
Qty: 30 TABLET | Refills: 2 | Status: SHIPPED | OUTPATIENT
Start: 2017-09-08 | End: 2017-10-03 | Stop reason: SDUPTHER

## 2017-09-15 ENCOUNTER — APPOINTMENT (OUTPATIENT)
Dept: ONCOLOGY | Facility: CLINIC | Age: 62
End: 2017-09-15

## 2017-09-15 ENCOUNTER — OFFICE VISIT (OUTPATIENT)
Dept: ONCOLOGY | Facility: CLINIC | Age: 62
End: 2017-09-15

## 2017-09-15 ENCOUNTER — LAB (OUTPATIENT)
Dept: LAB | Facility: HOSPITAL | Age: 62
End: 2017-09-15

## 2017-09-15 ENCOUNTER — APPOINTMENT (OUTPATIENT)
Dept: LAB | Facility: HOSPITAL | Age: 62
End: 2017-09-15

## 2017-09-15 VITALS
RESPIRATION RATE: 16 BRPM | SYSTOLIC BLOOD PRESSURE: 124 MMHG | BODY MASS INDEX: 32.24 KG/M2 | OXYGEN SATURATION: 96 % | DIASTOLIC BLOOD PRESSURE: 84 MMHG | TEMPERATURE: 98.5 F | HEART RATE: 79 BPM | HEIGHT: 70 IN | WEIGHT: 225.2 LBS

## 2017-09-15 DIAGNOSIS — D69.6 THROMBOCYTOPENIA (HCC): ICD-10-CM

## 2017-09-15 DIAGNOSIS — E53.8 VITAMIN B12 DEFICIENCY: Primary | ICD-10-CM

## 2017-09-15 DIAGNOSIS — I25.10 CORONARY ARTERY DISEASE INVOLVING NATIVE CORONARY ARTERY OF NATIVE HEART WITHOUT ANGINA PECTORIS: ICD-10-CM

## 2017-09-15 DIAGNOSIS — E53.8 VITAMIN B12 DEFICIENCY: ICD-10-CM

## 2017-09-15 DIAGNOSIS — D64.9 ANEMIA, UNSPECIFIED TYPE: ICD-10-CM

## 2017-09-15 DIAGNOSIS — Z95.1 S/P CABG (CORONARY ARTERY BYPASS GRAFT): Primary | ICD-10-CM

## 2017-09-15 LAB
BASOPHILS # BLD AUTO: 0.02 10*3/MM3 (ref 0–0.1)
BASOPHILS NFR BLD AUTO: 0.3 % (ref 0–1.1)
DEPRECATED RDW RBC AUTO: 43.5 FL (ref 37–49)
EOSINOPHIL # BLD AUTO: 0.3 10*3/MM3 (ref 0–0.36)
EOSINOPHIL NFR BLD AUTO: 5.2 % (ref 1–5)
ERYTHROCYTE [DISTWIDTH] IN BLOOD BY AUTOMATED COUNT: 13.3 % (ref 11.7–14.5)
HCT VFR BLD AUTO: 38.6 % (ref 40–49)
HGB BLD-MCNC: 12.5 G/DL (ref 13.5–16.5)
IMM GRANULOCYTES # BLD: 0.1 10*3/MM3 (ref 0–0.03)
IMM GRANULOCYTES NFR BLD: 1.7 % (ref 0–0.5)
LYMPHOCYTES # BLD AUTO: 1.59 10*3/MM3 (ref 1–3.5)
LYMPHOCYTES NFR BLD AUTO: 27.4 % (ref 20–49)
MCH RBC QN AUTO: 29.5 PG (ref 27–33)
MCHC RBC AUTO-ENTMCNC: 32.4 G/DL (ref 32–35)
MCV RBC AUTO: 91 FL (ref 83–97)
MONOCYTES # BLD AUTO: 0.64 10*3/MM3 (ref 0.25–0.8)
MONOCYTES NFR BLD AUTO: 11 % (ref 4–12)
NEUTROPHILS # BLD AUTO: 3.16 10*3/MM3 (ref 1.5–7)
NEUTROPHILS NFR BLD AUTO: 54.4 % (ref 39–75)
NRBC BLD MANUAL-RTO: 0 /100 WBC (ref 0–0)
PLATELET # BLD AUTO: 239 10*3/MM3 (ref 150–375)
PLATELETS.RETICULATED NFR BLD AUTO: 3.2 % (ref 1.1–6.1)
PMV BLD AUTO: 10 FL (ref 8.9–12.1)
RBC # BLD AUTO: 4.24 10*6/MM3 (ref 4.3–5.5)
WBC NRBC COR # BLD: 5.81 10*3/MM3 (ref 4–10)

## 2017-09-15 PROCEDURE — 85025 COMPLETE CBC W/AUTO DIFF WBC: CPT | Performed by: INTERNAL MEDICINE

## 2017-09-15 PROCEDURE — 36415 COLL VENOUS BLD VENIPUNCTURE: CPT | Performed by: INTERNAL MEDICINE

## 2017-09-15 PROCEDURE — 85055 RETICULATED PLATELET ASSAY: CPT | Performed by: INTERNAL MEDICINE

## 2017-09-15 PROCEDURE — 99214 OFFICE O/P EST MOD 30 MIN: CPT | Performed by: INTERNAL MEDICINE

## 2017-09-15 NOTE — PROGRESS NOTES
REASON FOR FOLLOWUP:     1.  Provide an opinion on any further workup or treatment of thrombocytopenia.    2.  Vitamin B12 deficiency diagnosed in middle of August 2017.         HISTORY OF PRESENT ILLNESS:  The patient is a 62 y.o. year old male who is here for reevaluation about his thrombocytopenia and vitamin B12 deficiency.  He is accompanied by his wife today.     I saw the patient originally during his hospitalization on 08/16/2017 for initial consultation of thrombocytopenia. At that time his platelets were 119,000 and normal hemoglobin of 14.1, WBC 5600. Laboratory study reported IPF 6.8%, vitamin B12 level at 261 pg/mL. Serum protein immunofixation was negative for monoclonal protein.     He was given vitamin B12 injection and started oral B12 supplementation. Patient reports that he has been taking 2500 mcg vitamin B12 daily.     This patient subsequently had triple vessel bypass surgery on 08/25/2017 at [facility]. At that time he still had mild thrombocytopenia with platelets 128,000 and hemoglobin 13.1, WBC 6100. Postsurgery, his platelets deteriorated all the way down to 76,000 on 08/27/2017 and hemoglobin 8.9; however, gradually improved afterwards with platelets recovered at 130,000 on 08/29/2017 and hemoglobin 9.3 when he was discharged. Since that time laboratory study on 09/05/2017 reported normalized platelets at 341,000, hemoglobin 10.8 and WBC 7300. Today, laboratory study reported platelets 239,000, hemoglobin 12.5 and WBC 5800.     Patient reports after surgery he is feeling better and more energetic. He is still in recovery. He still has a small wound in the middle chest not healed completely with slight yellowness but no active drainage. Home visiting nurse comes to his house twice a week inspecting that. Patient reports no fever, sweating, chills.    Patient was tested negative for comprehensive REBA panel on 8/16/2017, and also negative for rheumatoid factor.  Serum protein  immunofixation was negative, including IgG 675, IgA 91 IgM 104, he also had a normal LDH.       Medical History         Past Medical History:   Diagnosis Date   • Arthritis     • Colon cancer     • Elevated blood pressure reading without diagnosis of hypertension     • H/O complete eye exam 2016   • Hypertension     • Hypothyroidism, acquired     • Insomnia     • Kidney stones     • Osteoarthritis, multiple sites            Surgical History          Past Surgical History:   Procedure Laterality Date   • CARDIAC CATHETERIZATION N/A 8/13/2017     Procedure: Left Heart Cath;  Surgeon: dEyta Ye MD;  Location:  [facility] CATH INVASIVE LOCATION;  Service:    • CARDIAC CATHETERIZATION N/A 8/13/2017     Procedure: Stent RK coronary;  Surgeon: Edyta Ye MD;  Location:  [facility] CATH INVASIVE LOCATION;  Service:    • COLECTOMY PARTIAL / TOTAL   1994   • COLONOSCOPY   03/18/2009   • HERNIA REPAIR   04/02/2009     hiatal       Triple-vessel CABG bypass surgery on 8/25/2017 by Dr. Ye.      HEMATOLOGIC/ONCOLOGIC HISTORY: The patient is a 62 y.o. year old male whom we are consulted for evaluation of thrombocytopenia.      This patient to was admitted for first episodes of heart attack in mid August 2017.  Patient had cardiac stent placement this hospitalization.  Currently has no chest pain.  This patient has no history of coronary artery disease previously and was not taking aspirin or Plavix.      At a time of this admission, in the very early morning on 8/13/2017, laboratory study reported platelets 133,000, hemoglobin 14.2 and WBC 7700 including neutrophils 5200 and lymphocytes 1900.  His chemistry lab reported creatinine 1.29, normal electrolytes in the liver function panel, mildly elevated glucose 135.  Patient had cardiac catheterization and drug-eluting stent placement in the morning of August 13, 2017.  In the morning of 8/14/2017, repeated CBC showed a decreased platelets at 104,000, hemoglobin 12.3 and WBC  6100 without a differentiation.  Repeated laboratory study on 8/15/2017 reported a platelets 126,000 and hemoglobin 14, WBC 6600.  Today on 8/16/2017, laboratory study showed a platelets 119,000, hemoglobin 14.1 and a WBC 5600.  I obtained laboratory study in the afternoon, showed a marginally elevated IPF 6.8%, platelet counts 125,000.  His vitamin B12 level was very marginal at 261 pg/mL, negative for rheumatoid factor, and had normal LDH at 210.     I reviewed his previous laboratory study available in the [facility] EP[facility] electronic medical records, dating back to 3/25/2016 where he had platelets 132,000, hemoglobin 13.8 and WBC 5700 with normal distribution.  Lab results on 4/25/2016 showed a platelets 151,000, hemoglobin 14.5 and WBC 6800.  On 3/20/2017 his platelets was 153,000, hemoglobin 14.9 and WBC 6700.  He also had a normal thyroid screening test in March 2016 and March 2017.  His chemistries study showed a stable creatinine level between 1.2 and 1.3.      I also searched medical records in the [facility], back on 8/21/2014 he had a CBC showed a platelets 117,000 and hemoglobin 14.1 WBC 5000 with normal distribution.       On 10/20/2014 he had platelets 141,000 and hemoglobin 15.4 WBC 10,700 including neutrophils 9100.  His abdomen CT scan examination on 10/20/2014 reported no splenomegaly no hepatomegaly.  It is seems that he had visited to emergency room at that time because of chosen no cold, about 1 months after he had lithotripsy for kidney stone.  Urinalysis was positive for RBC and a trace of bacteria.      Patient reports that he has no easy bleeding bruising.  He previously had a multiple surgery noticeably with colon resection for colon cancer 27 years ago as well as repair for abdominal wall hernia, however he had no significant postoperatively bleeding.  He did receive chemotherapy for 6 months treated by Dr. Jasso at that time.        MEDICATIONS:The current medication list was  reviewed with the patient and updated in the EMR this date per the Medical Assistant. Medication dosages and frequencies were confirmed to be accurate.       ALLERGIES:          Allergies   Allergen Reactions   • Hydromorphone Nausea And Vomiting and Shortness Of Breath   • Demerol [Meperidine] Nausea And Vomiting         SOCIAL HISTORY:       Social History            Social History   • Marital status:        Spouse name: N/A   • Number of children: N/A   • Years of education: High school education            Social History Main Topics   • Smoking status: Never Smoker   • Smokeless tobacco: Not on file   • Alcohol use No   • Drug use: No    • Sexual activity: Not on file          FAMILY HISTORY:         Family History   Problem Relation Age of Onset   • Cancer Father diagnosed at age of 65          colon   • Heart disease Other     • Hypertension Other     • Gallbladder disease Other      Brother diagnosed of colon cancer at age 51      REVIEW OF SYSTEMS:  GENERAL: No change in appetite or weight;   No fevers, chills, sweats.    SKIN: No nonhealing lesions.   No rashes.  See history of present illness.   HEME/LYMPH: No easy bruising, bleeding.   No swollen nodes.   EYES: No vision changes or diplopia.   ENT: No tinnitus, hearing loss, gum bleeding, epistaxis, hoarseness or dysphagia.   RESPIRATORY: No cough, shortness of breath, hemoptysis or wheezing.   CVS:  admitted for chest pain and myocardial infarction.    GI: No melena or hematochezia.   No abdominal pain.  No nausea, vomiting, constipation, diarrhea  : No lower tract obstructive symptoms, dysuria or hematuria.   MUSCULOSKELETAL: No bone pain.  No joint stiffness.   NEUROLOGICAL: No global weakness, loss of consciousness or seizures.   PSYCHIATRIC: No increased nervousness, mood changes or depression.         Objective   Vitals:    09/15/17 1552   BP: 124/84   Pulse: 79   Resp: 16   Temp: 98.5 °F (36.9 °C)   TempSrc: Oral   SpO2: 96%   Weight: 225  "lb 3.2 oz (102 kg)   Height: 69.57\" (176.7 cm)  Comment: new ht. w/shoes.   PainSc: 0-No pain        PHYSICAL EXAM:    GENERAL:  Well-developed, well-nourished male in no acute distress.   SKIN:  Warm, dry without rashes, purpura or petechiae. Lower middle chest has a small wound about a half centimeter in longest dimension was yellowish tissue, no active drainage.   EYES:    Conjunctivae normal.  EARS:  Hearing intact.  NOSE:   No nasal discharge.  MOUTH:  Tongue is well-papillated; no stomatitis or ulcers.  Lips normal.  THROAT:  Oropharynx without lesions or exudates.  NECK:  Supple with good range of motion; no thyromegaly or masses.  LYMPHATICS:  No cervical, supraclavicular adenopathy.  CHEST:  Lungs clear to auscultation. Good airflow.  CARDIAC:  Regular rate and rhythm without murmurs, rubs or gallops. Normal S1,S2.  ABDOMEN:  Soft, nontender with no hepatosplenomegaly or masses.  Bowel sounds present.   EXTREMITIES:  No clubbing, cyanosis or edema.  NEUROLOGICAL:  Cranial Nerves II-XII grossly intact.  No focal neurological deficits.  PSYCHIATRIC:  Normal affect and mood.     RECENT LABS:        Lab Results   Component Value Date    WBC 5.81 09/15/2017    HGB 12.5 (L) 09/15/2017    HCT 38.6 (L) 09/15/2017    MCV 91.0 09/15/2017     09/15/2017     Lab Results   Component Value Date    NEUTROABS 3.16 09/15/2017     Lab Results   Component Value Date    GLUCOSE 101 (H) 08/30/2017    BUN 23 08/30/2017    CREATININE 1.04 08/30/2017    EGFRIFNONA 72 08/30/2017    EGFRIFAFRI 68 03/28/2017    BCR 22.1 08/30/2017    K 3.5 08/30/2017    CO2 26.3 08/30/2017    CALCIUM 9.3 08/30/2017    PROTENTOTREF 7.3 03/28/2017    ALBUMIN 3.80 08/29/2017    LABIL2 1.4 08/29/2017    AST 46 (H) 08/29/2017    ALT 31 08/29/2017     Lab Results   Component Value Date    ZUQHEKOF42 261 08/16/2017            Assessment/Plan       1.  Vitamin B12 deficiency, was given 1 time intramuscularly injection on 8/16/2017.  He was " subsequently followed by oral supplementation, he takes 2,500 µg daily.     2.  Mild thrombocytopenia, asymptomatic.  The exact etiology is not clear.  Was this purely because of vitamin B12 deficiency?  He had negative laboratory test for rheumatoid factor and comprehensive REBA panel, and no evidence of destruction of platelets due to normal LDH and no significant elevation of IPF.  After CABG surgery on August 25, 2017, he did have expected brief thrombocytopenia with max at 76,000 on August 27, 2017 and then subsequently rebounded afterwards.  His platelets normalized at 341,000 on September 5, 2017 and today it's 239,000.    3.  Mild anemia.  This patient had a borderline normal hemoglobin level prior to his bypass surgery.  After surgery his hemoglobin dropped as low as 8.9 g/dL on 8/20/2017 and subsequently improved afterwards.  This is not a surprise at all considering his CABG surgery.  Today it is further improved at 12.5 g.       PLAN:   1.  Continue oral vitamin B12 supplementation at 2500 µg daily.   2.  We'll bring patient back for MD reevaluation in 6 weeks, we'll check his B12 level, iron, ferritin, and CBC.         More than 25 min, over half of that time were for counseling and reviewing his recent hospitalization records related to CABG surgery.          ALEX ALBRIGHT M.D., Ph.D.    09/15/2017          CC: Edyta Ye M.D.      Shannon disclaimer:  Much of this encounter note is an electronic transcription/translation of spoken language to printed text. The electronic translation of spoken language may permit erroneous, or at times, nonsensical words or phrases to be inadvertently transcribed; Although I have reviewed the note for such errors, some may still exist.

## 2017-09-20 ENCOUNTER — TELEPHONE (OUTPATIENT)
Dept: CARDIAC SURGERY | Facility: CLINIC | Age: 62
End: 2017-09-20

## 2017-09-20 NOTE — TELEPHONE ENCOUNTER
Sobeida sparks [facility] nurse called to report that patient was having a scant amount of drainage from a chest tube sight. There are no signs or symptoms of infection and patient denies fever or chills. He will paint this area with betadine twice daily and call if the drainage continues or there are any changes with this area

## 2017-09-27 ENCOUNTER — OFFICE VISIT (OUTPATIENT)
Dept: CARDIAC SURGERY | Facility: CLINIC | Age: 62
End: 2017-09-27

## 2017-09-27 ENCOUNTER — OFFICE VISIT (OUTPATIENT)
Dept: CARDIOLOGY | Facility: CLINIC | Age: 62
End: 2017-09-27

## 2017-09-27 VITALS
HEART RATE: 80 BPM | TEMPERATURE: 98.2 F | RESPIRATION RATE: 20 BRPM | DIASTOLIC BLOOD PRESSURE: 73 MMHG | BODY MASS INDEX: 32.07 KG/M2 | SYSTOLIC BLOOD PRESSURE: 111 MMHG | HEIGHT: 70 IN | WEIGHT: 224 LBS | OXYGEN SATURATION: 98 %

## 2017-09-27 VITALS
SYSTOLIC BLOOD PRESSURE: 110 MMHG | BODY MASS INDEX: 32.07 KG/M2 | HEART RATE: 76 BPM | WEIGHT: 224 LBS | HEIGHT: 70 IN | DIASTOLIC BLOOD PRESSURE: 72 MMHG

## 2017-09-27 DIAGNOSIS — I25.10 CORONARY ARTERY DISEASE INVOLVING NATIVE CORONARY ARTERY OF NATIVE HEART WITHOUT ANGINA PECTORIS: Primary | ICD-10-CM

## 2017-09-27 DIAGNOSIS — D69.6 THROMBOCYTOPENIA (HCC): ICD-10-CM

## 2017-09-27 DIAGNOSIS — Z95.1 S/P CABG (CORONARY ARTERY BYPASS GRAFT): ICD-10-CM

## 2017-09-27 DIAGNOSIS — I10 ESSENTIAL HYPERTENSION: ICD-10-CM

## 2017-09-27 DIAGNOSIS — E78.5 HYPERLIPIDEMIA, UNSPECIFIED HYPERLIPIDEMIA TYPE: ICD-10-CM

## 2017-09-27 DIAGNOSIS — Z95.5 S/P CORONARY ARTERY STENT PLACEMENT: ICD-10-CM

## 2017-09-27 DIAGNOSIS — Z95.1 H/O CORONARY ARTERY BYPASS SURGERY: Primary | ICD-10-CM

## 2017-09-27 PROCEDURE — 93000 ELECTROCARDIOGRAM COMPLETE: CPT | Performed by: INTERNAL MEDICINE

## 2017-09-27 PROCEDURE — 99214 OFFICE O/P EST MOD 30 MIN: CPT | Performed by: INTERNAL MEDICINE

## 2017-09-27 PROCEDURE — 99024 POSTOP FOLLOW-UP VISIT: CPT | Performed by: THORACIC SURGERY (CARDIOTHORACIC VASCULAR SURGERY)

## 2017-09-27 RX ORDER — CLOPIDOGREL BISULFATE 75 MG/1
75 TABLET ORAL DAILY
Qty: 30 TABLET | Refills: 10 | Status: SHIPPED | OUTPATIENT
Start: 2017-09-27 | End: 2017-12-27 | Stop reason: SDUPTHER

## 2017-09-27 RX ORDER — SOLIFENACIN SUCCINATE 10 MG/1
10 TABLET, FILM COATED ORAL DAILY
COMMUNITY
Start: 2017-09-20 | End: 2021-01-05

## 2017-09-27 RX ORDER — ATORVASTATIN CALCIUM 20 MG/1
20 TABLET, FILM COATED ORAL NIGHTLY
Qty: 30 TABLET | Refills: 5 | Status: SHIPPED | OUTPATIENT
Start: 2017-09-27 | End: 2017-12-27 | Stop reason: SDUPTHER

## 2017-09-27 NOTE — PROGRESS NOTES
Subjective:     Encounter Date:09/27/2017      Patient ID: Luigi Steel is a 62 y.o. male.    Chief Complaint:  History of Present Illness    This is a 62-year-old male with a history of hypothyroidism, osteoarthritis, prior colon cancer status post partial colectomy, thrombocytopenia, hyperlipidemia, coronary artery disease status post anterior myocardial infarction and drug-eluting stent placement of the mid LAD on 8/13/2017 followed by coronary artery bypass surgery on 8/25/2017 who presents for follow-up.    Based initially saw the patient on 8/13/2017 when he presented with an anterior myocardial infarction.  The patient had a sudden onset of chest pain 2 hours prior to his arrival.  An EKG done in the field showed evidence of an anterior myocardial infarction he was brought directly to the cardiac catheterization laboratory on arrival to the hospital.  He does not have a thrombotic state occlusion of his mid LAD.  He additionally was noted to have significant stenosis of his proximal LAD, left circumflex artery, and posterior descending artery.  He underwent drug-eluting stent placement of the mid LAD at that time.  He remained stable and chest pain-free through the remainder of that hospitalization.  An initial echocardiogram showed a mildly depressed left ventricular systolic function with ejection fraction of 45%.  In regards to his residual multivessel coronary artery disease cardiothoracic surgery was consult did and they did recommend proceeding with a coronary artery bypass surgery.  The patient returned the following week of it which point his clopidogrel was stopped and he was placed on bridging therapy with Integrilin preoperatively.  He underwent a repeat echocardiogram during that admission that showed normal left ventricular systolic function and wall motion with an ejection fraction of 69.6% and no significant valvular disease.  He ultimately underwent coronary artery bypass surgery ×3  including a LIMA to LAD, saphenous vein graft to the PDA, saphenous vein graft to the OM 2.  His clopidogrel was resumed following his surgery order to ensure patency of his mid LAD stent in order to have retrograde filling of the LAD from the graft.  The patient progressed well but did have a syncopal episode on the operative day 4 that was assisted with a bowel movement.  This was felt to be due to vasovagal syncope.  His carvedilol was stopped but then resume the following day at a lower dose which is tolerated since.  He was on ACE inhibitor during this admission but this was also held because of onset of acute kidney injury.    Today he percent for routine follow-up.  Since I saw him last he is followed up with SHILOH Sheldon time he was doing pretty well.  He was having some continued issues with lightheadedness so his furosemide and potassium was discontinued.  All his other medications were sent.  Since then he is followed up with Dr. Echeverria and Dr. Mena regarding his chronic thrombocytopenia.  He has a follow-up appointment with  later today.  He does continue to have some lightheadedness with position changes but this resolves pretty quickly.  He denies any syncopal episodes.  Denies any chest pain, PND or orthopnea, palpitations, or lower extremity edema.  He does continue to have some fatigue and dyspnea on exertion but this is slowly improving.  He starts cardiac rehabilitation this Friday.      Review of Systems   Constitution: Positive for malaise/fatigue. Negative for weakness.   HENT: Negative for hearing loss, hoarse voice, nosebleeds and sore throat.    Eyes: Negative for pain.   Cardiovascular: Positive for dyspnea on exertion. Negative for chest pain, claudication, cyanosis, irregular heartbeat, leg swelling, near-syncope, orthopnea, palpitations, paroxysmal nocturnal dyspnea and syncope.   Respiratory: Negative for shortness of breath and snoring.    Endocrine: Negative for cold  intolerance, heat intolerance, polydipsia, polyphagia and polyuria.   Skin: Negative for itching and rash.   Musculoskeletal: Negative for arthritis, falls, joint pain, joint swelling, muscle cramps, muscle weakness and myalgias.   Gastrointestinal: Negative for constipation, diarrhea, dysphagia, heartburn, hematemesis, hematochezia, melena, nausea and vomiting.   Genitourinary: Negative for frequency, hematuria and hesitancy.   Neurological: Positive for light-headedness. Negative for excessive daytime sleepiness, dizziness, headaches and numbness.   Psychiatric/Behavioral: Negative for depression. The patient is not nervous/anxious.           Current Outpatient Prescriptions:   •  aspirin 81 MG chewable tablet, Chew 1 tablet Daily., Disp: , Rfl:   •  atorvastatin (LIPITOR) 20 MG tablet, Take 1 tablet by mouth Every Night., Disp: 30 tablet, Rfl: 5  •  carvedilol (COREG) 3.125 MG tablet, Take 1 tablet by mouth Every 12 (Twelve) Hours., Disp: 60 tablet, Rfl: 3  •  clopidogrel (PLAVIX) 75 MG tablet, Take 1 tablet by mouth Daily., Disp: 30 tablet, Rfl: 10  •  HYDROcodone-acetaminophen (NORCO) 5-325 MG per tablet, Take 1 tablet by mouth Every 4 (Four) Hours As Needed for Moderate Pain  for up to 70 doses., Disp: 70 tablet, Rfl: 0  •  levothyroxine (SYNTHROID, LEVOTHROID) 75 MCG tablet, Take 1 tablet by mouth Daily., Disp: 30 tablet, Rfl: 5  •  Solifenacin Succinate (VESICARE PO), Take 1 tablet/day by mouth Daily., Disp: , Rfl:   •  zolpidem (AMBIEN) 10 MG tablet, Take 1 tablet by mouth At Night As Needed for Sleep., Disp: 30 tablet, Rfl: 2    Past Medical History:   Diagnosis Date   • Anterior myocardial infarction 8/13/2017   • Arthritis     Hands, knees   • Colon cancer    • Coronary artery disease    • Elevated blood pressure reading without diagnosis of hypertension    • H/O complete eye exam 2016   • Hypertension    • Hypothyroidism, acquired    • Insomnia    • Kidney stones    • Obesity    • Osteoarthritis,  multiple sites    • PONV (postoperative nausea and vomiting)      Past Surgical History:   Procedure Laterality Date   • CARDIAC CATHETERIZATION N/A 8/13/2017    Procedure: Left Heart Cath;  Surgeon: Edyta Ye MD;  Location: [facility] CATH INVASIVE LOCATION;  Service:    • CARDIAC CATHETERIZATION N/A 8/13/2017    Procedure: Stent RK coronary;  Surgeon: Edyta Ye MD;  [facility];  Service:    • COLECTOMY PARTIAL / TOTAL  1994   • COLON RESECTION RIGHT Right 1990    for cancer   • COLONOSCOPY  03/18/2009    Dr. Tillman   • CORONARY ARTERY BYPASS GRAFT N/A 8/25/2017    Procedure: JAYLON STERNOTOMY CORONARY ARTERY BYPASS GRAFT TIMES 3 USING LEFT INTERNAL  MAMMARY ARTERY AND RIGHT SAPHENOUS VEIN GRAFT PER ENDOSCOPIC VEIN HARVESTING;  Surgeon: Ana Lynn MD;  Location: [facility] OR;  Service:    • HERNIA REPAIR  04/02/2009    hiatal   • HERNIA REPAIR Bilateral 08/2010    ABDOMINAL x3   • INCISIONAL HERNIA REPAIR  09/2011   • INCISIONAL HERNIA REPAIR  04/2009   • INCISIONAL HERNIA REPAIR  1996   • KIDNEY STONE SURGERY     • SMALL INTESTINE SURGERY      for adhesions     Family History   Problem Relation Age of Onset   • Arthritis Mother    • Stroke Mother    • Heart attack Mother    • Hypertension Mother    • Gallbladder disease Mother    • Hypertension Father    • Gallbladder disease Father    • Colon cancer Father    • Gallbladder disease Sister    • Arthritis Brother    • Cancer Brother    • Heart attack Brother    • Hypertension Brother    • Gallbladder disease Brother    • Heart disease Brother    • No Known Problems Maternal Grandmother    • No Known Problems Maternal Grandfather    • No Known Problems Paternal Grandmother    • No Known Problems Paternal Grandfather      Social History   Substance Use Topics   • Smoking status: Never Smoker   • Smokeless tobacco: Never Used   • Alcohol use No           ECG 12 Lead  Date/Time: 9/27/2017 11:21 AM  Performed by: JIM  "ARMANDO  Authorized by: ARMANDO FERNANDEZ   Comparison: compared with previous ECG   Similar to previous ECG  Rhythm: sinus rhythm  Comments: Anterolateral T wave inversions               Objective:         Visit Vitals   • /72 (BP Location: Right arm, Patient Position: Sitting)   • Pulse 76   • Ht 70\" (177.8 cm)   • Wt 224 lb (102 kg)   • BMI 32.14 kg/m2          Physical Exam   Constitutional: He is oriented to person, place, and time. He appears well-developed and well-nourished.   HENT:   Head: Normocephalic and atraumatic.   Eyes: Conjunctivae, EOM and lids are normal. Pupils are equal, round, and reactive to light.   Neck: Normal range of motion and full passive range of motion without pain. Neck supple. No JVD present. Carotid bruit is not present.   Cardiovascular: Normal rate, regular rhythm, S1 normal and S2 normal.  Exam reveals no gallop.    No murmur heard.  Pulses:       Radial pulses are 2+ on the right side, and 2+ on the left side.   No bilateral lower extremity edema   Pulmonary/Chest: Effort normal and breath sounds normal.   Abdominal: Soft. Normal appearance.   Lymphadenopathy:     He has no cervical adenopathy.   Neurological: He is alert and oriented to person, place, and time.   Skin: Skin is warm, dry and intact.   Psychiatric: He has a normal mood and affect.       Lab Review:       Assessment:          Diagnosis Plan   1. Coronary artery disease involving native coronary artery of native heart without angina pectoris  atorvastatin (LIPITOR) 20 MG tablet    clopidogrel (PLAVIX) 75 MG tablet   2. S/P CABG (coronary artery bypass graft)     3. Hyperlipidemia, unspecified hyperlipidemia type     4. Essential hypertension     5. S/P coronary artery stent placement     6. Thrombocytopenia            Plan:       1.  Coronary artery disease.  He is status post an anterior myocardial infarction and drug eluting stent placement of cement LAD followed by a coronary artery bypass surgery about 2 " weeks later for multivessel coronary artery disease.  He is improving from this standpoint.  His postoperative echocardiogram showed that his left ventricular systolic function has normalized.  Continue his current medical management including beta blocker, clopidogrel, statin, and aspirin.  He has follow-up with Dr. Lynn later today and is to start cardiac rehabilitation later this week.  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  At this point I would like to see him progressed a little further in cardiac rehabilitation before returning to work.    2.  Hypertension.  Well-controlled on low-dose of carvedilol.  3.  Hyperlipidemia.  Tolerating current dose of atorvastatin.  4.  Thrombocytopenia.  Followed by Dr. Mena.  5.  Hypothyroidism     Will plan on seeing the patient back again in 3 months.        Coronary Artery Disease  Assessment  • The patient has no angina    Plan  • Lifestyle modifications discussed include adhering to a heart healthy diet, avoidance of tobacco products, maintenance of a healthy weight, medication compliance, regular exercise and regular monitoring of cholesterol and blood pressure    Subjective - Objective  • There is a history of past MI on or around 8/13/2017  • There is a history of previous coronary artery bypass graft on or around 8/25/2017  • There has been a previous stent procedure using RK on or around 8/13/2017  • Current antiplatelet therapy includes aspirin 81 mg and clopidogrel 75 mg  • The patient qualifies for cardiac rehabilitation, and has been referred to cardiac rehab

## 2017-09-27 NOTE — PROGRESS NOTES
Luigi Steel is a 62 y.o. male s/p CABG. He denies any signs or symptoms of myocardial ischemia, cerebrovascular event, or infection. He reports good exercise tolerance.    Sternum is stable, incision is clean, dry, and intact.   CTA B/L.   Lower extremity incisions are clean, dry and intact.  GCS 15, no neurologic deficit.    He appears to be doing well.  I advised him to continue sternal precautions until the end of November.  He does operate a forklift at work; I advised him to refrain from full duties until after Thanksgiving.    Follow up with us will be on a PRN basis.

## 2017-09-29 ENCOUNTER — OFFICE VISIT (OUTPATIENT)
Dept: CARDIAC REHAB | Facility: HOSPITAL | Age: 62
End: 2017-09-29

## 2017-09-29 VITALS
SYSTOLIC BLOOD PRESSURE: 122 MMHG | BODY MASS INDEX: 32.35 KG/M2 | WEIGHT: 226 LBS | HEART RATE: 89 BPM | DIASTOLIC BLOOD PRESSURE: 74 MMHG | HEIGHT: 70 IN | OXYGEN SATURATION: 96 %

## 2017-09-29 DIAGNOSIS — Z95.1 S/P CABG X 3: Primary | ICD-10-CM

## 2017-09-29 PROCEDURE — 93797 PHYS/QHP OP CAR RHAB WO ECG: CPT

## 2017-09-29 NOTE — PROGRESS NOTES
Cardiac Rehab Initial Assessment      Name: Luigi Steel  :1955 Allergies:Hydromorphone and Demerol [meperidine]   MRN: [id number] 62 y.o. Physician: Jake Echeverria MD   Primary Diagnosis:    Diagnosis Plan   1. S/P CABG x 3      Event Date: 17 Specialist: Dr. Ye   Secondary Diagnosis:  Risk Stratification:Moderate Risk Note Author: Adela Helm RN     Cardiovascular History: Previous MI and Previous PCI     EXERCISE AT HOME  yes  10-15min  7 days per week    EF: 69.6%      Source: ECHO on 17          Ambulatory Status:Independent  Ambulatory Fall Risk Assessed on Initial Visit: yes 6 Minute Walk Pre- Cardiac Rehab:  Distance: 1018 ft      RPE:3  Max. HR: 103      SPO2:95-98    MET: 2.5  MPH: 1.9            RPD: 2  Resting BP: 122/74 LA, 112/74 RA    Peak BP: 120/72  Recovery BP: 122/72  Comments: tolerated well      NUTRITION  Lipids:yes If yes, labs as follows;  Total:  158  HDL:   HDL Cholesterol   Date Value Ref Range Status   2017 36 (L) 40 - 60 mg/dL Final    Lipids continued:  LDL:  LDL Cholesterol    Date Value Ref Range Status   2017 138 (H) 0 - 100 mg/dL Final     Triglyceride: 129   Weight Management:                 Weight: 226  Height: 70                                   BMI:  32.4  Waist Circumference: 48 inches   Alcohol Use: none Diabetes:No    Last HGBA1C with date if applicable: 5.51 on 17         SOCIAL HISTORY  Social History     Social History   • Marital status:      Spouse name: Brittany   • Number of children: N/A   • Years of education: N/A     Occupational History   •  [employer]     Social History Main Topics   • Smoking status: Never Smoker   • Smokeless tobacco: Never Used   • Alcohol use No   • Drug use: No   • Sexual activity: Defer     Other Topics Concern   • Not on file     Social History Narrative       Educational Level (choose one that applies) high school diploma/GED Learning Barriers:Ready to Learn    Family  Support:yes    Living Arrangement: lives with their spouse    Risk Factors: Heredity  Yes If Yes mother, father, brother, Hyperlipidemia  Yes and Obesity  Yes     Tobacco Adjunct: N/A        Comorbidities: Malignancy and Previous MI     PSYCHOSOCIAL  Clinical Depression: no    Stress: no     Assess presence or absence of depression using a valid screening tool: yes      PHYSICAL ASSESSMENT  Influenza vaccine: yes  Pneumococcal vaccine: yes          Angina: no    Describe angina scale of 0 - 4: 0 = none    Today are you having incisional pain? No. If, Yes, Scale: n/a        Today are you having any other pain? No. If, Yes, Scale: n/a     Diagnosed with Hypertension:no    Heart Sounds:  Normal rate and rhythm, s1s2    Lung Sounds: normal air entry, lungs clear to auscultation         Assessment: alert and oriented, palpable pedal pulses, no lower extremity edema Orthopedic Problems:     Are you being hurt, hit, or frightened by anyone at home or in your life? no    Are you being neglected by a caregiver? N/A Shoulder flexibility/Range of motion: Average     Recommended arm activity: Any    Chair sit and reach within: 2 inches   Leg flexibility: Average    Leg Strength/Balance/Five times sit to stand: 24 seconds.     Chose one: Average    Recommended stretching: Standing    Assessment:     Family attends [place]: yes Time of arrival: 1700  Time of departure: 1745     Patient Goals: Be able to walk 30 minutes 5 days per week around neighborhood. Continue learning about heart healthy eating. Start losing weight, wants to ultimately weigh 210-215 lbs. Be able to lift 50-75 lbs.          9/29/2017  4:56 PM  Adela Helm RN

## 2017-10-02 ENCOUNTER — TREATMENT (OUTPATIENT)
Dept: CARDIAC REHAB | Facility: HOSPITAL | Age: 62
End: 2017-10-02

## 2017-10-02 DIAGNOSIS — Z95.1 S/P CABG X 3: Primary | ICD-10-CM

## 2017-10-02 PROCEDURE — 93798 PHYS/QHP OP CAR RHAB W/ECG: CPT

## 2017-10-03 ENCOUNTER — OFFICE VISIT (OUTPATIENT)
Dept: FAMILY MEDICINE CLINIC | Facility: CLINIC | Age: 62
End: 2017-10-03

## 2017-10-03 VITALS
HEIGHT: 70 IN | HEART RATE: 85 BPM | BODY MASS INDEX: 31.35 KG/M2 | SYSTOLIC BLOOD PRESSURE: 121 MMHG | DIASTOLIC BLOOD PRESSURE: 72 MMHG | RESPIRATION RATE: 16 BRPM | TEMPERATURE: 98.2 F | WEIGHT: 219 LBS

## 2017-10-03 DIAGNOSIS — E03.9 HYPOTHYROIDISM, ACQUIRED: ICD-10-CM

## 2017-10-03 DIAGNOSIS — F51.01 PRIMARY INSOMNIA: ICD-10-CM

## 2017-10-03 PROCEDURE — 99213 OFFICE O/P EST LOW 20 MIN: CPT | Performed by: FAMILY MEDICINE

## 2017-10-03 RX ORDER — LEVOTHYROXINE SODIUM 0.07 MG/1
75 TABLET ORAL DAILY
Qty: 30 TABLET | Refills: 5 | Status: SHIPPED | OUTPATIENT
Start: 2017-10-03 | End: 2018-04-23 | Stop reason: SDUPTHER

## 2017-10-03 RX ORDER — ZOLPIDEM TARTRATE 10 MG/1
10 TABLET ORAL NIGHTLY PRN
Qty: 30 TABLET | Refills: 2 | Status: SHIPPED | OUTPATIENT
Start: 2017-10-03 | End: 2018-01-23 | Stop reason: SDUPTHER

## 2017-10-03 NOTE — PROGRESS NOTES
Subjective   Luigi Steel is a 62 y.o. male.     History of Present Illness     Chief Complaint:   Chief Complaint   Patient presents with   • Hypothyroidism     [facility]   • Insomnia       Luigi Steel 62 y.o. male who presents today for Medical Management of the below listed issues and medication refills.  he has a problem list of   Patient Active Problem List   Diagnosis   • Colon cancer   • Elevated blood pressure reading without diagnosis of hypertension   • Hypothyroidism, acquired   • Insomnia   • Osteoarthritis, multiple sites   • Hyperlipidemia   • Thrombocytopenia   • Vitamin B12 deficiency   • CAD (coronary artery disease)   • S/P CABG (coronary artery bypass graft)   • Anemia   • S/P coronary artery stent placement   • Essential hypertension   .  Since the last visit, he has overall felt well and is recovering well from his heart surgery.  he has been compliant with   Current Outpatient Prescriptions:   •  levothyroxine (SYNTHROID, LEVOTHROID) 75 MCG tablet, Take 1 tablet by mouth Daily., Disp: 30 tablet, Rfl: 5  •  zolpidem (AMBIEN) 10 MG tablet, Take 1 tablet by mouth At Night As Needed for Sleep., Disp: 30 tablet, Rfl: 2  •  aspirin 81 MG chewable tablet, Chew 1 tablet Daily., Disp: , Rfl:   •  atorvastatin (LIPITOR) 20 MG tablet, Take 1 tablet by mouth Every Night., Disp: 30 tablet, Rfl: 5  •  carvedilol (COREG) 3.125 MG tablet, Take 1 tablet by mouth Every 12 (Twelve) Hours., Disp: 60 tablet, Rfl: 3  •  clopidogrel (PLAVIX) 75 MG tablet, Take 1 tablet by mouth Daily., Disp: 30 tablet, Rfl: 10  •  HYDROcodone-acetaminophen (NORCO) 5-325 MG per tablet, Take 1 tablet by mouth Every 4 (Four) Hours As Needed for Moderate Pain  for up to 70 doses., Disp: 70 tablet, Rfl: 0  •  VESICARE 10 MG tablet, , Disp: , Rfl: .  he denies medication side effects.    All of the chronic condition(s) listed above are stable w/o issues.    /72  Pulse 85  Temp 98.2 °F (36.8 °C) (Oral)   Resp 16  " Ht 70\" (177.8 cm)  Wt 219 lb (99.3 kg)  BMI 31.42 kg/m2    Results for orders placed or performed in visit on 09/15/17   Immature Platelet Fraction   Result Value Ref Range    IPF 3.20 1.10 - 6.10 %   CBC Auto Differential   Result Value Ref Range    WBC 5.81 4.00 - 10.00 10*3/mm3    RBC 4.24 (L) 4.30 - 5.50 10*6/mm3    Hemoglobin 12.5 (L) 13.5 - 16.5 g/dL    Hematocrit 38.6 (L) 40.0 - 49.0 %    MCV 91.0 83.0 - 97.0 fL    MCH 29.5 27.0 - 33.0 pg    MCHC 32.4 32.0 - 35.0 g/dL    RDW 13.3 11.7 - 14.5 %    RDW-SD 43.5 37.0 - 49.0 fl    MPV 10.0 8.9 - 12.1 fL    Platelets 239 150 - 375 10*3/mm3    Neutrophil % 54.4 39.0 - 75.0 %    Lymphocyte % 27.4 20.0 - 49.0 %    Monocyte % 11.0 4.0 - 12.0 %    Eosinophil % 5.2 (H) 1.0 - 5.0 %    Basophil % 0.3 0.0 - 1.1 %    Immature Grans % 1.7 (H) 0.0 - 0.5 %    Neutrophils, Absolute 3.16 1.50 - 7.00 10*3/mm3    Lymphocytes, Absolute 1.59 1.00 - 3.50 10*3/mm3    Monocytes, Absolute 0.64 0.25 - 0.80 10*3/mm3    Eosinophils, Absolute 0.30 0.00 - 0.36 10*3/mm3    Basophils, Absolute 0.02 0.00 - 0.10 10*3/mm3    Immature Grans, Absolute 0.10 (H) 0.00 - 0.03 10*3/mm3    nRBC 0.0 0.0 - 0.0 /100 WBC         The following portions of the patient's history were reviewed and updated as appropriate: allergies, current medications, past family history, past medical history, past social history, past surgical history and problem list.    Review of Systems   Constitutional: Negative for activity change, chills, fatigue and fever.   Respiratory: Negative for cough and shortness of breath.    Cardiovascular: Negative for chest pain and palpitations.   Gastrointestinal: Negative for abdominal pain.   Endocrine: Negative for cold intolerance.   Psychiatric/Behavioral: Negative for behavioral problems and dysphoric mood. The patient is not nervous/anxious.        Objective   Physical Exam   Constitutional: He appears well-developed and well-nourished.   Neck: Neck supple. No thyromegaly present. "   Cardiovascular: Normal rate and regular rhythm.    No murmur heard.  Pulmonary/Chest: Effort normal and breath sounds normal.   Abdominal: Bowel sounds are normal.   Psychiatric: He has a normal mood and affect. His behavior is normal.   Nursing note and vitals reviewed.    The patient has read and signed the [facility] Controlled Substance Contract.  I will continue to see patient for regular follow up appointments.  They are well controlled on their medication.  JESUS has been reviewed by me and is updated every 3 months. The patient is aware of the potential for addiction and dependence.    Assessment/Plan   Luigi was seen today for hypothyroidism and insomnia.    Diagnoses and all orders for this visit:    Primary insomnia  -     zolpidem (AMBIEN) 10 MG tablet; Take 1 tablet by mouth At Night As Needed for Sleep.    Hypothyroidism, acquired  -     levothyroxine (SYNTHROID, LEVOTHROID) 75 MCG tablet; Take 1 tablet by mouth Daily.

## 2017-10-04 ENCOUNTER — TREATMENT (OUTPATIENT)
Dept: CARDIAC REHAB | Facility: HOSPITAL | Age: 62
End: 2017-10-04

## 2017-10-04 DIAGNOSIS — Z95.1 S/P CABG X 3: Primary | ICD-10-CM

## 2017-10-04 PROCEDURE — 93798 PHYS/QHP OP CAR RHAB W/ECG: CPT

## 2017-10-04 NOTE — PROGRESS NOTES
CARDIAC/PULMONARY REHAB NUTRITION EDUCATION/ASSESSMENT      62 y.o.         Height: 70  in    Weight: 219  lb     BMI: 31.4 IBW:  160-180 lb.       Diet Survey Score: 44  Cardiac Risk Factors: obesity, sedentary lifestyle, family history Weight Assessment: Overweight  Weight Change:  Loss of 30 sine august  Desired Weight: less than 200 lb. lb      Food records reviewed? Yes     Occupation:  [employer]   Job Activity Level:mild    Routine Exercise: mild     Who does the patient live with: wife         Pertinent Lab Values:   Total: No components found for: CHOLESTEROL  HDL:   HDL Cholesterol   Date Value Ref Range Status   08/13/2017 36 (L) 40 - 60 mg/dL Final     LDL:  LDL Cholesterol    Date Value Ref Range Status   03/28/2017 138 (H) 0 - 100 mg/dL Final     Triglyceride: No components found for: TRIGLYCERIDE  Last HGBA1C with date if applicable:No components found for: A1C  Glucose:   Glucose   Date Value Ref Range Status   08/30/2017 101 (H) 65 - 99 mg/dL Final    Nutritional Supplements: N/A    Pertinent Nutrition-Related Medications:  N/A         Stated Problem Areas / Concerns: Additional wt loss, avoiding family history of CVD complications       Assessment / Recommendations: We reviewed AHA recommendations, Choose Your plate and the merits of a plant based diet, Appropriate written information was provided  Motivation level toward diet compliance: moderate         Goals:  Cook more meals at home. Use more fruits and vegetables with meals and for snacks. Avoid highly processed foods and sources of saturated fat.                 4:13 PM  10/4/2017  Allison Pedroza RD

## 2017-10-06 ENCOUNTER — TREATMENT (OUTPATIENT)
Dept: CARDIAC REHAB | Facility: HOSPITAL | Age: 62
End: 2017-10-06

## 2017-10-06 DIAGNOSIS — Z95.1 S/P CABG X 3: Primary | ICD-10-CM

## 2017-10-06 PROCEDURE — 93798 PHYS/QHP OP CAR RHAB W/ECG: CPT

## 2017-10-09 ENCOUNTER — TREATMENT (OUTPATIENT)
Dept: CARDIAC REHAB | Facility: HOSPITAL | Age: 62
End: 2017-10-09

## 2017-10-09 DIAGNOSIS — Z95.1 S/P CABG X 3: Primary | ICD-10-CM

## 2017-10-09 PROCEDURE — 93798 PHYS/QHP OP CAR RHAB W/ECG: CPT

## 2017-10-11 ENCOUNTER — TREATMENT (OUTPATIENT)
Dept: CARDIAC REHAB | Facility: HOSPITAL | Age: 62
End: 2017-10-11

## 2017-10-11 DIAGNOSIS — Z95.1 S/P CABG X 3: Primary | ICD-10-CM

## 2017-10-11 PROCEDURE — 93798 PHYS/QHP OP CAR RHAB W/ECG: CPT

## 2017-10-13 ENCOUNTER — TREATMENT (OUTPATIENT)
Dept: CARDIAC REHAB | Facility: HOSPITAL | Age: 62
End: 2017-10-13

## 2017-10-13 DIAGNOSIS — Z95.1 S/P CABG X 3: Primary | ICD-10-CM

## 2017-10-13 PROCEDURE — 93798 PHYS/QHP OP CAR RHAB W/ECG: CPT

## 2017-10-16 ENCOUNTER — TREATMENT (OUTPATIENT)
Dept: CARDIAC REHAB | Facility: HOSPITAL | Age: 62
End: 2017-10-16

## 2017-10-16 DIAGNOSIS — Z95.1 S/P CABG X 3: Primary | ICD-10-CM

## 2017-10-16 PROCEDURE — 93798 PHYS/QHP OP CAR RHAB W/ECG: CPT

## 2017-10-18 ENCOUNTER — TREATMENT (OUTPATIENT)
Dept: CARDIAC REHAB | Facility: HOSPITAL | Age: 62
End: 2017-10-18

## 2017-10-18 DIAGNOSIS — Z95.1 S/P CABG X 3: Primary | ICD-10-CM

## 2017-10-18 PROCEDURE — 93798 PHYS/QHP OP CAR RHAB W/ECG: CPT

## 2017-10-20 ENCOUNTER — TREATMENT (OUTPATIENT)
Dept: CARDIAC REHAB | Facility: HOSPITAL | Age: 62
End: 2017-10-20

## 2017-10-20 DIAGNOSIS — Z95.1 S/P CABG X 3: Primary | ICD-10-CM

## 2017-10-20 PROCEDURE — 93798 PHYS/QHP OP CAR RHAB W/ECG: CPT

## 2017-10-23 ENCOUNTER — TELEPHONE (OUTPATIENT)
Dept: CARDIOLOGY | Facility: CLINIC | Age: 62
End: 2017-10-23

## 2017-10-23 ENCOUNTER — TREATMENT (OUTPATIENT)
Dept: CARDIAC REHAB | Facility: HOSPITAL | Age: 62
End: 2017-10-23

## 2017-10-23 DIAGNOSIS — Z95.1 S/P CABG X 3: Primary | ICD-10-CM

## 2017-10-23 PROCEDURE — 93798 PHYS/QHP OP CAR RHAB W/ECG: CPT

## 2017-10-23 NOTE — TELEPHONE ENCOUNTER
Called pt to question if physically ready to return to work after procedure. Pt states he is still sore around incision area, pain when cough or sneeze. Pt states he may need a few more weeks. Paperwork in in-box. Thanks, Veronica

## 2017-10-23 NOTE — TELEPHONE ENCOUNTER
Pt states he received letter from disability approving pt until 12/15 or 12/16/2017. Pt states you can use that date as his return to work. Thanks, Veronica

## 2017-10-25 ENCOUNTER — TREATMENT (OUTPATIENT)
Dept: CARDIAC REHAB | Facility: HOSPITAL | Age: 62
End: 2017-10-25

## 2017-10-25 DIAGNOSIS — Z95.1 S/P CABG X 3: Primary | ICD-10-CM

## 2017-10-25 PROCEDURE — 93798 PHYS/QHP OP CAR RHAB W/ECG: CPT

## 2017-10-27 ENCOUNTER — TREATMENT (OUTPATIENT)
Dept: CARDIAC REHAB | Facility: HOSPITAL | Age: 62
End: 2017-10-27

## 2017-10-27 DIAGNOSIS — Z95.1 S/P CABG X 3: Primary | ICD-10-CM

## 2017-10-27 PROCEDURE — 93798 PHYS/QHP OP CAR RHAB W/ECG: CPT

## 2017-10-30 ENCOUNTER — TREATMENT (OUTPATIENT)
Dept: CARDIAC REHAB | Facility: HOSPITAL | Age: 62
End: 2017-10-30

## 2017-10-30 DIAGNOSIS — Z95.1 S/P CABG X 3: Primary | ICD-10-CM

## 2017-10-30 PROCEDURE — 93798 PHYS/QHP OP CAR RHAB W/ECG: CPT

## 2017-11-01 ENCOUNTER — TREATMENT (OUTPATIENT)
Dept: CARDIAC REHAB | Facility: HOSPITAL | Age: 62
End: 2017-11-01

## 2017-11-01 DIAGNOSIS — Z95.1 S/P CABG X 3: Primary | ICD-10-CM

## 2017-11-01 PROCEDURE — 93798 PHYS/QHP OP CAR RHAB W/ECG: CPT

## 2017-11-02 ENCOUNTER — OFFICE VISIT (OUTPATIENT)
Dept: ONCOLOGY | Facility: CLINIC | Age: 62
End: 2017-11-02

## 2017-11-02 ENCOUNTER — LAB (OUTPATIENT)
Dept: LAB | Facility: HOSPITAL | Age: 62
End: 2017-11-02

## 2017-11-02 VITALS
BODY MASS INDEX: 32.41 KG/M2 | HEART RATE: 76 BPM | OXYGEN SATURATION: 97 % | DIASTOLIC BLOOD PRESSURE: 82 MMHG | SYSTOLIC BLOOD PRESSURE: 120 MMHG | HEIGHT: 70 IN | TEMPERATURE: 98.5 F | WEIGHT: 226.4 LBS | RESPIRATION RATE: 16 BRPM

## 2017-11-02 DIAGNOSIS — E53.8 VITAMIN B12 DEFICIENCY: ICD-10-CM

## 2017-11-02 DIAGNOSIS — E61.1 IRON DEFICIENCY: ICD-10-CM

## 2017-11-02 DIAGNOSIS — D69.6 THROMBOCYTOPENIA (HCC): ICD-10-CM

## 2017-11-02 DIAGNOSIS — D64.9 ANEMIA, UNSPECIFIED TYPE: ICD-10-CM

## 2017-11-02 DIAGNOSIS — D64.9 ANEMIA, UNSPECIFIED TYPE: Primary | ICD-10-CM

## 2017-11-02 LAB
BASOPHILS # BLD AUTO: 0.01 10*3/MM3 (ref 0–0.1)
BASOPHILS NFR BLD AUTO: 0.2 % (ref 0–1.1)
DEPRECATED RDW RBC AUTO: 45.3 FL (ref 37–49)
EOSINOPHIL # BLD AUTO: 0.08 10*3/MM3 (ref 0–0.36)
EOSINOPHIL NFR BLD AUTO: 1.9 % (ref 1–5)
ERYTHROCYTE [DISTWIDTH] IN BLOOD BY AUTOMATED COUNT: 13.5 % (ref 11.7–14.5)
FERRITIN SERPL-MCNC: 116.2 NG/ML (ref 30–400)
FOLATE SERPL-MCNC: 6.06 NG/ML (ref 4.78–24.2)
HCT VFR BLD AUTO: 41.2 % (ref 40–49)
HGB BLD-MCNC: 12.7 G/DL (ref 13.5–16.5)
IMM GRANULOCYTES # BLD: 0.02 10*3/MM3 (ref 0–0.03)
IMM GRANULOCYTES NFR BLD: 0.5 % (ref 0–0.5)
IRON 24H UR-MRATE: 50 MCG/DL (ref 59–158)
IRON SATN MFR SERPL: 17 % (ref 14–48)
LYMPHOCYTES # BLD AUTO: 1.44 10*3/MM3 (ref 1–3.5)
LYMPHOCYTES NFR BLD AUTO: 34 % (ref 20–49)
MCH RBC QN AUTO: 28.3 PG (ref 27–33)
MCHC RBC AUTO-ENTMCNC: 30.8 G/DL (ref 32–35)
MCV RBC AUTO: 92 FL (ref 83–97)
MONOCYTES # BLD AUTO: 0.52 10*3/MM3 (ref 0.25–0.8)
MONOCYTES NFR BLD AUTO: 12.3 % (ref 4–12)
NEUTROPHILS # BLD AUTO: 2.17 10*3/MM3 (ref 1.5–7)
NEUTROPHILS NFR BLD AUTO: 51.1 % (ref 39–75)
NRBC BLD MANUAL-RTO: 0 /100 WBC (ref 0–0)
PLATELET # BLD AUTO: 134 10*3/MM3 (ref 150–375)
PMV BLD AUTO: 10.9 FL (ref 8.9–12.1)
RBC # BLD AUTO: 4.48 10*6/MM3 (ref 4.3–5.5)
TIBC SERPL-MCNC: 295 MCG/DL (ref 249–505)
TRANSFERRIN SERPL-MCNC: 211 MG/DL (ref 200–360)
VIT B12 BLD-MCNC: 1045 PG/ML (ref 211–946)
WBC NRBC COR # BLD: 4.24 10*3/MM3 (ref 4–10)

## 2017-11-02 PROCEDURE — 99214 OFFICE O/P EST MOD 30 MIN: CPT | Performed by: INTERNAL MEDICINE

## 2017-11-02 PROCEDURE — 82607 VITAMIN B-12: CPT | Performed by: INTERNAL MEDICINE

## 2017-11-02 PROCEDURE — 85025 COMPLETE CBC W/AUTO DIFF WBC: CPT | Performed by: INTERNAL MEDICINE

## 2017-11-02 PROCEDURE — 36415 COLL VENOUS BLD VENIPUNCTURE: CPT | Performed by: INTERNAL MEDICINE

## 2017-11-02 PROCEDURE — 83540 ASSAY OF IRON: CPT | Performed by: INTERNAL MEDICINE

## 2017-11-02 PROCEDURE — 82728 ASSAY OF FERRITIN: CPT | Performed by: INTERNAL MEDICINE

## 2017-11-02 PROCEDURE — 84466 ASSAY OF TRANSFERRIN: CPT | Performed by: INTERNAL MEDICINE

## 2017-11-02 PROCEDURE — 82746 ASSAY OF FOLIC ACID SERUM: CPT | Performed by: INTERNAL MEDICINE

## 2017-11-02 NOTE — PROGRESS NOTES
REASON FOR FOLLOWUP:     1.  Provide an opinion on any further workup or treatment of thrombocytopenia.    2.  Vitamin B12 deficiency diagnosed in middle of August 2017.         HISTORY OF PRESENT ILLNESS:  The patient is a 62 y.o. male who is here for 6-week reevaluation about his anemia, thrombocytopenia and vitamin B12 deficiency.  He is accompanied by his wife today.     Patient reports that a baseline condition, denies chest pain short of breath.  He has been doing very well after his bypass surgery in August 2017.  He has been taking oral vitamin B12 supplementation since that time.  He has no specific complaints.  No easy bleeding or bruising.     When I saw him 6 weeks ago on 9/15/2017 he had a fully recovered her platelets at 239,000, and WBC 5800 and a mild anemia with a hemoglobin 12.5.  Laboratory study today reported was platelets at 134,000, WBC 4240 and neutrophils 2170, lymphocytes 1440.  His hemoglobin is about the same at 12.7.      Patient reports no bleeding or easy bruising.        Medical History         Past Medical History:   Diagnosis Date   • Arthritis     • Colon cancer, post surgery and adjuvant chemotherapy in 1990.      • Elevated blood pressure reading without diagnosis of hypertension     • H/O complete eye exam 2016   • Hypertension     • Hypothyroidism, acquired     • Insomnia     • Kidney stones     • Osteoarthritis, multiple sites            Surgical History          Past Surgical History:   Procedure Laterality Date   • CARDIAC CATHETERIZATION N/A 8/13/2017     Procedure: Left Heart Cath;  Surgeon: Edyta Ye MD;  [facility];  Service:    • CARDIAC CATHETERIZATION N/A 8/13/2017     Procedure: Stent RK coronary;  Surgeon: Edyta Ye MD;  Location: [facility] CATH INVASIVE LOCATION;  Service:    • COLECTOMY PARTIAL / TOTAL   1994   • COLONOSCOPY   03/18/2009   • HERNIA REPAIR   04/02/2009     hiatal       Triple-vessel CABG bypass surgery on  8/25/2017 by Dr. Ye.      HEMATOLOGIC/ONCOLOGIC HISTORY: The patient is a 62 y.o. year old male whom we are consulted for evaluation of thrombocytopenia.      This patient to was admitted for first episodes of heart attack in mid August 2017.  Patient had cardiac stent placement this hospitalization.  Currently has no chest pain.  This patient has no history of coronary artery disease previously and was not taking aspirin or Plavix.      At time of this admission, in the very early morning on 8/13/2017, laboratory study reported platelets 133,000, hemoglobin 14.2 and WBC 7700 including neutrophils 5200 and lymphocytes 1900.  His chemistry lab reported creatinine 1.29, normal electrolytes in the liver function panel, mildly elevated glucose 135.  Patient had cardiac catheterization and drug-eluting stent placement in the morning of August 13, 2017.  In the morning of 8/14/2017, repeated CBC showed a decreased platelets at 104,000, hemoglobin 12.3 and WBC 6100 without a differentiation.  Repeated laboratory study on 8/15/2017 reported a platelets 126,000 and hemoglobin 14, WBC 6600.  Today on 8/16/2017, laboratory study showed a platelets 119,000, hemoglobin 14.1 and a WBC 5600.  I obtained laboratory study in the afternoon, showed a marginally elevated IPF 6.8%, platelet counts 125,000.  His vitamin B12 level was very marginal at 261 pg/mL, negative for rheumatoid factor, and had normal LDH at 210.     I reviewed his previous laboratory study available in the [facility] EPIC electronic medical records, dating back to 3/25/2016 where he had platelets 132,000, hemoglobin 13.8 and WBC 5700 with normal distribution.  Lab results on 4/25/2016 showed a platelets 151,000, hemoglobin 14.5 and WBC 6800.  On 3/20/2017 his platelets was 153,000, hemoglobin 14.9 and WBC 6700.  He also had a normal thyroid screening test in March 2016 and March 2017.  His chemistries study showed a stable creatinine level between 1.2 and 1.3.      I  also searched medical records in the [facility], back on 8/21/2014 he had a CBC showed a platelets 117,000 and hemoglobin 14.1 WBC 5000 with normal distribution.       On 10/20/2014 he had platelets 141,000 and hemoglobin 15.4 WBC 10,700 including neutrophils 9100.  His abdomen CT scan examination on 10/20/2014 reported no splenomegaly no hepatomegaly.  It is seems that he had visited to emergency room at that time because of chosen no cold, about 1 months after he had lithotripsy for kidney stone.  Urinalysis was positive for RBC and a trace of bacteria.      Patient reports that he has no easy bleeding or bruising.  He previously had multiple surgery noticeably with colon resection for colon cancer 27 years ago.  He did receive chemotherapy for 6 months treated by Dr. Jasso at that time.  He also had repair for abdominal wall hernia, however he had no significant postoperatively bleeding.     He was given vitamin B12 injection and started oral B12 supplementation. Patient reports that he has been taking 2500 mcg vitamin B12 daily.     This patient subsequently had triple vessel bypass surgery on 08/25/2017 at [facility]. At that time he still had mild thrombocytopenia with platelets 128,000 and hemoglobin 13.1, WBC 6100. Postsurgery, his platelets deteriorated all the way down to 76,000 on 08/27/2017 and hemoglobin 8.9; however, gradually improved afterwards with platelets recovered at 130,000 on 08/29/2017 and hemoglobin 9.3 when he was discharged. Since that time laboratory study on 09/05/2017 reported normalized platelets at 341,000, hemoglobin 10.8 and WBC 7300. Today, laboratory study reported platelets 239,000, hemoglobin 12.5 and WBC 5800.     Patient reports after surgery he is feeling better and more energetic. He is still in recovery. He still has a small wound in the middle chest not healed completely with slight yellowness but no active drainage. Home visiting nurse  comes to his house twice a week inspecting that. Patient reports no fever, sweating, chills.    Patient was tested negative for comprehensive REBA panel on 8/16/2017, and also negative for rheumatoid factor.  Serum protein immunofixation was negative, including IgG 675, IgA 91 IgM 104, he also had a normal LDH.     MEDICATIONS:The current medication list was reviewed with the patient and updated in the EMR this date per the Medical Assistant. Medication dosages and frequencies were confirmed to be accurate.       ALLERGIES:          Allergies   Allergen Reactions   • Hydromorphone Nausea And Vomiting and Shortness Of Breath   • Demerol [Meperidine] Nausea And Vomiting         SOCIAL HISTORY:       Social History            Social History   • Marital status:        Spouse name: N/A   • Number of children: N/A   • Years of education: High school education            Social History Main Topics   • Smoking status: Never Smoker   • Smokeless tobacco: Not on file   • Alcohol use No   • Drug use: No    • Sexual activity: Not on file          FAMILY HISTORY:         Family History   Problem Relation Age of Onset   • Cancer Father diagnosed at age of 65          colon   • Heart disease Other     • Hypertension Other     • Gallbladder disease Other      Brother diagnosed of colon cancer at age 51      REVIEW OF SYSTEMS:  GENERAL: No change in appetite or weight;   No fevers, chills, sweats.    SKIN: No nonhealing lesions.   No rashes.  See history of present illness.   HEME/LYMPH: No easy bruising, bleeding.   No swollen nodes.   EYES: No vision changes or diplopia.   ENT: No tinnitus, hearing loss, gum bleeding, epistaxis, hoarseness or dysphagia.   RESPIRATORY: No cough, shortness of breath, hemoptysis or wheezing.   CVS:  admitted for chest pain and myocardial infarction.    GI: No melena or hematochezia.   No abdominal pain.  No nausea, vomiting, constipation, diarrhea  : No lower tract obstructive symptoms,  "dysuria or hematuria.   MUSCULOSKELETAL: No bone pain.  No joint stiffness.   NEUROLOGICAL: No global weakness, loss of consciousness or seizures.   PSYCHIATRIC: No increased nervousness, mood changes or depression.         Objective   Vitals:    11/02/17 1547   BP: 120/82   Pulse: 76   Resp: 16   Temp: 98.5 °F (36.9 °C)   TempSrc: Oral   SpO2: 97%   Weight: 226 lb 6.4 oz (103 kg)   Height: 70\" (177.8 cm)   PainSc: 0-No pain   ECOG 1.        PHYSICAL EXAM:    GENERAL:  Well-developed, well-nourished male in no acute distress.   SKIN:  Warm, dry without rashes, purpura or petechiae.   EYES:    Conjunctivae normal.  EARS:  Hearing intact.  NOSE:   No nasal discharge.  MOUTH:  Tongue is well-papillated; no stomatitis or ulcers.  Lips normal.  THROAT:  Oropharynx without lesions or exudates.  NECK:  Supple with good range of motion; no thyromegaly or masses.  LYMPHATICS:  No cervical, supraclavicular adenopathy.  CHEST:  Lungs clear to auscultation. Good airflow.  CARDIAC:  Regular rate and rhythm without murmurs, rubs or gallops. Normal S1,S2.  ABDOMEN:  Soft, nontender with no hepatosplenomegaly or masses.  Bowel sounds present.   EXTREMITIES:  No clubbing, cyanosis or edema.  NEUROLOGICAL:  Cranial Nerves II-XII grossly intact.  No focal neurological deficits.  PSYCHIATRIC:  Normal affect and mood.     RECENT LABS:        Lab Results   Component Value Date    WBC 4.24 11/02/2017    HGB 12.7 (L) 11/02/2017    HCT 41.2 11/02/2017    MCV 92.0 11/02/2017     (L) 11/02/2017     Lab Results   Component Value Date    NEUTROABS 2.17 11/02/2017     Lab Results   Component Value Date    GLUCOSE 101 (H) 08/30/2017    BUN 23 08/30/2017    CREATININE 1.04 08/30/2017    EGFRIFNONA 72 08/30/2017    EGFRIFAFRI 68 03/28/2017    BCR 22.1 08/30/2017    K 3.5 08/30/2017    CO2 26.3 08/30/2017    CALCIUM 9.3 08/30/2017    PROTENTOTREF 7.3 03/28/2017    ALBUMIN 3.80 08/29/2017    LABIL2 1.4 08/29/2017    AST 46 (H) 08/29/2017    " ALT 31 08/29/2017     Lab Results   Component Value Date    CQCEQJYI12 261 08/16/2017         Lab Results   Component Value Date    IRON 50 (L) 11/02/2017    TIBC 295 11/02/2017    FERRITIN 116.20 11/02/2017      Iron saturation 17%     Assessment/Plan       1.  Vitamin B12 deficiency, was given 1 time intramuscularly injection on 8/16/2017.  He was subsequently followed by oral supplementation, he takes 2,500 µg daily.  Laboratory study result today is pending.    2.  Mild thrombocytopenia, asymptomatic.  The exact etiology is not clear.  Was this purely because of vitamin B12 deficiency?  He had negative laboratory test for rheumatoid factor and comprehensive REBA panel, and no evidence of destruction of platelets and no significant elevation of IPF.  After CABG surgery on August 25, 2017, he did have expected brief thrombocytopenia with max at 76,000 on August 27, 2017 and then subsequently rebounded afterwards.  His platelets normalized at 341,000 on September 5, 2017 and then on 9/15/2017 it's 239,000.  However today she decreased to 134,000, which is his baseline level prior to his heart surgery.  Patient reports no bleeding or bruising.  We'll continue to observe for now.    3.  Mild anemia.  This patient had borderline normal hemoglobin level prior to his bypass surgery.  After surgery his hemoglobin dropped as low as 8.9 g/dL on 8/20/2017 and subsequently improved afterwards.  However for the past 6 weeks, there is no apparent further improvement of his hemoglobin and it is 12.7 today.  His iron study showed a reasonable ferritin level, however iron saturation is slightly low.  I think he has mild iron deficiency.  I recommended patient to take oral iron supplementation once a day to see if it will accelerate his improvement of hemoglobin.      PLAN:   1.  Continue oral vitamin B12 supplementation at 2500 µg daily.   2.  Start oral iron supplementation ferrous sulfate 325 mg once a day.  He will buy  over-the-counter product.   3.  We are waiting for folic acid level and vitamin B12 level.  Results will be communicated with patient.  4.  We'll bring patient back for MD reevaluation in 4 months, we'll check his B12 level, iron, ferritin, and CBC again.         More than 25 min, over half of that time were for counseling.        ALEX ALBRIGHT M.D., Ph.D.    11/2/2017         ADDENDUM:    Lab Results   Component Value Date    FOLATE 6.06 11/02/2017     Lab Results   Component Value Date    [id number] 1045 (H) 11/02/2017     Patient has much improved vitamin B-12 level, however has low normal folic acid.  I called and spoke to him on November 6, 2017, as him to buy over-the-counter folic acid taking 800 µg once a day.  He voiced understanding.    Will recheck folate level in 4 months.     ALEX ALBRIGHT M.D., Ph.D.    11/06/2017        CC: Edyta Ye M.D.

## 2017-11-06 ENCOUNTER — TREATMENT (OUTPATIENT)
Dept: CARDIAC REHAB | Facility: HOSPITAL | Age: 62
End: 2017-11-06

## 2017-11-06 DIAGNOSIS — Z95.1 S/P CABG X 3: Primary | ICD-10-CM

## 2017-11-06 PROCEDURE — 93798 PHYS/QHP OP CAR RHAB W/ECG: CPT

## 2017-11-08 ENCOUNTER — TREATMENT (OUTPATIENT)
Dept: CARDIAC REHAB | Facility: HOSPITAL | Age: 62
End: 2017-11-08

## 2017-11-08 DIAGNOSIS — Z95.1 S/P CABG X 3: Primary | ICD-10-CM

## 2017-11-08 PROCEDURE — 93798 PHYS/QHP OP CAR RHAB W/ECG: CPT

## 2017-11-10 ENCOUNTER — TREATMENT (OUTPATIENT)
Dept: CARDIAC REHAB | Facility: HOSPITAL | Age: 62
End: 2017-11-10

## 2017-11-10 DIAGNOSIS — Z95.1 S/P CABG X 3: Primary | ICD-10-CM

## 2017-11-10 PROCEDURE — 93798 PHYS/QHP OP CAR RHAB W/ECG: CPT

## 2017-11-13 ENCOUNTER — TREATMENT (OUTPATIENT)
Dept: CARDIAC REHAB | Facility: HOSPITAL | Age: 62
End: 2017-11-13

## 2017-11-13 DIAGNOSIS — Z95.1 S/P CABG X 3: Primary | ICD-10-CM

## 2017-11-13 PROCEDURE — 93798 PHYS/QHP OP CAR RHAB W/ECG: CPT

## 2017-11-15 ENCOUNTER — TREATMENT (OUTPATIENT)
Dept: CARDIAC REHAB | Facility: HOSPITAL | Age: 62
End: 2017-11-15

## 2017-11-15 DIAGNOSIS — I25.10 CORONARY ARTERY DISEASE INVOLVING NATIVE CORONARY ARTERY OF NATIVE HEART WITHOUT ANGINA PECTORIS: Primary | ICD-10-CM

## 2017-11-15 PROCEDURE — 93798 PHYS/QHP OP CAR RHAB W/ECG: CPT

## 2017-11-17 ENCOUNTER — TREATMENT (OUTPATIENT)
Dept: CARDIAC REHAB | Facility: HOSPITAL | Age: 62
End: 2017-11-17

## 2017-11-17 DIAGNOSIS — I25.10 CORONARY ARTERY DISEASE INVOLVING NATIVE CORONARY ARTERY OF NATIVE HEART WITHOUT ANGINA PECTORIS: Primary | ICD-10-CM

## 2017-11-17 PROCEDURE — 93798 PHYS/QHP OP CAR RHAB W/ECG: CPT

## 2017-11-20 ENCOUNTER — TREATMENT (OUTPATIENT)
Dept: CARDIAC REHAB | Facility: HOSPITAL | Age: 62
End: 2017-11-20

## 2017-11-20 DIAGNOSIS — I25.10 CORONARY ARTERY DISEASE INVOLVING NATIVE CORONARY ARTERY OF NATIVE HEART WITHOUT ANGINA PECTORIS: Primary | ICD-10-CM

## 2017-11-20 DIAGNOSIS — Z95.1 S/P CABG X 3: ICD-10-CM

## 2017-11-20 PROCEDURE — 93798 PHYS/QHP OP CAR RHAB W/ECG: CPT

## 2017-11-22 ENCOUNTER — TREATMENT (OUTPATIENT)
Dept: CARDIAC REHAB | Facility: HOSPITAL | Age: 62
End: 2017-11-22

## 2017-11-22 DIAGNOSIS — I25.10 CORONARY ARTERY DISEASE INVOLVING NATIVE CORONARY ARTERY OF NATIVE HEART WITHOUT ANGINA PECTORIS: Primary | ICD-10-CM

## 2017-11-22 PROCEDURE — 93798 PHYS/QHP OP CAR RHAB W/ECG: CPT

## 2017-11-27 ENCOUNTER — TREATMENT (OUTPATIENT)
Dept: CARDIAC REHAB | Facility: HOSPITAL | Age: 62
End: 2017-11-27

## 2017-11-27 DIAGNOSIS — I25.10 CORONARY ARTERY DISEASE INVOLVING NATIVE CORONARY ARTERY OF NATIVE HEART WITHOUT ANGINA PECTORIS: Primary | ICD-10-CM

## 2017-11-27 PROCEDURE — 93798 PHYS/QHP OP CAR RHAB W/ECG: CPT

## 2017-11-29 ENCOUNTER — TREATMENT (OUTPATIENT)
Dept: CARDIAC REHAB | Facility: HOSPITAL | Age: 62
End: 2017-11-29

## 2017-11-29 DIAGNOSIS — I25.10 CORONARY ARTERY DISEASE INVOLVING NATIVE CORONARY ARTERY OF NATIVE HEART WITHOUT ANGINA PECTORIS: Primary | ICD-10-CM

## 2017-11-29 PROCEDURE — 93798 PHYS/QHP OP CAR RHAB W/ECG: CPT

## 2017-12-01 ENCOUNTER — TREATMENT (OUTPATIENT)
Dept: CARDIAC REHAB | Facility: HOSPITAL | Age: 62
End: 2017-12-01

## 2017-12-01 DIAGNOSIS — I25.10 CORONARY ARTERY DISEASE INVOLVING NATIVE CORONARY ARTERY OF NATIVE HEART WITHOUT ANGINA PECTORIS: Primary | ICD-10-CM

## 2017-12-01 PROCEDURE — 93798 PHYS/QHP OP CAR RHAB W/ECG: CPT

## 2017-12-04 ENCOUNTER — TREATMENT (OUTPATIENT)
Dept: CARDIAC REHAB | Facility: HOSPITAL | Age: 62
End: 2017-12-04

## 2017-12-04 DIAGNOSIS — I25.10 CORONARY ARTERY DISEASE INVOLVING NATIVE CORONARY ARTERY OF NATIVE HEART WITHOUT ANGINA PECTORIS: Primary | ICD-10-CM

## 2017-12-04 DIAGNOSIS — Z95.1 S/P CABG X 3: ICD-10-CM

## 2017-12-04 PROCEDURE — 93798 PHYS/QHP OP CAR RHAB W/ECG: CPT

## 2017-12-06 ENCOUNTER — TREATMENT (OUTPATIENT)
Dept: CARDIAC REHAB | Facility: HOSPITAL | Age: 62
End: 2017-12-06

## 2017-12-06 DIAGNOSIS — I25.10 CORONARY ARTERY DISEASE INVOLVING NATIVE CORONARY ARTERY OF NATIVE HEART WITHOUT ANGINA PECTORIS: Primary | ICD-10-CM

## 2017-12-06 PROCEDURE — 93798 PHYS/QHP OP CAR RHAB W/ECG: CPT

## 2017-12-08 ENCOUNTER — TELEPHONE (OUTPATIENT)
Dept: CARDIOLOGY | Facility: CLINIC | Age: 62
End: 2017-12-08

## 2017-12-08 ENCOUNTER — TREATMENT (OUTPATIENT)
Dept: CARDIAC REHAB | Facility: HOSPITAL | Age: 62
End: 2017-12-08

## 2017-12-08 DIAGNOSIS — Z95.1 S/P CABG X 3: ICD-10-CM

## 2017-12-08 DIAGNOSIS — I25.10 CORONARY ARTERY DISEASE INVOLVING NATIVE CORONARY ARTERY OF NATIVE HEART WITHOUT ANGINA PECTORIS: Primary | ICD-10-CM

## 2017-12-08 PROCEDURE — 93798 PHYS/QHP OP CAR RHAB W/ECG: CPT

## 2017-12-08 NOTE — TELEPHONE ENCOUNTER
Pt called requesting an extended leave of absence. Pt states leave of absence ends on 12/15/17 and his next visit with you to be released is on 12/27/17. Pt requesting letter for extended leave. Please advise. Thanks, Veronica

## 2017-12-11 ENCOUNTER — TREATMENT (OUTPATIENT)
Dept: CARDIAC REHAB | Facility: HOSPITAL | Age: 62
End: 2017-12-11

## 2017-12-11 DIAGNOSIS — Z95.1 S/P CABG X 3: ICD-10-CM

## 2017-12-11 DIAGNOSIS — I25.10 CORONARY ARTERY DISEASE INVOLVING NATIVE CORONARY ARTERY OF NATIVE HEART WITHOUT ANGINA PECTORIS: Primary | ICD-10-CM

## 2017-12-11 PROCEDURE — 93798 PHYS/QHP OP CAR RHAB W/ECG: CPT

## 2017-12-13 ENCOUNTER — TREATMENT (OUTPATIENT)
Dept: CARDIAC REHAB | Facility: HOSPITAL | Age: 62
End: 2017-12-13

## 2017-12-13 DIAGNOSIS — Z95.1 S/P CABG X 3: ICD-10-CM

## 2017-12-13 DIAGNOSIS — I25.10 CORONARY ARTERY DISEASE INVOLVING NATIVE CORONARY ARTERY OF NATIVE HEART WITHOUT ANGINA PECTORIS: Primary | ICD-10-CM

## 2017-12-13 PROCEDURE — 93798 PHYS/QHP OP CAR RHAB W/ECG: CPT

## 2017-12-15 ENCOUNTER — TELEPHONE (OUTPATIENT)
Dept: CARDIOLOGY | Facility: CLINIC | Age: 62
End: 2017-12-15

## 2017-12-15 ENCOUNTER — TREATMENT (OUTPATIENT)
Dept: CARDIAC REHAB | Facility: HOSPITAL | Age: 62
End: 2017-12-15

## 2017-12-15 DIAGNOSIS — Z95.1 S/P CABG X 3: ICD-10-CM

## 2017-12-15 DIAGNOSIS — I25.10 CORONARY ARTERY DISEASE INVOLVING NATIVE CORONARY ARTERY OF NATIVE HEART WITHOUT ANGINA PECTORIS: Primary | ICD-10-CM

## 2017-12-15 PROCEDURE — 93798 PHYS/QHP OP CAR RHAB W/ECG: CPT

## 2017-12-18 ENCOUNTER — TREATMENT (OUTPATIENT)
Dept: CARDIAC REHAB | Facility: HOSPITAL | Age: 62
End: 2017-12-18

## 2017-12-18 DIAGNOSIS — I25.10 CORONARY ARTERY DISEASE INVOLVING NATIVE CORONARY ARTERY OF NATIVE HEART WITHOUT ANGINA PECTORIS: Primary | ICD-10-CM

## 2017-12-18 DIAGNOSIS — Z95.1 S/P CABG X 3: ICD-10-CM

## 2017-12-18 PROCEDURE — 93798 PHYS/QHP OP CAR RHAB W/ECG: CPT

## 2017-12-20 ENCOUNTER — TREATMENT (OUTPATIENT)
Dept: CARDIAC REHAB | Facility: HOSPITAL | Age: 62
End: 2017-12-20

## 2017-12-20 DIAGNOSIS — I25.10 CORONARY ARTERY DISEASE INVOLVING NATIVE CORONARY ARTERY OF NATIVE HEART WITHOUT ANGINA PECTORIS: Primary | ICD-10-CM

## 2017-12-20 DIAGNOSIS — Z95.1 S/P CABG X 3: ICD-10-CM

## 2017-12-20 PROCEDURE — 93798 PHYS/QHP OP CAR RHAB W/ECG: CPT

## 2017-12-22 ENCOUNTER — TREATMENT (OUTPATIENT)
Dept: CARDIAC REHAB | Facility: HOSPITAL | Age: 62
End: 2017-12-22

## 2017-12-22 DIAGNOSIS — I25.10 CORONARY ARTERY DISEASE INVOLVING NATIVE CORONARY ARTERY OF NATIVE HEART WITHOUT ANGINA PECTORIS: Primary | ICD-10-CM

## 2017-12-22 PROCEDURE — 93798 PHYS/QHP OP CAR RHAB W/ECG: CPT

## 2017-12-27 ENCOUNTER — OFFICE VISIT (OUTPATIENT)
Dept: CARDIOLOGY | Facility: CLINIC | Age: 62
End: 2017-12-27

## 2017-12-27 ENCOUNTER — TRANSCRIBE ORDERS (OUTPATIENT)
Dept: CARDIAC REHAB | Facility: HOSPITAL | Age: 62
End: 2017-12-27

## 2017-12-27 VITALS
BODY MASS INDEX: 32.21 KG/M2 | HEIGHT: 70 IN | HEART RATE: 81 BPM | WEIGHT: 225 LBS | SYSTOLIC BLOOD PRESSURE: 116 MMHG | DIASTOLIC BLOOD PRESSURE: 74 MMHG

## 2017-12-27 DIAGNOSIS — Z95.5 S/P CORONARY ARTERY STENT PLACEMENT: ICD-10-CM

## 2017-12-27 DIAGNOSIS — D69.6 THROMBOCYTOPENIA (HCC): ICD-10-CM

## 2017-12-27 DIAGNOSIS — Z95.1 S/P CABG (CORONARY ARTERY BYPASS GRAFT): Primary | ICD-10-CM

## 2017-12-27 DIAGNOSIS — I25.10 CORONARY ARTERY DISEASE INVOLVING NATIVE CORONARY ARTERY OF NATIVE HEART WITHOUT ANGINA PECTORIS: Primary | ICD-10-CM

## 2017-12-27 DIAGNOSIS — E78.5 HYPERLIPIDEMIA, UNSPECIFIED HYPERLIPIDEMIA TYPE: ICD-10-CM

## 2017-12-27 DIAGNOSIS — E03.9 HYPOTHYROIDISM, ACQUIRED: ICD-10-CM

## 2017-12-27 DIAGNOSIS — Z95.1 S/P CABG (CORONARY ARTERY BYPASS GRAFT): ICD-10-CM

## 2017-12-27 DIAGNOSIS — I10 ESSENTIAL HYPERTENSION: ICD-10-CM

## 2017-12-27 PROCEDURE — 93000 ELECTROCARDIOGRAM COMPLETE: CPT | Performed by: INTERNAL MEDICINE

## 2017-12-27 PROCEDURE — 99214 OFFICE O/P EST MOD 30 MIN: CPT | Performed by: INTERNAL MEDICINE

## 2017-12-27 RX ORDER — CLOPIDOGREL BISULFATE 75 MG/1
75 TABLET ORAL DAILY
Qty: 30 TABLET | Refills: 8 | Status: SHIPPED | OUTPATIENT
Start: 2017-12-27 | End: 2018-06-27 | Stop reason: SDUPTHER

## 2017-12-27 RX ORDER — CARVEDILOL 3.12 MG/1
3.12 TABLET ORAL EVERY 12 HOURS SCHEDULED
Qty: 60 TABLET | Refills: 5 | Status: SHIPPED | OUTPATIENT
Start: 2017-12-27 | End: 2018-08-28 | Stop reason: SDUPTHER

## 2017-12-27 RX ORDER — ATORVASTATIN CALCIUM 20 MG/1
20 TABLET, FILM COATED ORAL NIGHTLY
Qty: 30 TABLET | Refills: 5 | Status: SHIPPED | OUTPATIENT
Start: 2017-12-27 | End: 2019-01-16 | Stop reason: SDUPTHER

## 2017-12-27 NOTE — PROGRESS NOTES
Subjective:     Encounter Date:12/27/2017      Patient ID: Luigi Steel is a 62 y.o. male.    Chief Complaint:  History of Present Illness    This is a 62-year-old with a history of hypothyroidism, osteoarthritis, prior colon cancer status post partial colectomy, thrombocytopenia, hyperlipidemia, coronary artery disease status post anterior myocardial infarction and drug eluting stent placement of the mid LAD on 8/13/2017, followed by coronary artery bypass surgery ×3 on 8/25/2017, who presents for follow-up.    I began following the patient when he presented with an anterior myocardial infarction on 8/13/2017.  The patient reported sudden onset of chest discomfort 2 hours prior to his arrival.  On arrival to the hospital is brought directly to the cardiac catheterization laboratory and was found to have a thrombotic occlusion of his mid LAD.  Additionally was found to have multivessel coronary artery disease including proximal LAD, left circumflex artery, and posterior descending artery.  He underwent drug-eluting stent placement of his mid LAD at the time.  The remainder of his hospital position was uneventful.  He did have an echocardiogram performed following his event that showed mildly depressed left ventricular systolic function with ejection fraction of 45%.    The patient was brought back a week later for coronary artery bypass surgery.  His clopidogrel was stopped and he was placed on Integrilin therapy preoperatively.  He also underwent a repeat echocardiogram during that admission that showed normalization of his left ventricle systolic function and wall motion with an estimated ejection fraction of 70% and no significant valvular disease.  On 8/25/2017 he underwent CABG ×3 with a LIMA to LAD, saphenous vein graft to the PDA, and a saphenous vein graft to his OM 2.  He was resumed on clopidogrel following his surgery to ensure patency of his mid LAD stent.  Postoperatively he did pretty well except  "he did suffer a syncopal episode on postoperative day 4 that was felt to be due to vasovagal syncope.  I saw him last in for hospital follow-up in 9/2017 he was doing fairly well overall.  We decided to wait before allowing him to return back to work until he progressed further in cardiac rehabilitation.    Today he presents for routine 3 month follow-up.  He has since completed cardiac rehabilitation.  He reports that he is feeling better overall and thinks that he may even feel better than he did prior to his cardiac event in August.  He denies any chest pain, PND or orthopnea, presyncope or syncope, lower extremity edema, or shortness of breath.  He does have occasional \"fluttering\" in his chest that are brief.  He reports that he is ready to return back to work.  He also reports that when he sneezes he sometimes feels like his one side of his sternum moves a little.  He does not notice this on any other medications.      Review of Systems   Constitution: Negative for weakness and malaise/fatigue.   HENT: Negative for hearing loss, hoarse voice, nosebleeds and sore throat.    Eyes: Negative for pain.   Cardiovascular: Positive for palpitations. Negative for chest pain, claudication, cyanosis, dyspnea on exertion, irregular heartbeat, leg swelling, near-syncope, orthopnea, paroxysmal nocturnal dyspnea and syncope.   Respiratory: Negative for shortness of breath and snoring.    Endocrine: Negative for cold intolerance, heat intolerance, polydipsia, polyphagia and polyuria.   Skin: Negative for itching and rash.   Musculoskeletal: Negative for arthritis, falls, joint pain, joint swelling, muscle cramps, muscle weakness and myalgias.   Gastrointestinal: Negative for constipation, diarrhea, dysphagia, heartburn, hematemesis, hematochezia, melena, nausea and vomiting.   Genitourinary: Negative for frequency, hematuria and hesitancy.   Neurological: Negative for excessive daytime sleepiness, dizziness, headaches, " light-headedness and numbness.   Psychiatric/Behavioral: Negative for depression. The patient is not nervous/anxious.           Current Outpatient Prescriptions:   •  aspirin 81 MG chewable tablet, Chew 1 tablet Daily., Disp: , Rfl:   •  atorvastatin (LIPITOR) 20 MG tablet, Take 1 tablet by mouth Every Night., Disp: 30 tablet, Rfl: 5  •  carvedilol (COREG) 3.125 MG tablet, Take 1 tablet by mouth Every 12 (Twelve) Hours., Disp: 60 tablet, Rfl: 3  •  clopidogrel (PLAVIX) 75 MG tablet, Take 1 tablet by mouth Daily., Disp: 30 tablet, Rfl: 10  •  levothyroxine (SYNTHROID, LEVOTHROID) 75 MCG tablet, Take 1 tablet by mouth Daily., Disp: 30 tablet, Rfl: 5  •  VESICARE 10 MG tablet, , Disp: , Rfl:   •  zolpidem (AMBIEN) 10 MG tablet, Take 1 tablet by mouth At Night As Needed for Sleep., Disp: 30 tablet, Rfl: 2    Past Medical History:   Diagnosis Date   • Anterior myocardial infarction 8/13/2017   • Arthritis     Hands, knees   • Colon cancer    • Coronary artery disease    • Elevated blood pressure reading without diagnosis of hypertension    • H/O complete eye exam 2016   • Hypertension    • Hypothyroidism, acquired    • Insomnia    • Kidney stones    • Obesity    • Osteoarthritis, multiple sites    • PONV (postoperative nausea and vomiting)      Past Surgical History:   Procedure Laterality Date   • CARDIAC CATHETERIZATION N/A 8/13/2017    Procedure: Left Heart Cath;  Surgeon: Edyta Ye MD;  Location: [facility] INVASIVE LOCATION;  Service:    • CARDIAC CATHETERIZATION N/A 8/13/2017    Procedure: Stent RK coronary;  Surgeon: Edyta Ye MD;  Location: [facility] CATH INVASIVE LOCATION;  Service:    • COLECTOMY PARTIAL / TOTAL  1994   • COLON RESECTION RIGHT Right 1990    for cancer   • COLONOSCOPY  03/18/2009    Dr. Tilmlan   • CORONARY ARTERY BYPASS GRAFT N/A 8/25/2017    Procedure: JAYLON STERNOTOMY CORONARY ARTERY BYPASS GRAFT TIMES 3 USING LEFT INTERNAL  MAMMARY ARTERY AND RIGHT SAPHENOUS VEIN GRAFT PER ENDOSCOPIC  "VEIN HARVESTING;  Surgeon: Ana Lynn MD;  Location: [facility] OR;  Service:    • HERNIA REPAIR  04/02/2009    hiatal   • HERNIA REPAIR Bilateral 08/2010    ABDOMINAL x3   • INCISIONAL HERNIA REPAIR  09/2011   • INCISIONAL HERNIA REPAIR  04/2009   • INCISIONAL HERNIA REPAIR  1996   • KIDNEY STONE SURGERY     • SMALL INTESTINE SURGERY      for adhesions     Family History   Problem Relation Age of Onset   • Arthritis Mother    • Stroke Mother    • Heart attack Mother    • Hypertension Mother    • Gallbladder disease Mother    • Heart disease Mother    • Hypertension Father    • Gallbladder disease Father    • Colon cancer Father 65   • Gallbladder disease Sister    • Arthritis Brother    • Cancer Brother    • Colon cancer Brother 51   • Heart attack Brother    • Heart disease Brother    • Hypertension Brother    • Gallbladder disease Brother    • Heart disease Brother    • No Known Problems Maternal Grandmother    • No Known Problems Maternal Grandfather    • No Known Problems Paternal Grandmother    • No Known Problems Paternal Grandfather      Social History   Substance Use Topics   • Smoking status: Never Smoker   • Smokeless tobacco: Never Used   • Alcohol use No           ECG 12 Lead  Date/Time: 12/27/2017 12:27 PM  Performed by: ARMANDO FERNANDEZ  Authorized by: ARMANDO FERNANDEZ   Comparison: compared with previous ECG   Similar to previous ECG  Rhythm: sinus rhythm  Comments: Non-specific anterior T wave changes               Objective:         Visit Vitals   • /74 (BP Location: Right arm, Patient Position: Sitting)   • Pulse 81   • Ht 177.8 cm (70\")   • Wt 102 kg (225 lb)   • BMI 32.28 kg/m2          Physical Exam   Constitutional: He is oriented to person, place, and time. He appears well-developed and well-nourished.   HENT:   Head: Normocephalic and atraumatic.   Eyes: Conjunctivae, EOM and lids are normal. Pupils are equal, round, and reactive to light.   Neck: Normal range of motion and full " passive range of motion without pain. Neck supple. No JVD present. Carotid bruit is not present.   Cardiovascular: Normal rate, regular rhythm, S1 normal and S2 normal.  Exam reveals no gallop.    No murmur heard.  Pulses:       Radial pulses are 2+ on the right side, and 2+ on the left side.   No bilateral lower extremity edema   Pulmonary/Chest: Effort normal and breath sounds normal.   Abdominal: Soft. Normal appearance.   Lymphadenopathy:     He has no cervical adenopathy.   Neurological: He is alert and oriented to person, place, and time.   Skin: Skin is warm, dry and intact.   Psychiatric: He has a normal mood and affect.       Lab Review:       Assessment:          Diagnosis Plan   1. Coronary artery disease involving native coronary artery of native heart without angina pectoris     2. S/P CABG (coronary artery bypass graft)     3. S/P coronary artery stent placement     4. Essential hypertension            Plan:       1.  Coronary artery disease.  Status post drug eluting stent placement of the mid LAD and CABG ×3.  The study doing well from the standpoint overall.  His completed cardiac rehabilitation and plans on signing up for phase 3 cardiac rehabilitation.  Will continue his current management.  We'll go ahead and have patient return to work without restrictions.    2.  Hypertension.  Well-controlled on his current medications.  3.  Hyperlipidemia.  Appears to be tolerating his current dose of atorvastatin.  4.  Thrombocytopenia.  5.  Hypothyroidism.    We'll see the patient back again in 6 months.

## 2017-12-29 ENCOUNTER — OFFICE VISIT (OUTPATIENT)
Dept: CARDIAC REHAB | Facility: HOSPITAL | Age: 62
End: 2017-12-29

## 2017-12-29 DIAGNOSIS — I25.10 CORONARY ARTERY DISEASE INVOLVING NATIVE CORONARY ARTERY OF NATIVE HEART WITHOUT ANGINA PECTORIS: Primary | ICD-10-CM

## 2018-01-05 ENCOUNTER — OFFICE VISIT (OUTPATIENT)
Dept: CARDIAC REHAB | Facility: HOSPITAL | Age: 63
End: 2018-01-05

## 2018-01-05 DIAGNOSIS — I25.10 CORONARY ARTERY DISEASE INVOLVING NATIVE CORONARY ARTERY OF NATIVE HEART WITHOUT ANGINA PECTORIS: Primary | ICD-10-CM

## 2018-01-22 ENCOUNTER — OFFICE VISIT (OUTPATIENT)
Dept: CARDIAC REHAB | Facility: HOSPITAL | Age: 63
End: 2018-01-22

## 2018-01-22 DIAGNOSIS — I25.10 CORONARY ARTERY DISEASE INVOLVING NATIVE CORONARY ARTERY OF NATIVE HEART WITHOUT ANGINA PECTORIS: Primary | ICD-10-CM

## 2018-01-23 ENCOUNTER — OFFICE VISIT (OUTPATIENT)
Dept: FAMILY MEDICINE CLINIC | Facility: CLINIC | Age: 63
End: 2018-01-23

## 2018-01-23 VITALS
BODY MASS INDEX: 32.64 KG/M2 | HEART RATE: 61 BPM | DIASTOLIC BLOOD PRESSURE: 77 MMHG | RESPIRATION RATE: 16 BRPM | TEMPERATURE: 97.7 F | WEIGHT: 228 LBS | SYSTOLIC BLOOD PRESSURE: 138 MMHG | HEIGHT: 70 IN

## 2018-01-23 DIAGNOSIS — F51.01 PRIMARY INSOMNIA: ICD-10-CM

## 2018-01-23 PROCEDURE — 99212 OFFICE O/P EST SF 10 MIN: CPT | Performed by: FAMILY MEDICINE

## 2018-01-23 RX ORDER — ZOLPIDEM TARTRATE 10 MG/1
10 TABLET ORAL NIGHTLY PRN
Qty: 30 TABLET | Refills: 2 | Status: SHIPPED | OUTPATIENT
Start: 2018-01-23 | End: 2018-05-02 | Stop reason: SDUPTHER

## 2018-01-23 NOTE — PROGRESS NOTES
"Subjective   Luigi Steel is a 62 y.o. male.     History of Present Illness     Chief Complaint:   Chief Complaint   Patient presents with   • Insomnia       Luigi Steel 62 y.o. male who presents today for Medical Management of the below listed issues and medication refills.  he has a problem list of   Patient Active Problem List   Diagnosis   • Colon cancer   • Elevated blood pressure reading without diagnosis of hypertension   • Hypothyroidism, acquired   • Insomnia   • Osteoarthritis, multiple sites   • Hyperlipidemia   • Thrombocytopenia   • Vitamin B12 deficiency   • CAD (coronary artery disease)   • S/P CABG (coronary artery bypass graft)   • Anemia   • S/P coronary artery stent placement   • Essential hypertension   • Iron deficiency   .  Since the last visit, he has overall felt well.  he has been compliant with   Current Outpatient Prescriptions:   •  aspirin 81 MG chewable tablet, Chew 1 tablet Daily., Disp: , Rfl:   •  atorvastatin (LIPITOR) 20 MG tablet, Take 1 tablet by mouth Every Night., Disp: 30 tablet, Rfl: 5  •  carvedilol (COREG) 3.125 MG tablet, Take 1 tablet by mouth Every 12 (Twelve) Hours., Disp: 60 tablet, Rfl: 5  •  clopidogrel (PLAVIX) 75 MG tablet, Take 1 tablet by mouth Daily., Disp: 30 tablet, Rfl: 8  •  levothyroxine (SYNTHROID, LEVOTHROID) 75 MCG tablet, Take 1 tablet by mouth Daily., Disp: 30 tablet, Rfl: 5  •  VESICARE 10 MG tablet, , Disp: , Rfl:   •  zolpidem (AMBIEN) 10 MG tablet, Take 1 tablet by mouth At Night As Needed for Sleep., Disp: 30 tablet, Rfl: 2.  he denies medication side effects.    All of the chronic condition(s) listed above are stable w/o issues.    /77  Pulse 61  Temp 97.7 °F (36.5 °C) (Oral)   Resp 16  Ht 177.8 cm (70\")  Wt 103 kg (228 lb)  BMI 32.71 kg/m2    Results for orders placed or performed in visit on 11/02/17   Folate   Result Value Ref Range    Folate 6.06 4.78 - 24.20 ng/mL           The following portions of the patient's history " were reviewed and updated as appropriate: allergies, current medications, past family history, past medical history, past social history, past surgical history and problem list.    Review of Systems   Constitutional: Negative for activity change, chills, fatigue and fever.   Respiratory: Negative for cough and shortness of breath.    Cardiovascular: Negative for chest pain and palpitations.   Gastrointestinal: Negative for abdominal pain.   Endocrine: Negative for cold intolerance.   Psychiatric/Behavioral: Negative for behavioral problems and dysphoric mood. The patient is not nervous/anxious.        Objective   Physical Exam   Constitutional: He appears well-developed and well-nourished.   Neck: Neck supple. No thyromegaly present.   Cardiovascular: Normal rate and regular rhythm.    No murmur heard.  Pulmonary/Chest: Effort normal and breath sounds normal.   Abdominal: Bowel sounds are normal.   Psychiatric: He has a normal mood and affect. His behavior is normal.   Nursing note and vitals reviewed.    The patient has read and signed the [facility] Controlled Substance Contract.  I will continue to see patient for regular follow up appointments.  They are well controlled on their medication.  JESUS has been reviewed by me and is updated every 3 months. The patient is aware of the potential for addiction and dependence.    Assessment/Plan   Luigi was seen today for insomnia.    Diagnoses and all orders for this visit:    Primary insomnia  -     zolpidem (AMBIEN) 10 MG tablet; Take 1 tablet by mouth At Night As Needed for Sleep.

## 2018-01-26 ENCOUNTER — OFFICE VISIT (OUTPATIENT)
Dept: CARDIAC REHAB | Facility: HOSPITAL | Age: 63
End: 2018-01-26

## 2018-01-26 DIAGNOSIS — I25.10 CORONARY ARTERY DISEASE INVOLVING NATIVE CORONARY ARTERY OF NATIVE HEART WITHOUT ANGINA PECTORIS: Primary | ICD-10-CM

## 2018-01-29 ENCOUNTER — OFFICE VISIT (OUTPATIENT)
Dept: CARDIAC REHAB | Facility: HOSPITAL | Age: 63
End: 2018-01-29

## 2018-01-29 DIAGNOSIS — I25.10 CORONARY ARTERY DISEASE INVOLVING NATIVE CORONARY ARTERY OF NATIVE HEART WITHOUT ANGINA PECTORIS: Primary | ICD-10-CM

## 2018-01-31 ENCOUNTER — OFFICE VISIT (OUTPATIENT)
Dept: CARDIAC REHAB | Facility: HOSPITAL | Age: 63
End: 2018-01-31

## 2018-01-31 DIAGNOSIS — I25.10 CORONARY ARTERY DISEASE INVOLVING NATIVE CORONARY ARTERY OF NATIVE HEART WITHOUT ANGINA PECTORIS: Primary | ICD-10-CM

## 2018-02-05 ENCOUNTER — OFFICE VISIT (OUTPATIENT)
Dept: CARDIAC REHAB | Facility: HOSPITAL | Age: 63
End: 2018-02-05

## 2018-02-05 DIAGNOSIS — I25.10 CORONARY ARTERY DISEASE INVOLVING NATIVE CORONARY ARTERY OF NATIVE HEART WITHOUT ANGINA PECTORIS: Primary | ICD-10-CM

## 2018-02-09 ENCOUNTER — OFFICE VISIT (OUTPATIENT)
Dept: CARDIAC REHAB | Facility: HOSPITAL | Age: 63
End: 2018-02-09

## 2018-02-09 DIAGNOSIS — I25.10 CORONARY ARTERY DISEASE INVOLVING NATIVE CORONARY ARTERY OF NATIVE HEART WITHOUT ANGINA PECTORIS: Primary | ICD-10-CM

## 2018-02-14 ENCOUNTER — OFFICE VISIT (OUTPATIENT)
Dept: CARDIAC REHAB | Facility: HOSPITAL | Age: 63
End: 2018-02-14

## 2018-02-14 DIAGNOSIS — I25.10 CORONARY ARTERY DISEASE INVOLVING NATIVE CORONARY ARTERY OF NATIVE HEART WITHOUT ANGINA PECTORIS: Primary | ICD-10-CM

## 2018-02-16 ENCOUNTER — OFFICE VISIT (OUTPATIENT)
Dept: CARDIAC REHAB | Facility: HOSPITAL | Age: 63
End: 2018-02-16

## 2018-02-16 DIAGNOSIS — I25.10 CORONARY ARTERY DISEASE INVOLVING NATIVE CORONARY ARTERY OF NATIVE HEART WITHOUT ANGINA PECTORIS: Primary | ICD-10-CM

## 2018-02-19 ENCOUNTER — OFFICE VISIT (OUTPATIENT)
Dept: CARDIAC REHAB | Facility: HOSPITAL | Age: 63
End: 2018-02-19

## 2018-02-19 DIAGNOSIS — I25.10 CORONARY ARTERY DISEASE INVOLVING NATIVE CORONARY ARTERY OF NATIVE HEART WITHOUT ANGINA PECTORIS: Primary | ICD-10-CM

## 2018-02-21 ENCOUNTER — OFFICE VISIT (OUTPATIENT)
Dept: CARDIAC REHAB | Facility: HOSPITAL | Age: 63
End: 2018-02-21

## 2018-02-21 DIAGNOSIS — Z95.1 S/P CABG X 3: Primary | ICD-10-CM

## 2018-02-23 ENCOUNTER — OFFICE VISIT (OUTPATIENT)
Dept: CARDIAC REHAB | Facility: HOSPITAL | Age: 63
End: 2018-02-23

## 2018-02-23 DIAGNOSIS — Z95.1 S/P CABG X 3: Primary | ICD-10-CM

## 2018-02-26 ENCOUNTER — OFFICE VISIT (OUTPATIENT)
Dept: CARDIAC REHAB | Facility: HOSPITAL | Age: 63
End: 2018-02-26

## 2018-02-26 DIAGNOSIS — Z95.1 S/P CABG X 3: Primary | ICD-10-CM

## 2018-02-28 ENCOUNTER — OFFICE VISIT (OUTPATIENT)
Dept: CARDIAC REHAB | Facility: HOSPITAL | Age: 63
End: 2018-02-28

## 2018-02-28 DIAGNOSIS — Z95.1 S/P CABG X 3: Primary | ICD-10-CM

## 2018-03-01 ENCOUNTER — LAB (OUTPATIENT)
Dept: LAB | Facility: HOSPITAL | Age: 63
End: 2018-03-01

## 2018-03-01 ENCOUNTER — OFFICE VISIT (OUTPATIENT)
Dept: ONCOLOGY | Facility: CLINIC | Age: 63
End: 2018-03-01

## 2018-03-01 VITALS
TEMPERATURE: 98.1 F | HEIGHT: 69 IN | BODY MASS INDEX: 33.77 KG/M2 | WEIGHT: 228 LBS | DIASTOLIC BLOOD PRESSURE: 82 MMHG | SYSTOLIC BLOOD PRESSURE: 120 MMHG | HEART RATE: 68 BPM | RESPIRATION RATE: 16 BRPM

## 2018-03-01 DIAGNOSIS — E53.8 VITAMIN B12 DEFICIENCY: ICD-10-CM

## 2018-03-01 DIAGNOSIS — D64.9 ANEMIA, UNSPECIFIED TYPE: ICD-10-CM

## 2018-03-01 DIAGNOSIS — E53.8 VITAMIN B12 DEFICIENCY: Primary | ICD-10-CM

## 2018-03-01 DIAGNOSIS — D69.6 THROMBOCYTOPENIA (HCC): ICD-10-CM

## 2018-03-01 DIAGNOSIS — R03.0 ELEVATED BLOOD PRESSURE READING WITHOUT DIAGNOSIS OF HYPERTENSION: Primary | ICD-10-CM

## 2018-03-01 LAB
BASOPHILS # BLD AUTO: 0.02 10*3/MM3 (ref 0–0.1)
BASOPHILS NFR BLD AUTO: 0.4 % (ref 0–1.1)
DEPRECATED RDW RBC AUTO: 46.8 FL (ref 37–49)
EOSINOPHIL # BLD AUTO: 0.09 10*3/MM3 (ref 0–0.36)
EOSINOPHIL NFR BLD AUTO: 1.8 % (ref 1–5)
ERYTHROCYTE [DISTWIDTH] IN BLOOD BY AUTOMATED COUNT: 13.7 % (ref 11.7–14.5)
FERRITIN SERPL-MCNC: 83.1 NG/ML (ref 30–400)
FOLATE SERPL-MCNC: >20 NG/ML (ref 4.78–24.2)
HCT VFR BLD AUTO: 40.9 % (ref 40–49)
HGB BLD-MCNC: 13 G/DL (ref 13.5–16.5)
IMM GRANULOCYTES # BLD: 0.01 10*3/MM3 (ref 0–0.03)
IMM GRANULOCYTES NFR BLD: 0.2 % (ref 0–0.5)
IRON 24H UR-MRATE: 73 MCG/DL (ref 59–158)
IRON SATN MFR SERPL: 23 % (ref 14–48)
LYMPHOCYTES # BLD AUTO: 1.65 10*3/MM3 (ref 1–3.5)
LYMPHOCYTES NFR BLD AUTO: 32.5 % (ref 20–49)
MCH RBC QN AUTO: 29.4 PG (ref 27–33)
MCHC RBC AUTO-ENTMCNC: 31.8 G/DL (ref 32–35)
MCV RBC AUTO: 92.5 FL (ref 83–97)
MONOCYTES # BLD AUTO: 0.65 10*3/MM3 (ref 0.25–0.8)
MONOCYTES NFR BLD AUTO: 12.8 % (ref 4–12)
NEUTROPHILS # BLD AUTO: 2.65 10*3/MM3 (ref 1.5–7)
NEUTROPHILS NFR BLD AUTO: 52.3 % (ref 39–75)
NRBC BLD MANUAL-RTO: 0 /100 WBC (ref 0–0)
PLATELET # BLD AUTO: 116 10*3/MM3 (ref 150–375)
PMV BLD AUTO: 10.1 FL (ref 8.9–12.1)
RBC # BLD AUTO: 4.42 10*6/MM3 (ref 4.3–5.5)
TIBC SERPL-MCNC: 314 MCG/DL (ref 249–505)
TRANSFERRIN SERPL-MCNC: 224 MG/DL (ref 200–360)
VIT B12 BLD-MCNC: 1383 PG/ML (ref 211–946)
WBC NRBC COR # BLD: 5.07 10*3/MM3 (ref 4–10)

## 2018-03-01 PROCEDURE — 83540 ASSAY OF IRON: CPT | Performed by: INTERNAL MEDICINE

## 2018-03-01 PROCEDURE — 82746 ASSAY OF FOLIC ACID SERUM: CPT | Performed by: INTERNAL MEDICINE

## 2018-03-01 PROCEDURE — 82728 ASSAY OF FERRITIN: CPT | Performed by: INTERNAL MEDICINE

## 2018-03-01 PROCEDURE — 36415 COLL VENOUS BLD VENIPUNCTURE: CPT | Performed by: INTERNAL MEDICINE

## 2018-03-01 PROCEDURE — 82607 VITAMIN B-12: CPT | Performed by: INTERNAL MEDICINE

## 2018-03-01 PROCEDURE — 99214 OFFICE O/P EST MOD 30 MIN: CPT | Performed by: INTERNAL MEDICINE

## 2018-03-01 PROCEDURE — 85025 COMPLETE CBC W/AUTO DIFF WBC: CPT | Performed by: INTERNAL MEDICINE

## 2018-03-01 PROCEDURE — 84466 ASSAY OF TRANSFERRIN: CPT | Performed by: INTERNAL MEDICINE

## 2018-03-01 NOTE — PROGRESS NOTES
REASON FOR FOLLOWUP:     1.  Chronic mild thrombocytopenia dating back at least to 2 March 2016.    2.  Vitamin B12 deficiency diagnosed in middle of August 2017, responded to oral B12 supplementation.   3.  Post surgical anemia in August 2017.         HISTORY OF PRESENT ILLNESS:  The patient is a 62 y.o. male who is here for 4-month reevaluation about his anemia, thrombocytopenia and vitamin B12 deficiency.     Patient reports at baseline condition, denies chest pain or short of breath.  He has been doing very well after his bypass surgery in August 2017.  He has been taking oral vitamin B12, folic acid and oral iron supplementation or once a day. He has no specific complaints.  No easy bleeding or bruising.     Laboratory study today showed the lower platelets at 116,000 Ammann slightly improved hemoglobin 13.0 and a normal WBC 5070 including neutrophils 2650 and lymphocytes 1650.        Medical History         Past Medical History:   Diagnosis Date   • Arthritis     • Colon cancer, post surgery and adjuvant chemotherapy in 1990.      • Elevated blood pressure reading without diagnosis of hypertension     • H/O complete eye exam 2016   • Hypertension     • Hypothyroidism, acquired     • Insomnia     • Kidney stones     • Osteoarthritis, multiple sites            Surgical History          Past Surgical History:   Procedure Laterality Date   • CARDIAC CATHETERIZATION N/A 8/13/2017     Procedure: Left Heart Cath;  Surgeon: Edyta Ye MD;  Location:  [facility];  Service:    • CARDIAC CATHETERIZATION N/A 8/13/2017     Procedure: Stent RK coronary;  Surgeon: Edyta Ye MD;  Location:  [facility];  Service:    • COLECTOMY PARTIAL / TOTAL   1994   • COLONOSCOPY   03/18/2009   • HERNIA REPAIR   04/02/2009     hiatal       Triple-vessel CABG bypass surgery on 8/25/2017 by Dr. Ye.      HEMATOLOGIC/ONCOLOGIC HISTORY: The patient is a 62 y.o. year old male whom we are  consulted for evaluation of thrombocytopenia.      This patient to was admitted for first episodes of heart attack in mid August 2017.  Patient had cardiac stent placement this hospitalization.  Currently has no chest pain.  This patient has no history of coronary artery disease previously and was not taking aspirin or Plavix.      At time of this admission, in the very early morning on 8/13/2017, laboratory study reported platelets 133,000, hemoglobin 14.2 and WBC 7700 including neutrophils 5200 and lymphocytes 1900.  His chemistry lab reported creatinine 1.29, normal electrolytes in the liver function panel, mildly elevated glucose 135.  Patient had cardiac catheterization and drug-eluting stent placement in the morning of August 13, 2017.  In the morning of 8/14/2017, repeated CBC showed a decreased platelets at 104,000, hemoglobin 12.3 and WBC 6100 without a differentiation.  Repeated laboratory study on 8/15/2017 reported a platelets 126,000 and hemoglobin 14, WBC 6600.  Today on 8/16/2017, laboratory study showed a platelets 119,000, hemoglobin 14.1 and a WBC 5600.  I obtained laboratory study in the afternoon, showed a marginally elevated IPF 6.8%, platelet counts 125,000.  His vitamin B12 level was very marginal at 261 pg/mL, negative for rheumatoid factor, and had normal LDH at 210.     I reviewed his previous laboratory study available in the [facility] electronic medical records, dating back to 3/25/2016 where he had platelets 132,000, hemoglobin 13.8 and WBC 5700 with normal distribution.  Lab results on 4/25/2016 showed a platelets 151,000, hemoglobin 14.5 and WBC 6800.  On 3/20/2017 his platelets was 153,000, hemoglobin 14.9 and WBC 6700.  He also had a normal thyroid screening test in March 2016 and March 2017.  His chemistries study showed a stable creatinine level between 1.2 and 1.3.      I also searched medical records in the [facility], back on 8/21/2014 he had a CBC showed a  platelets 117,000 and hemoglobin 14.1 WBC 5000 with normal distribution.       On 10/20/2014 he had platelets 141,000 and hemoglobin 15.4 WBC 10,700 including neutrophils 9100.  His abdomen CT scan examination on 10/20/2014 reported no splenomegaly no hepatomegaly.  It is seems that he had visited to emergency room at that time because of chosen no cold, about 1 months after he had lithotripsy for kidney stone.  Urinalysis was positive for RBC and a trace of bacteria.      Patient reports that he has no easy bleeding or bruising.  He previously had multiple surgery noticeably with colon resection for colon cancer 27 years ago.  He did receive chemotherapy for 6 months treated by Dr. Jasso at that time.  He also had repair for abdominal wall hernia, however he had no significant postoperatively bleeding.     He was given vitamin B12 injection and started oral B12 supplementation. Patient reports that he has been taking 2500 mcg vitamin B12 daily.     This patient subsequently had triple vessel bypass surgery on 08/25/2017 at [facility]. At that time he still had mild thrombocytopenia with platelets 128,000 and hemoglobin 13.1, WBC 6100. Postsurgery, his platelets deteriorated all the way down to 76,000 on 08/27/2017 and hemoglobin 8.9; however, gradually improved afterwards with platelets recovered at 130,000 on 08/29/2017 and hemoglobin 9.3 when he was discharged. Since that time laboratory study on 09/05/2017 reported normalized platelets at 341,000, hemoglobin 10.8 and WBC 7300. Today, laboratory study reported platelets 239,000, hemoglobin 12.5 and WBC 5800.     Patient reports after surgery he is feeling better and more energetic. He is still in recovery. He still has a small wound in the middle chest not healed completely with slight yellowness but no active drainage. Home visiting nurse comes to his house twice a week inspecting that. Patient reports no fever, sweating, chills.    Patient was  tested negative for comprehensive REBA panel on 8/16/2017, and also negative for rheumatoid factor.  Serum protein immunofixation was negative, including IgG 675, IgA 91 IgM 104, he also had a normal LDH.     When I saw him on 9/15/2017 he had normalized platelets at 239,000, and WBC 5800 and a mild but much improved anemia with hemoglobin 12.5.      Laboratory study on 11/2/2017 reported worsened platelets at 134,000, WBC 4240 and neutrophils 2170, lymphocytes 1440.  His hemoglobin is about the same at 12.7.  Serum vitamin B12 level was 1045 pg/mL, folic acid 6.06 ng/mL, iron 50 TIBC 295 iron saturation 17% and a ferritin 116 ng/mL.  Patient was asked to start oral folic acid and oral iron supplementation once a day.        MEDICATIONS:The current medication list was reviewed with the patient and updated in the EMR this date per the Medical Assistant. Medication dosages and frequencies were confirmed to be accurate.       ALLERGIES:          Allergies   Allergen Reactions   • Hydromorphone Nausea And Vomiting and Shortness Of Breath   • Demerol [Meperidine] Nausea And Vomiting         SOCIAL HISTORY: never smoked cigarettes.       FAMILY HISTORY: Father was diagnosed of colon cancer at age 65. Brother diagnosed of colon cancer at age 51.       REVIEW OF SYSTEMS:  GENERAL: No change in appetite or weight;   No fevers, chills, sweats.    SKIN: No nonhealing lesions. No rashes.   HEME/LYMPH: No easy bruising, bleeding.   No swollen nodes.   EYES: No vision changes or diplopia.   ENT: No tinnitus, hearing loss, gum bleeding, epistaxis, hoarseness or dysphagia.   RESPIRATORY: No cough, shortness of breath, hemoptysis or wheezing.   CVS:   no recurrent chest pain or exertional dyspnea.    GI: No melena or hematochezia.   No abdominal pain.  No nausea, vomiting, constipation, diarrhea  : No lower tract obstructive symptoms, dysuria or hematuria.   MUSCULOSKELETAL: No bone pain.  No joint stiffness.   NEUROLOGICAL:  "No global weakness, loss of consciousness or seizures.   PSYCHIATRIC: No increased nervousness, mood changes or depression.         Objective   Vitals:    03/01/18 1049   BP: 120/82   Pulse: 68   Resp: 16   Temp: 98.1 °F (36.7 °C)   Weight: 103 kg (228 lb)   Height: 175 cm (68.9\")   PainSc: 0-No pain   ECOG 1.        PHYSICAL EXAM:    GENERAL:  Well-developed, well-nourished  male in no acute distress.   SKIN:  Warm, dry without rashes, purpura or petechiae.   EYES:    Conjunctivae normal.  EARS:  Hearing intact.  NOSE:   No nasal discharge.  MOUTH:  Tongue is well-papillated; no stomatitis or ulcers.  Lips normal.  THROAT:  Oropharynx without lesions or exudates.  NECK:  no thyromegaly or masses.  LYMPHATICS:  No cervical, supraclavicular adenopathy.  CHEST:  Lungs clear to auscultation. Good airflow.  CARDIAC:  Regular rate and rhythm without murmurs, rubs or gallops. Normal S1,S2.  ABDOMEN:  Soft, nontender with no hepatosplenomegaly or masses.  Bowel sounds present.   EXTREMITIES:  No clubbing, cyanosis or edema.  NEUROLOGICAL:  Cranial Nerves II-XII grossly intact.  No focal neurological deficits.  PSYCHIATRIC:  Normal affect and mood.     RECENT LABS:        Lab Results   Component Value Date    WBC 5.07 03/01/2018    HGB 13.0 (L) 03/01/2018    HCT 40.9 03/01/2018    MCV 92.5 03/01/2018     (L) 03/01/2018     Lab Results   Component Value Date    NEUTROABS 2.65 03/01/2018     Lab Results   Component Value Date    IRON 73 03/01/2018    TIBC 314 03/01/2018    FERRITIN 83.10 03/01/2018    Iron saturation 23%     Assessment/Plan       1.  Vitamin B12 deficiency.  Able to maintain supratherapeutic B12 level on oral supplementation. He'll continue treatment for now.      2.  Mild thrombocytopenia, asymptomatic.  The exact etiology is not clear.  It does not look like related to vitamin B12 deficiency since he has worsening thrombocytopenia Y his vitamin B12 becomes supratherapeutic.  His platelet " counts actually is more close to his baseline condition.  We'll continue to monitor.      He had negative laboratory test for rheumatoid factor and comprehensive REBA panel, and no evidence of destruction of platelets and no significant elevation of IPF.  After CABG surgery on August 25, 2017, he did have expected brief thrombocytopenia with max at 76,000 on August 27, 2017 and then subsequently rebounded afterwards.  His platelets normalized at 341,000 on 9/5/2017 and then on 9/15/2017 it's 239,000.  This could be part of postsurgical changes.      3.  Mild anemia.  This patient had borderline normal hemoglobin level prior to his bypass surgery in August 2017.  After surgery his hemoglobin dropped as low as 8.9 g/dL on 8/20/2017 and subsequently improved afterwards.  His iron study showed a reasonable ferritin level, however iron saturation was slightly low.  His folic acid level was also marginally normal.  He is being on both oral iron and folic acid since November 2017.  He now has supratherapeutic folic acid level, and also improved iron saturation.  His hemoglobin marginally improved.      PLAN:   1.  Continue oral vitamin B12 supplementation at 2500 µg daily.   2.  Continue oral iron supplementation ferrous sulfate 325 mg once a day.     3.  Continue oral folic acid 800 µg daily.  4.  Returns in 6 months for MD visit and labs cbc, CMP, ferritin, iron, b12, folate.     More than 15 min, over half of that time were for counseling.        ALEX ALBRIGHT M.D., Ph.D.    3/1/2018        CC: Edyta Ye M.D.

## 2018-03-05 ENCOUNTER — OFFICE VISIT (OUTPATIENT)
Dept: CARDIAC REHAB | Facility: HOSPITAL | Age: 63
End: 2018-03-05

## 2018-03-05 DIAGNOSIS — Z95.1 S/P CABG X 3: Primary | ICD-10-CM

## 2018-03-09 ENCOUNTER — OFFICE VISIT (OUTPATIENT)
Dept: CARDIAC REHAB | Facility: HOSPITAL | Age: 63
End: 2018-03-09

## 2018-03-09 DIAGNOSIS — Z95.1 S/P CABG X 3: Primary | ICD-10-CM

## 2018-03-12 ENCOUNTER — OFFICE VISIT (OUTPATIENT)
Dept: CARDIAC REHAB | Facility: HOSPITAL | Age: 63
End: 2018-03-12

## 2018-03-12 DIAGNOSIS — Z95.1 S/P CABG X 3: Primary | ICD-10-CM

## 2018-03-19 ENCOUNTER — OFFICE VISIT (OUTPATIENT)
Dept: CARDIAC REHAB | Facility: HOSPITAL | Age: 63
End: 2018-03-19

## 2018-03-19 DIAGNOSIS — Z95.1 S/P CABG X 3: Primary | ICD-10-CM

## 2018-03-23 ENCOUNTER — OFFICE VISIT (OUTPATIENT)
Dept: CARDIAC REHAB | Facility: HOSPITAL | Age: 63
End: 2018-03-23

## 2018-03-23 DIAGNOSIS — Z95.1 S/P CABG X 3: Primary | ICD-10-CM

## 2018-04-09 ENCOUNTER — OFFICE VISIT (OUTPATIENT)
Dept: CARDIAC REHAB | Facility: HOSPITAL | Age: 63
End: 2018-04-09

## 2018-04-09 DIAGNOSIS — Z95.1 S/P CABG X 3: Primary | ICD-10-CM

## 2018-04-11 ENCOUNTER — OFFICE VISIT (OUTPATIENT)
Dept: CARDIAC REHAB | Facility: HOSPITAL | Age: 63
End: 2018-04-11

## 2018-04-11 DIAGNOSIS — Z95.1 S/P CABG X 3: Primary | ICD-10-CM

## 2018-04-16 ENCOUNTER — OFFICE VISIT (OUTPATIENT)
Dept: CARDIAC REHAB | Facility: HOSPITAL | Age: 63
End: 2018-04-16

## 2018-04-16 DIAGNOSIS — Z95.1 S/P CABG X 3: Primary | ICD-10-CM

## 2018-04-20 ENCOUNTER — OFFICE VISIT (OUTPATIENT)
Dept: CARDIAC REHAB | Facility: HOSPITAL | Age: 63
End: 2018-04-20

## 2018-04-20 DIAGNOSIS — Z95.1 S/P CABG X 3: Primary | ICD-10-CM

## 2018-04-23 ENCOUNTER — OFFICE VISIT (OUTPATIENT)
Dept: CARDIAC REHAB | Facility: HOSPITAL | Age: 63
End: 2018-04-23

## 2018-04-23 DIAGNOSIS — Z95.1 S/P CABG X 3: Primary | ICD-10-CM

## 2018-04-23 DIAGNOSIS — E03.9 HYPOTHYROIDISM, ACQUIRED: ICD-10-CM

## 2018-04-23 RX ORDER — LEVOTHYROXINE SODIUM 0.07 MG/1
TABLET ORAL
Qty: 30 TABLET | Refills: 0 | Status: SHIPPED | OUTPATIENT
Start: 2018-04-23 | End: 2018-05-02 | Stop reason: SDUPTHER

## 2018-04-27 ENCOUNTER — OFFICE VISIT (OUTPATIENT)
Dept: CARDIAC REHAB | Facility: HOSPITAL | Age: 63
End: 2018-04-27

## 2018-04-27 DIAGNOSIS — Z95.1 S/P CABG X 3: Primary | ICD-10-CM

## 2018-04-30 ENCOUNTER — OFFICE VISIT (OUTPATIENT)
Dept: CARDIAC REHAB | Facility: HOSPITAL | Age: 63
End: 2018-04-30

## 2018-04-30 DIAGNOSIS — Z95.1 S/P CABG X 3: Primary | ICD-10-CM

## 2018-05-02 NOTE — PROGRESS NOTES
"Subjective   Luigi Steel is a 63 y.o. male.     History of Present Illness     Chief Complaint:   Chief Complaint   Patient presents with   • Insomnia     med refill - nora    • Hypothyroidism       Luigi Steel 63 y.o. male who presents today for Medical Management of the below listed issues and medication refills.  he has a problem list of   Patient Active Problem List   Diagnosis   • Colon cancer   • Elevated blood pressure reading without diagnosis of hypertension   • Hypothyroidism, acquired   • Insomnia   • Osteoarthritis, multiple sites   • Hyperlipidemia   • Thrombocytopenia   • Vitamin B12 deficiency   • CAD (coronary artery disease)   • S/P CABG (coronary artery bypass graft)   • Anemia   • S/P coronary artery stent placement   • Essential hypertension   • Iron deficiency   .  Since the last visit, he has overall felt well.  he has been compliant with   Current Outpatient Prescriptions:   •  aspirin 81 MG chewable tablet, Chew 1 tablet Daily., Disp: , Rfl:   •  atorvastatin (LIPITOR) 20 MG tablet, Take 1 tablet by mouth Every Night., Disp: 30 tablet, Rfl: 5  •  carvedilol (COREG) 3.125 MG tablet, Take 1 tablet by mouth Every 12 (Twelve) Hours., Disp: 60 tablet, Rfl: 5  •  clopidogrel (PLAVIX) 75 MG tablet, Take 1 tablet by mouth Daily., Disp: 30 tablet, Rfl: 8  •  levothyroxine (SYNTHROID, LEVOTHROID) 75 MCG tablet, Take 1 tablet by mouth Daily., Disp: 30 tablet, Rfl: 5  •  VESICARE 10 MG tablet, , Disp: , Rfl:   •  zolpidem (AMBIEN) 10 MG tablet, Take 1 tablet by mouth At Night As Needed for Sleep., Disp: 30 tablet, Rfl: 2.  he denies medication side effects.    All of the chronic condition(s) listed above are stable w/o issues.    /82   Pulse 73   Temp 98.3 °F (36.8 °C) (Oral)   Resp 16   Ht 177.8 cm (70\")   Wt 103 kg (227 lb)   BMI 32.57 kg/m²     Results for orders placed or performed in visit on 03/01/18   Folate   Result Value Ref Range    Folate >20.00 4.78 - 24.20 ng/mL "   Ferritin   Result Value Ref Range    Ferritin 83.10 30.00 - 400.00 ng/mL   Iron Profile   Result Value Ref Range    Iron 73 59 - 158 mcg/dL    Iron Saturation 23 14 - 48 %    Transferrin 224 200 - 360 mg/dL    TIBC 314 249 - 505 mcg/dL   Vitamin B12   Result Value Ref Range    Vitamin B-12 1,383 (H) 211 - 946 pg/mL   CBC Auto Differential   Result Value Ref Range    WBC 5.07 4.00 - 10.00 10*3/mm3    RBC 4.42 4.30 - 5.50 10*6/mm3    Hemoglobin 13.0 (L) 13.5 - 16.5 g/dL    Hematocrit 40.9 40.0 - 49.0 %    MCV 92.5 83.0 - 97.0 fL    MCH 29.4 27.0 - 33.0 pg    MCHC 31.8 (L) 32.0 - 35.0 g/dL    RDW 13.7 11.7 - 14.5 %    RDW-SD 46.8 37.0 - 49.0 fl    MPV 10.1 8.9 - 12.1 fL    Platelets 116 (L) 150 - 375 10*3/mm3    Neutrophil % 52.3 39.0 - 75.0 %    Lymphocyte % 32.5 20.0 - 49.0 %    Monocyte % 12.8 (H) 4.0 - 12.0 %    Eosinophil % 1.8 1.0 - 5.0 %    Basophil % 0.4 0.0 - 1.1 %    Immature Grans % 0.2 0.0 - 0.5 %    Neutrophils, Absolute 2.65 1.50 - 7.00 10*3/mm3    Lymphocytes, Absolute 1.65 1.00 - 3.50 10*3/mm3    Monocytes, Absolute 0.65 0.25 - 0.80 10*3/mm3    Eosinophils, Absolute 0.09 0.00 - 0.36 10*3/mm3    Basophils, Absolute 0.02 0.00 - 0.10 10*3/mm3    Immature Grans, Absolute 0.01 0.00 - 0.03 10*3/mm3    nRBC 0.0 0.0 - 0.0 /100 WBC           The following portions of the patient's history were reviewed and updated as appropriate: allergies, current medications, past family history, past medical history, past social history, past surgical history and problem list.    Review of Systems   Constitutional: Negative for activity change, chills, fatigue and fever.   Respiratory: Negative for cough and shortness of breath.    Cardiovascular: Negative for chest pain and palpitations.   Gastrointestinal: Negative for abdominal pain.   Endocrine: Negative for cold intolerance.   Psychiatric/Behavioral: Negative for behavioral problems and dysphoric mood. The patient is not nervous/anxious.        Objective   Physical  Exam   Constitutional: He appears well-developed and well-nourished.   Neck: Neck supple. No thyromegaly present.   Cardiovascular: Normal rate and regular rhythm.    No murmur heard.  Pulmonary/Chest: Effort normal and breath sounds normal.   Abdominal: Bowel sounds are normal.   Psychiatric: He has a normal mood and affect. His behavior is normal.   Nursing note and vitals reviewed.      Assessment/Plan   Luigi was seen today for insomnia and hypothyroidism.    Diagnoses and all orders for this visit:    Essential hypertension  -     Comprehensive Metabolic Panel  -     Lipid Panel    Primary insomnia  -     zolpidem (AMBIEN) 10 MG tablet; Take 1 tablet by mouth At Night As Needed for Sleep.    Hypothyroidism, acquired  -     levothyroxine (SYNTHROID, LEVOTHROID) 75 MCG tablet; Take 1 tablet by mouth Daily.  -     TSH  -     T4, Free    Screening for prostate cancer  -     PSA Screen

## 2018-05-03 ENCOUNTER — OFFICE VISIT (OUTPATIENT)
Dept: FAMILY MEDICINE CLINIC | Facility: CLINIC | Age: 63
End: 2018-05-03

## 2018-05-03 VITALS
BODY MASS INDEX: 32.5 KG/M2 | WEIGHT: 227 LBS | HEART RATE: 73 BPM | RESPIRATION RATE: 16 BRPM | HEIGHT: 70 IN | DIASTOLIC BLOOD PRESSURE: 82 MMHG | TEMPERATURE: 98.3 F | SYSTOLIC BLOOD PRESSURE: 128 MMHG

## 2018-05-03 DIAGNOSIS — E03.9 HYPOTHYROIDISM, ACQUIRED: ICD-10-CM

## 2018-05-03 DIAGNOSIS — Z12.5 SCREENING FOR PROSTATE CANCER: ICD-10-CM

## 2018-05-03 DIAGNOSIS — F51.01 PRIMARY INSOMNIA: ICD-10-CM

## 2018-05-03 DIAGNOSIS — I10 ESSENTIAL HYPERTENSION: Primary | ICD-10-CM

## 2018-05-03 LAB
ALBUMIN SERPL-MCNC: 4.3 G/DL (ref 3.5–5.2)
ALBUMIN/GLOB SERPL: 1.8 G/DL
ALP SERPL-CCNC: 91 U/L (ref 39–117)
ALT SERPL-CCNC: 13 U/L (ref 1–41)
AST SERPL-CCNC: 15 U/L (ref 1–40)
BILIRUB SERPL-MCNC: 0.8 MG/DL (ref 0.1–1.2)
BUN SERPL-MCNC: 20 MG/DL (ref 8–23)
BUN/CREAT SERPL: 16.1 (ref 7–25)
CALCIUM SERPL-MCNC: 9.6 MG/DL (ref 8.6–10.5)
CHLORIDE SERPL-SCNC: 105 MMOL/L (ref 98–107)
CHOLEST SERPL-MCNC: 116 MG/DL (ref 0–200)
CO2 SERPL-SCNC: 26.4 MMOL/L (ref 22–29)
CREAT SERPL-MCNC: 1.24 MG/DL (ref 0.76–1.27)
GFR SERPLBLD CREATININE-BSD FMLA CKD-EPI: 59 ML/MIN/1.73
GFR SERPLBLD CREATININE-BSD FMLA CKD-EPI: 71 ML/MIN/1.73
GLOBULIN SER CALC-MCNC: 2.4 GM/DL
GLUCOSE SERPL-MCNC: 80 MG/DL (ref 65–99)
HDLC SERPL-MCNC: 35 MG/DL (ref 40–60)
LDLC SERPL CALC-MCNC: 64 MG/DL (ref 0–100)
POTASSIUM SERPL-SCNC: 4.9 MMOL/L (ref 3.5–5.2)
PROT SERPL-MCNC: 6.7 G/DL (ref 6–8.5)
PSA SERPL-MCNC: 0.12 NG/ML (ref 0–4)
SODIUM SERPL-SCNC: 144 MMOL/L (ref 136–145)
T4 FREE SERPL-MCNC: 1.36 NG/DL (ref 0.93–1.7)
TRIGL SERPL-MCNC: 87 MG/DL (ref 0–150)
TSH SERPL DL<=0.005 MIU/L-ACNC: 2.3 MIU/ML (ref 0.27–4.2)
VLDLC SERPL CALC-MCNC: 17.4 MG/DL (ref 5–40)

## 2018-05-03 PROCEDURE — 99214 OFFICE O/P EST MOD 30 MIN: CPT | Performed by: FAMILY MEDICINE

## 2018-05-03 RX ORDER — ZOLPIDEM TARTRATE 10 MG/1
10 TABLET ORAL NIGHTLY PRN
Qty: 30 TABLET | Refills: 2 | Status: SHIPPED | OUTPATIENT
Start: 2018-05-03 | End: 2018-08-28 | Stop reason: SDUPTHER

## 2018-05-03 RX ORDER — LEVOTHYROXINE SODIUM 0.07 MG/1
75 TABLET ORAL DAILY
Qty: 30 TABLET | Refills: 5 | Status: SHIPPED | OUTPATIENT
Start: 2018-05-03 | End: 2018-11-28 | Stop reason: SDUPTHER

## 2018-05-07 ENCOUNTER — OFFICE VISIT (OUTPATIENT)
Dept: CARDIAC REHAB | Facility: HOSPITAL | Age: 63
End: 2018-05-07

## 2018-05-07 DIAGNOSIS — Z95.1 S/P CABG X 3: Primary | ICD-10-CM

## 2018-05-09 ENCOUNTER — OFFICE VISIT (OUTPATIENT)
Dept: CARDIAC REHAB | Facility: HOSPITAL | Age: 63
End: 2018-05-09

## 2018-05-09 DIAGNOSIS — Z95.1 S/P CABG X 3: Primary | ICD-10-CM

## 2018-05-14 ENCOUNTER — OFFICE VISIT (OUTPATIENT)
Dept: CARDIAC REHAB | Facility: HOSPITAL | Age: 63
End: 2018-05-14

## 2018-05-14 DIAGNOSIS — Z95.1 S/P CABG X 3: Primary | ICD-10-CM

## 2018-05-21 ENCOUNTER — OFFICE VISIT (OUTPATIENT)
Dept: CARDIAC REHAB | Facility: HOSPITAL | Age: 63
End: 2018-05-21

## 2018-05-21 DIAGNOSIS — Z95.1 S/P CABG X 3: Primary | ICD-10-CM

## 2018-05-25 ENCOUNTER — OFFICE VISIT (OUTPATIENT)
Dept: CARDIAC REHAB | Facility: HOSPITAL | Age: 63
End: 2018-05-25

## 2018-05-25 DIAGNOSIS — Z95.1 S/P CABG X 3: Primary | ICD-10-CM

## 2018-06-04 ENCOUNTER — OFFICE VISIT (OUTPATIENT)
Dept: CARDIAC REHAB | Facility: HOSPITAL | Age: 63
End: 2018-06-04

## 2018-06-04 DIAGNOSIS — Z95.1 S/P CABG X 3: Primary | ICD-10-CM

## 2018-06-18 ENCOUNTER — OFFICE VISIT (OUTPATIENT)
Dept: CARDIAC REHAB | Facility: HOSPITAL | Age: 63
End: 2018-06-18

## 2018-06-18 DIAGNOSIS — Z95.1 S/P CABG X 3: Primary | ICD-10-CM

## 2018-06-27 ENCOUNTER — OFFICE VISIT (OUTPATIENT)
Dept: CARDIOLOGY | Facility: CLINIC | Age: 63
End: 2018-06-27

## 2018-06-27 VITALS
BODY MASS INDEX: 32.64 KG/M2 | SYSTOLIC BLOOD PRESSURE: 126 MMHG | WEIGHT: 228 LBS | HEART RATE: 69 BPM | DIASTOLIC BLOOD PRESSURE: 76 MMHG | HEIGHT: 70 IN

## 2018-06-27 DIAGNOSIS — I25.10 CORONARY ARTERY DISEASE INVOLVING NATIVE CORONARY ARTERY OF NATIVE HEART WITHOUT ANGINA PECTORIS: ICD-10-CM

## 2018-06-27 DIAGNOSIS — Z95.1 S/P CABG (CORONARY ARTERY BYPASS GRAFT): Primary | ICD-10-CM

## 2018-06-27 DIAGNOSIS — Z95.5 S/P CORONARY ARTERY STENT PLACEMENT: ICD-10-CM

## 2018-06-27 DIAGNOSIS — E78.5 HYPERLIPIDEMIA, UNSPECIFIED HYPERLIPIDEMIA TYPE: ICD-10-CM

## 2018-06-27 DIAGNOSIS — I10 ESSENTIAL HYPERTENSION: ICD-10-CM

## 2018-06-27 DIAGNOSIS — E03.9 HYPOTHYROIDISM, ACQUIRED: ICD-10-CM

## 2018-06-27 PROCEDURE — 93000 ELECTROCARDIOGRAM COMPLETE: CPT | Performed by: INTERNAL MEDICINE

## 2018-06-27 PROCEDURE — 99214 OFFICE O/P EST MOD 30 MIN: CPT | Performed by: INTERNAL MEDICINE

## 2018-06-27 RX ORDER — CLOPIDOGREL BISULFATE 75 MG/1
75 TABLET ORAL DAILY
Qty: 30 TABLET | Refills: 11 | Status: SHIPPED | OUTPATIENT
Start: 2018-06-27 | End: 2019-01-30

## 2018-06-27 NOTE — PROGRESS NOTES
Subjective:     Encounter Date:06/27/2018      Patient ID: Luigi Steel is a 63 y.o. male.    Chief Complaint:  History of Present Illness    This is a 63-year-old with a history of hypothyroidism, osteoarthritis, prior colon cancer status post partial colectomy, thrombocytopenia, hyperlipidemia, coronary artery disease status post anterior myocardial infarction and drug eluting stent placement of the mid LAD on 8/13/2017, followed by coronary artery bypass surgery ×3 on 8/25/2017, who presents for follow-up.     I began following the patient when he presented with an anterior myocardial infarction on 8/13/2017.  The patient reported sudden onset of chest discomfort 2 hours prior to his arrival.  On arrival to the hospital is brought directly to the cardiac catheterization laboratory and was found to have a thrombotic occlusion of his mid LAD.  Additionally was found to have multivessel coronary artery disease including proximal LAD, left circumflex artery, and posterior descending artery.  He underwent drug-eluting stent placement of his mid LAD at the time.  The remainder of his hospital position was uneventful.  He did have an echocardiogram performed following his event that showed mildly depressed left ventricular systolic function with ejection fraction of 45%.     The patient was brought back a week later for coronary artery bypass surgery.  His clopidogrel was stopped and he was placed on Integrilin therapy preoperatively.  He also underwent a repeat echocardiogram during that admission that showed normalization of his left ventricle systolic function and wall motion with an estimated ejection fraction of 70% and no significant valvular disease.  On 8/25/2017 he underwent CABG ×3 with a LIMA to LAD, saphenous vein graft to the PDA, and a saphenous vein graft to his OM 2.  He was resumed on clopidogrel following his surgery to ensure patency of his mid LAD stent.  Postoperatively he did pretty well  except he did suffer a syncopal episode on postoperative day 4 that was felt to be due to vasovagal syncope.      In follow up he has done well.  He has completed phase 2 cardiac and has gone on to participate in phase 3 cardiac rehabilitation.  He has reported some occasional fluttering in his chest, otherwise he has denied any significant issues.       Today he presents for routine 3 month follow up.  He continues to feel well overall.  He continues to have intermittent fluttering in his chest that is associated with lightheadedness but only lasts a few seconds at a time.  Denies any chest pain, shortness of breath, PND orthopnea, near-syncope or syncope, lower extremity edema.  He is still participating in phase 3 cardiac rehabilitation and is doing well.    Review of Systems   Constitution: Negative for weakness and malaise/fatigue.   HENT: Negative for hearing loss, hoarse voice, nosebleeds and sore throat.    Eyes: Negative for pain.   Cardiovascular: Positive for palpitations. Negative for chest pain, claudication, cyanosis, dyspnea on exertion, irregular heartbeat, leg swelling, near-syncope, orthopnea, paroxysmal nocturnal dyspnea and syncope.   Respiratory: Negative for shortness of breath and snoring.    Endocrine: Negative for cold intolerance, heat intolerance, polydipsia, polyphagia and polyuria.   Skin: Negative for itching and rash.   Musculoskeletal: Negative for arthritis, falls, joint pain, joint swelling, muscle cramps, muscle weakness and myalgias.   Gastrointestinal: Negative for constipation, diarrhea, dysphagia, heartburn, hematemesis, hematochezia, melena, nausea and vomiting.   Genitourinary: Negative for frequency, hematuria and hesitancy.   Neurological: Negative for excessive daytime sleepiness, dizziness, headaches, light-headedness and numbness.   Psychiatric/Behavioral: Negative for depression. The patient is not nervous/anxious.           Current Outpatient Prescriptions:   •   aspirin 81 MG chewable tablet, Chew 1 tablet Daily., Disp: , Rfl:   •  atorvastatin (LIPITOR) 20 MG tablet, Take 1 tablet by mouth Every Night., Disp: 30 tablet, Rfl: 5  •  carvedilol (COREG) 3.125 MG tablet, Take 1 tablet by mouth Every 12 (Twelve) Hours., Disp: 60 tablet, Rfl: 5  •  clopidogrel (PLAVIX) 75 MG tablet, Take 1 tablet by mouth Daily., Disp: 30 tablet, Rfl: 11  •  levothyroxine (SYNTHROID, LEVOTHROID) 75 MCG tablet, Take 1 tablet by mouth Daily., Disp: 30 tablet, Rfl: 5  •  VESICARE 10 MG tablet, , Disp: , Rfl:   •  zolpidem (AMBIEN) 10 MG tablet, Take 1 tablet by mouth At Night As Needed for Sleep., Disp: 30 tablet, Rfl: 2    Past Medical History:   Diagnosis Date   • Anterior myocardial infarction 8/13/2017   • Arthritis     Hands, knees   • Colon cancer    • Coronary artery disease    • Elevated blood pressure reading without diagnosis of hypertension    • H/O complete eye exam 2014    due   • Hypertension    • Hypothyroidism, acquired    • Insomnia    • Kidney stones    • Obesity    • Osteoarthritis, multiple sites    • PONV (postoperative nausea and vomiting)      Past Surgical History:   Procedure Laterality Date   • CARDIAC CATHETERIZATION N/A 8/13/2017    Procedure: Left Heart Cath;  Surgeon: Edyta Ye MD;  Location: [facility] INVASIVE LOCATION;  Service:    • CARDIAC CATHETERIZATION N/A 8/13/2017    Procedure: Stent RK coronary;  Surgeon: Edyta Ye MD;  Location: [facility] INVASIVE LOCATION;  Service:    • COLECTOMY PARTIAL / TOTAL  1994   • COLON RESECTION RIGHT Right 1990    for cancer   • COLONOSCOPY  03/18/2009    Dr. Tillman   • CORONARY ARTERY BYPASS GRAFT N/A 8/25/2017    Procedure: JAYLON STERNOTOMY CORONARY ARTERY BYPASS GRAFT TIMES 3 USING LEFT INTERNAL  MAMMARY ARTERY AND RIGHT SAPHENOUS VEIN GRAFT PER ENDOSCOPIC VEIN HARVESTING;  Surgeon: Ana Lynn MD;  Location: [facility] OR;  Service:    • HERNIA REPAIR  04/02/2009    hiatal   • HERNIA REPAIR Bilateral 08/2010  "   ABDOMINAL x3   • INCISIONAL HERNIA REPAIR  09/2011   • INCISIONAL HERNIA REPAIR  04/2009   • INCISIONAL HERNIA REPAIR  1996   • KIDNEY STONE SURGERY     • SMALL INTESTINE SURGERY      for adhesions     Family History   Problem Relation Age of Onset   • Arthritis Mother    • Stroke Mother    • Heart attack Mother    • Hypertension Mother    • Gallbladder disease Mother    • Heart disease Mother    • Hypertension Father    • Gallbladder disease Father    • Colon cancer Father 65   • Gallbladder disease Sister    • Arthritis Brother    • Cancer Brother    • Colon cancer Brother 51   • Heart attack Brother    • Heart disease Brother    • Hypertension Brother    • Gallbladder disease Brother    • Heart disease Brother    • No Known Problems Maternal Grandmother    • No Known Problems Maternal Grandfather    • No Known Problems Paternal Grandmother    • No Known Problems Paternal Grandfather      Social History   Substance Use Topics   • Smoking status: Never Smoker   • Smokeless tobacco: Never Used   • Alcohol use No           ECG 12 Lead  Date/Time: 6/27/2018 10:57 AM  Performed by: ARMANDO FERNANDEZ  Authorized by: ARMANDO FERNANDEZ   Comparison: compared with previous ECG   Similar to previous ECG  Rhythm: sinus rhythm               Objective:         Visit Vitals  /76 (BP Location: Right arm, Patient Position: Sitting)   Pulse 69   Ht 177.8 cm (70\")   Wt 103 kg (228 lb)   BMI 32.71 kg/m²          Physical Exam   Constitutional: He is oriented to person, place, and time. He appears well-developed and well-nourished.   HENT:   Head: Normocephalic and atraumatic.   Eyes: Conjunctivae, EOM and lids are normal. Pupils are equal, round, and reactive to light.   Neck: Normal range of motion and full passive range of motion without pain. Neck supple. No JVD present. Carotid bruit is not present.   Cardiovascular: Normal rate, regular rhythm, S1 normal and S2 normal.  Exam reveals no gallop.    No murmur " heard.  Pulses:       Radial pulses are 2+ on the right side, and 2+ on the left side.   No bilateral lower extremity edema   Pulmonary/Chest: Effort normal and breath sounds normal.   Abdominal: Soft. Normal appearance.   Lymphadenopathy:     He has no cervical adenopathy.   Neurological: He is alert and oriented to person, place, and time.   Skin: Skin is warm, dry and intact.   Psychiatric: He has a normal mood and affect.       Lab Review:       Assessment:          Diagnosis Plan   1. S/P CABG (coronary artery bypass graft)     2. Coronary artery disease involving native coronary artery of native heart without angina pectoris  clopidogrel (PLAVIX) 75 MG tablet   3. S/P coronary artery stent placement     4. Essential hypertension     5. Hyperlipidemia, unspecified hyperlipidemia type     6. Hypothyroidism, acquired            Plan:       1.  Coronary artery disease.  He is status post both drug-eluting stent placement of his mid LAD and coronary artery bypass surgery ×3.  He is doing well from the standpoint.  We'll continue his current management including dual antiplatelet therapy.  2.  Hypertension.  Well-controlled on his current medications.    3.  Hyperlipidemia.  Lipids appear to be at goal.  4.  Chronic thrombocytopenia.  Followed by hematology.  5.  Hypothyroidism    We'll plan on seeing the patient back again in 6 months.    Coronary Artery Disease  Assessment  • The patient has no angina    Plan  • Lifestyle modifications discussed include adhering to a heart healthy diet, avoidance of tobacco products, maintenance of a healthy weight, medication compliance, regular exercise and regular monitoring of cholesterol and blood pressure    Subjective - Objective  • There is a history of past MI on or around 8/13/2017  • There is a history of previous coronary artery bypass graft on or around 8/25/2017  • There has been a previous stent procedure using RK on or around 8/13/2017  • Current antiplatelet  therapy includes aspirin 81 mg and clopidogrel 75 mg  • The patient qualifies for cardiac rehabilitation, and has been referred to cardiac rehab

## 2018-08-17 ENCOUNTER — LAB (OUTPATIENT)
Dept: LAB | Facility: HOSPITAL | Age: 63
End: 2018-08-17

## 2018-08-17 ENCOUNTER — OFFICE VISIT (OUTPATIENT)
Dept: ONCOLOGY | Facility: CLINIC | Age: 63
End: 2018-08-17

## 2018-08-17 VITALS
DIASTOLIC BLOOD PRESSURE: 76 MMHG | RESPIRATION RATE: 16 BRPM | HEIGHT: 69 IN | OXYGEN SATURATION: 94 % | TEMPERATURE: 97.9 F | BODY MASS INDEX: 34.33 KG/M2 | SYSTOLIC BLOOD PRESSURE: 124 MMHG | WEIGHT: 231.8 LBS | HEART RATE: 58 BPM

## 2018-08-17 DIAGNOSIS — E53.8 VITAMIN B12 DEFICIENCY: ICD-10-CM

## 2018-08-17 DIAGNOSIS — E61.1 IRON DEFICIENCY: Primary | ICD-10-CM

## 2018-08-17 DIAGNOSIS — D69.6 THROMBOCYTOPENIA (HCC): ICD-10-CM

## 2018-08-17 DIAGNOSIS — D64.9 ANEMIA, UNSPECIFIED TYPE: ICD-10-CM

## 2018-08-17 LAB
ALBUMIN SERPL-MCNC: 4 G/DL (ref 3.5–5.2)
ALBUMIN/GLOB SERPL: 1.6 G/DL (ref 1.1–2.4)
ALP SERPL-CCNC: 95 U/L (ref 38–116)
ALT SERPL W P-5'-P-CCNC: 15 U/L (ref 0–41)
ANION GAP SERPL CALCULATED.3IONS-SCNC: 10.8 MMOL/L
AST SERPL-CCNC: 14 U/L (ref 0–40)
BASOPHILS # BLD AUTO: 0.02 10*3/MM3 (ref 0–0.1)
BASOPHILS NFR BLD AUTO: 0.3 % (ref 0–1.1)
BILIRUB SERPL-MCNC: 0.7 MG/DL (ref 0.1–1.2)
BUN BLD-MCNC: 19 MG/DL (ref 6–20)
BUN/CREAT SERPL: 14.1 (ref 7.3–30)
CALCIUM SPEC-SCNC: 9.3 MG/DL (ref 8.5–10.2)
CHLORIDE SERPL-SCNC: 106 MMOL/L (ref 98–107)
CO2 SERPL-SCNC: 27.2 MMOL/L (ref 22–29)
CREAT BLD-MCNC: 1.35 MG/DL (ref 0.7–1.3)
DEPRECATED RDW RBC AUTO: 43.5 FL (ref 37–49)
EOSINOPHIL # BLD AUTO: 0.12 10*3/MM3 (ref 0–0.36)
EOSINOPHIL NFR BLD AUTO: 2 % (ref 1–5)
ERYTHROCYTE [DISTWIDTH] IN BLOOD BY AUTOMATED COUNT: 12.6 % (ref 11.7–14.5)
FERRITIN SERPL-MCNC: 73 NG/ML (ref 30–400)
GFR SERPL CREATININE-BSD FRML MDRD: 53 ML/MIN/1.73
GLOBULIN UR ELPH-MCNC: 2.5 GM/DL (ref 1.8–3.5)
GLUCOSE BLD-MCNC: 79 MG/DL (ref 74–124)
HCT VFR BLD AUTO: 41.2 % (ref 40–49)
HGB BLD-MCNC: 13.2 G/DL (ref 13.5–16.5)
IMM GRANULOCYTES # BLD: 0.01 10*3/MM3 (ref 0–0.03)
IMM GRANULOCYTES NFR BLD: 0.2 % (ref 0–0.5)
IRON 24H UR-MRATE: 51 MCG/DL (ref 59–158)
IRON SATN MFR SERPL: 17 % (ref 14–48)
LYMPHOCYTES # BLD AUTO: 1.96 10*3/MM3 (ref 1–3.5)
LYMPHOCYTES NFR BLD AUTO: 32.6 % (ref 20–49)
MCH RBC QN AUTO: 30.3 PG (ref 27–33)
MCHC RBC AUTO-ENTMCNC: 32 G/DL (ref 32–35)
MCV RBC AUTO: 94.5 FL (ref 83–97)
MONOCYTES # BLD AUTO: 0.61 10*3/MM3 (ref 0.25–0.8)
MONOCYTES NFR BLD AUTO: 10.1 % (ref 4–12)
NEUTROPHILS # BLD AUTO: 3.3 10*3/MM3 (ref 1.5–7)
NEUTROPHILS NFR BLD AUTO: 54.8 % (ref 39–75)
NRBC BLD MANUAL-RTO: 0 /100 WBC (ref 0–0)
PLATELET # BLD AUTO: 122 10*3/MM3 (ref 150–375)
PMV BLD AUTO: 11 FL (ref 8.9–12.1)
POTASSIUM BLD-SCNC: 4.3 MMOL/L (ref 3.5–4.7)
PROT SERPL-MCNC: 6.5 G/DL (ref 6.3–8)
RBC # BLD AUTO: 4.36 10*6/MM3 (ref 4.3–5.5)
SODIUM BLD-SCNC: 144 MMOL/L (ref 134–145)
TIBC SERPL-MCNC: 297 MCG/DL (ref 249–505)
TRANSFERRIN SERPL-MCNC: 212 MG/DL (ref 200–360)
VIT B12 BLD-MCNC: 1514 PG/ML (ref 211–946)
WBC NRBC COR # BLD: 6.02 10*3/MM3 (ref 4–10)

## 2018-08-17 PROCEDURE — 83540 ASSAY OF IRON: CPT | Performed by: INTERNAL MEDICINE

## 2018-08-17 PROCEDURE — 82728 ASSAY OF FERRITIN: CPT | Performed by: INTERNAL MEDICINE

## 2018-08-17 PROCEDURE — 99213 OFFICE O/P EST LOW 20 MIN: CPT | Performed by: INTERNAL MEDICINE

## 2018-08-17 PROCEDURE — 80053 COMPREHEN METABOLIC PANEL: CPT | Performed by: INTERNAL MEDICINE

## 2018-08-17 PROCEDURE — 84466 ASSAY OF TRANSFERRIN: CPT | Performed by: INTERNAL MEDICINE

## 2018-08-17 PROCEDURE — 82607 VITAMIN B-12: CPT | Performed by: INTERNAL MEDICINE

## 2018-08-17 PROCEDURE — 85025 COMPLETE CBC W/AUTO DIFF WBC: CPT | Performed by: INTERNAL MEDICINE

## 2018-08-17 PROCEDURE — 36415 COLL VENOUS BLD VENIPUNCTURE: CPT | Performed by: INTERNAL MEDICINE

## 2018-08-17 NOTE — PROGRESS NOTES
REASON FOR FOLLOWUP:     1.  Chronic mild thrombocytopenia dating back at least to March 2016.    2.  Vitamin B12 deficiency diagnosed in middle of August 2017, responded to oral B12 supplementation.   3.  Post surgical anemia in August 2017.         HISTORY OF PRESENT ILLNESS:  The patient is a 63 y.o. male who is here for 6-month reevaluation about his anemia, thrombocytopenia and vitamin B12 deficiency.     This patient has a persistent mild anemia with hemoglobin 13.2, and mild thrombocytopenia with platelets 122,000.  Patient reports at baseline condition, denies fainting or syncope. No easy bleeding or bruising.  He has been taking oral vitamin B12, folic acid and oral iron supplementation or once a day. He has no specific complaints.      He has been doing very well after his bypass surgery in August 2017.   No chest pain or short of breath.      Obstipated reported hemoglobin 13.2 platelets 122,000 WBC 6000 including neutrophils 3300 lymphocytes 2000.  Studies for iron, B12 and folic acid are pending.     Laboratory study on 3/1/2018 showed the platelets at 116,000, slightly improved hemoglobin 13.0 and normal WBC 5070 including neutrophils 2650 and lymphocytes 1650.  His vitamin B12 level was 3083 pg/mL, folic acid more than 20 ng/mL.  Serum ferritin 83.1 ng/m, iron 73 TIBC 314 and iron saturation 23%.        Medical History         Past Medical History:   Diagnosis Date   • Arthritis     • Colon cancer, post surgery and adjuvant chemotherapy in 1990.      • Elevated blood pressure reading without diagnosis of hypertension     • H/O complete eye exam 2016   • Hypertension     • Hypothyroidism, acquired     • Insomnia     • Kidney stones     • Osteoarthritis, multiple sites            Surgical History          Past Surgical History:   Procedure Laterality Date   • CARDIAC CATHETERIZATION N/A 8/13/2017     Procedure: Left Heart Cath;  Surgeon: Edyta Ye MD;  Location: [facility] INVASIVE LOCATION;   Service:    • CARDIAC CATHETERIZATION N/A 8/13/2017     Procedure: Stent RK coronary;  Surgeon: Edyta Ye MD;  Location: [facility] INVASIVE LOCATION;  Service:    • COLECTOMY PARTIAL / TOTAL   1994   • COLONOSCOPY   03/18/2009   • HERNIA REPAIR   04/02/2009     hiatal       Triple-vessel CABG bypass surgery on 8/25/2017 by Dr. Ye.      HEMATOLOGIC/ONCOLOGIC HISTORY: The patient is a 62 y.o. year old male whom we are consulted for evaluation of thrombocytopenia.      This patient to was admitted for first episodes of heart attack in mid August 2017.  Patient had cardiac stent placement this hospitalization.  Currently has no chest pain.  This patient has no history of coronary artery disease previously and was not taking aspirin or Plavix.      At time of this admission, in the very early morning on 8/13/2017, laboratory study reported platelets 133,000, hemoglobin 14.2 and WBC 7700 including neutrophils 5200 and lymphocytes 1900.  His chemistry lab reported creatinine 1.29, normal electrolytes in the liver function panel, mildly elevated glucose 135.  Patient had cardiac catheterization and drug-eluting stent placement in the morning of August 13, 2017.  In the morning of 8/14/2017, repeated CBC showed a decreased platelets at 104,000, hemoglobin 12.3 and WBC 6100 without a differentiation.  Repeated laboratory study on 8/15/2017 reported a platelets 126,000 and hemoglobin 14, WBC 6600.  Today on 8/16/2017, laboratory study showed a platelets 119,000, hemoglobin 14.1 and a WBC 5600.  I obtained laboratory study in the afternoon, showed a marginally elevated IPF 6.8%, platelet counts 125,000.  His vitamin B12 level was very marginal at 261 pg/mL, negative for rheumatoid factor, and had normal LDH at 210.     I reviewed his previous laboratory study available in the [facility] electronic medical records, dating back to 3/25/2016 where he had platelets 132,000, hemoglobin 13.8 and WBC 5700 with normal  distribution.  Lab results on 4/25/2016 showed a platelets 151,000, hemoglobin 14.5 and WBC 6800.  On 3/20/2017 his platelets was 153,000, hemoglobin 14.9 and WBC 6700.  He also had a normal thyroid screening test in March 2016 and March 2017.  His chemistries study showed a stable creatinine level between 1.2 and 1.3.      I also searched medical records in the [facility], back on 8/21/2014 he had a CBC showed a platelets 117,000 and hemoglobin 14.1 WBC 5000 with normal distribution.       On 10/20/2014 he had platelets 141,000 and hemoglobin 15.4 WBC 10,700 including neutrophils 9100.  His abdomen CT scan examination on 10/20/2014 reported no splenomegaly no hepatomegaly.  It is seems that he had visited to emergency room at that time because of chosen no cold, about 1 months after he had lithotripsy for kidney stone.  Urinalysis was positive for RBC and a trace of bacteria.      Patient reports that he has no easy bleeding or bruising.  He previously had multiple surgery noticeably with colon resection for colon cancer 27 years ago.  He did receive chemotherapy for 6 months treated by Dr. Jasso at that time.  He also had repair for abdominal wall hernia, however he had no significant postoperatively bleeding.     He was given vitamin B12 injection and started oral B12 supplementation. Patient reports that he has been taking 2500 mcg vitamin B12 daily.     This patient subsequently had triple vessel bypass surgery on 08/25/2017 at [facility]. At that time he still had mild thrombocytopenia with platelets 128,000 and hemoglobin 13.1, WBC 6100. Postsurgery, his platelets deteriorated all the way down to 76,000 on 08/27/2017 and hemoglobin 8.9; however, gradually improved afterwards with platelets recovered at 130,000 on 08/29/2017 and hemoglobin 9.3 when he was discharged. Since that time laboratory study on 09/05/2017 reported normalized platelets at 341,000, hemoglobin 10.8 and WBC  7300. Today, laboratory study reported platelets 239,000, hemoglobin 12.5 and WBC 5800.     Patient reports after surgery he is feeling better and more energetic. He is still in recovery. He still has a small wound in the middle chest not healed completely with slight yellowness but no active drainage. Home visiting nurse comes to his house twice a week inspecting that. Patient reports no fever, sweating, chills.    Patient was tested negative for comprehensive REBA panel on 8/16/2017, and also negative for rheumatoid factor.  Serum protein immunofixation was negative, including IgG 675, IgA 91 IgM 104, he also had a normal LDH.     When I saw him on 9/15/2017 he had normalized platelets at 239,000, and WBC 5800 and a mild but much improved anemia with hemoglobin 12.5.      He had negative laboratory test for rheumatoid factor and comprehensive REBA panel, and no evidence of destruction of platelets and no significant elevation of IPF.  After CABG surgery on August 25, 2017, he did have expected brief thrombocytopenia with max at 76,000 on August 27, 2017 and then subsequently rebounded afterwards.  His platelets normalized at 341,000 on 9/5/2017 and then on 9/15/2017 it's 239,000.  This could be part of postsurgical changes.      Laboratory study on 11/2/2017 reported worsened platelets at 134,000, WBC 4240 and neutrophils 2170, lymphocytes 1440.  His hemoglobin is about the same at 12.7.  Serum vitamin B12 level was 1045 pg/mL, folic acid 6.06 ng/mL, iron 50 TIBC 295 iron saturation 17% and a ferritin 116 ng/mL.  Patient was asked to start oral folic acid and oral iron supplementation once a day.      Laboratory study on 3/1/2018 showed the platelets at 116,000, slightly improved hemoglobin 13.0 and normal WBC 5070 including neutrophils 2650 and lymphocytes 1650.  His vitamin B12 level was 3083 pg/mL, folic acid more than 20 ng/mL.  Serum ferritin 83.1 ng/m, iron 73 TIBC 314 and iron saturation 23%.   "    MEDICATIONS:The current medication list was reviewed with the patient and updated in the EMR this date per the Medical Assistant. Medication dosages and frequencies were confirmed to be accurate.       ALLERGIES:          Allergies   Allergen Reactions   • Hydromorphone Nausea And Vomiting and Shortness Of Breath   • Demerol [Meperidine] Nausea And Vomiting         SOCIAL HISTORY: never smoked cigarettes.       FAMILY HISTORY: Father was diagnosed of colon cancer at age 65. Brother diagnosed of colon cancer at age 51.       REVIEW OF SYSTEMS:  GENERAL: No change in appetite or weight; No fevers, chills, sweats.    SKIN: No nonhealing lesions. No rashes.   HEME/LYMPH: No easy bruising, bleeding.  No swollen nodes.   EYES: No vision changes or diplopia.   ENT: No tinnitus, hearing loss, gum bleeding, epistaxis, hoarseness or dysphagia.   RESPIRATORY: No cough, shortness of breath, hemoptysis or wheezing.   CVS:   no recurrent chest pain or exertional dyspnea.    GI: No melena or hematochezia.  No abdominal pain.  No nausea, vomiting, constipation, diarrhea  : No lower tract obstructive symptoms, dysuria or hematuria.   MUSCULOSKELETAL: No bone pain.  No joint stiffness.   NEUROLOGICAL: No global weakness, loss of consciousness or seizures.   PSYCHIATRIC: No increased nervousness, mood changes or depression.         Objective   Vitals:    08/17/18 1058   BP: 124/76   Pulse: 58   Resp: 16   Temp: 97.9 °F (36.6 °C)   TempSrc: Oral   SpO2: 94%   Weight: 105 kg (231 lb 12.8 oz)   Height: 174 cm (68.5\")  Comment: New  6mth visit   PainSc: 0-No pain   ECOG 1.        PHYSICAL EXAM:    GENERAL:  Well-developed, well-nourished  male healthy looking.   SKIN:  Warm, dry without rashes, purpura or petechiae.   EYES:    Conjunctivae normal.  EARS:  Hearing intact.  NOSE:   No nasal discharge.  MOUTH:  Tongue is well-papillated; oral mucosa moist.  no stomatitis or ulcers.  Lips normal.  THROAT:  Oropharynx " without lesions or exudates.  NECK:  no thyromegaly or masses.  LYMPHATICS:  No cervical or supraclavicular adenopathy.  CHEST:  Lungs clear to auscultation.   CARDIAC:  Regular rate and rhythm without murmurs. Normal S1,S2.  ABDOMEN:  Soft, no tender, no hepatosplenomegaly or masses.  Bowel sounds present.   EXTREMITIES:  No clubbing, cyanosis or edema.  NEUROLOGICAL:  Cranial Nerves II-XII grossly intact.  No focal neurological deficits.  PSYCHIATRIC:  Normal affect and mood.     RECENT LABS:        Lab Results   Component Value Date    WBC 6.02 08/17/2018    HGB 13.2 (L) 08/17/2018    HCT 41.2 08/17/2018    MCV 94.5 08/17/2018     (L) 08/17/2018     Lab Results   Component Value Date    NEUTROABS 3.30 08/17/2018     Lab Results   Component Value Date    IRON 73 03/01/2018    TIBC 314 03/01/2018    FERRITIN 83.10 03/01/2018    Iron saturation 23%     Assessment/Plan       1.  Vitamin B12 deficiency.  Able to maintain supratherapeutic B12 level on oral supplementation. He'll continue treatment for now.  Labs pending today.    2.  Mild thrombocytopenia, asymptomatic.  The exact etiology is not clear.  It does not look like related to vitamin B12 deficiency since he has worsening thrombocytopenia when his vitamin B12 becomes supratherapeutic.  We'll continue to monitor.      3.  Mild anemia.  This patient had borderline normal hemoglobin level prior to his bypass surgery in August 2017.  After surgery his hemoglobin dropped as low as 8.9 g/dL on 8/20/2017 and subsequently improved afterwards.  His iron study showed a reasonable ferritin level, however iron saturation was slightly low.  His folic acid level was also marginally normal.  He is being on both oral iron and folic acid since November 2017.  He now has supratherapeutic folic acid level, and also improved iron saturation.  His hemoglobin marginally improved.      PLAN:   1.  Waiting for laboratory studies of iron and B12.  2.  Continue oral vitamin B12  supplementation at 2500 µg daily.   3.  Continue oral iron supplementation ferrous sulfate 325 mg once a day.     4.  Continue oral folic acid 800 µg daily.  5.  Returns in 12 months for MD visit and labs cbc, CMP, ferritin, iron, b12 levels.       More than 15 min, over half of that time were for counseling.        ALEX ALBRIGHT M.D., Ph.D.    8/17/2018      Addendum:    Lab Results   Component Value Date    CXMXZGHB60 1,514 (H) 08/17/2018     Lab Results   Component Value Date    IRON 51 (L) 08/17/2018    TIBC 297 08/17/2018    FERRITIN 73.00 08/17/2018   Iron saturation 17%      Patient has further improved B12 level, however his iron saturation and eventually both have decreased slightly.     Continue oral vitamin B12, and increase oral iron to twice a day.     ALEX ALBRIGHT M.D., Ph.D.    8/19/2018          CC: Edyta Ye M.D.

## 2018-08-19 PROBLEM — D50.9 IRON DEFICIENCY ANEMIA: Status: ACTIVE | Noted: 2017-09-15

## 2018-08-20 ENCOUNTER — TELEPHONE (OUTPATIENT)
Dept: ONCOLOGY | Facility: HOSPITAL | Age: 63
End: 2018-08-20

## 2018-08-20 LAB
FOLATE BLD-MCNC: 330.9 NG/ML
FOLATE RBC-MCNC: 862 NG/ML
HCT VFR BLD AUTO: 38.4 % (ref 37.5–51)

## 2018-08-20 NOTE — TELEPHONE ENCOUNTER
----- Message from Leeann Mena MD PhD sent at 8/19/2018 12:19 PM EDT -----  Regarding: oral iron   Patient has further improved B12 level, however his iron saturation and ferritin both have decreased slightly.     Continue oral vitamin B12, and increase oral iron to twice a day.     Thanks!    Dr. Mena     Attempted to call both numbers listed. No answer, unable to leave voicemail on either number. Attempted to call again at [phone], no answer on either phone and unable to leave VM

## 2018-08-27 ENCOUNTER — APPOINTMENT (OUTPATIENT)
Dept: CARDIAC REHAB | Facility: HOSPITAL | Age: 63
End: 2018-08-27

## 2018-08-28 ENCOUNTER — OFFICE VISIT (OUTPATIENT)
Dept: FAMILY MEDICINE CLINIC | Facility: CLINIC | Age: 63
End: 2018-08-28

## 2018-08-28 VITALS
SYSTOLIC BLOOD PRESSURE: 131 MMHG | BODY MASS INDEX: 32.35 KG/M2 | HEART RATE: 66 BPM | TEMPERATURE: 98.1 F | DIASTOLIC BLOOD PRESSURE: 78 MMHG | RESPIRATION RATE: 16 BRPM | HEIGHT: 70 IN | WEIGHT: 226 LBS

## 2018-08-28 DIAGNOSIS — I10 ESSENTIAL HYPERTENSION: ICD-10-CM

## 2018-08-28 DIAGNOSIS — F51.01 PRIMARY INSOMNIA: ICD-10-CM

## 2018-08-28 PROCEDURE — 99213 OFFICE O/P EST LOW 20 MIN: CPT | Performed by: FAMILY MEDICINE

## 2018-08-28 RX ORDER — CARVEDILOL 3.12 MG/1
3.12 TABLET ORAL EVERY 12 HOURS SCHEDULED
Qty: 60 TABLET | Refills: 5 | Status: CANCELLED | OUTPATIENT
Start: 2018-08-28

## 2018-08-28 RX ORDER — CARVEDILOL 3.12 MG/1
3.12 TABLET ORAL EVERY 12 HOURS SCHEDULED
Qty: 60 TABLET | Refills: 5 | Status: SHIPPED | OUTPATIENT
Start: 2018-08-28 | End: 2019-01-30 | Stop reason: SDUPTHER

## 2018-08-28 RX ORDER — ZOLPIDEM TARTRATE 10 MG/1
10 TABLET ORAL NIGHTLY PRN
Qty: 30 TABLET | Refills: 2 | Status: SHIPPED | OUTPATIENT
Start: 2018-08-28 | End: 2018-11-28 | Stop reason: SDUPTHER

## 2018-08-28 NOTE — PROGRESS NOTES
"Subjective   Luigi Steel is a 63 y.o. male.     History of Present Illness     Chief Complaint:   Chief Complaint   Patient presents with   • Insomnia     med refill - nora    • Hypertension       Luigi Steel 63 y.o. male who presents today for Medical Management of the below listed issues and medication refills.  he has a problem list of   Patient Active Problem List   Diagnosis   • Colon cancer (CMS/HCC)   • Elevated blood pressure reading without diagnosis of hypertension   • Hypothyroidism, acquired   • Insomnia   • Osteoarthritis, multiple sites   • Hyperlipidemia   • Thrombocytopenia (CMS/HCC)   • Vitamin B12 deficiency   • CAD (coronary artery disease)   • S/P CABG (coronary artery bypass graft)   • Iron deficiency anemia   • S/P coronary artery stent placement   • Essential hypertension   • Iron deficiency   .  Since the last visit, he has overall felt well.  he has been compliant with   Current Outpatient Prescriptions:   •  carvedilol (COREG) 3.125 MG tablet, Take 1 tablet by mouth Every 12 (Twelve) Hours., Disp: 60 tablet, Rfl: 5  •  aspirin 81 MG chewable tablet, Chew 1 tablet Daily., Disp: , Rfl:   •  atorvastatin (LIPITOR) 20 MG tablet, Take 1 tablet by mouth Every Night., Disp: 30 tablet, Rfl: 5  •  clopidogrel (PLAVIX) 75 MG tablet, Take 1 tablet by mouth Daily., Disp: 30 tablet, Rfl: 11  •  levothyroxine (SYNTHROID, LEVOTHROID) 75 MCG tablet, Take 1 tablet by mouth Daily., Disp: 30 tablet, Rfl: 5  •  VESICARE 10 MG tablet, , Disp: , Rfl:   •  zolpidem (AMBIEN) 10 MG tablet, Take 1 tablet by mouth At Night As Needed for Sleep., Disp: 30 tablet, Rfl: 2.  he denies medication side effects.    All of the chronic condition(s) listed above are stable w/o issues.    /78   Pulse 66   Temp 98.1 °F (36.7 °C) (Oral)   Resp 16   Ht 177.8 cm (70\")   Wt 103 kg (226 lb)   BMI 32.43 kg/m²     Results for orders placed or performed in visit on 08/17/18   Comprehensive Metabolic Panel   Result " Value Ref Range    Glucose 79 74 - 124 mg/dL    BUN 19 6 - 20 mg/dL    Creatinine 1.35 (H) 0.70 - 1.30 mg/dL    Sodium 144 134 - 145 mmol/L    Potassium 4.3 3.5 - 4.7 mmol/L    Chloride 106 98 - 107 mmol/L    CO2 27.2 22.0 - 29.0 mmol/L    Calcium 9.3 8.5 - 10.2 mg/dL    Total Protein 6.5 6.3 - 8.0 g/dL    Albumin 4.00 3.50 - 5.20 g/dL    ALT (SGPT) 15 0 - 41 U/L    AST (SGOT) 14 0 - 40 U/L    Alkaline Phosphatase 95 38 - 116 U/L    Total Bilirubin 0.7 0.1 - 1.2 mg/dL    eGFR Non African Amer 53 (L) >60 mL/min/1.73    Globulin 2.5 1.8 - 3.5 gm/dL    A/G Ratio 1.6 1.1 - 2.4 g/dL    BUN/Creatinine Ratio 14.1 7.3 - 30.0    Anion Gap 10.8 mmol/L   Ferritin   Result Value Ref Range    Ferritin 73.00 30.00 - 400.00 ng/mL   Iron Profile   Result Value Ref Range    Iron 51 (L) 59 - 158 mcg/dL    Iron Saturation 17 14 - 48 %    Transferrin 212 200 - 360 mg/dL    TIBC 297 249 - 505 mcg/dL   Vitamin B12   Result Value Ref Range    Vitamin B-12 1,514 (H) 211 - 946 pg/mL   Folate RBC   Result Value Ref Range    Folate, Hemolysate 330.9 Not Estab. ng/mL    Hematocrit 38.4 37.5 - 51.0 %    RBC Folate 862 >498 ng/mL   CBC Auto Differential   Result Value Ref Range    WBC 6.02 4.00 - 10.00 10*3/mm3    RBC 4.36 4.30 - 5.50 10*6/mm3    Hemoglobin 13.2 (L) 13.5 - 16.5 g/dL    Hematocrit 41.2 40.0 - 49.0 %    MCV 94.5 83.0 - 97.0 fL    MCH 30.3 27.0 - 33.0 pg    MCHC 32.0 32.0 - 35.0 g/dL    RDW 12.6 11.7 - 14.5 %    RDW-SD 43.5 37.0 - 49.0 fl    MPV 11.0 8.9 - 12.1 fL    Platelets 122 (L) 150 - 375 10*3/mm3    Neutrophil % 54.8 39.0 - 75.0 %    Lymphocyte % 32.6 20.0 - 49.0 %    Monocyte % 10.1 4.0 - 12.0 %    Eosinophil % 2.0 1.0 - 5.0 %    Basophil % 0.3 0.0 - 1.1 %    Immature Grans % 0.2 0.0 - 0.5 %    Neutrophils, Absolute 3.30 1.50 - 7.00 10*3/mm3    Lymphocytes, Absolute 1.96 1.00 - 3.50 10*3/mm3    Monocytes, Absolute 0.61 0.25 - 0.80 10*3/mm3    Eosinophils, Absolute 0.12 0.00 - 0.36 10*3/mm3    Basophils, Absolute 0.02 0.00 -  0.10 10*3/mm3    Immature Grans, Absolute 0.01 0.00 - 0.03 10*3/mm3    nRBC 0.0 0.0 - 0.0 /100 WBC           The following portions of the patient's history were reviewed and updated as appropriate: allergies, current medications, past family history, past medical history, past social history, past surgical history and problem list.    Review of Systems   Constitutional: Negative for activity change, chills, fatigue and fever.   Respiratory: Negative for cough and shortness of breath.    Cardiovascular: Negative for chest pain and palpitations.   Gastrointestinal: Negative for abdominal pain.   Endocrine: Negative for cold intolerance.   Psychiatric/Behavioral: Negative for behavioral problems and dysphoric mood. The patient is not nervous/anxious.        Objective   Physical Exam   Constitutional: He appears well-developed and well-nourished.   Neck: Neck supple. No thyromegaly present.   Cardiovascular: Normal rate and regular rhythm.    No murmur heard.  Pulmonary/Chest: Effort normal and breath sounds normal.   Abdominal: Bowel sounds are normal. There is no tenderness.   Psychiatric: He has a normal mood and affect. His behavior is normal.   Nursing note and vitals reviewed.    The patient has read and signed the [facility] Controlled Substance Contract.  I will continue to see patient for regular follow up appointments.  They are well controlled on their medication.  JESUS has been reviewed by me and is updated every 3 months. The patient is aware of the potential for addiction and dependence.    Assessment/Plan   Luigi was seen today for insomnia and hypertension.    Diagnoses and all orders for this visit:    Primary insomnia  -     zolpidem (AMBIEN) 10 MG tablet; Take 1 tablet by mouth At Night As Needed for Sleep.    Essential hypertension  -     carvedilol (COREG) 3.125 MG tablet; Take 1 tablet by mouth Every 12 (Twelve) Hours.    Other orders  -     Cancel: carvedilol (COREG) 3.125 MG tablet; Take  1 tablet by mouth Every 12 (Twelve) Hours.

## 2018-08-29 ENCOUNTER — APPOINTMENT (OUTPATIENT)
Dept: CARDIAC REHAB | Facility: HOSPITAL | Age: 63
End: 2018-08-29

## 2018-08-31 ENCOUNTER — APPOINTMENT (OUTPATIENT)
Dept: CARDIAC REHAB | Facility: HOSPITAL | Age: 63
End: 2018-08-31

## 2018-09-05 ENCOUNTER — APPOINTMENT (OUTPATIENT)
Dept: CARDIAC REHAB | Facility: HOSPITAL | Age: 63
End: 2018-09-05

## 2018-09-07 ENCOUNTER — APPOINTMENT (OUTPATIENT)
Dept: CARDIAC REHAB | Facility: HOSPITAL | Age: 63
End: 2018-09-07

## 2018-09-10 ENCOUNTER — APPOINTMENT (OUTPATIENT)
Dept: CARDIAC REHAB | Facility: HOSPITAL | Age: 63
End: 2018-09-10

## 2018-09-12 ENCOUNTER — APPOINTMENT (OUTPATIENT)
Dept: CARDIAC REHAB | Facility: HOSPITAL | Age: 63
End: 2018-09-12

## 2018-09-14 ENCOUNTER — APPOINTMENT (OUTPATIENT)
Dept: CARDIAC REHAB | Facility: HOSPITAL | Age: 63
End: 2018-09-14

## 2018-09-17 ENCOUNTER — APPOINTMENT (OUTPATIENT)
Dept: CARDIAC REHAB | Facility: HOSPITAL | Age: 63
End: 2018-09-17

## 2018-09-19 ENCOUNTER — APPOINTMENT (OUTPATIENT)
Dept: CARDIAC REHAB | Facility: HOSPITAL | Age: 63
End: 2018-09-19

## 2018-09-21 ENCOUNTER — APPOINTMENT (OUTPATIENT)
Dept: CARDIAC REHAB | Facility: HOSPITAL | Age: 63
End: 2018-09-21

## 2018-09-24 ENCOUNTER — APPOINTMENT (OUTPATIENT)
Dept: CARDIAC REHAB | Facility: HOSPITAL | Age: 63
End: 2018-09-24

## 2018-09-26 ENCOUNTER — APPOINTMENT (OUTPATIENT)
Dept: CARDIAC REHAB | Facility: HOSPITAL | Age: 63
End: 2018-09-26

## 2018-09-28 ENCOUNTER — APPOINTMENT (OUTPATIENT)
Dept: CARDIAC REHAB | Facility: HOSPITAL | Age: 63
End: 2018-09-28

## 2018-10-01 ENCOUNTER — APPOINTMENT (OUTPATIENT)
Dept: CARDIAC REHAB | Facility: HOSPITAL | Age: 63
End: 2018-10-01

## 2018-10-03 ENCOUNTER — APPOINTMENT (OUTPATIENT)
Dept: CARDIAC REHAB | Facility: HOSPITAL | Age: 63
End: 2018-10-03

## 2018-10-05 ENCOUNTER — APPOINTMENT (OUTPATIENT)
Dept: CARDIAC REHAB | Facility: HOSPITAL | Age: 63
End: 2018-10-05

## 2018-10-08 ENCOUNTER — APPOINTMENT (OUTPATIENT)
Dept: CARDIAC REHAB | Facility: HOSPITAL | Age: 63
End: 2018-10-08

## 2018-10-10 ENCOUNTER — APPOINTMENT (OUTPATIENT)
Dept: CARDIAC REHAB | Facility: HOSPITAL | Age: 63
End: 2018-10-10

## 2018-10-12 ENCOUNTER — APPOINTMENT (OUTPATIENT)
Dept: CARDIAC REHAB | Facility: HOSPITAL | Age: 63
End: 2018-10-12

## 2018-10-15 ENCOUNTER — APPOINTMENT (OUTPATIENT)
Dept: CARDIAC REHAB | Facility: HOSPITAL | Age: 63
End: 2018-10-15

## 2018-10-17 ENCOUNTER — APPOINTMENT (OUTPATIENT)
Dept: CARDIAC REHAB | Facility: HOSPITAL | Age: 63
End: 2018-10-17

## 2018-10-19 ENCOUNTER — APPOINTMENT (OUTPATIENT)
Dept: CARDIAC REHAB | Facility: HOSPITAL | Age: 63
End: 2018-10-19

## 2018-10-22 ENCOUNTER — APPOINTMENT (OUTPATIENT)
Dept: CARDIAC REHAB | Facility: HOSPITAL | Age: 63
End: 2018-10-22

## 2018-10-24 ENCOUNTER — APPOINTMENT (OUTPATIENT)
Dept: CARDIAC REHAB | Facility: HOSPITAL | Age: 63
End: 2018-10-24

## 2018-10-26 ENCOUNTER — APPOINTMENT (OUTPATIENT)
Dept: CARDIAC REHAB | Facility: HOSPITAL | Age: 63
End: 2018-10-26

## 2018-10-29 ENCOUNTER — APPOINTMENT (OUTPATIENT)
Dept: CARDIAC REHAB | Facility: HOSPITAL | Age: 63
End: 2018-10-29

## 2018-10-31 ENCOUNTER — APPOINTMENT (OUTPATIENT)
Dept: CARDIAC REHAB | Facility: HOSPITAL | Age: 63
End: 2018-10-31

## 2018-11-02 ENCOUNTER — APPOINTMENT (OUTPATIENT)
Dept: CARDIAC REHAB | Facility: HOSPITAL | Age: 63
End: 2018-11-02

## 2018-11-05 ENCOUNTER — APPOINTMENT (OUTPATIENT)
Dept: CARDIAC REHAB | Facility: HOSPITAL | Age: 63
End: 2018-11-05

## 2018-11-07 ENCOUNTER — APPOINTMENT (OUTPATIENT)
Dept: CARDIAC REHAB | Facility: HOSPITAL | Age: 63
End: 2018-11-07

## 2018-11-08 ENCOUNTER — OFFICE VISIT (OUTPATIENT)
Dept: CARDIOLOGY | Facility: CLINIC | Age: 63
End: 2018-11-08

## 2018-11-08 VITALS
OXYGEN SATURATION: 93 % | WEIGHT: 228 LBS | HEIGHT: 70 IN | DIASTOLIC BLOOD PRESSURE: 82 MMHG | HEART RATE: 93 BPM | SYSTOLIC BLOOD PRESSURE: 118 MMHG | BODY MASS INDEX: 32.64 KG/M2

## 2018-11-08 DIAGNOSIS — R07.9 CHEST PAIN, UNSPECIFIED TYPE: ICD-10-CM

## 2018-11-08 DIAGNOSIS — Z95.1 S/P CABG (CORONARY ARTERY BYPASS GRAFT): ICD-10-CM

## 2018-11-08 DIAGNOSIS — I25.10 CORONARY ARTERY DISEASE INVOLVING NATIVE CORONARY ARTERY OF NATIVE HEART WITHOUT ANGINA PECTORIS: Primary | ICD-10-CM

## 2018-11-08 PROCEDURE — 93000 ELECTROCARDIOGRAM COMPLETE: CPT | Performed by: PHYSICIAN ASSISTANT

## 2018-11-08 PROCEDURE — 99213 OFFICE O/P EST LOW 20 MIN: CPT | Performed by: PHYSICIAN ASSISTANT

## 2018-11-08 RX ORDER — INFLUENZA VIRUS VACCINE 15; 15; 15; 15 UG/.5ML; UG/.5ML; UG/.5ML; UG/.5ML
SUSPENSION INTRAMUSCULAR
COMMUNITY
Start: 2018-10-23 | End: 2018-11-28

## 2018-11-08 NOTE — PROGRESS NOTES
Date of Office Visit: 2018  Encounter Provider: SHILOH Tim  Place of Service: [facility] CARDIOLOGY  Patient Name: Luigi Steel  :1955    Chief Complaint   Patient presents with   • Coronary Artery Disease     6 month follow up   :     HPI: Luigi Steel is a 63 y.o. male who presents today for follow-up.  Old records have been obtained and reviewed by me.  He is a patient of Dr. Ye's with a past cardiac history significant for coronary artery disease.  In 2017 he had an anterior STEMI and underwent drug-eluting stent placement to the culprit vessel which was the mid LAD.  He had multivessel disease everywhere else, and ultimately underwent a CABG ×3.  He had a LIMA to the LAD, vein graft to the PDA, and vein graft to the OM 2.  He was last in our office to see Dr. Ye on 2018.  At that visit he was doing well.  He had completed phase 2 cardiac rehabilitation and was moving onto phase 3 cardiac rehabilitation.  No changes were made to his cardiac regimen, and it was recommended that he follow-up in 6 months.  I'm seeing him today sooner than that appointment with complaints of chest pain.   He states that he was at work on  night and he developed chest pain.  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  He had finished lifting some boxes and got on the forklift when he developed the sudden onset of tightness and heaviness across the front of his chest.  It did not radiate.  There was associated dizziness, nausea, and shortness of breath.  He states that it was about a 3 or 4 out of 10.  He states it is exactly the same cut of pain that he had during his heart attack.  He has not had any pain since, however he has not exerted himself.  He has been compliant with his medications, he is taking aspirin and Plavix every day.  He has not been going to cardiac rehabilitation as much because his wife was diagnosed with cancer and needs  to go to rehabilitation together.      Past Medical History:   Diagnosis Date   • Anterior myocardial infarction (CMS/HCC) 8/13/2017   • Arthritis     Hands, knees   • Colon cancer (CMS/HCC)    • Coronary artery disease    • Elevated blood pressure reading without diagnosis of hypertension    • H/O complete eye exam 2014    due   • Hypertension    • Hypothyroidism, acquired    • Insomnia    • Kidney stones    • Obesity    • Osteoarthritis, multiple sites    • PONV (postoperative nausea and vomiting)        Past Surgical History:   Procedure Laterality Date   • CARDIAC CATHETERIZATION N/A 8/13/2017    Procedure: Left Heart Cath;  Surgeon: Edyta Ye MD;  Location: [facility] CATH INVASIVE LOCATION;  Service:    • CARDIAC CATHETERIZATION N/A 8/13/2017    Procedure: Stent RK coronary;  Surgeon: Edyta Ye MD;  Location: [facility] CATH INVASIVE LOCATION;  Service:    • COLECTOMY PARTIAL / TOTAL  1994   • COLON RESECTION RIGHT Right 1990    for cancer   • COLONOSCOPY  03/18/2009    Dr. Tillman   • CORONARY ARTERY BYPASS GRAFT N/A 8/25/2017    Procedure: JAYLON STERNOTOMY CORONARY ARTERY BYPASS GRAFT TIMES 3 USING LEFT INTERNAL  MAMMARY ARTERY AND RIGHT SAPHENOUS VEIN GRAFT PER ENDOSCOPIC VEIN HARVESTING;  Surgeon: Ana Lynn MD;  Location: [facility] OR;  Service:    • HERNIA REPAIR  04/02/2009    hiatal   • HERNIA REPAIR Bilateral 08/2010    ABDOMINAL x3   • INCISIONAL HERNIA REPAIR  09/2011   • INCISIONAL HERNIA REPAIR  04/2009   • INCISIONAL HERNIA REPAIR  1996   • KIDNEY STONE SURGERY     • SMALL INTESTINE SURGERY      for adhesions       Social History     Social History   • Marital status:      Spouse name: Brittany   • Number of children: N/A   • Years of education: High School     Occupational History   •  [id number]     Social History Main Topics   • Smoking status: Never Smoker   • Smokeless tobacco: Never Used   • Alcohol use No   • Drug use: No   • Sexual activity: Defer     Other  Topics Concern   • Not on file     Social History Narrative   • No narrative on file       Family History   Problem Relation Age of Onset   • Arthritis Mother    • Stroke Mother    • Heart attack Mother    • Hypertension Mother    • Gallbladder disease Mother    • Heart disease Mother    • Hypertension Father    • Gallbladder disease Father    • Colon cancer Father 65   • Gallbladder disease Sister    • Arthritis Brother    • Cancer Brother    • Colon cancer Brother 51   • Heart attack Brother    • Heart disease Brother    • Hypertension Brother    • Gallbladder disease Brother    • Heart disease Brother    • No Known Problems Maternal Grandmother    • No Known Problems Maternal Grandfather    • No Known Problems Paternal Grandmother    • No Known Problems Paternal Grandfather        Review of Systems   Constitution: Positive for diaphoresis. Negative for chills, fever and malaise/fatigue.   Cardiovascular: Positive for chest pain. Negative for dyspnea on exertion, leg swelling, near-syncope, orthopnea, palpitations, paroxysmal nocturnal dyspnea and syncope.   Respiratory: Negative for cough and shortness of breath.    Musculoskeletal: Negative for joint pain, joint swelling and myalgias.   Gastrointestinal: Negative for abdominal pain, diarrhea, melena, nausea and vomiting.   Genitourinary: Negative for frequency and hematuria.   Neurological: Negative for light-headedness, numbness, paresthesias and seizures.   Allergic/Immunologic: Negative.    All other systems reviewed and are negative.      Allergies   Allergen Reactions   • Hydromorphone Nausea And Vomiting and Shortness Of Breath   • Demerol [Meperidine] Nausea And Vomiting         Current Outpatient Prescriptions:   •  aspirin 81 MG chewable tablet, Chew 1 tablet Daily., Disp: , Rfl:   •  atorvastatin (LIPITOR) 20 MG tablet, Take 1 tablet by mouth Every Night., Disp: 30 tablet, Rfl: 5  •  carvedilol (COREG) 3.125 MG tablet, Take 1 tablet by mouth Every 12  "(Twelve) Hours., Disp: 60 tablet, Rfl: 5  •  clopidogrel (PLAVIX) 75 MG tablet, Take 1 tablet by mouth Daily., Disp: 30 tablet, Rfl: 11  •  FLUARIX QUADRIVALENT 0.5 ML suspension prefilled syringe injection, , Disp: , Rfl:   •  levothyroxine (SYNTHROID, LEVOTHROID) 75 MCG tablet, Take 1 tablet by mouth Daily., Disp: 30 tablet, Rfl: 5  •  VESICARE 10 MG tablet, Take 10 mg by mouth Daily., Disp: , Rfl:   •  zolpidem (AMBIEN) 10 MG tablet, Take 1 tablet by mouth At Night As Needed for Sleep., Disp: 30 tablet, Rfl: 2      Objective:     Vitals:    11/08/18 1000 11/08/18 1016   BP: 116/80 118/82   BP Location: Right arm Left arm   Pulse: 93    SpO2: 93%    Weight: 103 kg (228 lb)    Height: 177.8 cm (70\")      Body mass index is 32.71 kg/m².    PHYSICAL EXAM:    Physical Exam   Constitutional: He is oriented to person, place, and time. He appears well-developed and well-nourished. No distress.   HENT:   Head: Normocephalic and atraumatic.   Eyes: Pupils are equal, round, and reactive to light.   Neck: No JVD present. No thyromegaly present.   Cardiovascular: Normal rate, regular rhythm, normal heart sounds and intact distal pulses.    No murmur heard.  Pulmonary/Chest: Effort normal and breath sounds normal. No respiratory distress.   Abdominal: Soft. Bowel sounds are normal. He exhibits no distension. There is no splenomegaly or hepatomegaly. There is no tenderness.   Musculoskeletal: Normal range of motion. He exhibits no edema.   Neurological: He is alert and oriented to person, place, and time.   Skin: Skin is warm and dry. He is not diaphoretic. No erythema.   Psychiatric: He has a normal mood and affect. His behavior is normal. Judgment normal.         ECG 12 Lead  Date/Time: 11/8/2018 10:23 AM  Performed by: JOSSELIN VALENCIA  Authorized by: JOSSELIN VALENCIA   Comparison: compared with previous ECG from 6/27/2018  Similar to previous ECG  Rhythm: sinus rhythm  BPM: 85  Clinical impression: normal ECG  Comments: " Indication: Coronary disease.              Assessment:       Diagnosis Plan   1. Coronary artery disease involving native coronary artery of native heart without angina pectoris  ECG 12 Lead    Stress Test With Myocardial Perfusion One Day   2. S/P CABG (coronary artery bypass graft)  ECG 12 Lead    Stress Test With Myocardial Perfusion One Day   3. Chest pain, unspecified type  Stress Test With Myocardial Perfusion One Day     Orders Placed This Encounter   Procedures   • Stress Test With Myocardial Perfusion One Day     Standing Status:   Future     Standing Expiration Date:   11/8/2019     Order Specific Question:   Rest/stress, rest only or stress only?     Answer:   Rest/Stress     Order Specific Question:   What stress agent will be used?     Answer:   Exercise with possible pharmacologic     Order Specific Question:   Reason for exam?     Answer:   Chest Pain     Order Specific Question:   Reason for exam?     Answer:   Known Coronary Artery Disease     Order Specific Question:   Known CAD specification?     Answer:   Follow-up with New or Worsening Symptoms   • ECG 12 Lead     This order was created via procedure documentation          Plan:       Coronary Artery Disease  Assessment  • The patient has CCS class I - angina only during strenuous or prolonged physical activity    Subjective - Objective  • There is a history of previous coronary artery bypass graft  • There has been a previous stent procedure using RK  • Current antiplatelet therapy includes aspirin 81 mg and clopidogrel 75 mg  • I'm a little concerned about his symptoms, as they are exactly like his symptoms when he had his heart attack.  He has a stent to his mid LAD and then he had bypass surgery afterwards with a LIMA to the LAD, vein graft to the PDA, and vein graft to the OM 2.  I'm going to check a nuclear stress test on him.  Further recommendations will be made pending the results of the stress test.  Otherwise he will keep his  appointment with Dr. Ye for January.      As always, it has been a pleasure to participate in your patient's care.      Sincerely,         Barbara Camacho PA-C

## 2018-11-09 ENCOUNTER — APPOINTMENT (OUTPATIENT)
Dept: CARDIAC REHAB | Facility: HOSPITAL | Age: 63
End: 2018-11-09

## 2018-11-10 ENCOUNTER — OFFICE VISIT (OUTPATIENT)
Dept: RETAIL CLINIC | Facility: CLINIC | Age: 63
End: 2018-11-10

## 2018-11-10 VITALS
DIASTOLIC BLOOD PRESSURE: 70 MMHG | TEMPERATURE: 98.2 F | HEART RATE: 102 BPM | OXYGEN SATURATION: 97 % | SYSTOLIC BLOOD PRESSURE: 116 MMHG | RESPIRATION RATE: 18 BRPM

## 2018-11-10 DIAGNOSIS — J40 BRONCHITIS: ICD-10-CM

## 2018-11-10 DIAGNOSIS — J01.40 ACUTE PANSINUSITIS, RECURRENCE NOT SPECIFIED: Primary | ICD-10-CM

## 2018-11-10 PROCEDURE — 99213 OFFICE O/P EST LOW 20 MIN: CPT | Performed by: NURSE PRACTITIONER

## 2018-11-10 RX ORDER — AZITHROMYCIN 250 MG/1
TABLET, FILM COATED ORAL
Qty: 6 TABLET | Refills: 0 | Status: SHIPPED | OUTPATIENT
Start: 2018-11-10 | End: 2018-11-12 | Stop reason: HOSPADM

## 2018-11-10 NOTE — PROGRESS NOTES
"Subjective   Luigi Steel is a 63 y.o. male.     Flu Symptoms   This is a new problem. The current episode started in the past 7 days. Associated symptoms include chills, congestion, coughing, fatigue, a fever, headaches (mild), myalgias, a sore throat (coughing), vertigo and weakness. Pertinent negatives include no nausea or vomiting. Nothing aggravates the symptoms. Treatments tried: mucinex, \"nothing helps\" The treatment provided no relief.        The following portions of the patient's history were reviewed and updated as appropriate: allergies, current medications, past family history, past medical history, past social history, past surgical history and problem list.    Review of Systems   Constitutional: Positive for chills, fatigue and fever.   HENT: Positive for congestion, rhinorrhea, sinus pressure and sore throat (coughing).    Respiratory: Positive for cough.    Gastrointestinal: Negative for nausea and vomiting.   Genitourinary: Negative.    Musculoskeletal: Positive for myalgias.   Neurological: Positive for dizziness, vertigo, weakness and headache.       Objective   Physical Exam   Constitutional: He is cooperative. No distress.   HENT:   Head: Normocephalic.   Right Ear: Hearing, external ear and ear canal normal. Tympanic membrane is erythematous.   Left Ear: Hearing, tympanic membrane, external ear and ear canal normal.   Nose: Mucosal edema and sinus tenderness present. Right sinus exhibits maxillary sinus tenderness. Left sinus exhibits maxillary sinus tenderness.   Mouth/Throat: Posterior oropharyngeal erythema present.   Eyes: Conjunctivae, EOM and lids are normal. Pupils are equal, round, and reactive to light.   Neck: Trachea normal and full passive range of motion without pain.   Cardiovascular: Normal rate, regular rhythm and normal pulses.   Pulmonary/Chest: Effort normal. He has wheezes in the right upper field, the right middle field, the left upper field and the left middle field. He " has rhonchi (clears with cough) in the right lower field and the left lower field.   Lymphadenopathy:     He has cervical adenopathy.        Right cervical: Superficial cervical adenopathy present.        Left cervical: Superficial cervical adenopathy present.   Neurological: He is alert.   Skin: Skin is warm. Capillary refill takes less than 2 seconds.   Psychiatric: He has a normal mood and affect. His speech is normal and behavior is normal.   Vitals reviewed.        Assessment/Plan   Luigi was seen today for flu symptoms.    Diagnoses and all orders for this visit:    Acute pansinusitis, recurrence not specified    Bronchitis    Other orders  -     azithromycin (ZITHROMAX Z-PEGGY) 250 MG tablet; Take 2 tablets the first day, then 1 tablet daily for 4 days.  -     PredniSONE 5 MG tablet therapy pack dosepak; Take as directed on package

## 2018-11-11 ENCOUNTER — HOSPITAL ENCOUNTER (OUTPATIENT)
Facility: HOSPITAL | Age: 63
Setting detail: OBSERVATION
Discharge: HOME | End: 2018-11-12
Attending: FAMILY MEDICINE | Admitting: HOSPITALIST

## 2018-11-11 ENCOUNTER — APPOINTMENT (OUTPATIENT)
Dept: GENERAL RADIOLOGY | Facility: HOSPITAL | Age: 63
End: 2018-11-11

## 2018-11-11 DIAGNOSIS — J12.2 PNEUMONIA DUE TO PARAINFLUENZA VIRUS: ICD-10-CM

## 2018-11-11 DIAGNOSIS — R55 SYNCOPE AND COLLAPSE: Primary | ICD-10-CM

## 2018-11-11 PROBLEM — B34.8 PARAINFLUENZA INFECTION: Status: ACTIVE | Noted: 2018-11-11

## 2018-11-11 LAB
ALBUMIN SERPL-MCNC: 4.2 G/DL (ref 3.5–5.2)
ALBUMIN/GLOB SERPL: 1.4 G/DL
ALP SERPL-CCNC: 95 U/L (ref 39–117)
ALT SERPL W P-5'-P-CCNC: 18 U/L (ref 1–41)
ANION GAP SERPL CALCULATED.3IONS-SCNC: 10.8 MMOL/L
AST SERPL-CCNC: 15 U/L (ref 1–40)
B PERT DNA SPEC QL NAA+PROBE: NOT DETECTED
BASOPHILS # BLD AUTO: 0 10*3/MM3 (ref 0–0.2)
BASOPHILS NFR BLD AUTO: 0 % (ref 0–1.5)
BILIRUB SERPL-MCNC: 0.7 MG/DL (ref 0.1–1.2)
BILIRUB UR QL STRIP: NEGATIVE
BUN BLD-MCNC: 15 MG/DL (ref 8–23)
BUN/CREAT SERPL: 10.5 (ref 7–25)
C PNEUM DNA NPH QL NAA+NON-PROBE: NOT DETECTED
CALCIUM SPEC-SCNC: 9.4 MG/DL (ref 8.6–10.5)
CHLORIDE SERPL-SCNC: 104 MMOL/L (ref 98–107)
CLARITY UR: CLEAR
CO2 SERPL-SCNC: 26.2 MMOL/L (ref 22–29)
COLOR UR: YELLOW
CREAT BLD-MCNC: 1.43 MG/DL (ref 0.76–1.27)
D-LACTATE SERPL-SCNC: 1 MMOL/L (ref 0.5–2)
DEPRECATED RDW RBC AUTO: 44.5 FL (ref 37–54)
EOSINOPHIL # BLD AUTO: 0 10*3/MM3 (ref 0–0.7)
EOSINOPHIL NFR BLD AUTO: 0 % (ref 0.3–6.2)
ERYTHROCYTE [DISTWIDTH] IN BLOOD BY AUTOMATED COUNT: 13.1 % (ref 11.5–14.5)
FLUAV H1 2009 PAND RNA NPH QL NAA+PROBE: NOT DETECTED
FLUAV H1 HA GENE NPH QL NAA+PROBE: NOT DETECTED
FLUAV H3 RNA NPH QL NAA+PROBE: NOT DETECTED
FLUAV SUBTYP SPEC NAA+PROBE: NOT DETECTED
FLUBV RNA ISLT QL NAA+PROBE: NOT DETECTED
GFR SERPL CREATININE-BSD FRML MDRD: 50 ML/MIN/1.73
GLOBULIN UR ELPH-MCNC: 3.1 GM/DL
GLUCOSE BLD-MCNC: 104 MG/DL (ref 65–99)
GLUCOSE UR STRIP-MCNC: NEGATIVE MG/DL
HADV DNA SPEC NAA+PROBE: NOT DETECTED
HCOV 229E RNA SPEC QL NAA+PROBE: NOT DETECTED
HCOV HKU1 RNA SPEC QL NAA+PROBE: NOT DETECTED
HCOV NL63 RNA SPEC QL NAA+PROBE: NOT DETECTED
HCOV OC43 RNA SPEC QL NAA+PROBE: NOT DETECTED
HCT VFR BLD AUTO: 45.7 % (ref 40.4–52.2)
HGB BLD-MCNC: 14.8 G/DL (ref 13.7–17.6)
HGB UR QL STRIP.AUTO: NEGATIVE
HMPV RNA NPH QL NAA+NON-PROBE: NOT DETECTED
HOLD SPECIMEN: NORMAL
HOLD SPECIMEN: NORMAL
HPIV1 RNA SPEC QL NAA+PROBE: NOT DETECTED
HPIV2 RNA SPEC QL NAA+PROBE: DETECTED
HPIV3 RNA NPH QL NAA+PROBE: NOT DETECTED
HPIV4 P GENE NPH QL NAA+PROBE: NOT DETECTED
IMM GRANULOCYTES # BLD: 0.02 10*3/MM3 (ref 0–0.03)
IMM GRANULOCYTES NFR BLD: 0.3 % (ref 0–0.5)
KETONES UR QL STRIP: NEGATIVE
LEUKOCYTE ESTERASE UR QL STRIP.AUTO: NEGATIVE
LYMPHOCYTES # BLD AUTO: 0.87 10*3/MM3 (ref 0.9–4.8)
LYMPHOCYTES NFR BLD AUTO: 11.6 % (ref 19.6–45.3)
M PNEUMO IGG SER IA-ACNC: NOT DETECTED
MCH RBC QN AUTO: 30 PG (ref 27–32.7)
MCHC RBC AUTO-ENTMCNC: 32.4 G/DL (ref 32.6–36.4)
MCV RBC AUTO: 92.7 FL (ref 79.8–96.2)
MONOCYTES # BLD AUTO: 0.55 10*3/MM3 (ref 0.2–1.2)
MONOCYTES NFR BLD AUTO: 7.3 % (ref 5–12)
NEUTROPHILS # BLD AUTO: 6.08 10*3/MM3 (ref 1.9–8.1)
NEUTROPHILS NFR BLD AUTO: 81.1 % (ref 42.7–76)
NITRITE UR QL STRIP: NEGATIVE
PH UR STRIP.AUTO: <=5 [PH] (ref 5–8)
PLATELET # BLD AUTO: 109 10*3/MM3 (ref 140–500)
PMV BLD AUTO: 11.1 FL (ref 6–12)
POTASSIUM BLD-SCNC: 4.5 MMOL/L (ref 3.5–5.2)
PROCALCITONIN SERPL-MCNC: 0.08 NG/ML (ref 0.1–0.25)
PROT SERPL-MCNC: 7.3 G/DL (ref 6–8.5)
PROT UR QL STRIP: NEGATIVE
RBC # BLD AUTO: 4.93 10*6/MM3 (ref 4.6–6)
RHINOVIRUS RNA SPEC NAA+PROBE: NOT DETECTED
RSV RNA NPH QL NAA+NON-PROBE: NOT DETECTED
SODIUM BLD-SCNC: 141 MMOL/L (ref 136–145)
SP GR UR STRIP: 1.02 (ref 1–1.03)
TROPONIN T SERPL-MCNC: <0.01 NG/ML (ref 0–0.03)
UROBILINOGEN UR QL STRIP: NORMAL
WBC NRBC COR # BLD: 7.5 10*3/MM3 (ref 4.5–10.7)
WHOLE BLOOD HOLD SPECIMEN: NORMAL
WHOLE BLOOD HOLD SPECIMEN: NORMAL

## 2018-11-11 PROCEDURE — 87040 BLOOD CULTURE FOR BACTERIA: CPT | Performed by: FAMILY MEDICINE

## 2018-11-11 PROCEDURE — 25010000002 HEPARIN (PORCINE) PER 1000 UNITS: Performed by: INTERNAL MEDICINE

## 2018-11-11 PROCEDURE — 87581 M.PNEUMON DNA AMP PROBE: CPT | Performed by: FAMILY MEDICINE

## 2018-11-11 PROCEDURE — 87798 DETECT AGENT NOS DNA AMP: CPT | Performed by: FAMILY MEDICINE

## 2018-11-11 PROCEDURE — G0378 HOSPITAL OBSERVATION PER HR: HCPCS

## 2018-11-11 PROCEDURE — 93010 ELECTROCARDIOGRAM REPORT: CPT | Performed by: INTERNAL MEDICINE

## 2018-11-11 PROCEDURE — 80053 COMPREHEN METABOLIC PANEL: CPT | Performed by: FAMILY MEDICINE

## 2018-11-11 PROCEDURE — 99285 EMERGENCY DEPT VISIT HI MDM: CPT

## 2018-11-11 PROCEDURE — 87633 RESP VIRUS 12-25 TARGETS: CPT | Performed by: FAMILY MEDICINE

## 2018-11-11 PROCEDURE — 84145 PROCALCITONIN (PCT): CPT | Performed by: FAMILY MEDICINE

## 2018-11-11 PROCEDURE — 83605 ASSAY OF LACTIC ACID: CPT | Performed by: FAMILY MEDICINE

## 2018-11-11 PROCEDURE — 93005 ELECTROCARDIOGRAM TRACING: CPT | Performed by: FAMILY MEDICINE

## 2018-11-11 PROCEDURE — 84484 ASSAY OF TROPONIN QUANT: CPT | Performed by: FAMILY MEDICINE

## 2018-11-11 PROCEDURE — 81003 URINALYSIS AUTO W/O SCOPE: CPT | Performed by: FAMILY MEDICINE

## 2018-11-11 PROCEDURE — 85025 COMPLETE CBC W/AUTO DIFF WBC: CPT | Performed by: FAMILY MEDICINE

## 2018-11-11 PROCEDURE — 71046 X-RAY EXAM CHEST 2 VIEWS: CPT

## 2018-11-11 PROCEDURE — 96372 THER/PROPH/DIAG INJ SC/IM: CPT

## 2018-11-11 PROCEDURE — 87486 CHLMYD PNEUM DNA AMP PROBE: CPT | Performed by: FAMILY MEDICINE

## 2018-11-11 RX ORDER — OXYBUTYNIN CHLORIDE 10 MG/1
10 TABLET, EXTENDED RELEASE ORAL DAILY
Status: DISCONTINUED | OUTPATIENT
Start: 2018-11-12 | End: 2018-11-12 | Stop reason: HOSPADM

## 2018-11-11 RX ORDER — NITROGLYCERIN 0.4 MG/1
0.4 TABLET SUBLINGUAL
Status: DISCONTINUED | OUTPATIENT
Start: 2018-11-11 | End: 2018-11-12 | Stop reason: HOSPADM

## 2018-11-11 RX ORDER — SODIUM CHLORIDE 0.9 % (FLUSH) 0.9 %
3 SYRINGE (ML) INJECTION EVERY 12 HOURS SCHEDULED
Status: DISCONTINUED | OUTPATIENT
Start: 2018-11-11 | End: 2018-11-12 | Stop reason: HOSPADM

## 2018-11-11 RX ORDER — ONDANSETRON 2 MG/ML
4 INJECTION INTRAMUSCULAR; INTRAVENOUS EVERY 6 HOURS PRN
Status: DISCONTINUED | OUTPATIENT
Start: 2018-11-11 | End: 2018-11-12 | Stop reason: HOSPADM

## 2018-11-11 RX ORDER — HEPARIN SODIUM 5000 [USP'U]/ML
5000 INJECTION, SOLUTION INTRAVENOUS; SUBCUTANEOUS EVERY 12 HOURS SCHEDULED
Status: DISCONTINUED | OUTPATIENT
Start: 2018-11-11 | End: 2018-11-12

## 2018-11-11 RX ORDER — ZOLPIDEM TARTRATE 5 MG/1
10 TABLET ORAL NIGHTLY PRN
Status: DISCONTINUED | OUTPATIENT
Start: 2018-11-11 | End: 2018-11-12 | Stop reason: HOSPADM

## 2018-11-11 RX ORDER — SODIUM CHLORIDE 0.9 % (FLUSH) 0.9 %
10 SYRINGE (ML) INJECTION AS NEEDED
Status: DISCONTINUED | OUTPATIENT
Start: 2018-11-11 | End: 2018-11-12 | Stop reason: HOSPADM

## 2018-11-11 RX ORDER — LEVOTHYROXINE SODIUM 0.07 MG/1
75 TABLET ORAL EVERY MORNING
Status: DISCONTINUED | OUTPATIENT
Start: 2018-11-12 | End: 2018-11-12 | Stop reason: HOSPADM

## 2018-11-11 RX ORDER — ACETAMINOPHEN 500 MG
1000 TABLET ORAL ONCE
Status: COMPLETED | OUTPATIENT
Start: 2018-11-11 | End: 2018-11-11

## 2018-11-11 RX ORDER — CLOPIDOGREL BISULFATE 75 MG/1
75 TABLET ORAL DAILY
Status: DISCONTINUED | OUTPATIENT
Start: 2018-11-12 | End: 2018-11-12 | Stop reason: HOSPADM

## 2018-11-11 RX ORDER — ACETAMINOPHEN 325 MG/1
650 TABLET ORAL EVERY 4 HOURS PRN
Status: DISCONTINUED | OUTPATIENT
Start: 2018-11-11 | End: 2018-11-12 | Stop reason: HOSPADM

## 2018-11-11 RX ORDER — CARVEDILOL 3.12 MG/1
3.12 TABLET ORAL EVERY 12 HOURS SCHEDULED
Status: DISCONTINUED | OUTPATIENT
Start: 2018-11-11 | End: 2018-11-12 | Stop reason: HOSPADM

## 2018-11-11 RX ORDER — SODIUM CHLORIDE 0.9 % (FLUSH) 0.9 %
3-10 SYRINGE (ML) INJECTION AS NEEDED
Status: DISCONTINUED | OUTPATIENT
Start: 2018-11-11 | End: 2018-11-12 | Stop reason: HOSPADM

## 2018-11-11 RX ORDER — ASPIRIN 81 MG/1
81 TABLET, CHEWABLE ORAL DAILY
Status: DISCONTINUED | OUTPATIENT
Start: 2018-11-12 | End: 2018-11-12 | Stop reason: HOSPADM

## 2018-11-11 RX ORDER — ATORVASTATIN CALCIUM 20 MG/1
20 TABLET, FILM COATED ORAL NIGHTLY
Status: DISCONTINUED | OUTPATIENT
Start: 2018-11-11 | End: 2018-11-12 | Stop reason: HOSPADM

## 2018-11-11 RX ADMIN — ACETAMINOPHEN 1000 MG: 500 TABLET, FILM COATED ORAL at 14:47

## 2018-11-11 RX ADMIN — ATORVASTATIN CALCIUM 20 MG: 20 TABLET, FILM COATED ORAL at 22:45

## 2018-11-11 RX ADMIN — ZOLPIDEM TARTRATE 10 MG: 5 TABLET ORAL at 22:45

## 2018-11-11 RX ADMIN — SODIUM CHLORIDE 500 ML: 9 INJECTION, SOLUTION INTRAVENOUS at 15:01

## 2018-11-11 RX ADMIN — HEPARIN SODIUM 5000 UNITS: 5000 INJECTION INTRAVENOUS; SUBCUTANEOUS at 22:45

## 2018-11-11 RX ADMIN — CARVEDILOL 3.12 MG: 3.12 TABLET, FILM COATED ORAL at 22:45

## 2018-11-11 NOTE — ED PROVIDER NOTES
" EMERGENCY DEPARTMENT ENCOUNTER    CHIEF COMPLAINT  Chief Complaint: cough  History given by: patient  History limited by: nothing  Room Number: S409/1  PMD: Jake Echeverria MD      HPI:  Pt is a 63 y.o. male who presents complaining of a productive cough with \"yellow greenish\" sputum for the past week. Pt also complains of generalized myalgias, diaphoresis, subjective fever [103 in ED], and chest pain when coughing. Pt denies chest pain when not coughing. Pt denies head congestion, dysuria, urinary frequency, urinary urgency, nausea, vomiting, or abdominal pain. Pt also reports that he had a syncopal episode PTA while he was having a BM. Pt reports that he lowered himself to the ground and denies any sustaining injuries. Pt reports that he has a hx of \"frequent\" syncopal episodes. Pt states that he was started on Zpak yesterday with no relief of his symptoms. Pt has a hx of hypertension, anterior MI, CAD, and hypothyroidism. Pt is currently on a 81 mg ASA and 75 mg of Plavix daily.    Duration: one week  Onset: gradual  Timing: constant  Quality: productive cough with \"yellow greenish\" sputum  Intensity/Severity: moderate  Progression: not specified  Associated Symptoms: generalized myalgias, diaphoresis, subjective fever, syncope, and chest pain when coughing  Aggravating Factors: none specified  Alleviating Factors: none specified  Previous Episodes: none specified  Treatment before arrival: Pt states that he was started on Zpak yesterday with no relief of his symptoms.     PAST MEDICAL HISTORY  Active Ambulatory Problems     Diagnosis Date Noted   • Colon cancer (CMS/[facility])    • Elevated blood pressure reading without diagnosis of hypertension    • Hypothyroidism, acquired    • Insomnia    • Osteoarthritis, multiple sites    • Hyperlipidemia 03/22/2016   • Thrombocytopenia (CMS/HCC) 08/16/2017   • Vitamin B12 deficiency 08/16/2017   • CAD (coronary artery disease) 08/17/2017   • S/P CABG (coronary artery bypass " graft) 09/05/2017   • Iron deficiency anemia 09/15/2017   • S/P coronary artery stent placement 09/27/2017   • Essential hypertension 09/27/2017   • Iron deficiency 11/02/2017     Resolved Ambulatory Problems     Diagnosis Date Noted   • Anterior myocardial infarction (CMS/HCC) 08/13/2017     Past Medical History:   Diagnosis Date   • Anterior myocardial infarction (CMS/HCC) 8/13/2017   • Arthritis    • Colon cancer (CMS/HCC)    • Coronary artery disease    • Elevated blood pressure reading without diagnosis of hypertension    • H/O complete eye exam 2014   • Hypertension    • Hypothyroidism, acquired    • Insomnia    • Kidney stones    • Obesity    • Osteoarthritis, multiple sites    • PONV (postoperative nausea and vomiting)        PAST SURGICAL HISTORY  Past Surgical History:   Procedure Laterality Date   • COLECTOMY PARTIAL / TOTAL  1994   • COLON RESECTION RIGHT Right 1990    for cancer   • COLONOSCOPY  03/18/2009    Dr. Tillman   • HERNIA REPAIR  04/02/2009    hiatal   • HERNIA REPAIR Bilateral 08/2010    ABDOMINAL x3   • INCISIONAL HERNIA REPAIR  09/2011   • INCISIONAL HERNIA REPAIR  04/2009   • INCISIONAL HERNIA REPAIR  1996   • KIDNEY STONE SURGERY     • SMALL INTESTINE SURGERY      for adhesions       FAMILY HISTORY  Family History   Problem Relation Age of Onset   • Arthritis Mother    • Stroke Mother    • Heart attack Mother    • Hypertension Mother    • Gallbladder disease Mother    • Heart disease Mother    • Hypertension Father    • Gallbladder disease Father    • Colon cancer Father 65   • Gallbladder disease Sister    • Arthritis Brother    • Cancer Brother    • Colon cancer Brother 51   • Heart attack Brother    • Heart disease Brother    • Hypertension Brother    • Gallbladder disease Brother    • Heart disease Brother    • No Known Problems Maternal Grandmother    • No Known Problems Maternal Grandfather    • No Known Problems Paternal Grandmother    • No Known Problems Paternal Grandfather   "      SOCIAL HISTORY  Social History     Socioeconomic History   • Marital status:      Spouse name: Brittany   • Number of children: Not on file   • Years of education: High School   • Highest education level: Not on file   Social Needs   • Financial resource strain: Not on file   • Food insecurity - worry: Not on file   • Food insecurity - inability: Not on file   • Transportation needs - medical: Not on file   • Transportation needs - non-medical: Not on file   Occupational History   • Occupation: scroll kit     Employer: [employer]   Tobacco Use   • Smoking status: Never Smoker   • Smokeless tobacco: Never Used   Substance and Sexual Activity   • Alcohol use: No   • Drug use: No   • Sexual activity: Defer   Other Topics Concern   • Not on file   Social History Narrative   • Not on file       ALLERGIES  Demerol [meperidine] and Dilaudid [hydromorphone hcl]    REVIEW OF SYSTEMS  Review of Systems   Constitutional: Positive for diaphoresis and fever (103 in ED). Negative for activity change and appetite change.   HENT: Negative for congestion and sore throat.    Eyes: Negative.    Respiratory: Positive for cough (productive cough with \"yellow greenish\" sputum). Negative for shortness of breath.    Cardiovascular: Positive for chest pain (when coughing). Negative for leg swelling.   Gastrointestinal: Negative for abdominal pain, diarrhea, nausea and vomiting.   Endocrine: Negative.    Genitourinary: Negative for decreased urine volume, dysuria, frequency and urgency.   Musculoskeletal: Positive for myalgias (generalized). Negative for neck pain.   Skin: Negative for rash and wound.   Allergic/Immunologic: Negative.    Neurological: Positive for syncope. Negative for weakness, numbness and headaches.   Hematological: Negative.    Psychiatric/Behavioral: Negative.    All other systems reviewed and are negative.      PHYSICAL EXAM  ED Triage Vitals   Temp Heart Rate Resp BP SpO2   11/11/18 1345 11/11/18 1345 " 11/11/18 1345 11/11/18 1345 11/11/18 1345   (!) 103 °F (39.4 °C) 90 20 144/78 95 %      Temp src Heart Rate Source Patient Position BP Location FiO2 (%)   11/11/18 1345 11/11/18 1356 11/11/18 1356 -- --   Tympanic Monitor Lying         Physical Exam   Constitutional: He is oriented to person, place, and time. No distress.   HENT:   Head: Normocephalic and atraumatic.   Eyes: EOM are normal. Pupils are equal, round, and reactive to light.   Neck: Normal range of motion. Neck supple.   Cardiovascular: Normal rate, regular rhythm and normal heart sounds. Exam reveals no gallop and no friction rub.   No murmur heard.  Pulmonary/Chest: Effort normal. No respiratory distress. He has no decreased breath sounds. He has no wheezes. He has no rhonchi. He has rales in the right lower field. He exhibits no tenderness.   Abdominal: Soft. He exhibits no distension and no mass. There is no tenderness. There is no rebound and no guarding.   Musculoskeletal: Normal range of motion. He exhibits no edema.   Neurological: He is alert and oriented to person, place, and time. He has normal sensation and normal strength.   Skin: Skin is warm and dry.   Psychiatric: Mood and affect normal.   Nursing note and vitals reviewed.      LAB RESULTS  Lab Results (last 24 hours)     ** No results found for the last 24 hours. **          I ordered the above labs and reviewed the results    RADIOLOGY  XR Chest 2 View   Final Result   Small basilar atelectasis/infiltrate, minimal pleural   effusions, follow-up recommended. Mild cardiomegaly with slight   prominence of vascular and interstitial markings.       This report was finalized on 11/11/2018 2:52 PM by Dr. Matt Thomas M.D.               I ordered the above noted radiological studies. Interpreted by radiologist. Reviewed by me in PACS.       PROCEDURES  Procedures    EKG          EKG time: 1417  Rhythm/Rate: SR, 98  P waves and KS: nml; nml  QRS, axis: nml; nml   ST and T waves: nml      Interpreted Contemporaneously by me, independently viewed and is improved compared to prior on 08/29/17 when the patient had TWI.      PROGRESS AND CONSULTS  1346 EKG was ordered.    1409 Ordered blood work, UA, and blood cultures for further evaluation.    1415 Ordered blood work and CXR for further evaluation.    1428 Ordered Tylenol to treat fever. Also ordered IV Fluids for hydration.        MEDICAL DECISION MAKING  Results were reviewed/discussed with the patient and they were also made aware of online access. Pt also made aware that some labs, such as cultures, will not be resulted during ER visit and follow up with PMD is necessary.     MDM  Number of Diagnoses or Management Options     Amount and/or Complexity of Data Reviewed  Clinical lab tests: ordered and reviewed (Procalcitonin: 0.08, Creatinine: 1.43, WBC: 7.50, and Troponin is <0.010.)  Tests in the radiology section of CPT®: ordered and reviewed (CXR shows small basilar atelectasis/infiltrate, minimal pleural effusions, follow-up recommended. Mild cardiomegaly with slight prominence of vascular and interstitial markings.)  Tests in the medicine section of CPT®: reviewed and ordered (See procedure notes for EKG interpretation.)  Decide to obtain previous medical records or to obtain history from someone other than the patient: yes  Review and summarize past medical records: yes (The patient was last hospitalized in August 2017 for a STEMI.)  Independent visualization of images, tracings, or specimens: yes    Patient Progress  Patient progress: stable         DIAGNOSIS  Final diagnoses:   Syncope and collapse   Pneumonia due to parainfluenza virus       DISPOSITION  Admission    Latest Documented Vital Signs:  As of 10:29 PM  BP- 101/68 HR- 82 Temp- 98.4 °F (36.9 °C) (Tympanic) O2 sat- 92%    --  Documentation assistance provided by geoff Puri for Dr. Bryce MD.  Information recorded by the geoff was done at my direction and has been  verified and validated by me.       Savita Puri  11/11/18 1548       Luis Alberto Wu MD  11/13/18 8404

## 2018-11-11 NOTE — ED NOTES
Lactic acid was sent with Covarity but lab stated they do not have tube. MD aware. Will re-draw     Riana Schultz, TRI  11/11/18 6171

## 2018-11-11 NOTE — ED TRIAGE NOTES
"Pt was started on Z Kamari yesterday for bronchitis. Today had syncopal episode while walking to bathroom. Family states he was \"unconscious\" for 10 minutes  "

## 2018-11-12 ENCOUNTER — APPOINTMENT (OUTPATIENT)
Dept: CARDIAC REHAB | Facility: HOSPITAL | Age: 63
End: 2018-11-12

## 2018-11-12 VITALS
HEART RATE: 82 BPM | WEIGHT: 227.8 LBS | RESPIRATION RATE: 16 BRPM | TEMPERATURE: 98.4 F | HEIGHT: 70 IN | OXYGEN SATURATION: 92 % | SYSTOLIC BLOOD PRESSURE: 101 MMHG | BODY MASS INDEX: 32.61 KG/M2 | DIASTOLIC BLOOD PRESSURE: 68 MMHG

## 2018-11-12 LAB
ALBUMIN SERPL-MCNC: 3.6 G/DL (ref 3.5–5.2)
ALBUMIN/GLOB SERPL: 1.2 G/DL
ALP SERPL-CCNC: 75 U/L (ref 39–117)
ALT SERPL W P-5'-P-CCNC: 14 U/L (ref 1–41)
ANION GAP SERPL CALCULATED.3IONS-SCNC: 11.1 MMOL/L
AST SERPL-CCNC: 14 U/L (ref 1–40)
BASOPHILS # BLD AUTO: 0 10*3/MM3 (ref 0–0.2)
BASOPHILS NFR BLD AUTO: 0 % (ref 0–1.5)
BILIRUB SERPL-MCNC: 0.9 MG/DL (ref 0.1–1.2)
BUN BLD-MCNC: 17 MG/DL (ref 8–23)
BUN/CREAT SERPL: 13.2 (ref 7–25)
CALCIUM SPEC-SCNC: 8.8 MG/DL (ref 8.6–10.5)
CHLORIDE SERPL-SCNC: 104 MMOL/L (ref 98–107)
CO2 SERPL-SCNC: 23.9 MMOL/L (ref 22–29)
CREAT BLD-MCNC: 1.29 MG/DL (ref 0.76–1.27)
DEPRECATED RDW RBC AUTO: 46.5 FL (ref 37–54)
EOSINOPHIL # BLD AUTO: 0 10*3/MM3 (ref 0–0.7)
EOSINOPHIL NFR BLD AUTO: 0 % (ref 0.3–6.2)
ERYTHROCYTE [DISTWIDTH] IN BLOOD BY AUTOMATED COUNT: 13.3 % (ref 11.5–14.5)
GFR SERPL CREATININE-BSD FRML MDRD: 56 ML/MIN/1.73
GLOBULIN UR ELPH-MCNC: 3 GM/DL
GLUCOSE BLD-MCNC: 99 MG/DL (ref 65–99)
HCT VFR BLD AUTO: 42.3 % (ref 40.4–52.2)
HGB BLD-MCNC: 13.2 G/DL (ref 13.7–17.6)
IMM GRANULOCYTES # BLD: 0 10*3/MM3 (ref 0–0.03)
IMM GRANULOCYTES NFR BLD: 0 % (ref 0–0.5)
LYMPHOCYTES # BLD AUTO: 1.76 10*3/MM3 (ref 0.9–4.8)
LYMPHOCYTES NFR BLD AUTO: 25.3 % (ref 19.6–45.3)
MCH RBC QN AUTO: 29.9 PG (ref 27–32.7)
MCHC RBC AUTO-ENTMCNC: 31.2 G/DL (ref 32.6–36.4)
MCV RBC AUTO: 95.7 FL (ref 79.8–96.2)
MONOCYTES # BLD AUTO: 0.66 10*3/MM3 (ref 0.2–1.2)
MONOCYTES NFR BLD AUTO: 9.5 % (ref 5–12)
NEUTROPHILS # BLD AUTO: 4.54 10*3/MM3 (ref 1.9–8.1)
NEUTROPHILS NFR BLD AUTO: 65.2 % (ref 42.7–76)
PLATELET # BLD AUTO: 109 10*3/MM3 (ref 140–500)
PMV BLD AUTO: 11.1 FL (ref 6–12)
POTASSIUM BLD-SCNC: 4.2 MMOL/L (ref 3.5–5.2)
PROT SERPL-MCNC: 6.6 G/DL (ref 6–8.5)
RBC # BLD AUTO: 4.42 10*6/MM3 (ref 4.6–6)
SODIUM BLD-SCNC: 139 MMOL/L (ref 136–145)
WBC NRBC COR # BLD: 6.96 10*3/MM3 (ref 4.5–10.7)

## 2018-11-12 PROCEDURE — 80053 COMPREHEN METABOLIC PANEL: CPT | Performed by: INTERNAL MEDICINE

## 2018-11-12 PROCEDURE — 63710000001 PREDNISONE PER 1 MG: Performed by: HOSPITALIST

## 2018-11-12 PROCEDURE — 96372 THER/PROPH/DIAG INJ SC/IM: CPT

## 2018-11-12 PROCEDURE — G0378 HOSPITAL OBSERVATION PER HR: HCPCS

## 2018-11-12 PROCEDURE — 85025 COMPLETE CBC W/AUTO DIFF WBC: CPT | Performed by: INTERNAL MEDICINE

## 2018-11-12 PROCEDURE — 25010000002 HEPARIN (PORCINE) PER 1000 UNITS: Performed by: INTERNAL MEDICINE

## 2018-11-12 RX ORDER — PREDNISONE 20 MG/1
40 TABLET ORAL DAILY
Status: DISCONTINUED | OUTPATIENT
Start: 2018-11-12 | End: 2018-11-12 | Stop reason: HOSPADM

## 2018-11-12 RX ORDER — ACETAMINOPHEN 325 MG/1
650 TABLET ORAL EVERY 4 HOURS PRN
Start: 2018-11-12

## 2018-11-12 RX ORDER — GUAIFENESIN 600 MG/1
600 TABLET, EXTENDED RELEASE ORAL EVERY 12 HOURS SCHEDULED
Start: 2018-11-12 | End: 2019-07-30

## 2018-11-12 RX ORDER — GUAIFENESIN AND CODEINE PHOSPHATE 100; 10 MG/5ML; MG/5ML
10 SOLUTION ORAL 3 TIMES DAILY PRN
Start: 2018-11-12 | End: 2018-11-28

## 2018-11-12 RX ORDER — ALBUTEROL SULFATE 90 UG/1
2 AEROSOL, METERED RESPIRATORY (INHALATION) EVERY 4 HOURS PRN
Start: 2018-11-12 | End: 2020-08-04

## 2018-11-12 RX ORDER — PREDNISONE 20 MG/1
40 TABLET ORAL DAILY
Start: 2018-11-12 | End: 2019-03-12

## 2018-11-12 RX ADMIN — CLOPIDOGREL 75 MG: 75 TABLET, FILM COATED ORAL at 08:45

## 2018-11-12 RX ADMIN — PREDNISONE 40 MG: 20 TABLET ORAL at 12:34

## 2018-11-12 RX ADMIN — HEPARIN SODIUM 5000 UNITS: 5000 INJECTION INTRAVENOUS; SUBCUTANEOUS at 08:45

## 2018-11-12 RX ADMIN — OXYBUTYNIN CHLORIDE 10 MG: 10 TABLET, FILM COATED, EXTENDED RELEASE ORAL at 08:45

## 2018-11-12 RX ADMIN — Medication 3 ML: at 08:45

## 2018-11-12 RX ADMIN — LEVOTHYROXINE SODIUM 75 MCG: 75 TABLET ORAL at 08:45

## 2018-11-12 RX ADMIN — ASPIRIN 81 MG: 81 TABLET, CHEWABLE ORAL at 08:45

## 2018-11-12 RX ADMIN — CARVEDILOL 3.12 MG: 3.12 TABLET, FILM COATED ORAL at 08:45

## 2018-11-12 NOTE — H&P
Internal medicine history and physical   INTERNAL MEDICINE   [facility]       Patient Identification:  Name: Luigi Steel  Age: 63 y.o.  Sex: male  :  1955  MRN: [id number]                   Primary Care Physician: Jake Echeverria MD                                   Chief Complaint:  Came to the emergency room with generalized bodyaches sweating cough and discomfort in the chest upon coughing along with fever of 103.  Has been passing out very frequently in the last 5-6 days and has had previous episodes of passing out spells    History of Present Illness:   Patient is a vague historian who has complicated past medical history as noted below including history of coronary artery disease with history of coronary artery bypass grafting as well as left heart catheterization and stenting last procedure was in 2017 had a follow-up cardiology visit on 2018 and has a scheduled follow-up in December due to chest pain that he noticed on prior  night while at work.  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  It felt the same pain that he had when he had a heart attack.  The discomfort lasted 15-20 minutes.  He was apparently recommended to have nuclear stress test that day.  If confusion afterwards what was the result of the stress test and whether any other intervention was done or not.  According to the charting on the visit on 2018 patient did not specify any other complaints at this fever fatigue body aches and cough and congestion and sore throat and weakness.  Patient presented to the immediate care center/[facility] center on 11/10/2018 with 7 day history of chills congestion fatigue and fever and mild headaches along with generalized body aches and sore throat.  Patient has tried some Mucinex and over-the-counter medications with no relief.  He was assessed to have  acute sinusitis with bronchitis and was prescribed Zithromax.  Patient started these medications but continued to feel poorly and decided to come to the emergency room today and is being admitted for further care.  Patient describes today that he has been having near passing out or passing out spells more frequent in the last week or so with the last episode occurred today when he came out of the bathroom.  He did not pass out completely just lowered himself and felt better.  All these passing out spells are associated with him being either standing up or walking.  He says that he has had these spells in the past before and has been hospitalized in worked up.      Past Medical History:  Past Medical History:   Diagnosis Date   • Anterior myocardial infarction (CMS/HCC) 8/13/2017   • Arthritis     Hands, knees   • Colon cancer (CMS/HCC)    • Coronary artery disease    • Elevated blood pressure reading without diagnosis of hypertension    • H/O complete eye exam 2014    due   • Hypertension    • Hypothyroidism, acquired    • Insomnia    • Kidney stones    • Obesity    • Osteoarthritis, multiple sites    • PONV (postoperative nausea and vomiting)      Past Surgical History:  Past Surgical History:   Procedure Laterality Date   • COLECTOMY PARTIAL / TOTAL  1994   • COLON RESECTION RIGHT Right 1990    for cancer   • COLONOSCOPY  03/18/2009    Dr. Tillman   • HERNIA REPAIR  04/02/2009    hiatal   • HERNIA REPAIR Bilateral 08/2010    ABDOMINAL x3   • INCISIONAL HERNIA REPAIR  09/2011   • INCISIONAL HERNIA REPAIR  04/2009   • INCISIONAL HERNIA REPAIR  1996   • KIDNEY STONE SURGERY     • SMALL INTESTINE SURGERY      for adhesions      Home Meds:  Medications Prior to Admission   Medication Sig Dispense Refill Last Dose   • aspirin 81 MG chewable tablet Chew 1 tablet Daily.   Taking   • atorvastatin (LIPITOR) 20 MG tablet Take 1 tablet by mouth Every Night. 30 tablet 5 Taking   • azithromycin (ZITHROMAX Z-PEGGY) 250 MG tablet Take  2 tablets the first day, then 1 tablet daily for 4 days. 6 tablet 0    • carvedilol (COREG) 3.125 MG tablet Take 1 tablet by mouth Every 12 (Twelve) Hours. 60 tablet 5 Taking   • clopidogrel (PLAVIX) 75 MG tablet Take 1 tablet by mouth Daily. 30 tablet 11 Taking   • levothyroxine (SYNTHROID, LEVOTHROID) 75 MCG tablet Take 1 tablet by mouth Daily. 30 tablet 5 Taking   • PredniSONE 5 MG tablet therapy pack dosepak Take as directed on package 21 each 0    • VESICARE 10 MG tablet Take 10 mg by mouth Daily.   Taking   • zolpidem (AMBIEN) 10 MG tablet Take 1 tablet by mouth At Night As Needed for Sleep. 30 tablet 2 Taking   • FLUARIX QUADRIVALENT 0.5 ML suspension prefilled syringe injection    Not Taking     Current Meds:     Current Facility-Administered Medications:   •  sodium chloride 0.9 % flush 10 mL, 10 mL, Intravenous, PRN, Luis Alberto Wu MD  Allergies:  Allergies   Allergen Reactions   • Demerol [Meperidine] Nausea And Vomiting   • Dilaudid [Hydromorphone Hcl] Nausea And Vomiting     Social History:   Social History     Tobacco Use   • Smoking status: Never Smoker   • Smokeless tobacco: Never Used   Substance Use Topics   • Alcohol use: No      Family History:  Family History   Problem Relation Age of Onset   • Arthritis Mother    • Stroke Mother    • Heart attack Mother    • Hypertension Mother    • Gallbladder disease Mother    • Heart disease Mother    • Hypertension Father    • Gallbladder disease Father    • Colon cancer Father 65   • Gallbladder disease Sister    • Arthritis Brother    • Cancer Brother    • Colon cancer Brother 51   • Heart attack Brother    • Heart disease Brother    • Hypertension Brother    • Gallbladder disease Brother    • Heart disease Brother    • No Known Problems Maternal Grandmother    • No Known Problems Maternal Grandfather    • No Known Problems Paternal Grandmother    • No Known Problems Paternal Grandfather           Review of Systems  See history of present illness and  "past medical history.   Constitutional: Positive for chills, fatigue and fever.   HENT: Positive for congestion, rhinorrhea, sinus pressure and sore throat (coughing).    Respiratory: Positive for cough.    Gastrointestinal: Negative for nausea and vomiting.   Genitourinary: Negative.    Musculoskeletal: Positive for myalgias.   Neurological: Positive for dizziness, vertigo, weakness and headache    Vitals:   /66 (BP Location: Right arm, Patient Position: Lying)   Pulse 80   Temp 98.8 °F (37.1 °C) (Oral)   Resp 16   Ht 177.8 cm (70\")   Wt 103 kg (227 lb 12.8 oz)   SpO2 93%   BMI 32.69 kg/m²   I/O:     Intake/Output Summary (Last 24 hours) at 11/11/2018 2117  Last data filed at 11/11/2018 1623  Gross per 24 hour   Intake 500 ml   Output --   Net 500 ml     Exam:  General Appearance:    Alert, cooperative, no distress, appears stated age   Head:    Normocephalic, without obvious abnormality, atraumatic   Eyes:    PERRL, conjunctiva/corneas clear, EOM's intact, both eyes   Ears:    Normal external ear canals, both ears   Nose:   Nares normal, septum midline, mucosa normal, no drainage    or sinus tenderness   Throat:   Lips, tongue, gums normal; oral mucosa pink and moist   Neck:   Supple, symmetrical, trachea midline, no adenopathy;     thyroid:  no enlargement/tenderness/nodules; no carotid    bruit or JVD   Back:     Symmetric, no curvature, ROM normal, no CVA tenderness   Lungs:    Decreased breath sounds bilaterally   Chest Wall:    No tenderness or deformity    Heart:    Regular rate and rhythm, S1 and S2 normal, no murmur, rub   or gallop   Abdomen:     Soft, non-tender, bowel sounds active all four quadrants,     no masses, no hepatomegaly, no splenomegaly   Extremities:   Extremities normal, atraumatic, no cyanosis or edema   Pulses:   Pulses palpable in all extremities; symmetric all extremities   Skin:   Skin color normal, Skin is warm and dry,  no rashes or palpable lesions   Neurologic:   " CNII-XII intact, motor strength grossly intact, sensation grossly intact to light touch, no focal deficits noted       Data Review:      I reviewed the patient's new clinical results.  Results from last 7 days   Lab Units  11/11/18   1411   WBC 10*3/mm3  7.50   HEMOGLOBIN g/dL  14.8   PLATELETS 10*3/mm3  109*     Results from last 7 days   Lab Units  11/11/18   1411   SODIUM mmol/L  141   POTASSIUM mmol/L  4.5   CHLORIDE mmol/L  104   CO2 mmol/L  26.2   BUN mg/dL  15   CREATININE mg/dL  1.43*   CALCIUM mg/dL  9.4   GLUCOSE mg/dL  104*     Microbiology Results (last 10 days)     Procedure Component Value - Date/Time    Respiratory Panel, PCR - Swab, Nasopharynx [[id number]]  (Abnormal) Collected:  11/11/18 1448    Lab Status:  Final result Specimen:  Swab from Nasopharynx Updated:  11/11/18 1707     ADENOVIRUS, PCR Not Detected     Coronavirus 229E Not Detected     Coronavirus HKU1 Not Detected     Coronavirus NL63 Not Detected     Coronavirus OC43 Not Detected     Human Metapneumovirus Not Detected     Human Rhinovirus/Enterovirus Not Detected     Influenza B PCR Not Detected     Parainfluenza Virus 1 Not Detected     Parainfluenza Virus 2 Detected     Parainfluenza Virus 3 Not Detected     Parainfluenza Virus 4 Not Detected     Bordetella pertussis pcr Not Detected     Influenza A H1 2009 PCR Not Detected     Chlamydophila pneumoniae PCR Not Detected     Mycoplasma pneumo by PCR Not Detected     Influenza A PCR Not Detected     Influenza A H3 Not Detected     Influenza A H1 Not Detected     RSV, PCR Not Detected        Xr Chest 2 View    Result Date: 11/11/2018  Small basilar atelectasis/infiltrate, minimal pleural effusions, follow-up recommended. Mild cardiomegaly with slight prominence of vascular and interstitial markings.  This report was finalized on 11/11/2018 2:52 PM by Dr. Matt Thomas M.D.      ECG 12 Lead   Final Result   RR Interval= 612 ms   SC Interval= 151 ms   QRSD Interval= 105 ms   QT  Interval= 333 ms   QTc Interval= 426 ms   Heart Rate= 98 ms   P Axis= 51 deg   QRS Axis= 16 deg   T Wave Axis= 33 deg   I: 40 Axis= 3 deg   T: 40 Axis= 127 deg   ST Axis= 121 deg   Sinus rhythm   Since prior tracing RATE HAS INCREASED 21 BPM   Electronically Signed by:  Santos Boone ([facility]) 11-Nov-2018 14:51:59   Date and Time of Study: 2018-11-11 14:17:47            proCalcitonin 0.08  Troponin <0.010    Assessment:  Active Hospital Problems    Diagnosis Date Noted   • **Parainfluenza infection [B33.8] 11/11/2018   • Syncope and collapse [R55] 11/11/2018   • Essential hypertension [I10] 09/27/2017   • S/P coronary artery stent placement [Z95.5] 09/27/2017   • S/P CABG (coronary artery bypass graft) [Z95.1] 09/05/2017   • Hypothyroidism, acquired [E03.9]    Chest pain last Sunday while at work and recent cardiology evaluation on 11/8/2018 and supposedly had a nuclear stress test  Medical decision making:  Systemic viral illness with Parainfluenza virus  2-plan is to admit the patient provide symptomatic management and supportive care.  Monitor evolving pneumonia.    History of coronary artery disease with recent episode of chest pain and near syncopal episode and initial workup negative patient already had a cardiology evaluation in the outpatient setting on 11/8/2018 plan is to check serial troponin and cardiology consultation for this change in his status.  Hypertension-continue antihypertensive regimen and avoid hypotensive episodes and check orthostatic blood pressure and pulse.  Hypothyroidism continue thyroid replacement therapy.    Azar Jacobsen MD   11/11/2018  9:17 PM  Much of this encounter note is an electronic transcription/translation of spoken language to printed text. The electronic translation of spoken language may permit erroneous, or at times, nonsensical words or phrases to be inadvertently transcribed; Although I have reviewed the note for such errors, some may still exist

## 2018-11-12 NOTE — DISCHARGE SUMMARY
[facility]    Date of Discharge:  11/12/2018    PCP: Jake Echeverria MD    Discharge Diagnosis:   Active Hospital Problems    Diagnosis Date Noted   • **Parainfluenza infection [B33.8] 11/11/2018   • Syncope and collapse [R55] 11/11/2018   • Essential hypertension [I10] 09/27/2017   • S/P coronary artery stent placement [Z95.5] 09/27/2017   • S/P CABG (coronary artery bypass graft) [Z95.1] 09/05/2017   • Hypothyroidism, acquired [E03.9]       Resolved Hospital Problems   No resolved problems to display.     Procedures Performed       Consults     Date and Time Order Name Status Description    11/11/2018 1713 [facility] (on-call MD unless specified) Completed         Hospital Course  Please see history and physical for details. Patient is a 63 y.o. male initially admitted for complaints of generalized body aches cough and fever for 103.  Basically this patient was found to have a parainfluenza bronchitis/pneumonia.  Antibiotics are not indicated at this juncture due to viral etiology.  He clinically feels much better today.  I will give him prednisone 40 mg ×1 and continue him for short course therapy without taper for an additional 4 days post discharge.  I've also added albuterol HFA and counseled him to continue scheduled at least 4 times daily over the next couple days and then he can transition to an as-needed basis in.  Patient should continue that improved symptomatically treatment.  I counseled that he can take whatever over-the-counter medications he would like that are safe given his past history of coronary artery disease.  I also counseled him to start on scheduled Mucinex and will also help control his cough symptoms with Robitussin-AC.  His syncope is secondary to excessive coughing of counseled patient on how to reduce falls risk if he gets into a severe coughing episode.  Case discussed with patient as well as spouse present at bedside and all  questions answered to the best my ability.  I feel this patient is more than medically stable for disposition and can have further outpatient follow-up.      Condition on Discharge: Improved.     Temp:  [98.4 °F (36.9 °C)-103 °F (39.4 °C)] 98.4 °F (36.9 °C)  Heart Rate:  [] 82  Resp:  [16-20] 16  BP: (101-144)/(63-84) 101/68  Body mass index is 32.69 kg/m².    Physical Exam   Constitutional: He is oriented to person, place, and time. He appears well-developed and well-nourished.   Neck: Neck supple. No JVD present.   Cardiovascular: Normal rate, regular rhythm and normal heart sounds.   Pulmonary/Chest: Effort normal.   Mild wheeze on left side but otherwise good air entry bilaterally with no increased work of breathing or respiratory distress   Abdominal: Soft. Bowel sounds are normal. He exhibits no distension. There is no tenderness.   Musculoskeletal: He exhibits no edema.   Neurological: He is alert and oriented to person, place, and time. No cranial nerve deficit.   Psychiatric: He has a normal mood and affect. His behavior is normal. Thought content normal.        Discharge Medications      New Medications      Instructions Start Date   acetaminophen 325 MG tablet  Commonly known as:  TYLENOL   650 mg, Oral, Every 4 Hours PRN      albuterol 108 (90 Base) MCG/ACT inhaler  Commonly known as:  PROVENTIL HFA;VENTOLIN HFA   2 puffs, Inhalation, Every 4 Hours PRN      guaiFENesin 600 MG 12 hr tablet  Commonly known as:  MUCINEX   600 mg, Oral, Every 12 Hours Scheduled      guaifenesin-codeine 100-10 MG/5ML liquid  Commonly known as:  GUAIFENESIN AC   10 mL, Oral, 3 Times Daily PRN      predniSONE 20 MG tablet  Commonly known as:  DELTASONE  Replaces:  PredniSONE 5 MG tablet therapy pack dosepak   40 mg, Oral, Daily         Continue These Medications      Instructions Start Date   aspirin 81 MG chewable tablet   81 mg, Oral, Daily      atorvastatin 20 MG tablet  Commonly known as:  LIPITOR   20 mg, Oral,  Nightly      carvedilol 3.125 MG tablet  Commonly known as:  COREG   3.125 mg, Oral, Every 12 Hours Scheduled      clopidogrel 75 MG tablet  Commonly known as:  PLAVIX   75 mg, Oral, Daily      FLUARIX QUADRIVALENT 0.5 ML suspension prefilled syringe injection  Generic drug:  influenza vac split quad   No dose, route, or frequency recorded.      levothyroxine 75 MCG tablet  Commonly known as:  SYNTHROID, LEVOTHROID   75 mcg, Oral, Daily      VESICARE 10 MG tablet  Generic drug:  solifenacin   10 mg, Oral, Daily      zolpidem 10 MG tablet  Commonly known as:  AMBIEN   10 mg, Oral, Nightly PRN         Stop These Medications    azithromycin 250 MG tablet  Commonly known as:  ZITHROMAX Z-PEGGY     PredniSONE 5 MG tablet therapy pack dosepak  Replaced by:  predniSONE 20 MG tablet             Additional Instructions for the Follow-ups that You Need to Schedule     Discharge Follow-up with PCP   As directed       Currently Documented PCP:    Jake Echeverria MD    PCP Phone Number:    [phone]     Follow Up Details:  pcp 2-3 weeks - keep all other previously scheduled appts           Follow-up Information     Jake Echeverria MD .    Specialty:  Family Medicine  Why:  pcp 2-3 weeks - keep all other previously scheduled appts  Contact information:  [address] [address]  [phone]                 Test Results Pending at Discharge   Order Current Status    Blood Culture - Blood, Arm, Left Preliminary result    Blood Culture - Blood, Arm, Left Preliminary result         Fredy Reed MD  11/12/18  12:20 PM    Discharge time spent greater than 30 minutes.

## 2018-11-12 NOTE — PLAN OF CARE
Problem: Fall Risk (Adult)  Goal: Absence of Fall  Outcome: Ongoing (interventions implemented as appropriate)      Problem: Patient Care Overview  Goal: Plan of Care Review  Outcome: Ongoing (interventions implemented as appropriate)   11/12/18 0838 11/12/18 1220   Coping/Psychosocial   Plan of Care Reviewed With patient;spouse --    Plan of Care Review   Progress --  improving   OTHER   Outcome Summary --  pt to discharge home per MD order.     Goal: Individualization and Mutuality  Outcome: Ongoing (interventions implemented as appropriate)    Goal: Discharge Needs Assessment  Outcome: Ongoing (interventions implemented as appropriate)    Goal: Interprofessional Rounds/Family Conf  Outcome: Ongoing (interventions implemented as appropriate)      Problem: Infection, Risk/Actual (Adult)  Goal: Infection Prevention/Resolution  Outcome: Ongoing (interventions implemented as appropriate)      Problem: Syncope (Adult)  Goal: Physical Safety/Health Maintenance  Outcome: Ongoing (interventions implemented as appropriate)    Goal: Optimal Emotional/Functional [place]  Outcome: Ongoing (interventions implemented as appropriate)

## 2018-11-12 NOTE — PLAN OF CARE
Problem: Fall Risk (Adult)  Goal: Identify Related Risk Factors and Signs and Symptoms  Outcome: Outcome(s) achieved Date Met: 11/12/18    Goal: Absence of Fall  Outcome: Ongoing (interventions implemented as appropriate)      Problem: Patient Care Overview  Goal: Plan of Care Review  Outcome: Ongoing (interventions implemented as appropriate)   11/12/18 0620   Coping/Psychosocial   Plan of Care Reviewed With patient   Plan of Care Review   Progress no change   OTHER   Outcome Summary no complaints, room air, up with standby assist, will continue to monitor     Goal: Individualization and Mutuality  Outcome: Ongoing (interventions implemented as appropriate)    Goal: Discharge Needs Assessment  Outcome: Ongoing (interventions implemented as appropriate)      Problem: Infection, Risk/Actual (Adult)  Goal: Identify Related Risk Factors and Signs and Symptoms  Outcome: Outcome(s) achieved Date Met: 11/12/18    Goal: Infection Prevention/Resolution  Outcome: Ongoing (interventions implemented as appropriate)      Problem: Syncope (Adult)  Goal: Identify Related Risk Factors and Signs and Symptoms  Outcome: Outcome(s) achieved Date Met: 11/12/18    Goal: Physical Safety/Health Maintenance  Outcome: Ongoing (interventions implemented as appropriate)    Goal: Optimal Emotional/Functional [place]  Outcome: Ongoing (interventions implemented as appropriate)

## 2018-11-13 NOTE — PROGRESS NOTES
Case Management Discharge Note    Final Note: Home    Destination      No service has been selected for the patient.      Durable Medical Equipment      No service has been selected for the patient.      Dialysis/Infusion      No service has been selected for the patient.      Home Medical Care      No service has been selected for the patient.      Community Resources      No service has been selected for the patient.        Other: Other(auto)    Final Discharge Disposition Code: 01 - home or self-care

## 2018-11-14 ENCOUNTER — APPOINTMENT (OUTPATIENT)
Dept: CARDIAC REHAB | Facility: HOSPITAL | Age: 63
End: 2018-11-14

## 2018-11-16 ENCOUNTER — APPOINTMENT (OUTPATIENT)
Dept: CARDIAC REHAB | Facility: HOSPITAL | Age: 63
End: 2018-11-16

## 2018-11-16 LAB
BACTERIA SPEC AEROBE CULT: NORMAL
BACTERIA SPEC AEROBE CULT: NORMAL

## 2018-11-19 ENCOUNTER — APPOINTMENT (OUTPATIENT)
Dept: CARDIAC REHAB | Facility: HOSPITAL | Age: 63
End: 2018-11-19

## 2018-11-20 ENCOUNTER — HOSPITAL ENCOUNTER (OUTPATIENT)
Dept: CARDIOLOGY | Facility: HOSPITAL | Age: 63
Discharge: HOME | End: 2018-11-20
Admitting: PHYSICIAN ASSISTANT

## 2018-11-20 ENCOUNTER — HOSPITAL ENCOUNTER (OUTPATIENT)
Dept: CARDIOLOGY | Facility: HOSPITAL | Age: 63
Discharge: HOME | End: 2018-11-20

## 2018-11-20 VITALS — HEIGHT: 70 IN | WEIGHT: 220 LBS | BODY MASS INDEX: 31.5 KG/M2

## 2018-11-20 DIAGNOSIS — I25.10 CORONARY ARTERY DISEASE INVOLVING NATIVE CORONARY ARTERY OF NATIVE HEART WITHOUT ANGINA PECTORIS: ICD-10-CM

## 2018-11-20 DIAGNOSIS — Z95.1 S/P CABG (CORONARY ARTERY BYPASS GRAFT): ICD-10-CM

## 2018-11-20 DIAGNOSIS — R07.9 CHEST PAIN, UNSPECIFIED TYPE: ICD-10-CM

## 2018-11-20 PROCEDURE — 93016 CV STRESS TEST SUPVJ ONLY: CPT | Performed by: INTERNAL MEDICINE

## 2018-11-20 PROCEDURE — 93018 CV STRESS TEST I&R ONLY: CPT | Performed by: INTERNAL MEDICINE

## 2018-11-20 PROCEDURE — 93017 CV STRESS TEST TRACING ONLY: CPT

## 2018-11-20 PROCEDURE — 78452 HT MUSCLE IMAGE SPECT MULT: CPT

## 2018-11-20 PROCEDURE — 78452 HT MUSCLE IMAGE SPECT MULT: CPT | Performed by: INTERNAL MEDICINE

## 2018-11-20 PROCEDURE — A9502 TC99M TETROFOSMIN: HCPCS | Performed by: PHYSICIAN ASSISTANT

## 2018-11-20 PROCEDURE — 0 TECHNETIUM TETROFOSMIN KIT: Performed by: PHYSICIAN ASSISTANT

## 2018-11-20 RX ADMIN — TETROFOSMIN 1 DOSE: 1.38 INJECTION, POWDER, LYOPHILIZED, FOR SOLUTION INTRAVENOUS at 13:01

## 2018-11-21 ENCOUNTER — TELEPHONE (OUTPATIENT)
Dept: CARDIOLOGY | Facility: CLINIC | Age: 63
End: 2018-11-21

## 2018-11-21 ENCOUNTER — APPOINTMENT (OUTPATIENT)
Dept: CARDIAC REHAB | Facility: HOSPITAL | Age: 63
End: 2018-11-21

## 2018-11-21 ENCOUNTER — HOSPITAL ENCOUNTER (OUTPATIENT)
Dept: CARDIOLOGY | Facility: HOSPITAL | Age: 63
Discharge: HOME | End: 2018-11-21

## 2018-11-21 VITALS — WEIGHT: 220.02 LBS | BODY MASS INDEX: 31.5 KG/M2 | HEIGHT: 70 IN

## 2018-11-21 LAB
BH CV NUCLEAR PRIOR STUDY: 2
BH CV STRESS BP STAGE 1: NORMAL
BH CV STRESS BP STAGE 2: NORMAL
BH CV STRESS BP STAGE 3: NORMAL
BH CV STRESS DURATION MIN STAGE 1: 3
BH CV STRESS DURATION MIN STAGE 2: 3
BH CV STRESS DURATION MIN STAGE 3: 1
BH CV STRESS DURATION SEC STAGE 1: 0
BH CV STRESS DURATION SEC STAGE 2: 0
BH CV STRESS DURATION SEC STAGE 3: 0
BH CV STRESS GRADE STAGE 1: 10
BH CV STRESS GRADE STAGE 2: 12
BH CV STRESS GRADE STAGE 3: 14
BH CV STRESS HR STAGE 1: 116
BH CV STRESS HR STAGE 2: 132
BH CV STRESS HR STAGE 3: 142
BH CV STRESS METS STAGE 1: 5
BH CV STRESS METS STAGE 2: 7.5
BH CV STRESS METS STAGE 3: 10
BH CV STRESS PROTOCOL 1: NORMAL
BH CV STRESS RECOVERY BP: NORMAL MMHG
BH CV STRESS RECOVERY HR: 90 BPM
BH CV STRESS SPEED STAGE 1: 1.7
BH CV STRESS SPEED STAGE 2: 2.5
BH CV STRESS SPEED STAGE 3: 3.4
BH CV STRESS STAGE 1: 1
BH CV STRESS STAGE 2: 2
BH CV STRESS STAGE 3: 3
LV EF NUC BP: 56 %
MAXIMAL PREDICTED HEART RATE: 157 BPM
PERCENT MAX PREDICTED HR: 90.45 %
STRESS BASELINE BP: NORMAL MMHG
STRESS BASELINE HR: 80 BPM
STRESS PERCENT HR: 106 %
STRESS POST ESTIMATED WORKLOAD: 8 METS
STRESS POST EXERCISE DUR MIN: 7 MIN
STRESS POST EXERCISE DUR SEC: 0 SEC
STRESS POST PEAK BP: NORMAL MMHG
STRESS POST PEAK HR: 142 BPM
STRESS TARGET HR: 133 BPM

## 2018-11-21 PROCEDURE — A9502 TC99M TETROFOSMIN: HCPCS

## 2018-11-21 PROCEDURE — 0 TECHNETIUM TETROFOSMIN KIT

## 2018-11-23 ENCOUNTER — APPOINTMENT (OUTPATIENT)
Dept: CARDIAC REHAB | Facility: HOSPITAL | Age: 63
End: 2018-11-23

## 2018-11-26 ENCOUNTER — APPOINTMENT (OUTPATIENT)
Dept: CARDIAC REHAB | Facility: HOSPITAL | Age: 63
End: 2018-11-26

## 2018-11-28 ENCOUNTER — APPOINTMENT (OUTPATIENT)
Dept: CARDIAC REHAB | Facility: HOSPITAL | Age: 63
End: 2018-11-28

## 2018-11-28 ENCOUNTER — OFFICE VISIT (OUTPATIENT)
Dept: FAMILY MEDICINE CLINIC | Facility: CLINIC | Age: 63
End: 2018-11-28

## 2018-11-28 VITALS
TEMPERATURE: 97.9 F | SYSTOLIC BLOOD PRESSURE: 120 MMHG | HEIGHT: 70 IN | OXYGEN SATURATION: 97 % | WEIGHT: 221 LBS | RESPIRATION RATE: 16 BRPM | BODY MASS INDEX: 31.64 KG/M2 | DIASTOLIC BLOOD PRESSURE: 78 MMHG | HEART RATE: 70 BPM

## 2018-11-28 DIAGNOSIS — F51.01 PRIMARY INSOMNIA: ICD-10-CM

## 2018-11-28 DIAGNOSIS — J18.9 PNEUMONIA DUE TO INFECTIOUS ORGANISM, UNSPECIFIED LATERALITY, UNSPECIFIED PART OF LUNG: Primary | ICD-10-CM

## 2018-11-28 DIAGNOSIS — E03.9 HYPOTHYROIDISM, ACQUIRED: ICD-10-CM

## 2018-11-28 DIAGNOSIS — Z09 HOSPITAL DISCHARGE FOLLOW-UP: ICD-10-CM

## 2018-11-28 PROCEDURE — 99214 OFFICE O/P EST MOD 30 MIN: CPT | Performed by: FAMILY MEDICINE

## 2018-11-28 RX ORDER — LEVOTHYROXINE SODIUM 0.07 MG/1
75 TABLET ORAL DAILY
Qty: 30 TABLET | Refills: 5 | Status: SHIPPED | OUTPATIENT
Start: 2018-11-28 | End: 2019-03-12 | Stop reason: SDUPTHER

## 2018-11-28 RX ORDER — ZOLPIDEM TARTRATE 10 MG/1
10 TABLET ORAL NIGHTLY PRN
Qty: 30 TABLET | Refills: 2 | Status: SHIPPED | OUTPATIENT
Start: 2018-11-28 | End: 2019-03-12 | Stop reason: SDUPTHER

## 2018-11-28 NOTE — PROGRESS NOTES
Subjective   Luigi Steel is a 63 y.o. male.     History of Present Illness     Chief Complaint:   Chief Complaint   Patient presents with   • Pneumonia     HOSP FOLLOW UP    • Loss of Consciousness   • Hypothyroidism     MED REFILL - PT REQUESTING REFILLS TODAY    • Insomnia       Luigi Steel 63 y.o. male who presents today for Medical Management of the below listed issues and medication refills.  he has a problem list of   Patient Active Problem List   Diagnosis   • Colon cancer (CMS/HCC)   • Elevated blood pressure reading without diagnosis of hypertension   • Hypothyroidism, acquired   • Insomnia   • Osteoarthritis, multiple sites   • Hyperlipidemia   • Thrombocytopenia (CMS/HCC)   • Vitamin B12 deficiency   • CAD (coronary artery disease)   • S/P CABG (coronary artery bypass graft)   • Iron deficiency anemia   • S/P coronary artery stent placement   • Essential hypertension   • Iron deficiency   • Syncope and collapse   • Parainfluenza infection   .  Since the last visit, he has overall felt well until a recent admission to the hospital. That visit was as follows:    Date of Discharge:  11/12/2018     PCP: Jake Echeverria MD     Discharge Diagnosis:        Active Hospital Problems     Diagnosis Date Noted   • **Parainfluenza infection [B33.8] 11/11/2018   • Syncope and collapse [R55] 11/11/2018   • Essential hypertension [I10] 09/27/2017   • S/P coronary artery stent placement [Z95.5] 09/27/2017   • S/P CABG (coronary artery bypass graft) [Z95.1] 09/05/2017   • Hypothyroidism, acquired [E03.9]         Resolved Hospital Problems   No resolved problems to display.      Procedures Performed            Consults      Date and Time Order Name Status Description     11/11/2018 3383 [facility] (on-call MD unless specified) Completed            Hospital Course  Please see history and physical for details. Patient is a 63 y.o. male initially admitted for complaints of generalized body aches cough and fever for 103.   Basically this patient was found to have a parainfluenza bronchitis/pneumonia.  Antibiotics are not indicated at this juncture due to viral etiology.  He clinically feels much better today.  I will give him prednisone 40 mg ×1 and continue him for short course therapy without taper for an additional 4 days post discharge.  I've also added albuterol HFA and counseled him to continue scheduled at least 4 times daily over the next couple days and then he can transition to an as-needed basis in.  Patient should continue that improved symptomatically treatment.  I counseled that he can take whatever over-the-counter medications he would like that are safe given his past history of coronary artery disease.  I also counseled him to start on scheduled Mucinex and will also help control his cough symptoms with Robitussin-AC.  His syncope is secondary to excessive coughing of counseled patient on how to reduce falls risk if he gets into a severe coughing episode.  Case discussed with patient as well as spouse present at bedside and all questions answered to the best my ability.  I feel this patient is more than medically stable for disposition and can have further outpatient follow-up.    Current outpatient and discharge medications have been reconciled for the patient.  Reviewed by: Jake Echeverria MD          he has been compliant with   Current Outpatient Medications:   •  levothyroxine (SYNTHROID, LEVOTHROID) 75 MCG tablet, Take 1 tablet by mouth Daily., Disp: 30 tablet, Rfl: 5  •  zolpidem (AMBIEN) 10 MG tablet, Take 1 tablet by mouth At Night As Needed for Sleep., Disp: 30 tablet, Rfl: 2  •  acetaminophen (TYLENOL) 325 MG tablet, Take 2 tablets by mouth Every 4 (Four) Hours As Needed for Mild Pain ., Disp: , Rfl:   •  albuterol (PROVENTIL HFA;VENTOLIN HFA) 108 (90 Base) MCG/ACT inhaler, Inhale 2 puffs Every 4 (Four) Hours As Needed for Wheezing., Disp: , Rfl:   •  aspirin 81 MG chewable tablet, Chew 1 tablet Daily.,  "Disp: , Rfl:   •  atorvastatin (LIPITOR) 20 MG tablet, Take 1 tablet by mouth Every Night., Disp: 30 tablet, Rfl: 5  •  carvedilol (COREG) 3.125 MG tablet, Take 1 tablet by mouth Every 12 (Twelve) Hours., Disp: 60 tablet, Rfl: 5  •  clopidogrel (PLAVIX) 75 MG tablet, Take 1 tablet by mouth Daily., Disp: 30 tablet, Rfl: 11  •  guaiFENesin (MUCINEX) 600 MG 12 hr tablet, Take 1 tablet by mouth Every 12 (Twelve) Hours., Disp: , Rfl:   •  predniSONE (DELTASONE) 20 MG tablet, Take 2 tablets by mouth Daily., Disp: , Rfl:   •  VESICARE 10 MG tablet, Take 10 mg by mouth Daily., Disp: , Rfl: .  he denies medication side effects.    All of the chronic condition(s) listed above are stable w/o issues.    /78   Pulse 70   Temp 97.9 °F (36.6 °C) (Oral)   Resp 16   Ht 177.8 cm (70\")   Wt 100 kg (221 lb)   SpO2 97%   BMI 31.71 kg/m²     Results for orders placed or performed during the hospital encounter of 11/20/18   Stress Test With Myocardial Perfusion Two Day   Result Value Ref Range    Nuclear Prior Study 2     BH CV STRESS PROTOCOL 1 Darren     Stage 1 1     HR Stage 1 116     BP Stage 1 136/82     Duration Min Stage 1 3     Duration Sec Stage 1 0     Grade Stage 1 10     Speed Stage 1 1.7     BH CV STRESS METS STAGE 1 5     Stage 2 2     HR Stage 2 132     BP Stage 2 144/86     Duration Min Stage 2 3     Duration Sec Stage 2 0     Grade Stage 2 12     Speed Stage 2 2.5     BH CV STRESS METS STAGE 2 7.5     Stage 3 3     HR Stage 3 142     BP Stage 3 144/86     Duration Min Stage 3 1     Duration Sec Stage 3 0     Grade Stage 3 14     Speed Stage 3 3.4     BH CV STRESS METS STAGE 3 10.0     Baseline HR 80 bpm    Baseline /80 mmHg    Peak  bpm    Percent Max Pred HR 90.45 %    Percent Target  %    Peak /86 mmHg    Recovery HR 90 bpm    Recovery /82 mmHg    Target HR (85%) 133 bpm    Max. Pred. HR (100%) 157 bpm    Exercise duration (min) 7 min    Exercise duration (sec) 0 sec    " Estimated workload 8.0 METS    Nuc Stress EF 56 %           The following portions of the patient's history were reviewed and updated as appropriate: allergies, current medications, past family history, past medical history, past social history, past surgical history and problem list.    Review of Systems   Constitutional: Negative for activity change, chills, fatigue and fever.   Respiratory: Negative for cough and shortness of breath.    Cardiovascular: Negative for chest pain and palpitations.   Gastrointestinal: Negative for abdominal pain.   Endocrine: Negative for cold intolerance.   Psychiatric/Behavioral: Negative for behavioral problems and dysphoric mood. The patient is not nervous/anxious.        Objective   Physical Exam   Constitutional: He appears well-developed and well-nourished.   Neck: Neck supple. No thyromegaly present.   Cardiovascular: Normal rate and regular rhythm.   No murmur heard.  Pulmonary/Chest: Effort normal and breath sounds normal.   Abdominal: Bowel sounds are normal. There is no tenderness.   Psychiatric: He has a normal mood and affect. His behavior is normal.   Nursing note and vitals reviewed.  Hospital records reviewed with pt confirming HPI.    The patient has read and signed the [facility] Controlled Substance Contract.  I will continue to see patient for regular follow up appointments.  They are well controlled on their medication.  JESUS has been reviewed by me and is updated every 3 months. The patient is aware of the potential for addiction and dependence.    Assessment/Plan   Luigi was seen today for pneumonia, loss of consciousness, hypothyroidism and insomnia.    Diagnoses and all orders for this visit:    Pneumonia due to infectious organism, unspecified laterality, unspecified part of lung    Primary insomnia  -     zolpidem (AMBIEN) 10 MG tablet; Take 1 tablet by mouth At Night As Needed for Sleep.    Hospital discharge follow-up    Hypothyroidism, acquired  -      levothyroxine (SYNTHROID, LEVOTHROID) 75 MCG tablet; Take 1 tablet by mouth Daily.      Pt to slowly increase activity back to baseline.

## 2018-11-30 ENCOUNTER — APPOINTMENT (OUTPATIENT)
Dept: CARDIAC REHAB | Facility: HOSPITAL | Age: 63
End: 2018-11-30

## 2018-12-03 ENCOUNTER — APPOINTMENT (OUTPATIENT)
Dept: CARDIAC REHAB | Facility: HOSPITAL | Age: 63
End: 2018-12-03

## 2018-12-05 ENCOUNTER — APPOINTMENT (OUTPATIENT)
Dept: CARDIAC REHAB | Facility: HOSPITAL | Age: 63
End: 2018-12-05

## 2018-12-07 ENCOUNTER — APPOINTMENT (OUTPATIENT)
Dept: CARDIAC REHAB | Facility: HOSPITAL | Age: 63
End: 2018-12-07

## 2018-12-10 ENCOUNTER — APPOINTMENT (OUTPATIENT)
Dept: CARDIAC REHAB | Facility: HOSPITAL | Age: 63
End: 2018-12-10

## 2018-12-12 ENCOUNTER — APPOINTMENT (OUTPATIENT)
Dept: CARDIAC REHAB | Facility: HOSPITAL | Age: 63
End: 2018-12-12

## 2018-12-14 ENCOUNTER — APPOINTMENT (OUTPATIENT)
Dept: CARDIAC REHAB | Facility: HOSPITAL | Age: 63
End: 2018-12-14

## 2018-12-17 ENCOUNTER — APPOINTMENT (OUTPATIENT)
Dept: CARDIAC REHAB | Facility: HOSPITAL | Age: 63
End: 2018-12-17

## 2018-12-19 ENCOUNTER — APPOINTMENT (OUTPATIENT)
Dept: CARDIAC REHAB | Facility: HOSPITAL | Age: 63
End: 2018-12-19

## 2018-12-21 ENCOUNTER — APPOINTMENT (OUTPATIENT)
Dept: CARDIAC REHAB | Facility: HOSPITAL | Age: 63
End: 2018-12-21

## 2018-12-24 ENCOUNTER — APPOINTMENT (OUTPATIENT)
Dept: CARDIAC REHAB | Facility: HOSPITAL | Age: 63
End: 2018-12-24

## 2019-01-16 DIAGNOSIS — I25.10 CORONARY ARTERY DISEASE INVOLVING NATIVE CORONARY ARTERY OF NATIVE HEART WITHOUT ANGINA PECTORIS: ICD-10-CM

## 2019-01-17 RX ORDER — ATORVASTATIN CALCIUM 20 MG/1
TABLET, FILM COATED ORAL
Qty: 30 TABLET | Refills: 5 | Status: SHIPPED | OUTPATIENT
Start: 2019-01-17 | End: 2019-07-28 | Stop reason: SDUPTHER

## 2019-01-30 ENCOUNTER — OFFICE VISIT (OUTPATIENT)
Dept: CARDIOLOGY | Facility: CLINIC | Age: 64
End: 2019-01-30

## 2019-01-30 VITALS
WEIGHT: 233 LBS | SYSTOLIC BLOOD PRESSURE: 148 MMHG | DIASTOLIC BLOOD PRESSURE: 96 MMHG | HEART RATE: 63 BPM | BODY MASS INDEX: 33.36 KG/M2 | HEIGHT: 70 IN

## 2019-01-30 DIAGNOSIS — E78.5 HYPERLIPIDEMIA, UNSPECIFIED HYPERLIPIDEMIA TYPE: ICD-10-CM

## 2019-01-30 DIAGNOSIS — I10 ESSENTIAL HYPERTENSION: ICD-10-CM

## 2019-01-30 DIAGNOSIS — I25.10 CORONARY ARTERY DISEASE INVOLVING NATIVE CORONARY ARTERY OF NATIVE HEART WITHOUT ANGINA PECTORIS: Primary | ICD-10-CM

## 2019-01-30 DIAGNOSIS — Z95.5 S/P CORONARY ARTERY STENT PLACEMENT: ICD-10-CM

## 2019-01-30 DIAGNOSIS — Z95.1 S/P CABG (CORONARY ARTERY BYPASS GRAFT): ICD-10-CM

## 2019-01-30 PROCEDURE — 93000 ELECTROCARDIOGRAM COMPLETE: CPT | Performed by: INTERNAL MEDICINE

## 2019-01-30 PROCEDURE — 99214 OFFICE O/P EST MOD 30 MIN: CPT | Performed by: INTERNAL MEDICINE

## 2019-01-30 RX ORDER — CARVEDILOL 3.12 MG/1
3.12 TABLET ORAL EVERY 12 HOURS SCHEDULED
Qty: 60 TABLET | Refills: 11 | Status: SHIPPED | OUTPATIENT
Start: 2019-01-30 | End: 2019-07-30 | Stop reason: SDUPTHER

## 2019-01-30 NOTE — PROGRESS NOTES
Subjective:     Encounter Date:01/30/2019      Patient ID: Luigi Steel is a 63 y.o. male.    Chief Complaint:  History of Present Illness    This is a 63-year-old with a history of hypothyroidism, osteoarthritis, prior colon cancer status post partial colectomy, thrombocytopenia, hyperlipidemia, coronary artery disease status post anterior myocardial infarction and drug eluting stent placement of the mid LAD on 8/13/2017, followed by coronary artery bypass surgery ×3 on 8/25/2017, who presents for follow-up.     I began following the patient when he presented with an anterior myocardial infarction on 8/13/2017.  The patient reported sudden onset of chest discomfort 2 hours prior to his arrival.  On arrival to the hospital is brought directly to the cardiac catheterization laboratory and was found to have a thrombotic occlusion of his mid LAD.  Additionally was found to have multivessel coronary artery disease including proximal LAD, left circumflex artery, and posterior descending artery.  He underwent drug-eluting stent placement of his mid LAD at the time.  The remainder of his hospital position was uneventful.  He did have an echocardiogram performed following his event that showed mildly depressed left ventricular systolic function with ejection fraction of 45%.     The patient was brought back a week later for coronary artery bypass surgery.  His clopidogrel was stopped and he was placed on Integrilin therapy preoperatively.  He also underwent a repeat echocardiogram during that admission that showed normalization of his left ventricle systolic function and wall motion with an estimated ejection fraction of 70% and no significant valvular disease.  On 8/25/2017 he underwent CABG ×3 with a LIMA to LAD, saphenous vein graft to the PDA, and a saphenous vein graft to his OM 2.  He was resumed on clopidogrel following his surgery to ensure patency of his mid LAD stent.  Postoperatively he did pretty well  except he did suffer a syncopal episode on postoperative day 4 that was felt to be due to vasovagal syncope.        In follow up he has done well.  He has completed phase 2 cardiac and has gone on to participate in phase 3 cardiac rehabilitation.  He has reported some occasional fluttering in his chest, otherwise had denied any significant issues.       Today he presents for routine follow-up.  Since his last T was seen in 11/2018 by SHILOH Oliva with complaints of chest tightness and heaviness consistent with nausea, dizziness, shortness of breath.  He was set up with a stress test which was performed on 11/20/2018 was negative for ischemia.  Also appears that he was admitted to the hospital in early 11/2018 with parainfluenza bronchitis and pneumonia.    Overall today he reports that he feels well.  He continues to have brief episodes of fluttering persisted with some discomfort and lightheadedness that only last a few seconds.  This occurs about every 2-3 months.  He denies any change in the frequency or intensity of symptoms.  He reports compliance with his medications.  Denies any shortness of breath.  He does report that he hasn't been exercising as regularly as he was before after his wife received a cancer diagnosis about 6 months ago.  He reports that his blood pressures have been well controlled release checked him at home.      Review of Systems   Constitution: Negative for weakness and malaise/fatigue.   HENT: Negative for hearing loss, hoarse voice, nosebleeds and sore throat.    Eyes: Negative for pain.   Cardiovascular: Positive for chest pain and palpitations. Negative for claudication, cyanosis, dyspnea on exertion, irregular heartbeat, leg swelling, near-syncope, orthopnea, paroxysmal nocturnal dyspnea and syncope.   Respiratory: Negative for shortness of breath and snoring.    Endocrine: Negative for cold intolerance, heat intolerance, polydipsia, polyphagia and polyuria.   Skin: Negative for  itching and rash.   Musculoskeletal: Negative for arthritis, falls, joint pain, joint swelling, muscle cramps, muscle weakness and myalgias.   Gastrointestinal: Negative for constipation, diarrhea, dysphagia, heartburn, hematemesis, hematochezia, melena, nausea and vomiting.   Genitourinary: Negative for frequency, hematuria and hesitancy.   Neurological: Positive for light-headedness. Negative for excessive daytime sleepiness, dizziness, headaches and numbness.   Psychiatric/Behavioral: Negative for depression. The patient is not nervous/anxious.           Current Outpatient Medications:   •  acetaminophen (TYLENOL) 325 MG tablet, Take 2 tablets by mouth Every 4 (Four) Hours As Needed for Mild Pain ., Disp: , Rfl:   •  albuterol (PROVENTIL HFA;VENTOLIN HFA) 108 (90 Base) MCG/ACT inhaler, Inhale 2 puffs Every 4 (Four) Hours As Needed for Wheezing., Disp: , Rfl:   •  aspirin 81 MG chewable tablet, Chew 1 tablet Daily., Disp: , Rfl:   •  atorvastatin (LIPITOR) 20 MG tablet, TAKE ONE TABLET BY MOUTH ONCE DAILY AT  NIGHT, Disp: 30 tablet, Rfl: 5  •  carvedilol (COREG) 3.125 MG tablet, Take 1 tablet by mouth Every 12 (Twelve) Hours., Disp: 60 tablet, Rfl: 5  •  clopidogrel (PLAVIX) 75 MG tablet, Take 1 tablet by mouth Daily., Disp: 30 tablet, Rfl: 11  •  guaiFENesin (MUCINEX) 600 MG 12 hr tablet, Take 1 tablet by mouth Every 12 (Twelve) Hours., Disp: , Rfl:   •  levothyroxine (SYNTHROID, LEVOTHROID) 75 MCG tablet, Take 1 tablet by mouth Daily., Disp: 30 tablet, Rfl: 5  •  predniSONE (DELTASONE) 20 MG tablet, Take 2 tablets by mouth Daily., Disp: , Rfl:   •  VESICARE 10 MG tablet, Take 10 mg by mouth Daily., Disp: , Rfl:   •  zolpidem (AMBIEN) 10 MG tablet, Take 1 tablet by mouth At Night As Needed for Sleep., Disp: 30 tablet, Rfl: 2    Past Medical History:   Diagnosis Date   • Anterior myocardial infarction (CMS/HCC) 8/13/2017   • Arthritis     Hands, knees   • Colon cancer (CMS/HCC)    • Coronary artery disease    •  Elevated blood pressure reading without diagnosis of hypertension    • H/O complete eye exam 2014    due   • Hypertension    • Hypothyroidism, acquired    • Insomnia    • Kidney stones    • Obesity    • Osteoarthritis, multiple sites    • PONV (postoperative nausea and vomiting)      Past Surgical History:   Procedure Laterality Date   • CARDIAC CATHETERIZATION N/A 8/13/2017    Procedure: Left Heart Cath;  Surgeon: Edyta Ye MD;  Location: [facility] CATH INVASIVE LOCATION;  Service:    • CARDIAC CATHETERIZATION N/A 8/13/2017    Procedure: Stent RK coronary;  Surgeon: Edyta Ye MD;  Location: [facility] CATH INVASIVE LOCATION;  Service:    • COLECTOMY PARTIAL / TOTAL  1994   • COLON RESECTION RIGHT Right 1990    for cancer   • COLONOSCOPY  03/18/2009    Dr. Tillman   • CORONARY ARTERY BYPASS GRAFT N/A 8/25/2017    Procedure: JAYLON STERNOTOMY CORONARY ARTERY BYPASS GRAFT TIMES 3 USING LEFT INTERNAL  MAMMARY ARTERY AND RIGHT SAPHENOUS VEIN GRAFT PER ENDOSCOPIC VEIN HARVESTING;  Surgeon: Ana Lynn MD;  Location: [facility] OR;  Service:    • HERNIA REPAIR  04/02/2009    hiatal   • HERNIA REPAIR Bilateral 08/2010    ABDOMINAL x3   • INCISIONAL HERNIA REPAIR  09/2011   • INCISIONAL HERNIA REPAIR  04/2009   • INCISIONAL HERNIA REPAIR  1996   • KIDNEY STONE SURGERY     • SMALL INTESTINE SURGERY      for adhesions     Family History   Problem Relation Age of Onset   • Arthritis Mother    • Stroke Mother    • Heart attack Mother    • Hypertension Mother    • Gallbladder disease Mother    • Heart disease Mother    • Hypertension Father    • Gallbladder disease Father    • Colon cancer Father 65   • Gallbladder disease Sister    • Arthritis Brother    • Cancer Brother    • Colon cancer Brother 51   • Heart attack Brother    • Heart disease Brother    • Hypertension Brother    • Gallbladder disease Brother    • Heart disease Brother    • No Known Problems Maternal Grandmother    • No Known Problems Maternal Grandfather   "  • No Known Problems Paternal Grandmother    • No Known Problems Paternal Grandfather      Social History     Tobacco Use   • Smoking status: Never Smoker   • Smokeless tobacco: Never Used   Substance Use Topics   • Alcohol use: No   • Drug use: No           ECG 12 Lead  Date/Time: 1/30/2019 11:50 AM  Performed by: Edyta Ye MD  Authorized by: Edyta Ye MD   Comparison: compared with previous ECG   Similar to previous ECG  Rhythm: sinus rhythm               Objective:         Visit Vitals  /96 (BP Location: Left arm, Patient Position: Sitting)   Pulse 63   Ht 177.8 cm (70\")   Wt 106 kg (233 lb)   BMI 33.43 kg/m²          Physical Exam   Constitutional: He is oriented to person, place, and time. He appears well-developed and well-nourished.   HENT:   Head: Normocephalic and atraumatic.   Neck: No JVD present. Carotid bruit is not present.   Cardiovascular: Normal rate, regular rhythm, S1 normal and S2 normal. Exam reveals no gallop.   No murmur heard.  Pulses:       Radial pulses are 2+ on the right side, and 2+ on the left side.   No bilateral lower extremity edema   Pulmonary/Chest: Effort normal and breath sounds normal.   Abdominal: Soft. Normal appearance.   Neurological: He is alert and oriented to person, place, and time.   Skin: Skin is warm, dry and intact.   Psychiatric: He has a normal mood and affect.       Lab Review:       Assessment:          Diagnosis Plan   1. Coronary artery disease involving native coronary artery of native heart without angina pectoris     2. S/P coronary artery stent placement     3. S/P CABG (coronary artery bypass graft)     4. Essential hypertension     5. Hyperlipidemia, unspecified hyperlipidemia type            Plan:       1.  Coronary artery disease.  Overall appears to be doing well.  He has some atypical discomfort associated with palpitations.  He had a stress test in 11/2018 that was negative for ischemia.  Suspected over a year since his LAD stent " placement and he also has a LIMA to LAD in place we'll go ahead and have him discontinue his clopidogrel.  2.  Hypertension.  Mildly elevated in the office today.  He reports since been well-controlled at home.  We'll continue his current medications.  3.  Hyperlipidemia.  On statin therapy followed by Dr. Echeverria.  4.  Chronic thrombocytopenia.  Followed by hematology.  5.  Hypothyroidism    Will plan on seeing the patient back again in 6 months.    Coronary Artery Disease  Assessment  • The patient has no angina    Plan  • Lifestyle modifications discussed include adhering to a heart healthy diet, avoidance of tobacco products, maintenance of a healthy weight, medication compliance, regular exercise and regular monitoring of cholesterol and blood pressure    Subjective - Objective  • There is a history of past MI on or around 8/13/2017  • There is a history of previous coronary artery bypass graft on or around 8/25/2017  • There has been a previous stent procedure using RK on or around 8/13/2017  • Current antiplatelet therapy includes aspirin 81 mg and clopidogrel 75 mg  • The patient qualifies for cardiac rehabilitation, and has been referred to cardiac rehab

## 2019-03-12 ENCOUNTER — OFFICE VISIT (OUTPATIENT)
Dept: FAMILY MEDICINE CLINIC | Facility: CLINIC | Age: 64
End: 2019-03-12

## 2019-03-12 VITALS
DIASTOLIC BLOOD PRESSURE: 72 MMHG | SYSTOLIC BLOOD PRESSURE: 129 MMHG | HEIGHT: 70 IN | RESPIRATION RATE: 16 BRPM | WEIGHT: 236 LBS | HEART RATE: 66 BPM | BODY MASS INDEX: 33.79 KG/M2 | TEMPERATURE: 97.7 F

## 2019-03-12 DIAGNOSIS — F51.01 PRIMARY INSOMNIA: Primary | ICD-10-CM

## 2019-03-12 DIAGNOSIS — I10 ESSENTIAL HYPERTENSION: ICD-10-CM

## 2019-03-12 DIAGNOSIS — Z12.5 SCREENING FOR PROSTATE CANCER: ICD-10-CM

## 2019-03-12 DIAGNOSIS — E03.9 HYPOTHYROIDISM, ACQUIRED: ICD-10-CM

## 2019-03-12 PROCEDURE — 99213 OFFICE O/P EST LOW 20 MIN: CPT | Performed by: FAMILY MEDICINE

## 2019-03-12 RX ORDER — LEVOTHYROXINE SODIUM 0.07 MG/1
75 TABLET ORAL DAILY
Qty: 30 TABLET | Refills: 5 | Status: SHIPPED | OUTPATIENT
Start: 2019-03-12 | End: 2019-04-18 | Stop reason: DRUGHIGH

## 2019-03-12 RX ORDER — ZOLPIDEM TARTRATE 10 MG/1
10 TABLET ORAL NIGHTLY PRN
Qty: 30 TABLET | Refills: 2 | Status: SHIPPED | OUTPATIENT
Start: 2019-03-12 | End: 2019-06-12 | Stop reason: SDUPTHER

## 2019-03-12 NOTE — PROGRESS NOTES
Subjective   Luigi Steel is a 63 y.o. male.     History of Present Illness     Chief Complaint:   Chief Complaint   Patient presents with   • Insomnia     med refill- nora        Luigi Steel 63 y.o. male who presents today for Medical Management of the below listed issues and medication refills.  he has a problem list of   Patient Active Problem List   Diagnosis   • Colon cancer (CMS/HCC)   • Elevated blood pressure reading without diagnosis of hypertension   • Hypothyroidism, acquired   • Insomnia   • Osteoarthritis, multiple sites   • Hyperlipidemia   • Thrombocytopenia (CMS/HCC)   • Vitamin B12 deficiency   • CAD (coronary artery disease)   • S/P CABG (coronary artery bypass graft)   • Iron deficiency anemia   • S/P coronary artery stent placement   • Essential hypertension   • Iron deficiency   • Syncope and collapse   • Parainfluenza infection   .  Since the last visit, he has overall felt well.  he has been compliant with   Current Outpatient Medications:   •  levothyroxine (SYNTHROID, LEVOTHROID) 75 MCG tablet, Take 1 tablet by mouth Daily., Disp: 30 tablet, Rfl: 5  •  acetaminophen (TYLENOL) 325 MG tablet, Take 2 tablets by mouth Every 4 (Four) Hours As Needed for Mild Pain ., Disp: , Rfl:   •  albuterol (PROVENTIL HFA;VENTOLIN HFA) 108 (90 Base) MCG/ACT inhaler, Inhale 2 puffs Every 4 (Four) Hours As Needed for Wheezing., Disp: , Rfl:   •  aspirin 81 MG chewable tablet, Chew 1 tablet Daily., Disp: , Rfl:   •  atorvastatin (LIPITOR) 20 MG tablet, TAKE ONE TABLET BY MOUTH ONCE DAILY AT  NIGHT, Disp: 30 tablet, Rfl: 5  •  carvedilol (COREG) 3.125 MG tablet, Take 1 tablet by mouth Every 12 (Twelve) Hours., Disp: 60 tablet, Rfl: 11  •  guaiFENesin (MUCINEX) 600 MG 12 hr tablet, Take 1 tablet by mouth Every 12 (Twelve) Hours., Disp: , Rfl:   •  VESICARE 10 MG tablet, Take 10 mg by mouth Daily., Disp: , Rfl:   •  zolpidem (AMBIEN) 10 MG tablet, Take 1 tablet by mouth At Night As Needed for Sleep., Disp: 30  "tablet, Rfl: 2.  he denies medication side effects.    All of the chronic condition(s) listed above are stable w/o issues.    /72   Pulse 66   Temp 97.7 °F (36.5 °C) (Oral)   Resp 16   Ht 177.8 cm (70\")   Wt 107 kg (236 lb)   BMI 33.86 kg/m²     Results for orders placed or performed during the hospital encounter of 11/20/18   Stress Test With Myocardial Perfusion Two Day   Result Value Ref Range    Nuclear Prior Study 2     BH CV STRESS PROTOCOL 1 Darren     Stage 1 1     HR Stage 1 116     BP Stage 1 136/82     Duration Min Stage 1 3     Duration Sec Stage 1 0     Grade Stage 1 10     Speed Stage 1 1.7     BH CV STRESS METS STAGE 1 5     Stage 2 2     HR Stage 2 132     BP Stage 2 144/86     Duration Min Stage 2 3     Duration Sec Stage 2 0     Grade Stage 2 12     Speed Stage 2 2.5     BH CV STRESS METS STAGE 2 7.5     Stage 3 3     HR Stage 3 142     BP Stage 3 144/86     Duration Min Stage 3 1     Duration Sec Stage 3 0     Grade Stage 3 14     Speed Stage 3 3.4     BH CV STRESS METS STAGE 3 10.0     Baseline HR 80 bpm    Baseline /80 mmHg    Peak  bpm    Percent Max Pred HR 90.45 %    Percent Target  %    Peak /86 mmHg    Recovery HR 90 bpm    Recovery /82 mmHg    Target HR (85%) 133 bpm    Max. Pred. HR (100%) 157 bpm    Exercise duration (min) 7 min    Exercise duration (sec) 0 sec    Estimated workload 8.0 METS    Nuc Stress EF 56 %           The following portions of the patient's history were reviewed and updated as appropriate: allergies, current medications, past family history, past medical history, past social history, past surgical history and problem list.    Review of Systems   Constitutional: Negative for activity change, chills, fatigue and fever.   Respiratory: Negative for cough and shortness of breath.    Cardiovascular: Negative for chest pain and palpitations.   Gastrointestinal: Negative for abdominal pain.   Endocrine: Negative for cold intolerance. "   Psychiatric/Behavioral: Negative for behavioral problems and dysphoric mood. The patient is not nervous/anxious.        Objective   Physical Exam   Constitutional: He appears well-developed and well-nourished.   Neck: Neck supple. No thyromegaly present.   Cardiovascular: Normal rate and regular rhythm.   No murmur heard.  Pulmonary/Chest: Effort normal and breath sounds normal.   Abdominal: Bowel sounds are normal. There is no tenderness.   Psychiatric: He has a normal mood and affect. His behavior is normal.   Nursing note and vitals reviewed.    The patient has read and signed the [facility] Controlled Substance Contract.  I will continue to see patient for regular follow up appointments.  They are well controlled on their medication.  JESUS has been reviewed by me and is updated every 3 months. The patient is aware of the potential for addiction and dependence.    Assessment/Plan   Luigi was seen today for insomnia.    Diagnoses and all orders for this visit:    Primary insomnia  -     zolpidem (AMBIEN) 10 MG tablet; Take 1 tablet by mouth At Night As Needed for Sleep.    Hypothyroidism, acquired  -     levothyroxine (SYNTHROID, LEVOTHROID) 75 MCG tablet; Take 1 tablet by mouth Daily.  -     TSH; Future  -     T4, Free; Future    Essential hypertension  -     Comprehensive metabolic panel; Future  -     Lipid panel; Future  -     CBC and Differential; Future    Screening for prostate cancer  -     PSA; Future

## 2019-04-16 DIAGNOSIS — I10 ESSENTIAL HYPERTENSION: ICD-10-CM

## 2019-04-16 DIAGNOSIS — E03.9 HYPOTHYROIDISM, ACQUIRED: ICD-10-CM

## 2019-04-16 DIAGNOSIS — Z12.5 SCREENING FOR PROSTATE CANCER: ICD-10-CM

## 2019-04-16 LAB
ALBUMIN SERPL-MCNC: 4.9 G/DL (ref 3.5–5.2)
ALBUMIN/GLOB SERPL: 2.2 G/DL
ALP SERPL-CCNC: 101 U/L (ref 39–117)
ALT SERPL-CCNC: 16 U/L (ref 1–41)
AST SERPL-CCNC: 14 U/L (ref 1–40)
BASOPHILS # BLD AUTO: 0.01 10*3/MM3 (ref 0–0.2)
BASOPHILS NFR BLD AUTO: 0.2 % (ref 0–1.5)
BILIRUB SERPL-MCNC: 1.1 MG/DL (ref 0.2–1.2)
BUN SERPL-MCNC: 23 MG/DL (ref 8–23)
BUN/CREAT SERPL: 15 (ref 7–25)
CALCIUM SERPL-MCNC: 10.3 MG/DL (ref 8.6–10.5)
CHLORIDE SERPL-SCNC: 107 MMOL/L (ref 98–107)
CHOLEST SERPL-MCNC: 111 MG/DL (ref 0–200)
CO2 SERPL-SCNC: 26.5 MMOL/L (ref 22–29)
CREAT SERPL-MCNC: 1.53 MG/DL (ref 0.76–1.27)
EOSINOPHIL # BLD AUTO: 0.08 10*3/MM3 (ref 0–0.4)
EOSINOPHIL NFR BLD AUTO: 1.6 % (ref 0.3–6.2)
ERYTHROCYTE [DISTWIDTH] IN BLOOD BY AUTOMATED COUNT: 13 % (ref 12.3–15.4)
GLOBULIN SER CALC-MCNC: 2.2 GM/DL
GLUCOSE SERPL-MCNC: 92 MG/DL (ref 65–99)
HCT VFR BLD AUTO: 43.7 % (ref 37.5–51)
HDLC SERPL-MCNC: 32 MG/DL (ref 40–60)
HGB BLD-MCNC: 13.7 G/DL (ref 13–17.7)
IMM GRANULOCYTES # BLD AUTO: 0.02 10*3/MM3 (ref 0–0.05)
IMM GRANULOCYTES NFR BLD AUTO: 0.4 % (ref 0–0.5)
LDLC SERPL CALC-MCNC: 56 MG/DL (ref 0–100)
LYMPHOCYTES # BLD AUTO: 1.53 10*3/MM3 (ref 0.7–3.1)
LYMPHOCYTES NFR BLD AUTO: 29.8 % (ref 19.6–45.3)
MCH RBC QN AUTO: 29.8 PG (ref 26.6–33)
MCHC RBC AUTO-ENTMCNC: 31.4 G/DL (ref 31.5–35.7)
MCV RBC AUTO: 95 FL (ref 79–97)
MONOCYTES # BLD AUTO: 0.49 10*3/MM3 (ref 0.1–0.9)
MONOCYTES NFR BLD AUTO: 9.5 % (ref 5–12)
NEUTROPHILS # BLD AUTO: 3.01 10*3/MM3 (ref 1.4–7)
NEUTROPHILS NFR BLD AUTO: 58.5 % (ref 42.7–76)
PLATELET # BLD AUTO: 133 10*3/MM3 (ref 140–450)
POTASSIUM SERPL-SCNC: 4.5 MMOL/L (ref 3.5–5.2)
PROT SERPL-MCNC: 7.1 G/DL (ref 6–8.5)
PSA SERPL-MCNC: 0.12 NG/ML (ref 0–4)
RBC # BLD AUTO: 4.6 10*6/MM3 (ref 4.14–5.8)
SODIUM SERPL-SCNC: 145 MMOL/L (ref 136–145)
T4 FREE SERPL-MCNC: 1.41 NG/DL (ref 0.93–1.7)
TRIGL SERPL-MCNC: 113 MG/DL (ref 0–150)
TSH SERPL DL<=0.005 MIU/L-ACNC: 4.35 MIU/ML (ref 0.27–4.2)
VLDLC SERPL CALC-MCNC: 22.6 MG/DL
WBC # BLD AUTO: 5.14 10*3/MM3 (ref 3.4–10.8)

## 2019-04-18 DIAGNOSIS — E03.9 HYPOTHYROIDISM, UNSPECIFIED TYPE: Primary | ICD-10-CM

## 2019-04-18 DIAGNOSIS — R79.89 ELEVATED SERUM CREATININE: ICD-10-CM

## 2019-04-18 RX ORDER — LEVOTHYROXINE SODIUM 88 UG/1
88 TABLET ORAL DAILY
Qty: 30 TABLET | Refills: 5 | Status: SHIPPED | OUTPATIENT
Start: 2019-04-18 | End: 2019-09-12 | Stop reason: SDUPTHER

## 2019-05-31 ENCOUNTER — RESULTS ENCOUNTER (OUTPATIENT)
Dept: FAMILY MEDICINE CLINIC | Facility: CLINIC | Age: 64
End: 2019-05-31

## 2019-05-31 DIAGNOSIS — E03.9 HYPOTHYROIDISM, UNSPECIFIED TYPE: ICD-10-CM

## 2019-06-07 ENCOUNTER — TRANSCRIBE ORDERS (OUTPATIENT)
Dept: ADMINISTRATIVE | Facility: HOSPITAL | Age: 64
End: 2019-06-07

## 2019-06-07 DIAGNOSIS — N18.2 CKD STAGE 2 DUE TO TYPE 1 DIABETES MELLITUS (HCC): Primary | ICD-10-CM

## 2019-06-07 DIAGNOSIS — E10.22 CKD STAGE 2 DUE TO TYPE 1 DIABETES MELLITUS (HCC): Primary | ICD-10-CM

## 2019-06-12 ENCOUNTER — OFFICE VISIT (OUTPATIENT)
Dept: FAMILY MEDICINE CLINIC | Facility: CLINIC | Age: 64
End: 2019-06-12

## 2019-06-12 VITALS
HEART RATE: 68 BPM | RESPIRATION RATE: 14 BRPM | WEIGHT: 230 LBS | TEMPERATURE: 97.4 F | SYSTOLIC BLOOD PRESSURE: 117 MMHG | BODY MASS INDEX: 32.93 KG/M2 | DIASTOLIC BLOOD PRESSURE: 74 MMHG | HEIGHT: 70 IN

## 2019-06-12 DIAGNOSIS — F51.01 PRIMARY INSOMNIA: ICD-10-CM

## 2019-06-12 PROCEDURE — 99212 OFFICE O/P EST SF 10 MIN: CPT | Performed by: FAMILY MEDICINE

## 2019-06-12 RX ORDER — ZOLPIDEM TARTRATE 10 MG/1
10 TABLET ORAL NIGHTLY PRN
Qty: 30 TABLET | Refills: 2 | Status: SHIPPED | OUTPATIENT
Start: 2019-06-12 | End: 2019-06-27 | Stop reason: SDUPTHER

## 2019-06-12 NOTE — PROGRESS NOTES
Subjective   Luigi Steel is a 64 y.o. male.     History of Present Illness     Chief Complaint:   Chief Complaint   Patient presents with   • Insomnia     med refill - nora       Luigi Steel 64 y.o. male who presents today for Medical Management of the below listed issues and medication refills.  he has a problem list of   Patient Active Problem List   Diagnosis   • Colon cancer (CMS/HCC)   • Elevated blood pressure reading without diagnosis of hypertension   • Hypothyroidism, acquired   • Insomnia   • Osteoarthritis, multiple sites   • Hyperlipidemia   • Thrombocytopenia (CMS/HCC)   • Vitamin B12 deficiency   • CAD (coronary artery disease)   • S/P CABG (coronary artery bypass graft)   • Iron deficiency anemia   • S/P coronary artery stent placement   • Essential hypertension   • Iron deficiency   • Syncope and collapse   • Parainfluenza infection   .  Since the last visit, he has overall felt well.  he has been compliant with   Current Outpatient Medications:   •  zolpidem (AMBIEN) 10 MG tablet, Take 1 tablet by mouth At Night As Needed for Sleep., Disp: 30 tablet, Rfl: 2  •  acetaminophen (TYLENOL) 325 MG tablet, Take 2 tablets by mouth Every 4 (Four) Hours As Needed for Mild Pain ., Disp: , Rfl:   •  albuterol (PROVENTIL HFA;VENTOLIN HFA) 108 (90 Base) MCG/ACT inhaler, Inhale 2 puffs Every 4 (Four) Hours As Needed for Wheezing., Disp: , Rfl:   •  aspirin 81 MG chewable tablet, Chew 1 tablet Daily., Disp: , Rfl:   •  atorvastatin (LIPITOR) 20 MG tablet, TAKE ONE TABLET BY MOUTH ONCE DAILY AT  NIGHT, Disp: 30 tablet, Rfl: 5  •  carvedilol (COREG) 3.125 MG tablet, Take 1 tablet by mouth Every 12 (Twelve) Hours., Disp: 60 tablet, Rfl: 11  •  guaiFENesin (MUCINEX) 600 MG 12 hr tablet, Take 1 tablet by mouth Every 12 (Twelve) Hours., Disp: , Rfl:   •  levothyroxine (SYNTHROID) 88 MCG tablet, Take 1 tablet by mouth Daily., Disp: 30 tablet, Rfl: 5  •  VESICARE 10 MG tablet, Take 10 mg by mouth Daily., Disp: ,  "Rfl: .  he denies medication side effects.    All of the chronic condition(s) listed above are stable w/o issues.    /74   Pulse 68   Temp 97.4 °F (36.3 °C) (Oral)   Resp 14   Ht 177.8 cm (70\")   Wt 104 kg (230 lb)   BMI 33.00 kg/m²     Results for orders placed or performed in visit on 04/16/19   T4, Free   Result Value Ref Range    Free T4 1.41 0.93 - 1.70 ng/dL   PSA   Result Value Ref Range    PSA 0.124 0.000 - 4.000 ng/mL   TSH   Result Value Ref Range    TSH 4.350 (H) 0.270 - 4.200 mIU/mL   Lipid panel   Result Value Ref Range    Total Cholesterol 111 0 - 200 mg/dL    Triglycerides 113 0 - 150 mg/dL    HDL Cholesterol 32 (L) 40 - 60 mg/dL    VLDL Cholesterol 22.6 mg/dL    LDL Cholesterol  56 0 - 100 mg/dL   Comprehensive metabolic panel   Result Value Ref Range    Glucose 92 65 - 99 mg/dL    BUN 23 8 - 23 mg/dL    Creatinine 1.53 (H) 0.76 - 1.27 mg/dL    eGFR Non African Am 46 (L) >60 mL/min/1.73    eGFR African Am 56 (L) >60 mL/min/1.73    BUN/Creatinine Ratio 15.0 7.0 - 25.0    Sodium 145 136 - 145 mmol/L    Potassium 4.5 3.5 - 5.2 mmol/L    Chloride 107 98 - 107 mmol/L    Total CO2 26.5 22.0 - 29.0 mmol/L    Calcium 10.3 8.6 - 10.5 mg/dL    Total Protein 7.1 6.0 - 8.5 g/dL    Albumin 4.90 3.50 - 5.20 g/dL    Globulin 2.2 gm/dL    A/G Ratio 2.2 g/dL    Total Bilirubin 1.1 0.2 - 1.2 mg/dL    Alkaline Phosphatase 101 39 - 117 U/L    AST (SGOT) 14 1 - 40 U/L    ALT (SGPT) 16 1 - 41 U/L   CBC and Differential   Result Value Ref Range    WBC 5.14 3.40 - 10.80 10*3/mm3    RBC 4.60 4.14 - 5.80 10*6/mm3    Hemoglobin 13.7 13.0 - 17.7 g/dL    Hematocrit 43.7 37.5 - 51.0 %    MCV 95.0 79.0 - 97.0 fL    MCH 29.8 26.6 - 33.0 pg    MCHC 31.4 (L) 31.5 - 35.7 g/dL    RDW 13.0 12.3 - 15.4 %    Platelets 133 (L) 140 - 450 10*3/mm3    Neutrophil Rel % 58.5 42.7 - 76.0 %    Lymphocyte Rel % 29.8 19.6 - 45.3 %    Monocyte Rel % 9.5 5.0 - 12.0 %    Eosinophil Rel % 1.6 0.3 - 6.2 %    Basophil Rel % 0.2 0.0 - 1.5 %    " Neutrophils Absolute 3.01 1.40 - 7.00 10*3/mm3    Lymphocytes Absolute 1.53 0.70 - 3.10 10*3/mm3    Monocytes Absolute 0.49 0.10 - 0.90 10*3/mm3    Eosinophils Absolute 0.08 0.00 - 0.40 10*3/mm3    Basophils Absolute 0.01 0.00 - 0.20 10*3/mm3    Immature Granulocyte Rel % 0.4 0.0 - 0.5 %    Immature Grans Absolute 0.02 0.00 - 0.05 10*3/mm3           The following portions of the patient's history were reviewed and updated as appropriate: allergies, current medications, past family history, past medical history, past social history, past surgical history and problem list.    Review of Systems   Constitutional: Negative for activity change, chills, fatigue and fever.   Respiratory: Negative for cough and shortness of breath.    Cardiovascular: Negative for chest pain and palpitations.   Gastrointestinal: Negative for abdominal pain.   Endocrine: Negative for cold intolerance.   Psychiatric/Behavioral: Negative for behavioral problems and dysphoric mood. The patient is not nervous/anxious.        Objective   Physical Exam   Constitutional: He appears well-developed and well-nourished.   Neck: Neck supple. No thyromegaly present.   Cardiovascular: Normal rate and regular rhythm.   No murmur heard.  Pulmonary/Chest: Effort normal and breath sounds normal.   Abdominal: Bowel sounds are normal. There is no tenderness.   Psychiatric: He has a normal mood and affect. His behavior is normal.   Nursing note and vitals reviewed.    The patient has read and signed the [facility] Controlled Substance Contract.  I will continue to see patient for regular follow up appointments.  They are well controlled on their medication.  JESUS has been reviewed by me and is updated every 3 months. The patient is aware of the potential for addiction and dependence.    Assessment/Plan   Luigi was seen today for insomnia.    Diagnoses and all orders for this visit:    Primary insomnia  -     zolpidem (AMBIEN) 10 MG tablet; Take 1 tablet by  mouth At Night As Needed for Sleep.

## 2019-06-26 DIAGNOSIS — F51.01 PRIMARY INSOMNIA: ICD-10-CM

## 2019-06-27 DIAGNOSIS — F51.01 PRIMARY INSOMNIA: ICD-10-CM

## 2019-06-27 RX ORDER — ZOLPIDEM TARTRATE 10 MG/1
10 TABLET ORAL NIGHTLY PRN
Qty: 30 TABLET | Refills: 2 | OUTPATIENT
Start: 2019-06-27

## 2019-06-27 RX ORDER — ZOLPIDEM TARTRATE 10 MG/1
10 TABLET ORAL NIGHTLY PRN
Qty: 30 TABLET | Refills: 2 | Status: SHIPPED | OUTPATIENT
Start: 2019-06-27 | End: 2019-09-12 | Stop reason: SDUPTHER

## 2019-07-28 DIAGNOSIS — I25.10 CORONARY ARTERY DISEASE INVOLVING NATIVE CORONARY ARTERY OF NATIVE HEART WITHOUT ANGINA PECTORIS: ICD-10-CM

## 2019-07-29 RX ORDER — ATORVASTATIN CALCIUM 20 MG/1
TABLET, FILM COATED ORAL
Qty: 90 TABLET | Refills: 0 | Status: SHIPPED | OUTPATIENT
Start: 2019-07-29 | End: 2019-07-30 | Stop reason: SDUPTHER

## 2019-07-30 ENCOUNTER — OFFICE VISIT (OUTPATIENT)
Dept: CARDIOLOGY | Facility: CLINIC | Age: 64
End: 2019-07-30

## 2019-07-30 VITALS
SYSTOLIC BLOOD PRESSURE: 138 MMHG | HEART RATE: 72 BPM | WEIGHT: 232 LBS | HEIGHT: 70 IN | BODY MASS INDEX: 33.21 KG/M2 | DIASTOLIC BLOOD PRESSURE: 80 MMHG

## 2019-07-30 DIAGNOSIS — I10 ESSENTIAL HYPERTENSION: ICD-10-CM

## 2019-07-30 DIAGNOSIS — Z95.1 S/P CABG (CORONARY ARTERY BYPASS GRAFT): ICD-10-CM

## 2019-07-30 DIAGNOSIS — E78.5 HYPERLIPIDEMIA, UNSPECIFIED HYPERLIPIDEMIA TYPE: ICD-10-CM

## 2019-07-30 DIAGNOSIS — Z95.5 S/P CORONARY ARTERY STENT PLACEMENT: ICD-10-CM

## 2019-07-30 DIAGNOSIS — I25.10 CORONARY ARTERY DISEASE INVOLVING NATIVE CORONARY ARTERY OF NATIVE HEART WITHOUT ANGINA PECTORIS: Primary | ICD-10-CM

## 2019-07-30 PROCEDURE — 93000 ELECTROCARDIOGRAM COMPLETE: CPT | Performed by: INTERNAL MEDICINE

## 2019-07-30 PROCEDURE — 99214 OFFICE O/P EST MOD 30 MIN: CPT | Performed by: INTERNAL MEDICINE

## 2019-07-30 RX ORDER — ATORVASTATIN CALCIUM 20 MG/1
20 TABLET, FILM COATED ORAL
Qty: 90 TABLET | Refills: 3 | Status: SHIPPED | OUTPATIENT
Start: 2019-07-30 | End: 2020-08-04 | Stop reason: SDUPTHER

## 2019-07-30 RX ORDER — CARVEDILOL 3.12 MG/1
3.12 TABLET ORAL EVERY 12 HOURS SCHEDULED
Qty: 90 TABLET | Refills: 3 | Status: SHIPPED | OUTPATIENT
Start: 2019-07-30 | End: 2020-08-04 | Stop reason: SDUPTHER

## 2019-07-30 NOTE — PROGRESS NOTES
Subjective:     Encounter Date:07/30/2019      Patient ID: Luigi Steel is a 64 y.o. male.    Chief Complaint:  History of Present Illness    This is a 64-year-old with a history of hypothyroidism, osteoarthritis, prior colon cancer status post partial colectomy, thrombocytopenia, hyperlipidemia, coronary artery disease status post anterior myocardial infarction and drug eluting stent placement of the mid LAD on 8/13/2017, followed by coronary artery bypass surgery ×3 on 8/25/2017, who presents for follow-up.     I began following the patient when he presented with an anterior myocardial infarction on 8/13/2017.  The patient reported sudden onset of chest discomfort 2 hours prior to his arrival.  On arrival to the hospital is brought directly to the cardiac catheterization laboratory and was found to have a thrombotic occlusion of his mid LAD.  Additionally was found to have multivessel coronary artery disease including proximal LAD, left circumflex artery, and posterior descending artery.  He underwent drug-eluting stent placement of his mid LAD at the time.  The remainder of his hospital position was uneventful.  He did have an echocardiogram performed following his event that showed mildly depressed left ventricular systolic function with ejection fraction of 45%.     The patient was brought back a week later for coronary artery bypass surgery.  His clopidogrel was stopped and he was placed on Integrilin therapy preoperatively.  He also underwent a repeat echocardiogram during that admission that showed normalization of his left ventricle systolic function and wall motion with an estimated ejection fraction of 70% and no significant valvular disease.  On 8/25/2017 he underwent CABG ×3 with a LIMA to LAD, saphenous vein graft to the PDA, and a saphenous vein graft to his OM 2.  He was resumed on clopidogrel following his surgery to ensure patency of his mid LAD stent.  Postoperatively he did pretty well  except he did suffer a syncopal episode on postoperative day 4 that was felt to be due to vasovagal syncope.        In 11/2018 he was seen by SHILOH Oliva with complaints of chest tightness and heaviness consistent with nausea, dizziness, shortness of breath.  He was set up with a stress test which was performed on 11/20/2018 was negative for ischemia.    When he was seen in 1/2019 he appeared stable from a cardiac standpoint so I went ahead and discontinued his clopidogrel.     Presents today for routine follow-up.  Overall he is been feeling well.  He has chronic stable dyspnea on exertion.  He has intermittent palpitations sometimes associated with some lightheadedness that are brief and unchanged in frequency.  Denies any chest pain, PND or orthopnea, near-syncope or syncope, or lower extremity edema.  He denies any significant changes to medications.  He is still actively involved in caring for his wife who has been diagnosed with cancer and has not had a lot of time to exercise.    Review of Systems   Constitution: Negative for weakness and malaise/fatigue.   HENT: Negative for hearing loss, hoarse voice, nosebleeds and sore throat.    Eyes: Negative for pain.   Cardiovascular: Positive for dyspnea on exertion and palpitations. Negative for chest pain, claudication, cyanosis, irregular heartbeat, leg swelling, near-syncope, orthopnea, paroxysmal nocturnal dyspnea and syncope.   Respiratory: Negative for shortness of breath and snoring.    Endocrine: Negative for cold intolerance, heat intolerance, polydipsia, polyphagia and polyuria.   Skin: Negative for itching and rash.   Musculoskeletal: Negative for arthritis, falls, joint pain, joint swelling, muscle cramps, muscle weakness and myalgias.   Gastrointestinal: Negative for constipation, diarrhea, dysphagia, heartburn, hematemesis, hematochezia, melena, nausea and vomiting.   Genitourinary: Negative for frequency, hematuria and hesitancy.   Neurological:  Negative for excessive daytime sleepiness, dizziness, headaches, light-headedness and numbness.   Psychiatric/Behavioral: Negative for depression. The patient is not nervous/anxious.           Current Outpatient Medications:   •  acetaminophen (TYLENOL) 325 MG tablet, Take 2 tablets by mouth Every 4 (Four) Hours As Needed for Mild Pain ., Disp: , Rfl:   •  albuterol (PROVENTIL HFA;VENTOLIN HFA) 108 (90 Base) MCG/ACT inhaler, Inhale 2 puffs Every 4 (Four) Hours As Needed for Wheezing., Disp: , Rfl:   •  aspirin 81 MG chewable tablet, Chew 1 tablet Daily., Disp: , Rfl:   •  atorvastatin (LIPITOR) 20 MG tablet, Take 1 tablet by mouth every night at bedtime., Disp: 90 tablet, Rfl: 3  •  carvedilol (COREG) 3.125 MG tablet, Take 1 tablet by mouth Every 12 (Twelve) Hours., Disp: 90 tablet, Rfl: 3  •  levothyroxine (SYNTHROID) 88 MCG tablet, Take 1 tablet by mouth Daily., Disp: 30 tablet, Rfl: 5  •  VESICARE 10 MG tablet, Take 10 mg by mouth Daily., Disp: , Rfl:   •  zolpidem (AMBIEN) 10 MG tablet, Take 1 tablet by mouth At Night As Needed for Sleep., Disp: 30 tablet, Rfl: 2    Past Medical History:   Diagnosis Date   • Anterior myocardial infarction (CMS/HCC) 8/13/2017   • Arthritis     Hands, knees   • Colon cancer (CMS/HCC)    • Coronary artery disease    • Elevated blood pressure reading without diagnosis of hypertension    • H/O complete eye exam 2014    due   • Hypertension    • Hypothyroidism, acquired    • Insomnia    • Kidney stones    • Obesity    • Osteoarthritis, multiple sites    • PONV (postoperative nausea and vomiting)      Past Surgical History:   Procedure Laterality Date   • CARDIAC CATHETERIZATION N/A 8/13/2017    Procedure: Left Heart Cath;  Surgeon: Edyta Ye MD;  Location:  [facility];  Service:    • CARDIAC CATHETERIZATION N/A 8/13/2017    Procedure: Stent RK coronary;  Surgeon: Edyta Ye MD;  Location:  [facility];  Service:    • COLECTOMY PARTIAL /  "TOTAL  1994   • COLON RESECTION RIGHT Right 1990    for cancer   • COLONOSCOPY  03/18/2009    Dr. Tillman   • CORONARY ARTERY BYPASS GRAFT N/A 8/25/2017    Procedure: JAYLON STERNOTOMY CORONARY ARTERY BYPASS GRAFT TIMES 3 USING LEFT INTERNAL  MAMMARY ARTERY AND RIGHT SAPHENOUS VEIN GRAFT PER ENDOSCOPIC VEIN HARVESTING;  Surgeon: Ana Lynn MD;  Location: [facility] OR;  Service:    • HERNIA REPAIR  04/02/2009    hiatal   • HERNIA REPAIR Bilateral 08/2010    ABDOMINAL x3   • INCISIONAL HERNIA REPAIR  09/2011   • INCISIONAL HERNIA REPAIR  04/2009   • INCISIONAL HERNIA REPAIR  1996   • KIDNEY STONE SURGERY     • SMALL INTESTINE SURGERY      for adhesions     Family History   Problem Relation Age of Onset   • Arthritis Mother    • Stroke Mother    • Heart attack Mother    • Hypertension Mother    • Gallbladder disease Mother    • Heart disease Mother    • Hypertension Father    • Gallbladder disease Father    • Colon cancer Father 65   • Gallbladder disease Sister    • Arthritis Brother    • Cancer Brother    • Colon cancer Brother 51   • Heart attack Brother    • Heart disease Brother    • Hypertension Brother    • Gallbladder disease Brother    • Heart disease Brother    • No Known Problems Maternal Grandmother    • No Known Problems Maternal Grandfather    • No Known Problems Paternal Grandmother    • No Known Problems Paternal Grandfather      Social History     Tobacco Use   • Smoking status: Never Smoker   • Smokeless tobacco: Never Used   Substance Use Topics   • Alcohol use: No   • Drug use: No           ECG 12 Lead  Date/Time: 7/30/2019 11:50 AM  Performed by: Edyta Ye MD  Authorized by: Edyta Ye MD   Comparison: compared with previous ECG   Similar to previous ECG  Rhythm: sinus rhythm  Conduction: incomplete right bundle branch block               Objective:         Visit Vitals  /80   Pulse 72   Ht 177.8 cm (70\")   Wt 105 kg (232 lb)   BMI 33.29 kg/m²          Physical Exam "   Constitutional: He is oriented to person, place, and time. He appears well-developed and well-nourished.   HENT:   Head: Normocephalic and atraumatic.   Neck: No JVD present. Carotid bruit is not present.   Cardiovascular: Normal rate, regular rhythm, S1 normal and S2 normal. Exam reveals no gallop.   No murmur heard.  Pulses:       Radial pulses are 2+ on the right side, and 2+ on the left side.   No bilateral lower extremity edema   Pulmonary/Chest: Effort normal and breath sounds normal.   Abdominal: Soft. Normal appearance.   Neurological: He is alert and oriented to person, place, and time.   Skin: Skin is warm, dry and intact.   Psychiatric: He has a normal mood and affect.       Lab Review:       Assessment:          Diagnosis Plan   1. Coronary artery disease involving native coronary artery of native heart without angina pectoris  atorvastatin (LIPITOR) 20 MG tablet   2. S/P CABG (coronary artery bypass graft)     3. S/P coronary artery stent placement     4. Essential hypertension  carvedilol (COREG) 3.125 MG tablet   5. Hyperlipidemia, unspecified hyperlipidemia type            Plan:       1.  Coronary artery disease.  Status post CABG x3 and prior drug-eluting stent placement of the mid LAD.  He appears stable and asymptomatic.  Continue current management.  2.  Hypertension.  Well controlled on his current medications.  Continue the same.  3.  Hyperlipidemia.  On statin therapy which is followed by Dr. Echeverria.  The patient does request a refill on his atorvastatin today which I will go ahead and provide.  4.  Chronic thrombus cytopenia.  Followed by hematology.  5.  Hypothyroidism.    We will plan on seeing the patient back again in 1 year or sooner if further issues arise.    Coronary Artery Disease  Assessment  • The patient has no angina    Plan  • Lifestyle modifications discussed include adhering to a heart healthy diet, avoidance of tobacco products, maintenance of a healthy weight, medication  compliance, regular exercise and regular monitoring of cholesterol and blood pressure    Subjective - Objective  • There is a history of past MI on or around 8/13/2017  • There is a history of previous coronary artery bypass graft on or around 8/25/2017  • There has been a previous stent procedure using RK on or around 8/13/2017  • Current antiplatelet therapy includes aspirin 81 mg and clopidogrel 75 mg  • The patient qualifies for cardiac rehabilitation, and has been referred to cardiac rehab

## 2019-08-30 ENCOUNTER — LAB (OUTPATIENT)
Dept: LAB | Facility: HOSPITAL | Age: 64
End: 2019-08-30

## 2019-08-30 ENCOUNTER — OFFICE VISIT (OUTPATIENT)
Dept: ONCOLOGY | Facility: CLINIC | Age: 64
End: 2019-08-30

## 2019-08-30 VITALS
BODY MASS INDEX: 33.13 KG/M2 | HEIGHT: 70 IN | HEART RATE: 70 BPM | SYSTOLIC BLOOD PRESSURE: 158 MMHG | DIASTOLIC BLOOD PRESSURE: 83 MMHG | OXYGEN SATURATION: 96 % | TEMPERATURE: 98.6 F | WEIGHT: 231.4 LBS | RESPIRATION RATE: 16 BRPM

## 2019-08-30 DIAGNOSIS — D69.6 THROMBOCYTOPENIA (HCC): Primary | ICD-10-CM

## 2019-08-30 DIAGNOSIS — E61.1 IRON DEFICIENCY: Primary | ICD-10-CM

## 2019-08-30 DIAGNOSIS — E53.8 VITAMIN B12 DEFICIENCY: ICD-10-CM

## 2019-08-30 DIAGNOSIS — D69.6 THROMBOCYTOPENIA (HCC): ICD-10-CM

## 2019-08-30 LAB
BASOPHILS # BLD AUTO: 0.01 10*3/MM3 (ref 0–0.2)
BASOPHILS NFR BLD AUTO: 0.2 % (ref 0–1.5)
DEPRECATED RDW RBC AUTO: 44.2 FL (ref 37–54)
EOSINOPHIL # BLD AUTO: 0.05 10*3/MM3 (ref 0–0.4)
EOSINOPHIL NFR BLD AUTO: 0.8 % (ref 0.3–6.2)
ERYTHROCYTE [DISTWIDTH] IN BLOOD BY AUTOMATED COUNT: 12.6 % (ref 12.3–15.4)
FERRITIN SERPL-MCNC: 104.5 NG/ML (ref 30–400)
HCT VFR BLD AUTO: 44.2 % (ref 37.5–51)
HGB BLD-MCNC: 13.9 G/DL (ref 13–17.7)
IMM GRANULOCYTES # BLD AUTO: 0.02 10*3/MM3 (ref 0–0.05)
IMM GRANULOCYTES NFR BLD AUTO: 0.3 % (ref 0–0.5)
IRON 24H UR-MRATE: 96 MCG/DL (ref 59–158)
IRON SATN MFR SERPL: 29 % (ref 14–48)
LYMPHOCYTES # BLD AUTO: 1.61 10*3/MM3 (ref 0.7–3.1)
LYMPHOCYTES NFR BLD AUTO: 25.5 % (ref 19.6–45.3)
MCH RBC QN AUTO: 30.3 PG (ref 26.6–33)
MCHC RBC AUTO-ENTMCNC: 31.4 G/DL (ref 31.5–35.7)
MCV RBC AUTO: 96.3 FL (ref 79–97)
MONOCYTES # BLD AUTO: 0.66 10*3/MM3 (ref 0.1–0.9)
MONOCYTES NFR BLD AUTO: 10.4 % (ref 5–12)
NEUTROPHILS # BLD AUTO: 3.97 10*3/MM3 (ref 1.7–7)
NEUTROPHILS NFR BLD AUTO: 62.8 % (ref 42.7–76)
NRBC BLD AUTO-RTO: 0 /100 WBC (ref 0–0.2)
PLATELET # BLD AUTO: 129 10*3/MM3 (ref 140–450)
PMV BLD AUTO: 10.3 FL (ref 6–12)
RBC # BLD AUTO: 4.59 10*6/MM3 (ref 4.14–5.8)
TIBC SERPL-MCNC: 335 MCG/DL (ref 249–505)
TRANSFERRIN SERPL-MCNC: 239 MG/DL (ref 200–360)
VIT B12 BLD-MCNC: >2000 PG/ML (ref 211–946)
WBC NRBC COR # BLD: 6.32 10*3/MM3 (ref 3.4–10.8)

## 2019-08-30 PROCEDURE — 82728 ASSAY OF FERRITIN: CPT

## 2019-08-30 PROCEDURE — 36415 COLL VENOUS BLD VENIPUNCTURE: CPT

## 2019-08-30 PROCEDURE — 83540 ASSAY OF IRON: CPT

## 2019-08-30 PROCEDURE — 85025 COMPLETE CBC W/AUTO DIFF WBC: CPT

## 2019-08-30 PROCEDURE — 84466 ASSAY OF TRANSFERRIN: CPT

## 2019-08-30 PROCEDURE — G0463 HOSPITAL OUTPT CLINIC VISIT: HCPCS | Performed by: INTERNAL MEDICINE

## 2019-08-30 PROCEDURE — 99214 OFFICE O/P EST MOD 30 MIN: CPT | Performed by: INTERNAL MEDICINE

## 2019-08-30 PROCEDURE — 82607 VITAMIN B-12: CPT | Performed by: INTERNAL MEDICINE

## 2019-09-03 ENCOUNTER — TELEPHONE (OUTPATIENT)
Dept: ONCOLOGY | Facility: HOSPITAL | Age: 64
End: 2019-09-03

## 2019-09-03 NOTE — TELEPHONE ENCOUNTER
Notified patient and he v/u     ----- Message from Leeann Mena MD PhD sent at 9/2/2019  1:55 PM EDT -----  Regarding: stop oral iron.   Please call patient reporting iron level is better, he can stop taking oral iron.     Thank you very much!     Dr. Mena

## 2019-09-12 ENCOUNTER — OFFICE VISIT (OUTPATIENT)
Dept: FAMILY MEDICINE CLINIC | Facility: CLINIC | Age: 64
End: 2019-09-12

## 2019-09-12 VITALS
DIASTOLIC BLOOD PRESSURE: 81 MMHG | SYSTOLIC BLOOD PRESSURE: 135 MMHG | TEMPERATURE: 97.8 F | RESPIRATION RATE: 16 BRPM | BODY MASS INDEX: 33.21 KG/M2 | HEART RATE: 70 BPM | HEIGHT: 70 IN | WEIGHT: 232 LBS

## 2019-09-12 DIAGNOSIS — F51.01 PRIMARY INSOMNIA: Primary | ICD-10-CM

## 2019-09-12 DIAGNOSIS — E03.9 ACQUIRED HYPOTHYROIDISM: ICD-10-CM

## 2019-09-12 PROCEDURE — 99213 OFFICE O/P EST LOW 20 MIN: CPT | Performed by: FAMILY MEDICINE

## 2019-09-12 RX ORDER — ZOLPIDEM TARTRATE 10 MG/1
10 TABLET ORAL NIGHTLY PRN
Qty: 30 TABLET | Refills: 2 | Status: SHIPPED | OUTPATIENT
Start: 2019-09-12 | End: 2019-12-03 | Stop reason: SDUPTHER

## 2019-09-12 RX ORDER — LEVOTHYROXINE SODIUM 88 UG/1
88 TABLET ORAL DAILY
Qty: 30 TABLET | Refills: 5 | Status: SHIPPED | OUTPATIENT
Start: 2019-09-12 | End: 2020-03-03 | Stop reason: SDUPTHER

## 2019-09-12 NOTE — PROGRESS NOTES
Subjective   Luigi Steel is a 64 y.o. male.     History of Present Illness     Chief Complaint:   Chief Complaint   Patient presents with   • Insomnia     med refill nora    • Hypothyroidism       Luigi Steel 64 y.o. male who presents today for Medical Management of the below listed issues and medication refills.  he has a problem list of   Patient Active Problem List   Diagnosis   • Colon cancer (CMS/HCC)   • Elevated blood pressure reading without diagnosis of hypertension   • Hypothyroidism, acquired   • Insomnia   • Osteoarthritis, multiple sites   • Hyperlipidemia   • Thrombocytopenia (CMS/HCC)   • Vitamin B12 deficiency   • CAD (coronary artery disease)   • S/P CABG (coronary artery bypass graft)   • Iron deficiency anemia   • S/P coronary artery stent placement   • Essential hypertension   • Iron deficiency   • Syncope and collapse   • Parainfluenza infection   .  Since the last visit, he has overall felt well.  he has been compliant with   Current Outpatient Medications:   •  levothyroxine (SYNTHROID) 88 MCG tablet, Take 1 tablet by mouth Daily., Disp: 30 tablet, Rfl: 5  •  zolpidem (AMBIEN) 10 MG tablet, Take 1 tablet by mouth At Night As Needed for Sleep., Disp: 30 tablet, Rfl: 2  •  acetaminophen (TYLENOL) 325 MG tablet, Take 2 tablets by mouth Every 4 (Four) Hours As Needed for Mild Pain ., Disp: , Rfl:   •  albuterol (PROVENTIL HFA;VENTOLIN HFA) 108 (90 Base) MCG/ACT inhaler, Inhale 2 puffs Every 4 (Four) Hours As Needed for Wheezing., Disp: , Rfl:   •  aspirin 81 MG chewable tablet, Chew 1 tablet Daily., Disp: , Rfl:   •  atorvastatin (LIPITOR) 20 MG tablet, Take 1 tablet by mouth every night at bedtime., Disp: 90 tablet, Rfl: 3  •  carvedilol (COREG) 3.125 MG tablet, Take 1 tablet by mouth Every 12 (Twelve) Hours., Disp: 90 tablet, Rfl: 3  •  VESICARE 10 MG tablet, Take 10 mg by mouth Daily., Disp: , Rfl: .  he denies medication side effects.    All of the chronic condition(s) listed above are  "stable w/o issues.    /81   Pulse 70   Temp 97.8 °F (36.6 °C) (Oral)   Resp 16   Ht 177.8 cm (70\")   Wt 105 kg (232 lb)   BMI 33.29 kg/m²     Results for orders placed or performed in visit on 08/30/19   Ferritin   Result Value Ref Range    Ferritin 104.50 30.00 - 400.00 ng/mL   Iron Profile   Result Value Ref Range    Iron 96 59 - 158 mcg/dL    Iron Saturation 29 14 - 48 %    Transferrin 239 200 - 360 mg/dL    TIBC 335 249 - 505 mcg/dL   Vitamin B12   Result Value Ref Range    Vitamin B-12 >2,000 (H) 211 - 946 pg/mL   CBC Auto Differential   Result Value Ref Range    WBC 6.32 3.40 - 10.80 10*3/mm3    RBC 4.59 4.14 - 5.80 10*6/mm3    Hemoglobin 13.9 13.0 - 17.7 g/dL    Hematocrit 44.2 37.5 - 51.0 %    MCV 96.3 79.0 - 97.0 fL    MCH 30.3 26.6 - 33.0 pg    MCHC 31.4 (L) 31.5 - 35.7 g/dL    RDW 12.6 12.3 - 15.4 %    RDW-SD 44.2 37.0 - 54.0 fl    MPV 10.3 6.0 - 12.0 fL    Platelets 129 (L) 140 - 450 10*3/mm3    Neutrophil % 62.8 42.7 - 76.0 %    Lymphocyte % 25.5 19.6 - 45.3 %    Monocyte % 10.4 5.0 - 12.0 %    Eosinophil % 0.8 0.3 - 6.2 %    Basophil % 0.2 0.0 - 1.5 %    Immature Grans % 0.3 0.0 - 0.5 %    Neutrophils, Absolute 3.97 1.70 - 7.00 10*3/mm3    Lymphocytes, Absolute 1.61 0.70 - 3.10 10*3/mm3    Monocytes, Absolute 0.66 0.10 - 0.90 10*3/mm3    Eosinophils, Absolute 0.05 0.00 - 0.40 10*3/mm3    Basophils, Absolute 0.01 0.00 - 0.20 10*3/mm3    Immature Grans, Absolute 0.02 0.00 - 0.05 10*3/mm3    nRBC 0.0 0.0 - 0.2 /100 WBC           The following portions of the patient's history were reviewed and updated as appropriate: allergies, current medications, past family history, past medical history, past social history, past surgical history and problem list.    Review of Systems   Constitutional: Negative for activity change, chills, fatigue and fever.   Respiratory: Negative for cough and shortness of breath.    Cardiovascular: Negative for chest pain and palpitations.   Gastrointestinal: Negative " for abdominal pain.   Endocrine: Negative for cold intolerance.   Psychiatric/Behavioral: Negative for behavioral problems and dysphoric mood. The patient is not nervous/anxious.        Objective   Physical Exam   Constitutional: He appears well-developed and well-nourished.   Neck: Neck supple. No thyromegaly present.   Cardiovascular: Normal rate and regular rhythm.   No murmur heard.  Pulmonary/Chest: Effort normal and breath sounds normal.   Abdominal: Bowel sounds are normal. There is no tenderness.   Psychiatric: He has a normal mood and affect. His behavior is normal.   Nursing note and vitals reviewed.  TSH/FT4 ordered and pt to complete.    The patient has read and signed the [facility] Controlled Substance Contract.  I will continue to see patient for regular follow up appointments.  They are well controlled on their medication.  JESUS has been reviewed by me and is updated every 3 months. The patient is aware of the potential for addiction and dependence.    Assessment/Plan   Luigi was seen today for insomnia and hypothyroidism.    Diagnoses and all orders for this visit:    Primary insomnia  -     zolpidem (AMBIEN) 10 MG tablet; Take 1 tablet by mouth At Night As Needed for Sleep.    Acquired hypothyroidism  -     levothyroxine (SYNTHROID) 88 MCG tablet; Take 1 tablet by mouth Daily.

## 2019-09-13 LAB
T4 FREE SERPL-MCNC: 1.44 NG/DL (ref 0.93–1.7)
TSH SERPL DL<=0.005 MIU/L-ACNC: 2.19 UIU/ML (ref 0.27–4.2)

## 2019-12-03 ENCOUNTER — OFFICE VISIT (OUTPATIENT)
Dept: FAMILY MEDICINE CLINIC | Facility: CLINIC | Age: 64
End: 2019-12-03

## 2019-12-03 VITALS
SYSTOLIC BLOOD PRESSURE: 148 MMHG | TEMPERATURE: 98 F | DIASTOLIC BLOOD PRESSURE: 79 MMHG | BODY MASS INDEX: 34.36 KG/M2 | HEIGHT: 70 IN | HEART RATE: 74 BPM | OXYGEN SATURATION: 96 % | RESPIRATION RATE: 16 BRPM | WEIGHT: 240 LBS

## 2019-12-03 DIAGNOSIS — J01.00 ACUTE NON-RECURRENT MAXILLARY SINUSITIS: Primary | ICD-10-CM

## 2019-12-03 DIAGNOSIS — F51.01 PRIMARY INSOMNIA: ICD-10-CM

## 2019-12-03 PROCEDURE — 99213 OFFICE O/P EST LOW 20 MIN: CPT | Performed by: FAMILY MEDICINE

## 2019-12-03 RX ORDER — DEXTROMETHORPHAN HYDROBROMIDE AND PROMETHAZINE HYDROCHLORIDE 15; 6.25 MG/5ML; MG/5ML
5 SYRUP ORAL 4 TIMES DAILY PRN
Qty: 120 ML | Refills: 1 | Status: SHIPPED | OUTPATIENT
Start: 2019-12-03 | End: 2020-02-05

## 2019-12-03 RX ORDER — ZOLPIDEM TARTRATE 10 MG/1
10 TABLET ORAL NIGHTLY PRN
Qty: 30 TABLET | Refills: 2 | Status: SHIPPED | OUTPATIENT
Start: 2019-12-03 | End: 2020-03-03 | Stop reason: SDUPTHER

## 2019-12-03 RX ORDER — AMOXICILLIN AND CLAVULANATE POTASSIUM 875; 125 MG/1; MG/1
1 TABLET, FILM COATED ORAL EVERY 12 HOURS SCHEDULED
Qty: 20 TABLET | Refills: 0 | Status: SHIPPED | OUTPATIENT
Start: 2019-12-03 | End: 2020-02-05

## 2019-12-03 NOTE — PROGRESS NOTES
Subjective   Luigi Steel is a 64 y.o. male.     History of Present Illness     Chief Complaint:   Chief Complaint   Patient presents with   • Nasal Congestion      x  1 week to 10 days no fever   • Sinusitis   • Cough   • Insomnia     med refill norajoão Steel 64 y.o. male who presents today for Medical Management of the below listed issues and medication refills.  he has a problem list of   Patient Active Problem List   Diagnosis   • Colon cancer (CMS/HCC)   • Elevated blood pressure reading without diagnosis of hypertension   • Hypothyroidism, acquired   • Insomnia   • Osteoarthritis, multiple sites   • Hyperlipidemia   • Thrombocytopenia (CMS/HCC)   • Vitamin B12 deficiency   • CAD (coronary artery disease)   • S/P CABG (coronary artery bypass graft)   • Iron deficiency anemia   • S/P coronary artery stent placement   • Essential hypertension   • Iron deficiency   • Syncope and collapse   • Parainfluenza infection   .  Since the last visit, he has developed facial p/p, cough (green sputum), lots of PND, no f/c. Using Mucinex.  he has been compliant with   Current Outpatient Medications:   •  acetaminophen (TYLENOL) 325 MG tablet, Take 2 tablets by mouth Every 4 (Four) Hours As Needed for Mild Pain ., Disp: , Rfl:   •  albuterol (PROVENTIL HFA;VENTOLIN HFA) 108 (90 Base) MCG/ACT inhaler, Inhale 2 puffs Every 4 (Four) Hours As Needed for Wheezing., Disp: , Rfl:   •  amoxicillin-clavulanate (AUGMENTIN) 875-125 MG per tablet, Take 1 tablet by mouth Every 12 (Twelve) Hours., Disp: 20 tablet, Rfl: 0  •  aspirin 81 MG chewable tablet, Chew 1 tablet Daily., Disp: , Rfl:   •  atorvastatin (LIPITOR) 20 MG tablet, Take 1 tablet by mouth every night at bedtime., Disp: 90 tablet, Rfl: 3  •  carvedilol (COREG) 3.125 MG tablet, Take 1 tablet by mouth Every 12 (Twelve) Hours., Disp: 90 tablet, Rfl: 3  •  levothyroxine (SYNTHROID) 88 MCG tablet, Take 1 tablet by mouth Daily., Disp: 30 tablet, Rfl: 5  •   "promethazine-dextromethorphan (PROMETHAZINE-DM) 6.25-15 MG/5ML syrup, Take 5 mL by mouth 4 (Four) Times a Day As Needed for Cough., Disp: 120 mL, Rfl: 1  •  VESICARE 10 MG tablet, Take 10 mg by mouth Daily., Disp: , Rfl:   •  zolpidem (AMBIEN) 10 MG tablet, Take 1 tablet by mouth At Night As Needed for Sleep., Disp: 30 tablet, Rfl: 2.  he denies medication side effects.    All of the other chronic condition(s) listed above are stable w/o issues.    /79   Pulse 74   Temp 98 °F (36.7 °C) (Oral)   Resp 16   Ht 177.8 cm (70\")   Wt 109 kg (240 lb)   SpO2 96%   BMI 34.44 kg/m²     Results for orders placed or performed in visit on 08/30/19   Ferritin   Result Value Ref Range    Ferritin 104.50 30.00 - 400.00 ng/mL   Iron Profile   Result Value Ref Range    Iron 96 59 - 158 mcg/dL    Iron Saturation 29 14 - 48 %    Transferrin 239 200 - 360 mg/dL    TIBC 335 249 - 505 mcg/dL   Vitamin B12   Result Value Ref Range    Vitamin B-12 >2,000 (H) 211 - 946 pg/mL   CBC Auto Differential   Result Value Ref Range    WBC 6.32 3.40 - 10.80 10*3/mm3    RBC 4.59 4.14 - 5.80 10*6/mm3    Hemoglobin 13.9 13.0 - 17.7 g/dL    Hematocrit 44.2 37.5 - 51.0 %    MCV 96.3 79.0 - 97.0 fL    MCH 30.3 26.6 - 33.0 pg    MCHC 31.4 (L) 31.5 - 35.7 g/dL    RDW 12.6 12.3 - 15.4 %    RDW-SD 44.2 37.0 - 54.0 fl    MPV 10.3 6.0 - 12.0 fL    Platelets 129 (L) 140 - 450 10*3/mm3    Neutrophil % 62.8 42.7 - 76.0 %    Lymphocyte % 25.5 19.6 - 45.3 %    Monocyte % 10.4 5.0 - 12.0 %    Eosinophil % 0.8 0.3 - 6.2 %    Basophil % 0.2 0.0 - 1.5 %    Immature Grans % 0.3 0.0 - 0.5 %    Neutrophils, Absolute 3.97 1.70 - 7.00 10*3/mm3    Lymphocytes, Absolute 1.61 0.70 - 3.10 10*3/mm3    Monocytes, Absolute 0.66 0.10 - 0.90 10*3/mm3    Eosinophils, Absolute 0.05 0.00 - 0.40 10*3/mm3    Basophils, Absolute 0.01 0.00 - 0.20 10*3/mm3    Immature Grans, Absolute 0.02 0.00 - 0.05 10*3/mm3    nRBC 0.0 0.0 - 0.2 /100 WBC           The following portions of the " patient's history were reviewed and updated as appropriate: allergies, current medications, past family history, past medical history, past social history, past surgical history and problem list.    Review of Systems   Constitutional: Negative for activity change, chills, fatigue and fever.   HENT: Positive for congestion, postnasal drip and sinus pressure.    Respiratory: Positive for cough. Negative for chest tightness and shortness of breath.    Cardiovascular: Negative for chest pain and palpitations.   Gastrointestinal: Negative for abdominal pain and nausea.   Endocrine: Negative for cold intolerance and polydipsia.   Psychiatric/Behavioral: Negative for behavioral problems and dysphoric mood. The patient is not nervous/anxious.    All other systems reviewed and are negative.      Objective   Physical Exam   Constitutional: He appears well-developed and well-nourished.   HENT:   Right Ear: Tympanic membrane normal.   Left Ear: Tympanic membrane normal.   Nose: Right sinus exhibits maxillary sinus tenderness. Left sinus exhibits maxillary sinus tenderness.   Mouth/Throat: Uvula is midline and oropharynx is clear and moist. No oropharyngeal exudate or posterior oropharyngeal erythema.   Neck: Neck supple. No thyromegaly present.   Cardiovascular: Normal rate and regular rhythm.   No murmur heard.  Pulmonary/Chest: Effort normal and breath sounds normal.   Abdominal: Bowel sounds are normal. There is no tenderness.   Psychiatric: He has a normal mood and affect. His behavior is normal.   Nursing note and vitals reviewed.    The patient has read and signed the [facility] Controlled Substance Contract.  I will continue to see patient for regular follow up appointments.  They are well controlled on their medication.  JESUS has been reviewed by me and is updated every 3 months. The patient is aware of the potential for addiction and dependence.    Assessment/Plan   Luigi was seen today for nasal congestion,  sinusitis, cough and insomnia.    Diagnoses and all orders for this visit:    Acute non-recurrent maxillary sinusitis  -     amoxicillin-clavulanate (AUGMENTIN) 875-125 MG per tablet; Take 1 tablet by mouth Every 12 (Twelve) Hours.  -     promethazine-dextromethorphan (PROMETHAZINE-DM) 6.25-15 MG/5ML syrup; Take 5 mL by mouth 4 (Four) Times a Day As Needed for Cough.    Primary insomnia  -     zolpidem (AMBIEN) 10 MG tablet; Take 1 tablet by mouth At Night As Needed for Sleep.

## 2020-02-05 ENCOUNTER — OFFICE VISIT (OUTPATIENT)
Dept: FAMILY MEDICINE CLINIC | Facility: CLINIC | Age: 65
End: 2020-02-05

## 2020-02-05 VITALS
BODY MASS INDEX: 34.79 KG/M2 | HEART RATE: 66 BPM | SYSTOLIC BLOOD PRESSURE: 157 MMHG | HEIGHT: 70 IN | RESPIRATION RATE: 16 BRPM | TEMPERATURE: 97.8 F | WEIGHT: 243 LBS | DIASTOLIC BLOOD PRESSURE: 89 MMHG

## 2020-02-05 DIAGNOSIS — S39.012A STRAIN OF LUMBAR REGION, INITIAL ENCOUNTER: Primary | ICD-10-CM

## 2020-02-05 PROCEDURE — 99214 OFFICE O/P EST MOD 30 MIN: CPT | Performed by: FAMILY MEDICINE

## 2020-02-05 RX ORDER — METHYLPREDNISOLONE 4 MG/1
TABLET ORAL
Qty: 21 TABLET | Refills: 0 | Status: SHIPPED | OUTPATIENT
Start: 2020-02-05 | End: 2020-02-27

## 2020-02-05 RX ORDER — BACLOFEN 10 MG/1
10 TABLET ORAL 3 TIMES DAILY
Qty: 60 TABLET | Refills: 5 | Status: SHIPPED | OUTPATIENT
Start: 2020-02-05 | End: 2020-08-04

## 2020-02-05 NOTE — PROGRESS NOTES
"Subjective   Luigi Steel is a 64 y.o. male.     CC: Back Pain    History of Present Illness     Pt comes in today c/o some L>R LBP x 1 week w/o injury. No radiation or weakness. Tried Advil with mild help. Took daughters muscle relaxers that actually worked even better.     The following portions of the patient's history were reviewed and updated as appropriate: allergies, current medications, past family history, past medical history, past social history, past surgical history and problem list.    Review of Systems   Constitutional: Negative for chills and fever.   Musculoskeletal: Positive for back pain.   Neurological: Negative for weakness and numbness.       /89   Pulse 66   Temp 97.8 °F (36.6 °C) (Oral)   Resp 16   Ht 177.8 cm (70\")   Wt 110 kg (243 lb)   BMI 34.87 kg/m²     Objective   Physical Exam   Constitutional: He appears well-developed and well-nourished.   Musculoskeletal: Normal range of motion. He exhibits no tenderness.   Neurological:   Reflex Scores:       Patellar reflexes are 2+ on the right side and 2+ on the left side.       Achilles reflexes are 2+ on the right side and 2+ on the left side.  Psychiatric: He has a normal mood and affect. His behavior is normal.       Assessment/Plan   Luigi was seen today for low back pain.    Diagnoses and all orders for this visit:    Strain of lumbar region, initial encounter  -     methylPREDNISolone (MEDROL) 4 MG tablet; follow package directions  -     baclofen (LIORESAL) 10 MG tablet; Take 1 tablet by mouth 3 (Three) Times a Day.        Heat/stretching discussed.       "

## 2020-03-03 ENCOUNTER — OFFICE VISIT (OUTPATIENT)
Dept: FAMILY MEDICINE CLINIC | Facility: CLINIC | Age: 65
End: 2020-03-03

## 2020-03-03 VITALS
HEIGHT: 70 IN | SYSTOLIC BLOOD PRESSURE: 130 MMHG | DIASTOLIC BLOOD PRESSURE: 74 MMHG | WEIGHT: 236 LBS | BODY MASS INDEX: 33.79 KG/M2 | TEMPERATURE: 98.4 F | RESPIRATION RATE: 16 BRPM | HEART RATE: 80 BPM

## 2020-03-03 DIAGNOSIS — E03.9 ACQUIRED HYPOTHYROIDISM: ICD-10-CM

## 2020-03-03 DIAGNOSIS — Z12.11 SCREENING FOR COLON CANCER: ICD-10-CM

## 2020-03-03 DIAGNOSIS — F51.01 PRIMARY INSOMNIA: Primary | ICD-10-CM

## 2020-03-03 PROCEDURE — 99213 OFFICE O/P EST LOW 20 MIN: CPT | Performed by: FAMILY MEDICINE

## 2020-03-03 RX ORDER — LEVOTHYROXINE SODIUM 88 UG/1
88 TABLET ORAL DAILY
Qty: 30 TABLET | Refills: 5 | Status: SHIPPED | OUTPATIENT
Start: 2020-03-03 | End: 2020-06-03 | Stop reason: SDUPTHER

## 2020-03-03 RX ORDER — ZOLPIDEM TARTRATE 10 MG/1
10 TABLET ORAL NIGHTLY PRN
Qty: 30 TABLET | Refills: 2 | Status: SHIPPED | OUTPATIENT
Start: 2020-03-03 | End: 2020-09-15 | Stop reason: SDUPTHER

## 2020-03-03 NOTE — PROGRESS NOTES
Subjective   Luigi Steel is a 64 y.o. male.     History of Present Illness     Chief Complaint:   Chief Complaint   Patient presents with   • Hypothyroidism     med refill nora - meds reviewed with pt today    • Insomnia     [facility] - no labs   - colonoscopy due       Luigi Steel 64 y.o. male who presents today for Medical Management of the below listed issues and medication refills.  he has a problem list of   Patient Active Problem List   Diagnosis   • Colon cancer (CMS/HCC)   • Elevated blood pressure reading without diagnosis of hypertension   • Hypothyroidism, acquired   • Insomnia   • Osteoarthritis, multiple sites   • Hyperlipidemia   • Thrombocytopenia (CMS/HCC)   • Vitamin B12 deficiency   • CAD (coronary artery disease)   • S/P CABG (coronary artery bypass graft)   • Iron deficiency anemia   • S/P coronary artery stent placement   • Essential hypertension   • Iron deficiency   • Syncope and collapse   • Parainfluenza infection   .  Since the last visit, he has overall felt well.  he has been compliant with   Current Outpatient Medications:   •  levothyroxine (SYNTHROID) 88 MCG tablet, Take 1 tablet by mouth Daily., Disp: 30 tablet, Rfl: 5  •  zolpidem (AMBIEN) 10 MG tablet, Take 1 tablet by mouth At Night As Needed for Sleep., Disp: 30 tablet, Rfl: 2  •  acetaminophen (TYLENOL) 325 MG tablet, Take 2 tablets by mouth Every 4 (Four) Hours As Needed for Mild Pain ., Disp: , Rfl:   •  albuterol (PROVENTIL HFA;VENTOLIN HFA) 108 (90 Base) MCG/ACT inhaler, Inhale 2 puffs Every 4 (Four) Hours As Needed for Wheezing., Disp: , Rfl:   •  aspirin 81 MG chewable tablet, Chew 1 tablet Daily., Disp: , Rfl:   •  atorvastatin (LIPITOR) 20 MG tablet, Take 1 tablet by mouth every night at bedtime., Disp: 90 tablet, Rfl: 3  •  baclofen (LIORESAL) 10 MG tablet, Take 1 tablet by mouth 3 (Three) Times a Day., Disp: 60 tablet, Rfl: 5  •  carvedilol (COREG) 3.125 MG tablet, Take 1 tablet by mouth Every 12 (Twelve)  "Hours., Disp: 90 tablet, Rfl: 3  •  VESICARE 10 MG tablet, Take 10 mg by mouth Daily., Disp: , Rfl: .  he denies medication side effects.    All of the other chronic condition(s) listed above are stable w/o issues.    /74   Pulse 80   Temp 98.4 °F (36.9 °C) (Oral)   Resp 16   Ht 177.8 cm (70\")   Wt 107 kg (236 lb)   BMI 33.86 kg/m²     Results for orders placed or performed in visit on 08/30/19   Ferritin   Result Value Ref Range    Ferritin 104.50 30.00 - 400.00 ng/mL   Iron Profile   Result Value Ref Range    Iron 96 59 - 158 mcg/dL    Iron Saturation 29 14 - 48 %    Transferrin 239 200 - 360 mg/dL    TIBC 335 249 - 505 mcg/dL   Vitamin B12   Result Value Ref Range    Vitamin B-12 >2,000 (H) 211 - 946 pg/mL   CBC Auto Differential   Result Value Ref Range    WBC 6.32 3.40 - 10.80 10*3/mm3    RBC 4.59 4.14 - 5.80 10*6/mm3    Hemoglobin 13.9 13.0 - 17.7 g/dL    Hematocrit 44.2 37.5 - 51.0 %    MCV 96.3 79.0 - 97.0 fL    MCH 30.3 26.6 - 33.0 pg    MCHC 31.4 (L) 31.5 - 35.7 g/dL    RDW 12.6 12.3 - 15.4 %    RDW-SD 44.2 37.0 - 54.0 fl    MPV 10.3 6.0 - 12.0 fL    Platelets 129 (L) 140 - 450 10*3/mm3    Neutrophil % 62.8 42.7 - 76.0 %    Lymphocyte % 25.5 19.6 - 45.3 %    Monocyte % 10.4 5.0 - 12.0 %    Eosinophil % 0.8 0.3 - 6.2 %    Basophil % 0.2 0.0 - 1.5 %    Immature Grans % 0.3 0.0 - 0.5 %    Neutrophils, Absolute 3.97 1.70 - 7.00 10*3/mm3    Lymphocytes, Absolute 1.61 0.70 - 3.10 10*3/mm3    Monocytes, Absolute 0.66 0.10 - 0.90 10*3/mm3    Eosinophils, Absolute 0.05 0.00 - 0.40 10*3/mm3    Basophils, Absolute 0.01 0.00 - 0.20 10*3/mm3    Immature Grans, Absolute 0.02 0.00 - 0.05 10*3/mm3    nRBC 0.0 0.0 - 0.2 /100 WBC           The following portions of the patient's history were reviewed and updated as appropriate: allergies, current medications, past family history, past medical history, past social history, past surgical history and problem list.    Review of Systems   Constitutional: Negative for " activity change, chills, fatigue and fever.   Respiratory: Negative for cough and shortness of breath.    Cardiovascular: Negative for chest pain and palpitations.   Gastrointestinal: Negative for abdominal pain.   Endocrine: Negative for cold intolerance.   Psychiatric/Behavioral: Negative for behavioral problems and dysphoric mood. The patient is not nervous/anxious.        Objective   Physical Exam   Constitutional: He appears well-developed and well-nourished.   Neck: Neck supple. No thyromegaly present.   Cardiovascular: Normal rate and regular rhythm.   No murmur heard.  Pulmonary/Chest: Effort normal and breath sounds normal.   Abdominal: Bowel sounds are normal. There is no tenderness.   Psychiatric: He has a normal mood and affect. His behavior is normal.   Nursing note and vitals reviewed.    The patient has read and signed the [facility] Controlled Substance Contract.  I will continue to see patient for regular follow up appointments.  They are well controlled on their medication.  JESUS has been reviewed by me and is updated every 3 months. The patient is aware of the potential for addiction and dependence.    Assessment/Plan   Luigi was seen today for hypothyroidism and insomnia.    Diagnoses and all orders for this visit:    Primary insomnia  -     zolpidem (AMBIEN) 10 MG tablet; Take 1 tablet by mouth At Night As Needed for Sleep.    Acquired hypothyroidism  -     levothyroxine (SYNTHROID) 88 MCG tablet; Take 1 tablet by mouth Daily.    Screening for colon cancer  -     Ambulatory Referral to Gastroenterology    Other orders  -     Cancel: Ambulatory Referral to Gastroenterology

## 2020-03-23 ENCOUNTER — OFFICE VISIT (OUTPATIENT)
Dept: FAMILY MEDICINE CLINIC | Facility: CLINIC | Age: 65
End: 2020-03-23

## 2020-03-23 VITALS
WEIGHT: 235 LBS | SYSTOLIC BLOOD PRESSURE: 96 MMHG | RESPIRATION RATE: 18 BRPM | HEIGHT: 70 IN | DIASTOLIC BLOOD PRESSURE: 64 MMHG | HEART RATE: 92 BPM | TEMPERATURE: 98.3 F | OXYGEN SATURATION: 97 % | BODY MASS INDEX: 33.64 KG/M2

## 2020-03-23 DIAGNOSIS — J18.9 PNEUMONIA OF LEFT LOWER LOBE DUE TO INFECTIOUS ORGANISM: Primary | ICD-10-CM

## 2020-03-23 PROCEDURE — 99213 OFFICE O/P EST LOW 20 MIN: CPT | Performed by: FAMILY MEDICINE

## 2020-03-23 RX ORDER — LEVOFLOXACIN 500 MG/1
500 TABLET, FILM COATED ORAL DAILY
Qty: 10 TABLET | Refills: 0 | Status: SHIPPED | OUTPATIENT
Start: 2020-03-23 | End: 2020-04-02

## 2020-03-23 NOTE — PROGRESS NOTES
"Subjective   Luigi Steel is a 64 y.o. male.     CC: URI    History of Present Illness     Luigi Steel 64 y.o. male who presents for evaluation of possible pneumonia, cough. Symptoms include sneezing and productive cough.  Onset of symptoms was 1 week ago, gradually worsening since that time. Patient denies wheezing, hemoptysis, fever, chills.   Evaluation to date: none Treatment to date:  none.       The following portions of the patient's history were reviewed and updated as appropriate: allergies, current medications, past family history, past medical history, past social history, past surgical history and problem list.    Review of Systems   Constitutional: Negative for chills and fever.   HENT: Positive for postnasal drip. Negative for sinus pressure.    Respiratory: Positive for cough. Negative for wheezing.    Cardiovascular: Negative for chest pain.       BP 96/64 Comment: bp hard to hear  Pulse 92   Temp 98.3 °F (36.8 °C) (Oral)   Resp 18   Ht 177.8 cm (70\")   Wt 107 kg (235 lb)   SpO2 97%   BMI 33.72 kg/m²     Objective   Physical Exam   Constitutional: He appears well-developed and well-nourished. No distress.   HENT:   Right Ear: Tympanic membrane and ear canal normal.   Left Ear: Tympanic membrane and ear canal normal.   Nose: Right sinus exhibits no maxillary sinus tenderness and no frontal sinus tenderness. Left sinus exhibits no maxillary sinus tenderness and no frontal sinus tenderness.   Pulmonary/Chest: Effort normal. No respiratory distress. He has no wheezes. He has rales (LLL).   Psychiatric: He has a normal mood and affect. His behavior is normal.       Assessment/Plan   Luigi was seen today for cough.    Diagnoses and all orders for this visit:    Pneumonia of left lower lobe due to infectious organism (CMS/[facility])  -     levoFLOXacin (Levaquin) 500 MG tablet; Take 1 tablet by mouth Daily for 10 days.    Rest/hydration discussed.           "

## 2020-06-03 ENCOUNTER — OFFICE VISIT (OUTPATIENT)
Dept: FAMILY MEDICINE CLINIC | Facility: CLINIC | Age: 65
End: 2020-06-03

## 2020-06-03 DIAGNOSIS — Z00.00 ANNUAL PHYSICAL EXAM: ICD-10-CM

## 2020-06-03 DIAGNOSIS — E03.9 ACQUIRED HYPOTHYROIDISM: Primary | ICD-10-CM

## 2020-06-03 PROCEDURE — 99442 PR PHYS/QHP TELEPHONE EVALUATION 11-20 MIN: CPT | Performed by: FAMILY MEDICINE

## 2020-06-03 RX ORDER — ZOLPIDEM TARTRATE 10 MG/1
10 TABLET ORAL NIGHTLY PRN
Qty: 30 TABLET | Refills: 2 | Status: CANCELLED | OUTPATIENT
Start: 2020-06-03

## 2020-06-03 RX ORDER — LEVOTHYROXINE SODIUM 88 UG/1
88 TABLET ORAL DAILY
Qty: 30 TABLET | Refills: 5 | Status: SHIPPED | OUTPATIENT
Start: 2020-06-03 | End: 2020-09-15 | Stop reason: SDUPTHER

## 2020-06-03 NOTE — PROGRESS NOTES
Subjective   Luigi Steel is a 65 y.o. male.     CC: Telehealth Visit for Medical Management    History of Present Illness     Chief Complaint:   Chief Complaint   Patient presents with   • Hypothyroidism     med refill - nora - pt states does not need ambien today        Luigi Steel 65 y.o. male who presents today for Medical Management of the below listed issues and medication refills.  he has a problem list of   Patient Active Problem List   Diagnosis   • Colon cancer (CMS/HCC)   • Elevated blood pressure reading without diagnosis of hypertension   • Acquired hypothyroidism   • Insomnia   • Osteoarthritis, multiple sites   • Hyperlipidemia   • Thrombocytopenia (CMS/HCC)   • Vitamin B12 deficiency   • CAD (coronary artery disease)   • S/P CABG (coronary artery bypass graft)   • Iron deficiency anemia   • S/P coronary artery stent placement   • Essential hypertension   • Iron deficiency   • Syncope and collapse   • Parainfluenza infection   .  Since the last visit, he has overall felt well.  he has been compliant with   Current Outpatient Medications:   •  levothyroxine (Synthroid) 88 MCG tablet, Take 1 tablet by mouth Daily., Disp: 30 tablet, Rfl: 5  •  acetaminophen (TYLENOL) 325 MG tablet, Take 2 tablets by mouth Every 4 (Four) Hours As Needed for Mild Pain ., Disp: , Rfl:   •  albuterol (PROVENTIL HFA;VENTOLIN HFA) 108 (90 Base) MCG/ACT inhaler, Inhale 2 puffs Every 4 (Four) Hours As Needed for Wheezing., Disp: , Rfl:   •  aspirin 81 MG chewable tablet, Chew 1 tablet Daily., Disp: , Rfl:   •  atorvastatin (LIPITOR) 20 MG tablet, Take 1 tablet by mouth every night at bedtime., Disp: 90 tablet, Rfl: 3  •  baclofen (LIORESAL) 10 MG tablet, Take 1 tablet by mouth 3 (Three) Times a Day., Disp: 60 tablet, Rfl: 5  •  carvedilol (COREG) 3.125 MG tablet, Take 1 tablet by mouth Every 12 (Twelve) Hours., Disp: 90 tablet, Rfl: 3  •  VESICARE 10 MG tablet, Take 10 mg by mouth Daily., Disp: , Rfl:   •  zolpidem  (AMBIEN) 10 MG tablet, Take 1 tablet by mouth At Night As Needed for Sleep., Disp: 30 tablet, Rfl: 2.  he denies medication side effects.    All of the other chronic condition(s) listed above are stable w/o issues.    There were no vitals taken for this visit.    Results for orders placed or performed in visit on 08/30/19   Ferritin   Result Value Ref Range    Ferritin 104.50 30.00 - 400.00 ng/mL   Iron Profile   Result Value Ref Range    Iron 96 59 - 158 mcg/dL    Iron Saturation 29 14 - 48 %    Transferrin 239 200 - 360 mg/dL    TIBC 335 249 - 505 mcg/dL   Vitamin B12   Result Value Ref Range    Vitamin B-12 >2,000 (H) 211 - 946 pg/mL   CBC Auto Differential   Result Value Ref Range    WBC 6.32 3.40 - 10.80 10*3/mm3    RBC 4.59 4.14 - 5.80 10*6/mm3    Hemoglobin 13.9 13.0 - 17.7 g/dL    Hematocrit 44.2 37.5 - 51.0 %    MCV 96.3 79.0 - 97.0 fL    MCH 30.3 26.6 - 33.0 pg    MCHC 31.4 (L) 31.5 - 35.7 g/dL    RDW 12.6 12.3 - 15.4 %    RDW-SD 44.2 37.0 - 54.0 fl    MPV 10.3 6.0 - 12.0 fL    Platelets 129 (L) 140 - 450 10*3/mm3    Neutrophil % 62.8 42.7 - 76.0 %    Lymphocyte % 25.5 19.6 - 45.3 %    Monocyte % 10.4 5.0 - 12.0 %    Eosinophil % 0.8 0.3 - 6.2 %    Basophil % 0.2 0.0 - 1.5 %    Immature Grans % 0.3 0.0 - 0.5 %    Neutrophils, Absolute 3.97 1.70 - 7.00 10*3/mm3    Lymphocytes, Absolute 1.61 0.70 - 3.10 10*3/mm3    Monocytes, Absolute 0.66 0.10 - 0.90 10*3/mm3    Eosinophils, Absolute 0.05 0.00 - 0.40 10*3/mm3    Basophils, Absolute 0.01 0.00 - 0.20 10*3/mm3    Immature Grans, Absolute 0.02 0.00 - 0.05 10*3/mm3    nRBC 0.0 0.0 - 0.2 /100 WBC           The following portions of the patient's history were reviewed and updated as appropriate: allergies, current medications, past family history, past medical history, past social history, past surgical history and problem list.    Review of Systems   Constitutional: Negative for appetite change, chills and fever.   Respiratory: Negative for cough.     Cardiovascular: Negative for chest pain.   Psychiatric/Behavioral: Negative for dysphoric mood.       Objective   Physical Exam   Constitutional: No distress.   Psychiatric: He has a normal mood and affect. His behavior is normal. Thought content normal.     The patient has read and signed the [facility] Controlled Substance Contract.  I will continue to see patient for regular follow up appointments.  They are well controlled on their medication.  JESUS has been reviewed by me and is updated every 3 months. The patient is aware of the potential for addiction and dependence.    Assessment/Plan   Luigi was seen today for hypothyroidism.    Diagnoses and all orders for this visit:    Acquired hypothyroidism  -     levothyroxine (Synthroid) 88 MCG tablet; Take 1 tablet by mouth Daily.  -     TSH  -     T4, Free    Annual physical exam  Comments:  for labs only, don't bill  Orders:  -     Comprehensive metabolic panel  -     Lipid panel  -     CBC and Differential  -     PSA    Spent   14  minutes with chart and interview and consent for this encounter given by the patient.  You have chosen to receive care through a telehealth visit.  Do you consent to use a TELEPHONE connection for your medical care today? Yes

## 2020-06-09 LAB
ALBUMIN SERPL-MCNC: 4.7 G/DL (ref 3.5–5.2)
ALBUMIN/GLOB SERPL: 2.2 G/DL
ALP SERPL-CCNC: 102 U/L (ref 39–117)
ALT SERPL-CCNC: 22 U/L (ref 1–41)
AST SERPL-CCNC: 19 U/L (ref 1–40)
BASOPHILS # BLD AUTO: ABNORMAL 10*3/UL
BILIRUB SERPL-MCNC: 1.1 MG/DL (ref 0.2–1.2)
BUN SERPL-MCNC: 16 MG/DL (ref 8–23)
BUN/CREAT SERPL: 12 (ref 7–25)
CALCIUM SERPL-MCNC: 10.1 MG/DL (ref 8.6–10.5)
CHLORIDE SERPL-SCNC: 107 MMOL/L (ref 98–107)
CHOLEST SERPL-MCNC: 114 MG/DL (ref 0–200)
CO2 SERPL-SCNC: 26.9 MMOL/L (ref 22–29)
CREAT SERPL-MCNC: 1.33 MG/DL (ref 0.76–1.27)
DIFFERENTIAL COMMENT: NORMAL
EOSINOPHIL # BLD AUTO: ABNORMAL 10*3/UL
EOSINOPHIL NFR BLD AUTO: ABNORMAL %
ERYTHROCYTE [DISTWIDTH] IN BLOOD BY AUTOMATED COUNT: 13.2 % (ref 12.3–15.4)
GLOBULIN SER CALC-MCNC: 2.1 GM/DL
GLUCOSE SERPL-MCNC: 83 MG/DL (ref 65–99)
HCT VFR BLD AUTO: 42.4 % (ref 37.5–51)
HDLC SERPL-MCNC: 30 MG/DL (ref 40–60)
HGB BLD-MCNC: 14.1 G/DL (ref 13–17.7)
LDLC SERPL CALC-MCNC: 58 MG/DL (ref 0–100)
LYMPHOCYTES # BLD AUTO: ABNORMAL 10*3/UL
LYMPHOCYTES # BLD MANUAL: 1.55 10*3/MM3 (ref 0.7–3.1)
LYMPHOCYTES NFR BLD AUTO: ABNORMAL %
LYMPHOCYTES NFR BLD MANUAL: 27 % (ref 19.6–45.3)
MCH RBC QN AUTO: 29.6 PG (ref 26.6–33)
MCHC RBC AUTO-ENTMCNC: 33.3 G/DL (ref 31.5–35.7)
MCV RBC AUTO: 88.9 FL (ref 79–97)
MONOCYTES # BLD MANUAL: 0.46 10*3/MM3 (ref 0.1–0.9)
MONOCYTES NFR BLD AUTO: ABNORMAL %
MONOCYTES NFR BLD MANUAL: 8 % (ref 5–12)
NEUTROPHILS # BLD MANUAL: 3.73 10*3/MM3 (ref 1.7–7)
NEUTROPHILS NFR BLD AUTO: ABNORMAL %
NEUTROPHILS NFR BLD MANUAL: 65 % (ref 42.7–76)
PLATELET # BLD AUTO: 136 10*3/MM3 (ref 140–450)
PLATELET BLD QL SMEAR: NORMAL
POTASSIUM SERPL-SCNC: 5.2 MMOL/L (ref 3.5–5.2)
PROT SERPL-MCNC: 6.8 G/DL (ref 6–8.5)
PSA SERPL-MCNC: 0.22 NG/ML (ref 0–4)
RBC # BLD AUTO: 4.77 10*6/MM3 (ref 4.14–5.8)
RBC MORPH BLD: NORMAL
SODIUM SERPL-SCNC: 144 MMOL/L (ref 136–145)
T4 FREE SERPL-MCNC: 1.19 NG/DL (ref 0.93–1.7)
TRIGL SERPL-MCNC: 131 MG/DL (ref 0–150)
TSH SERPL DL<=0.005 MIU/L-ACNC: 2.42 UIU/ML (ref 0.27–4.2)
VLDLC SERPL CALC-MCNC: 26.2 MG/DL (ref 5–40)
WBC # BLD AUTO: 5.74 10*3/MM3 (ref 3.4–10.8)

## 2020-08-03 PROBLEM — I25.2: Status: ACTIVE | Noted: 2020-08-03

## 2020-08-03 NOTE — PROGRESS NOTES
Date of Office Visit: 2020  Encounter Provider: VASILIY Grimes  Primary Cardiologist: Dr. Ye  Place of Service: [facility] CARDIOLOGY  Patient Name: Luigi Steel  :1955      Subjective:     Chief Complaint:  Yearly CAD follow up    History of Present Illness:  Luigi Steel is a pleasant 65 y.o. male who is new to me .  Outside records have been requested and reviewed by me if available.     This is a patient of Dr. Ye with a history of coronary artery disease status post anterior MI and drug-eluting stent placement to mid LAD 2017 followed by coronary bypass surgery x3 with LIMA to LAD, SVG to PDA, SVG to OM 2 17, hyperlipidemia, hypothyroidism, osteoarthritis, prior colon cancer status post partial colectomy, thrombocytopenia, mild cardiomyopathy EF 45% post MI that improved to 70%.    At the time of his CABG patient had a syncopal episode postop day 4 felt to be vasovagal syncopal.    2018 in follow-up patient had chest tightness and heaviness consistent with nausea, dizziness, shortness of breath.  He was set up for stress test 2018- for ischemia.    2019 he was stable so his Plavix was discontinued.    2019 patient was last in the office for 6-month follow-up visit.  He was feeling well.  He had chronic stable dyspnea on exertion and intermittent palpitations sometimes associated with some lightheadedness that were brief and unchanged in frequency.  No chest pain, PND, orthopnea, syncope, near syncope or lower extremity edema.  He was caring for his wife who was diagnosed with cancer and was not exercising much.  1 year follow-up was recommended.    He is presenting today for yearly follow-up.  On today's EKG he has ventricular bigeminy and trigeminy.  He is feeling well.  He denies any chest pain, dyspnea, lower extremity edema, PND, orthopnea, syncope, near syncope.  On occasion he feels a couple skipped heartbeats with  some associated lightheadedness occurring every couple weeks.  He reports this is not getting worse or significantly problematic.  He does not feel his PVCs in the office today.  He has not been checking his blood pressure heart rate at home.  He does admit he has been overeating and is probably gained a little bit of weight.      Past Medical History:   Diagnosis Date   • Anterior myocardial infarction (CMS/HCC) 8/13/2017   • Arthritis     Hands, knees   • Colon cancer (CMS/HCC)    • Coronary artery disease    • Elevated blood pressure reading without diagnosis of hypertension    • H/O complete eye exam 2014    due   • Hypertension    • Hypothyroidism, acquired    • Insomnia    • Kidney stones    • Obesity    • Osteoarthritis, multiple sites    • PONV (postoperative nausea and vomiting)      Past Surgical History:   Procedure Laterality Date   • CARDIAC CATHETERIZATION N/A 8/13/2017    Procedure: Left Heart Cath;  Surgeon: Edyta Ye MD;  Location: [facility] CATH INVASIVE LOCATION;  Service:    • CARDIAC CATHETERIZATION N/A 8/13/2017    Procedure: Stent RK coronary;  Surgeon: Edyta Ye MD;  Location: [facility] INVASIVE LOCATION;  Service:    • COLECTOMY PARTIAL / TOTAL  1994   • COLON RESECTION RIGHT Right 1990    for cancer   • COLONOSCOPY  03/18/2009    Dr. Tillman   • CORONARY ARTERY BYPASS GRAFT N/A 8/25/2017    Procedure: JAYLON STERNOTOMY CORONARY ARTERY BYPASS GRAFT TIMES 3 USING LEFT INTERNAL  MAMMARY ARTERY AND RIGHT SAPHENOUS VEIN GRAFT PER ENDOSCOPIC VEIN HARVESTING;  Surgeon: Ana Lynn MD;  Location: [facility] OR;  Service:    • HERNIA REPAIR  04/02/2009    hiatal   • HERNIA REPAIR Bilateral 08/2010    ABDOMINAL x3   • INCISIONAL HERNIA REPAIR  09/2011   • INCISIONAL HERNIA REPAIR  04/2009   • INCISIONAL HERNIA REPAIR  1996   • KIDNEY STONE SURGERY     • SMALL INTESTINE SURGERY      for adhesions     Outpatient Medications Prior to Visit   Medication Sig Dispense Refill   • acetaminophen  (TYLENOL) 325 MG tablet Take 2 tablets by mouth Every 4 (Four) Hours As Needed for Mild Pain .     • aspirin 81 MG chewable tablet Chew 1 tablet Daily.     • atorvastatin (LIPITOR) 20 MG tablet Take 1 tablet by mouth every night at bedtime. 90 tablet 3   • carvedilol (COREG) 3.125 MG tablet Take 1 tablet by mouth Every 12 (Twelve) Hours. 90 tablet 3   • Ferrous Gluconate (IRON 27 PO) Take  by mouth Daily.     • levothyroxine (Synthroid) 88 MCG tablet Take 1 tablet by mouth Daily. 30 tablet 5   • VESICARE 10 MG tablet Take 10 mg by mouth Daily.     • vitamin B-12 (CYANOCOBALAMIN) 100 MCG tablet Take 50 mcg by mouth Daily.     • zolpidem (AMBIEN) 10 MG tablet Take 1 tablet by mouth At Night As Needed for Sleep. 30 tablet 2   • albuterol (PROVENTIL HFA;VENTOLIN HFA) 108 (90 Base) MCG/ACT inhaler Inhale 2 puffs Every 4 (Four) Hours As Needed for Wheezing.     • baclofen (LIORESAL) 10 MG tablet Take 1 tablet by mouth 3 (Three) Times a Day. 60 tablet 5     No facility-administered medications prior to visit.        Allergies as of 08/04/2020 - Reviewed 08/04/2020   Allergen Reaction Noted   • Hydromorphone Shortness Of Breath and Nausea And Vomiting 08/21/2014   • Demerol [meperidine] Nausea And Vomiting 08/20/2014   • Dilaudid [hydromorphone hcl] Nausea And Vomiting 11/11/2018     Social History     Socioeconomic History   • Marital status:      Spouse name: Brittany   • Number of children: Not on file   • Years of education: High School   • Highest education level: Not on file   Occupational History   • Occupation: [employer]     Employer: [employer]   Tobacco Use   • Smoking status: Never Smoker   • Smokeless tobacco: Never Used   Substance and Sexual Activity   • Alcohol use: No   • Drug use: No   • Sexual activity: Defer     Family History   Problem Relation Age of Onset   • Arthritis Mother    • Stroke Mother    • Heart attack Mother    • Hypertension Mother    • Gallbladder disease Mother    • Heart  disease Mother    • Hypertension Father    • Gallbladder disease Father    • Colon cancer Father 65   • Gallbladder disease Sister    • Arthritis Brother    • Cancer Brother    • Colon cancer Brother 51   • Heart attack Brother    • Heart disease Brother    • Hypertension Brother    • Gallbladder disease Brother    • Heart disease Brother    • No Known Problems Maternal Grandmother    • No Known Problems Maternal Grandfather    • No Known Problems Paternal Grandmother    • No Known Problems Paternal Grandfather      Review of Systems   Constitution: Negative for chills, fever and malaise/fatigue.   HENT: Negative for ear pain, hearing loss, nosebleeds and sore throat.    Eyes: Negative for double vision, pain, vision loss in left eye and vision loss in right eye.   Cardiovascular: Positive for irregular heartbeat and palpitations. Negative for chest pain, claudication, dyspnea on exertion, leg swelling, near-syncope, orthopnea, paroxysmal nocturnal dyspnea and syncope.   Respiratory: Negative for cough, shortness of breath, snoring and wheezing.    Endocrine: Negative for cold intolerance and heat intolerance.   Hematologic/Lymphatic: Negative for bleeding problem.   Skin: Negative for color change, itching, rash and unusual hair distribution.   Musculoskeletal: Negative for joint pain and joint swelling.   Gastrointestinal: Negative for abdominal pain, diarrhea, hematochezia, melena, nausea and vomiting.   Genitourinary: Negative for decreased libido, frequency, hematuria, hesitancy and incomplete emptying.   Neurological: Positive for light-headedness. Negative for excessive daytime sleepiness, dizziness, headaches, loss of balance, numbness, paresthesias and seizures.   Psychiatric/Behavioral: Negative for depression.          Objective:     Vitals:    08/04/20 1409   BP: 138/78   BP Location: Right arm   Patient Position: Sitting   Cuff Size: Large Adult   Pulse: 71   SpO2: 98%   Weight: 109 kg (239 lb 9.6 oz)    "  Height: 177.8 cm (70\")     Body mass index is 34.38 kg/m².    PHYSICAL EXAM:  Physical Exam   Constitutional: He is oriented to person, place, and time. He appears well-developed and well-nourished. No distress.   Obese   HENT:   Head: Normocephalic and atraumatic.   Eyes: Conjunctivae and EOM are normal.   Neck: No JVD present. Carotid bruit is not present.   Cardiovascular: Normal rate, regular rhythm, normal heart sounds and intact distal pulses. Frequent extrasystoles are present.   No murmur heard.  Pulses:       Radial pulses are 2+ on the right side, and 2+ on the left side.        Posterior tibial pulses are 2+ on the right side, and 2+ on the left side.   Pulmonary/Chest: Effort normal and breath sounds normal. No accessory muscle usage. No tachypnea. No respiratory distress. He has no decreased breath sounds. He has no wheezes. He has no rhonchi. He has no rales. He exhibits no tenderness.   No pitting lower extremity edema   Abdominal: Soft. Bowel sounds are normal. He exhibits no distension. There is no tenderness. There is no rebound and no guarding.   Obese abdomen   Musculoskeletal: Normal range of motion. He exhibits no edema.   Neurological: He is alert and oriented to person, place, and time.   Skin: Skin is warm, dry and intact. He is not diaphoretic. No erythema.   Psychiatric: He has a normal mood and affect. His speech is normal and behavior is normal. Judgment and thought content normal. Cognition and memory are normal.   Nursing note and vitals reviewed.        ECG 12 Lead  Date/Time: 8/4/2020 2:10 PM  Performed by: Ramandeep Escoto APRN  Authorized by: Ramandeep Escoto APRN   Comparison: compared with previous ECG from 7/30/2019  Comparison to previous ECG: Frequent PVCs new, RSR prime  Rhythm: sinus rhythm  Ectopy: unifocal PVCs, bigeminy and trigeminy  Rate: normal  BPM: 72  QRS axis: normal    Clinical impression: abnormal EKG  Comments: Indication: CAD            Most recent lab " review:  6/8/2020 CBC within normal limits except for mild thrombocytopenia that was stable 136, CMP with mild renal insufficiency creatinine 1.33 with a GFR 54 which was stable compared to previous, lipid panel total cholesterol 114, glycerides 131, HDL low 30, LDL 58, TSH normal.  Assessment:       Diagnosis Plan   1. Coronary artery disease involving native coronary artery of native heart without angina pectoris  ECG 12 Lead    Magnesium   2. S/P coronary artery stent placement  ECG 12 Lead    Magnesium   3. S/P CABG (coronary artery bypass graft)  ECG 12 Lead    Magnesium   4. Hx of acute myocardial infarction of anterior wall  ECG 12 Lead    Magnesium   5. Essential hypertension  ECG 12 Lead    Magnesium   6. Hyperlipidemia, unspecified hyperlipidemia type  ECG 12 Lead    Magnesium   7. Acquired hypothyroidism  ECG 12 Lead    Magnesium   8. Thrombocytopenia (CMS/HCC)  ECG 12 Lead    Magnesium   9. Frequent PVCs  Magnesium    Holter Monitor - 24 Hour       Plan:     1.  Coronary artery disease:  Status post CABG x3 and prior drug-eluting stent placement of the mid LAD August 2017.    EF returned to normal after anterior MI in 2017.  11/2018 stress test without ischemia.  He appears stable and asymptomatic.  Continue current management with aspirin, statin and carvedilol.  2.  Hypertension: BP is stable today. I asked him to keep a BP/HR log at home so I can know how to further adjust his meds for his PVCs.  Continue the same.  3.  Hyperlipidemia: On statin therapy.  Lipids June 2020 stable.  LDL at goal below 70 (58), HDL low 30.  Encouraged regular physical activity.  4.  Chronic thrombocytopenia: This is stable. Followed by hematology.  5.  Hypothyroidism: On replacement hormone and recent thyroid labs June 2020 stable.  6.  Renal insufficiency: GFR 54, creatinine stable 1.33.  PCP is monitoring.  7.  Frequent PVCs: This appears new. Will quantify burden with 24 hour holter monitor. May need to proceed with  echo/stress test as further work up pending his holter monitor results.       I advised on the importance of medication compliance, good blood pressure, blood sugar and cholesterol control, as well as heart healthy diet, regular exercise and avoidance of tobacco for cardiovascular disease risk factor modification.     Follow up with Dr. Ye in 6 months, unless otherwise needed sooner based on test results.  I advised the patient to contact our office with any questions or concerns.      I have reviewed this case with Dr. Ye. It has been a pleasure to participate in this patient's care. Please feel free to contact me with any questions or concerns.     Ramandeep Escoto, VASILIY  08/04/2020             Your medication list           Accurate as of August 4, 2020  2:30 PM. If you have any questions, ask your nurse or doctor.               CONTINUE taking these medications      Instructions Last Dose Given Next Dose Due   acetaminophen 325 MG tablet  Commonly known as:  TYLENOL      Take 2 tablets by mouth Every 4 (Four) Hours As Needed for Mild Pain .       aspirin 81 MG chewable tablet      Chew 1 tablet Daily.       atorvastatin 20 MG tablet  Commonly known as:  LIPITOR      Take 1 tablet by mouth every night at bedtime.       carvedilol 3.125 MG tablet  Commonly known as:  COREG      Take 1 tablet by mouth Every 12 (Twelve) Hours.       IRON 27 PO      Take  by mouth Daily.       levothyroxine 88 MCG tablet  Commonly known as:  Synthroid      Take 1 tablet by mouth Daily.       VESIcare 10 MG tablet  Generic drug:  solifenacin      Take 10 mg by mouth Daily.       vitamin B-12 100 MCG tablet  Commonly known as:  CYANOCOBALAMIN      Take 50 mcg by mouth Daily.       zolpidem 10 MG tablet  Commonly known as:  AMBIEN      Take 1 tablet by mouth At Night As Needed for Sleep.          STOP taking these medications    albuterol sulfate  (90 Base) MCG/ACT inhaler  Commonly known as:  PROVENTIL HFA;VENTOLIN  HFA;PROAIR HFA  Stopped by:  VASILIY Grimes        baclofen 10 MG tablet  Commonly known as:  LIORESAL  Stopped by:  VASILIY Grimes               The above medication changes may not have been made by this provider.  Medication list was updated to reflect medications patient is currently taking including medication changes and discontinuations made by other healthcare providers or the patients themselves.     Dictated utilizing Dragon Dictation System.

## 2020-08-04 ENCOUNTER — OFFICE VISIT (OUTPATIENT)
Dept: CARDIOLOGY | Facility: CLINIC | Age: 65
End: 2020-08-04

## 2020-08-04 VITALS
DIASTOLIC BLOOD PRESSURE: 78 MMHG | HEART RATE: 71 BPM | OXYGEN SATURATION: 98 % | HEIGHT: 70 IN | SYSTOLIC BLOOD PRESSURE: 138 MMHG | WEIGHT: 239.6 LBS | BODY MASS INDEX: 34.3 KG/M2

## 2020-08-04 DIAGNOSIS — Z95.1 S/P CABG (CORONARY ARTERY BYPASS GRAFT): ICD-10-CM

## 2020-08-04 DIAGNOSIS — I10 ESSENTIAL HYPERTENSION: ICD-10-CM

## 2020-08-04 DIAGNOSIS — D69.6 THROMBOCYTOPENIA (HCC): ICD-10-CM

## 2020-08-04 DIAGNOSIS — I49.3 FREQUENT PVCS: ICD-10-CM

## 2020-08-04 DIAGNOSIS — E78.5 HYPERLIPIDEMIA, UNSPECIFIED HYPERLIPIDEMIA TYPE: ICD-10-CM

## 2020-08-04 DIAGNOSIS — E03.9 ACQUIRED HYPOTHYROIDISM: ICD-10-CM

## 2020-08-04 DIAGNOSIS — Z95.5 S/P CORONARY ARTERY STENT PLACEMENT: ICD-10-CM

## 2020-08-04 DIAGNOSIS — I25.2 HX OF ACUTE MYOCARDIAL INFARCTION OF ANTERIOR WALL: ICD-10-CM

## 2020-08-04 DIAGNOSIS — I25.10 CORONARY ARTERY DISEASE INVOLVING NATIVE CORONARY ARTERY OF NATIVE HEART WITHOUT ANGINA PECTORIS: Primary | ICD-10-CM

## 2020-08-04 PROCEDURE — 93000 ELECTROCARDIOGRAM COMPLETE: CPT | Performed by: NURSE PRACTITIONER

## 2020-08-04 PROCEDURE — 99214 OFFICE O/P EST MOD 30 MIN: CPT | Performed by: NURSE PRACTITIONER

## 2020-08-04 RX ORDER — UBIDECARENONE 75 MG
50 CAPSULE ORAL DAILY
COMMUNITY

## 2020-08-04 RX ORDER — CARVEDILOL 3.12 MG/1
3.12 TABLET ORAL EVERY 12 HOURS SCHEDULED
Qty: 60 TABLET | Refills: 0 | Status: SHIPPED | OUTPATIENT
Start: 2020-08-04 | End: 2020-08-06 | Stop reason: SDUPTHER

## 2020-08-04 RX ORDER — ATORVASTATIN CALCIUM 20 MG/1
20 TABLET, FILM COATED ORAL
Qty: 90 TABLET | Refills: 1 | Status: SHIPPED | OUTPATIENT
Start: 2020-08-04 | End: 2020-08-06 | Stop reason: SDUPTHER

## 2020-08-06 DIAGNOSIS — I10 ESSENTIAL HYPERTENSION: ICD-10-CM

## 2020-08-06 DIAGNOSIS — I25.10 CORONARY ARTERY DISEASE INVOLVING NATIVE CORONARY ARTERY OF NATIVE HEART WITHOUT ANGINA PECTORIS: ICD-10-CM

## 2020-08-06 RX ORDER — ATORVASTATIN CALCIUM 20 MG/1
20 TABLET, FILM COATED ORAL
Qty: 90 TABLET | Refills: 3 | Status: SHIPPED | OUTPATIENT
Start: 2020-08-06 | End: 2021-07-08 | Stop reason: SDUPTHER

## 2020-08-06 RX ORDER — CARVEDILOL 3.12 MG/1
3.12 TABLET ORAL EVERY 12 HOURS SCHEDULED
Qty: 180 TABLET | Refills: 3 | Status: SHIPPED | OUTPATIENT
Start: 2020-08-06 | End: 2020-08-07

## 2020-08-07 ENCOUNTER — TELEPHONE (OUTPATIENT)
Dept: CARDIOLOGY | Facility: CLINIC | Age: 65
End: 2020-08-07

## 2020-08-07 DIAGNOSIS — I10 ESSENTIAL HYPERTENSION: ICD-10-CM

## 2020-08-07 RX ORDER — CARVEDILOL 3.12 MG/1
6.25 TABLET ORAL EVERY 12 HOURS SCHEDULED
Qty: 30 TABLET | Refills: 0
Start: 2020-08-07 | End: 2021-07-08 | Stop reason: SDUPTHER

## 2020-08-07 NOTE — TELEPHONE ENCOUNTER
Called and spoke with patient regarding Holter monitor results.I discussed with Dr. Ye.  No sustained VT.  PVC burden was 5.8%.  Since is not having symptoms we will not proceed with stress test or echocardiogram.  His blood pressure runs 130s/70s.  Average heart rate in the 70s.  We will increase his carvedilol to 6.25 mg twice daily.  He will double up on current supply and let me know how he is doing a couple weeks.  I did review possible side effects with him for which she should call notify me.

## 2020-08-21 ENCOUNTER — OFFICE VISIT (OUTPATIENT)
Dept: ONCOLOGY | Facility: CLINIC | Age: 65
End: 2020-08-21

## 2020-08-21 ENCOUNTER — LAB (OUTPATIENT)
Dept: LAB | Facility: HOSPITAL | Age: 65
End: 2020-08-21

## 2020-08-21 VITALS
HEIGHT: 70 IN | DIASTOLIC BLOOD PRESSURE: 91 MMHG | SYSTOLIC BLOOD PRESSURE: 139 MMHG | RESPIRATION RATE: 16 BRPM | TEMPERATURE: 98 F | OXYGEN SATURATION: 95 % | WEIGHT: 239.9 LBS | BODY MASS INDEX: 34.35 KG/M2 | HEART RATE: 73 BPM

## 2020-08-21 DIAGNOSIS — D50.9 IRON DEFICIENCY ANEMIA, UNSPECIFIED IRON DEFICIENCY ANEMIA TYPE: Primary | ICD-10-CM

## 2020-08-21 DIAGNOSIS — E61.1 IRON DEFICIENCY: ICD-10-CM

## 2020-08-21 DIAGNOSIS — E53.8 VITAMIN B12 DEFICIENCY: ICD-10-CM

## 2020-08-21 DIAGNOSIS — D69.6 THROMBOCYTOPENIA (HCC): ICD-10-CM

## 2020-08-21 LAB
ALBUMIN SERPL-MCNC: 4.1 G/DL (ref 3.5–5.2)
ALBUMIN/GLOB SERPL: 1.5 G/DL (ref 1.1–2.4)
ALP SERPL-CCNC: 104 U/L (ref 38–116)
ALT SERPL W P-5'-P-CCNC: 21 U/L (ref 0–41)
ANION GAP SERPL CALCULATED.3IONS-SCNC: 9.7 MMOL/L (ref 5–15)
AST SERPL-CCNC: 17 U/L (ref 0–40)
BASOPHILS # BLD AUTO: 0.02 10*3/MM3 (ref 0–0.2)
BASOPHILS NFR BLD AUTO: 0.5 % (ref 0–1.5)
BILIRUB SERPL-MCNC: 0.7 MG/DL (ref 0.2–1.2)
BUN SERPL-MCNC: 21 MG/DL (ref 6–20)
BUN/CREAT SERPL: 15.6 (ref 7.3–30)
CALCIUM SPEC-SCNC: 9.9 MG/DL (ref 8.5–10.2)
CHLORIDE SERPL-SCNC: 104 MMOL/L (ref 98–107)
CO2 SERPL-SCNC: 25.3 MMOL/L (ref 22–29)
CREAT SERPL-MCNC: 1.35 MG/DL (ref 0.7–1.3)
DEPRECATED RDW RBC AUTO: 43.9 FL (ref 37–54)
EOSINOPHIL # BLD AUTO: 0.11 10*3/MM3 (ref 0–0.4)
EOSINOPHIL NFR BLD AUTO: 2.6 % (ref 0.3–6.2)
ERYTHROCYTE [DISTWIDTH] IN BLOOD BY AUTOMATED COUNT: 13 % (ref 12.3–15.4)
FERRITIN SERPL-MCNC: 91.9 NG/ML (ref 30–400)
GFR SERPL CREATININE-BSD FRML MDRD: 53 ML/MIN/1.73
GLOBULIN UR ELPH-MCNC: 2.8 GM/DL (ref 1.8–3.5)
GLUCOSE SERPL-MCNC: 118 MG/DL (ref 74–124)
HCT VFR BLD AUTO: 45.4 % (ref 37.5–51)
HGB BLD-MCNC: 14.6 G/DL (ref 13–17.7)
IMM GRANULOCYTES # BLD AUTO: 0.01 10*3/MM3 (ref 0–0.05)
IMM GRANULOCYTES NFR BLD AUTO: 0.2 % (ref 0–0.5)
IRON 24H UR-MRATE: 82 MCG/DL (ref 59–158)
IRON SATN MFR SERPL: 26 % (ref 14–48)
LYMPHOCYTES # BLD AUTO: 1.07 10*3/MM3 (ref 0.7–3.1)
LYMPHOCYTES NFR BLD AUTO: 24.9 % (ref 19.6–45.3)
MCH RBC QN AUTO: 30 PG (ref 26.6–33)
MCHC RBC AUTO-ENTMCNC: 32.2 G/DL (ref 31.5–35.7)
MCV RBC AUTO: 93.2 FL (ref 79–97)
MONOCYTES # BLD AUTO: 0.4 10*3/MM3 (ref 0.1–0.9)
MONOCYTES NFR BLD AUTO: 9.3 % (ref 5–12)
NEUTROPHILS NFR BLD AUTO: 2.68 10*3/MM3 (ref 1.7–7)
NEUTROPHILS NFR BLD AUTO: 62.5 % (ref 42.7–76)
NRBC BLD AUTO-RTO: 0 /100 WBC (ref 0–0.2)
PLATELET # BLD AUTO: 125 10*3/MM3 (ref 140–450)
PMV BLD AUTO: 10.6 FL (ref 6–12)
POTASSIUM SERPL-SCNC: 4.6 MMOL/L (ref 3.5–4.7)
PROT SERPL-MCNC: 6.9 G/DL (ref 6.3–8)
RBC # BLD AUTO: 4.87 10*6/MM3 (ref 4.14–5.8)
SODIUM SERPL-SCNC: 139 MMOL/L (ref 134–145)
TIBC SERPL-MCNC: 311 MCG/DL (ref 249–505)
TRANSFERRIN SERPL-MCNC: 222 MG/DL (ref 200–360)
VIT B12 BLD-MCNC: >2000 PG/ML (ref 211–946)
WBC # BLD AUTO: 4.29 10*3/MM3 (ref 3.4–10.8)

## 2020-08-21 PROCEDURE — 99214 OFFICE O/P EST MOD 30 MIN: CPT | Performed by: INTERNAL MEDICINE

## 2020-08-21 PROCEDURE — 80053 COMPREHEN METABOLIC PANEL: CPT

## 2020-08-21 PROCEDURE — 82728 ASSAY OF FERRITIN: CPT

## 2020-08-21 PROCEDURE — 85025 COMPLETE CBC W/AUTO DIFF WBC: CPT

## 2020-08-21 PROCEDURE — 83540 ASSAY OF IRON: CPT

## 2020-08-21 PROCEDURE — 84466 ASSAY OF TRANSFERRIN: CPT

## 2020-08-21 PROCEDURE — 82607 VITAMIN B-12: CPT | Performed by: INTERNAL MEDICINE

## 2020-08-21 PROCEDURE — 36415 COLL VENOUS BLD VENIPUNCTURE: CPT

## 2020-08-21 NOTE — PROGRESS NOTES
REASON FOR FOLLOWUP:     1.  Chronic mild thrombocytopenia dating back at least to 2016.    2.  Vitamin B12 deficiency diagnosed in middle of 2017, responded to oral B12 supplementation.   3.  Post surgical anemia in 2017 with max hemoglobin 8.8.  Patient was asked to start oral iron once a day.  Patient had trace iron deficiency with iron saturation 17% despite ferritin 116.2 ng/mL he was taking oral iron once a day, with good tolerance.  He is already improved, and that was discontinued in 2019.        HISTORY OF PRESENT ILLNESS:  The patient is a 65 y.o. male  presenting today for annual follow-up.      Patient reports he is doing well, continues on oral vitamin B12 supplementation.  He discontinued oral iron in  as we instructed.  Patient reports no bleeding or bruising.  He has excellent performance status ECOG 0.    He has been doing very well after his bypass surgery in 2017.   No chest pain or short of breath.      His wife  of metastatic melanoma.  Patient is close to his children, sees his daughter daily, and he is son weekly.    Laboratory study today reported persistent mild thrombocytopenia with platelets 125,000, otherwise normal hemoglobin 14.6 MCV 93.2 MCHC 32.2 and  normal WBC 4290.  Other labs are pending including iron study B12 level and CMP.        Medical History         Past Medical History:   Diagnosis Date   • Arthritis     • Colon cancer, post surgery and adjuvant chemotherapy in .      • Elevated blood pressure reading without diagnosis of hypertension     • H/O complete eye exam    • Hypertension     • Hypothyroidism, acquired     • Insomnia     • Kidney stones     • Osteoarthritis, multiple sites            Surgical History          Past Surgical History:   Procedure Laterality Date   • CARDIAC CATHETERIZATION N/A 2017     Procedure: Left Heart Cath;  Surgeon: Edyta Ye MD;  Location: [facility] INVASIVE LOCATION;   Service:    • CARDIAC CATHETERIZATION N/A 8/13/2017     Procedure: Stent RK coronary;  Surgeon: Edyta Ye MD;  Location: [facility] INVASIVE LOCATION;  Service:    • COLECTOMY PARTIAL / TOTAL   1994   • COLONOSCOPY   03/18/2009   • HERNIA REPAIR   04/02/2009     hiatal       Triple-vessel CABG bypass surgery on 8/25/2017 by Dr. Ye.      HEMATOLOGIC/ONCOLOGIC HISTORY: The patient is a 62 y.o. year old male whom we are consulted for evaluation of thrombocytopenia.      This patient to was admitted for first episodes of heart attack in mid August 2017.  Patient had cardiac stent placement this hospitalization.  Currently has no chest pain.  This patient has no history of coronary artery disease previously and was not taking aspirin or Plavix.      At time of this admission, in the very early morning on 8/13/2017, laboratory study reported platelets 133,000, hemoglobin 14.2 and WBC 7700 including neutrophils 5200 and lymphocytes 1900.  His chemistry lab reported creatinine 1.29, normal electrolytes in the liver function panel, mildly elevated glucose 135.  Patient had cardiac catheterization and drug-eluting stent placement in the morning of August 13, 2017.  In the morning of 8/14/2017, repeated CBC showed a decreased platelets at 104,000, hemoglobin 12.3 and WBC 6100 without a differentiation.  Repeated laboratory study on 8/15/2017 reported a platelets 126,000 and hemoglobin 14, WBC 6600.  Today on 8/16/2017, laboratory study showed a platelets 119,000, hemoglobin 14.1 and a WBC 5600.  I obtained laboratory study in the afternoon, showed a marginally elevated IPF 6.8%, platelet counts 125,000.  His vitamin B12 level was very marginal at 261 pg/mL, negative for rheumatoid factor, and had normal LDH at 210.     I reviewed his previous laboratory study available in the [facility] electronic medical records, dating back to 3/25/2016 where he had platelets 132,000, hemoglobin 13.8 and WBC 5700 with normal  distribution.  Lab results on 4/25/2016 showed a platelets 151,000, hemoglobin 14.5 and WBC 6800.  On 3/20/2017 his platelets was 153,000, hemoglobin 14.9 and WBC 6700.  He also had a normal thyroid screening test in March 2016 and March 2017.  His chemistries study showed a stable creatinine level between 1.2 and 1.3.      I also searched medical records in the [facility], back on 8/21/2014 he had a CBC showed a platelets 117,000 and hemoglobin 14.1 WBC 5000 with normal distribution.       On 10/20/2014 he had platelets 141,000 and hemoglobin 15.4 WBC 10,700 including neutrophils 9100.  His abdomen CT scan examination on 10/20/2014 reported no splenomegaly no hepatomegaly.  It is seems that he had visited to emergency room at that time because of chosen no cold, about 1 months after he had lithotripsy for kidney stone.  Urinalysis was positive for RBC and a trace of bacteria.      Patient reports that he has no easy bleeding or bruising.  He previously had multiple surgery noticeably with colon resection for colon cancer 27 years ago.  He did receive chemotherapy for 6 months treated by Dr. Jasso at that time.  He also had repair for abdominal wall hernia, however he had no significant postoperatively bleeding.     He was given vitamin B12 injection and started oral B12 supplementation. Patient reports that he has been taking 2500 mcg vitamin B12 daily.     This patient subsequently had triple vessel bypass surgery on 08/25/2017 at [facility]. At that time he still had mild thrombocytopenia with platelets 128,000 and hemoglobin 13.1, WBC 6100. Postsurgery, his platelets deteriorated all the way down to 76,000 on 08/27/2017 and hemoglobin 8.9; however, gradually improved afterwards with platelets recovered at 130,000 on 08/29/2017 and hemoglobin 9.3 when he was discharged. Since that time laboratory study on 09/05/2017 reported normalized platelets at 341,000, hemoglobin 10.8 and WBC  7300. Today, laboratory study reported platelets 239,000, hemoglobin 12.5 and WBC 5800.     Patient reports after surgery he is feeling better and more energetic. He is still in recovery. He still has a small wound in the middle chest not healed completely with slight yellowness but no active drainage. Home visiting nurse comes to his house twice a week inspecting that. Patient reports no fever, sweating, chills.    Patient was tested negative for comprehensive REBA panel on 8/16/2017, and also negative for rheumatoid factor.  Serum protein immunofixation was negative, including IgG 675, IgA 91 IgM 104, he also had a normal LDH.     When I saw him on 9/15/2017 he had normalized platelets at 239,000, and WBC 5800 and a mild but much improved anemia with hemoglobin 12.5.      He had negative laboratory test for rheumatoid factor and comprehensive REBA panel, and no evidence of destruction of platelets and no significant elevation of IPF.  After CABG surgery on August 25, 2017, he did have expected brief thrombocytopenia with max at 76,000 on August 27, 2017 and then subsequently rebounded afterwards.  His platelets normalized at 341,000 on 9/5/2017 and then on 9/15/2017 it's 239,000.  This could be part of postsurgical changes.      Laboratory study on 11/2/2017 reported worsened platelets at 134,000, WBC 4240 and neutrophils 2170, lymphocytes 1440.  His hemoglobin is about the same at 12.7.  Serum vitamin B12 level was 1045 pg/mL, folic acid 6.06 ng/mL, iron 50 TIBC 295 iron saturation 17% and ferritin 116 ng/mL.  Patient was asked to start oral folic acid and oral iron supplementation once a day.      Laboratory study on 3/1/2018 showed the platelets at 116,000, slightly improved hemoglobin 13.0 and normal WBC 5070 including neutrophils 2650 and lymphocytes 1650.  His vitamin B12 level was 1383 pg/mL, folic acid > 20 ng/mL.  Serum ferritin 83.1 ng/m, iron 73 TIBC 314 and iron saturation 23%.      Lab study on  8/17/2018 reported ferritin 73, free iron 51 TIBC 297 and iron saturation 17%.  B12 was 1514 pg/mL and RBC folate 862 ng/mL.  Hemoglobin was 13.2.    Laboratory study performed 08/30/2019 reported stable mild thrombocytopenia with platelets 129,000, marginal hemoglobin 13.9 MCV 96.3 and MCHC 31.4.  WBC normal 6300 including ANC 3970, lymphocytes 1610 and monocytes 660.  Iron study reported ferritin 104.5 ng/mL and iron saturation 29%.  Oral iron was discontinued.     MEDICATIONS:The current medication list was reviewed with the patient and updated in the EMR this date per the Medical Assistant. Medication dosages and frequencies were confirmed to be accurate.       ALLERGIES:          Allergies   Allergen Reactions   • Hydromorphone Nausea And Vomiting and Shortness Of Breath   • Demerol [Meperidine] Nausea And Vomiting         SOCIAL HISTORY: never smoked cigarettes.       FAMILY HISTORY: Father was diagnosed of colon cancer at age 65. Brother diagnosed of colon cancer at age 51.       REVIEW OF SYSTEMS:  Review of Systems   Constitutional: Negative for activity change, appetite change, chills, diaphoresis, fatigue, fever and unexpected weight change.   HENT: Negative for facial swelling, mouth sores, sore throat and trouble swallowing.    Eyes: Negative for photophobia and visual disturbance.   Respiratory: Negative for cough, chest tightness and shortness of breath.    Cardiovascular: Negative for chest pain, palpitations and leg swelling.   Gastrointestinal: Negative for abdominal pain, blood in stool and nausea.   Endocrine: Negative for cold intolerance and heat intolerance.   Genitourinary: Negative for dysuria and hematuria.   Musculoskeletal: Negative for arthralgias and joint swelling.   Skin: Negative for color change and rash.   Allergic/Immunologic: Negative for immunocompromised state.   Neurological: Negative for dizziness, speech difficulty, numbness and headaches.   Hematological: Negative  "for adenopathy. Does not bruise/bleed easily.   Psychiatric/Behavioral: Negative for agitation and confusion.     Objective   Vitals:    08/21/20 1148   BP: 139/91   Pulse: 73   Resp: 16   Temp: 98 °F (36.7 °C)   SpO2: 95%   Weight: 109 kg (239 lb 14.4 oz)   Height: 177.8 cm (70\")   PainSc: 0-No pain   ECOG 1.        PHYSICAL EXAM:      GENERAL:  Well-developed, well-nourished male in no acute distress.   SKIN:  Warm, dry without rashes, purpura or petechiae.  EYES:  Pupils equal, round.  Conjunctivae normal.  EARS:  Hearing intact.  NOSE:  Patient wears mask due to the pandemic coronavirus infection.   MOUTH: Same as above.  THROAT: Same as above.  NECK:  Supple, no thyromegaly or masses.  LYMPHATICS:  No cervical, supraclavicular, axillary adenopathy.  CHEST: Normal respiratory effort.  Lungs clear to auscultation. Good airflow.  CARDIAC:  Regular rate and rhythm without murmurs, rubs or gallops. Normal S1,S2.  ABDOMEN:  Soft, no tender, no hepatosplenomegaly or masses. Bowel sounds normal.  EXTREMITIES:  No clubbing, cyanosis or edema.  NEUROLOGICAL:  Cranial Nerves II-XII grossly intact.   PSYCHIATRIC:  Normal affect and mood.           RECENT LABS:        Lab Results   Component Value Date    WBC 4.29 08/21/2020    HGB 14.6 08/21/2020    HCT 45.4 08/21/2020    MCV 93.2 08/21/2020     (L) 08/21/2020     Lab Results   Component Value Date    NEUTROABS 2.68 08/21/2020     Pending results for iron studies, B12 and CMP.    Assessment/Plan       1.  Vitamin B12 deficiency.  Able to maintain supratherapeutic B12 level on oral supplementation. He'll continue treatment for now.    · On 08/19/2018 the patient had further improved B12 level.   · 8/30/2019 patient had supratherapeutic B12 level more than 2000 pg/mL.    · Today 8/21/2020, patient states he continues taking oral Vitamin B-12 supplementation daily. Vitamin B-12 laboratory study is pending.    2.  Mild thrombocytopenia, asymptomatic.  The exact etiology " is not clear.  It does not look like related to vitamin B12 deficiency since he has worsening thrombocytopenia when his vitamin B12 becomes supratherapeutic.  We'll continue to monitor.    · Platelets are somewhat low today, 08/30/2019, at 129,000.   · Stable thrombocytopenia platelets 125,000 on 8/21/2020.  Patient reports no bleeding or bruising.  Will continue to monitor.    3.  Mild anemia with iron deficiency.    · This patient had borderline normal hemoglobin level prior to his bypass surgery in August 2017.  After surgery his hemoglobin dropped as low as 8.9 g/dL on 8/20/2017 and subsequently improved afterwards.  His iron study showed a reasonable ferritin level, however iron saturation was slightly low.  His folic acid level were also marginally normal.    · He has been on both oral iron and folic acid since November 2017.    · On 8/30/2019 laboratory studies show hemoglobin of 13.9 with hematocrit of 44.2%.  His iron study improved at with normal iron saturation 29%, and ferritin 104.5 ng/mL.  We instructed patient to discontinue oral iron treatment.      PLAN:   1. Continue oral vitamin B12 supplementation at 2500 µg daily.   2. Continue oral folic acid 800 µg daily.  3. I will call the patient with results when available.     4. Returns in 12 months for MD visit and labs cbc, CMP, ferritin, iron levels.       Leeann Mena MD PhD  8/21/2020.    ADDENDUM:  Lab Results   Component Value Date    IRON 82 08/21/2020    TIBC 311 08/21/2020    FERRITIN 91.90 08/21/2020   Iron sats: 26% on 8/21/2020    Lab Results   Component Value Date    [id number] >2,000 (H) 08/21/2020     I called and spoke with the patient today, asked him to continue oral vitamin B12 despite supratherapeutic.  No need for resumption of oral iron treatment, despite iron studies have been slightly trending down.  Patient voiced understanding.    More than 25 min were used for patient care, over half of that time were for counseling.       Leeann Mena MD PhD  8/23/2020      CC:  VARINDER Feliz M.D.

## 2020-08-23 LAB
FOLATE BLD-MCNC: 313 NG/ML
FOLATE RBC-MCNC: 721 NG/ML
HCT VFR BLD AUTO: 43.4 % (ref 37.5–51)

## 2020-09-15 ENCOUNTER — OFFICE VISIT (OUTPATIENT)
Dept: FAMILY MEDICINE CLINIC | Facility: CLINIC | Age: 65
End: 2020-09-15

## 2020-09-15 VITALS
DIASTOLIC BLOOD PRESSURE: 80 MMHG | TEMPERATURE: 98 F | BODY MASS INDEX: 34.65 KG/M2 | WEIGHT: 242 LBS | HEIGHT: 70 IN | SYSTOLIC BLOOD PRESSURE: 132 MMHG | RESPIRATION RATE: 16 BRPM | HEART RATE: 68 BPM

## 2020-09-15 DIAGNOSIS — E03.9 ACQUIRED HYPOTHYROIDISM: ICD-10-CM

## 2020-09-15 DIAGNOSIS — F51.01 PRIMARY INSOMNIA: ICD-10-CM

## 2020-09-15 DIAGNOSIS — Z23 IMMUNIZATION DUE: ICD-10-CM

## 2020-09-15 DIAGNOSIS — Z00.00 WELCOME TO MEDICARE PREVENTIVE VISIT: Primary | ICD-10-CM

## 2020-09-15 PROCEDURE — G0008 ADMIN INFLUENZA VIRUS VAC: HCPCS | Performed by: FAMILY MEDICINE

## 2020-09-15 PROCEDURE — 90732 PPSV23 VACC 2 YRS+ SUBQ/IM: CPT | Performed by: FAMILY MEDICINE

## 2020-09-15 PROCEDURE — G0009 ADMIN PNEUMOCOCCAL VACCINE: HCPCS | Performed by: FAMILY MEDICINE

## 2020-09-15 PROCEDURE — 90694 VACC AIIV4 NO PRSRV 0.5ML IM: CPT | Performed by: FAMILY MEDICINE

## 2020-09-15 PROCEDURE — 99213 OFFICE O/P EST LOW 20 MIN: CPT | Performed by: FAMILY MEDICINE

## 2020-09-15 RX ORDER — ZOLPIDEM TARTRATE 10 MG/1
10 TABLET ORAL NIGHTLY PRN
Qty: 30 TABLET | Refills: 2 | Status: SHIPPED | OUTPATIENT
Start: 2020-09-15 | End: 2021-03-07 | Stop reason: SDUPTHER

## 2020-09-15 RX ORDER — LEVOTHYROXINE SODIUM 88 UG/1
88 TABLET ORAL DAILY
Qty: 30 TABLET | Refills: 5 | Status: SHIPPED | OUTPATIENT
Start: 2020-09-15 | End: 2021-03-07 | Stop reason: SDUPTHER

## 2020-09-15 NOTE — PATIENT INSTRUCTIONS
Medicare Wellness  Personal Prevention Plan of Service     Date of Office Visit:  09/15/2020  Encounter Provider:  Jake Echeverria MD  Place of Service:  [facility]  Patient Name: Luiig Steel  :  1955    As part of the Medicare Wellness portion of your visit today, we are providing you with this personalized preventive plan of services (PPPS). This plan is based upon recommendations of the [place] Preventive Services Task Force (USPSTF) and the Advisory Committee on Immunization Practices (ACIP).    This lists the preventive care services that should be considered, and provides dates of when you are due. Items listed as completed are up-to-date and do not require any further intervention.    Health Maintenance   Topic Date Due   • URINE MICROALBUMIN  1955   • HEMOGLOBIN A1C  2018   • COLONOSCOPY  2019   • Pneumococcal Vaccine Once at 65 Years Old  2020   • TDAP/TD VACCINES (2 - Td) 09/15/2020 (Originally 2018)   • INFLUENZA VACCINE  2020 (Originally 2020)   • ZOSTER VACCINE (2 of 3) 2021 (Originally 2016)   • LIPID PANEL  2021   • MEDICARE ANNUAL WELLNESS  09/15/2021   • BH AMB Pneumococcal Vaccine 65+ (1 of 1 - PPSV23) 09/15/2025   • HEPATITIS C SCREENING  Discontinued   • DIABETIC FOOT EXAM  Discontinued   • DIABETIC EYE EXAM  Discontinued       Orders Placed This Encounter   Procedures   • Fluad Quad >65 years   • Pneumococcal Polysaccharide Vaccine 23-Valent Greater Than or Equal To 3yo Subcutaneous / IM       Return in about 6 months (around 3/15/2021), or if symptoms worsen or fail to improve.

## 2020-09-15 NOTE — PROGRESS NOTES
ImmunizationPNEU 23  performed in right deltoid by Sandy Rowe MA. Patient tolerated the procedure well without complications.  09/15/20   Sandy Rowe MA

## 2020-09-15 NOTE — PROGRESS NOTES
Immunization  FLU HIGH DOSE performed in LEFT DELTOID * by Sandy Rowe MA. Patient tolerated the procedure well without complications.  09/15/20   Sandy Rowe MA

## 2020-09-15 NOTE — PROGRESS NOTES
Subjective   Luigi Steel is a 65 y.o. male.     History of Present Illness     Chief Complaint:   Chief Complaint   Patient presents with   • medicare wellness     due    • Insomnia     med refill - no labs   - nora        Luigi Steel 65 y.o. male who presents today for Medical Management of the below listed issues and medication refills.  he has a problem list of   Patient Active Problem List   Diagnosis   • Colon cancer (CMS/HCC)   • Elevated blood pressure reading without diagnosis of hypertension   • Acquired hypothyroidism   • Insomnia   • Osteoarthritis, multiple sites   • Hyperlipidemia   • Thrombocytopenia (CMS/HCC)   • Vitamin B12 deficiency   • CAD (coronary artery disease)   • S/P CABG (coronary artery bypass graft)   • Iron deficiency anemia   • S/P coronary artery stent placement   • Essential hypertension   • Iron deficiency   • Syncope and collapse   • Parainfluenza infection   • Hx of acute myocardial infarction of anterior wall   .  Since the last visit, he has overall felt well.  he has been compliant with   Current Outpatient Medications:   •  acetaminophen (TYLENOL) 325 MG tablet, Take 2 tablets by mouth Every 4 (Four) Hours As Needed for Mild Pain ., Disp: , Rfl:   •  aspirin 81 MG chewable tablet, Chew 1 tablet Daily., Disp: , Rfl:   •  atorvastatin (LIPITOR) 20 MG tablet, Take 1 tablet by mouth every night at bedtime., Disp: 90 tablet, Rfl: 3  •  carvedilol (COREG) 3.125 MG tablet, Take 2 tablets by mouth Every 12 (Twelve) Hours., Disp: 30 tablet, Rfl: 0  •  Ferrous Gluconate (IRON 27 PO), Take  by mouth Daily., Disp: , Rfl:   •  levothyroxine (Synthroid) 88 MCG tablet, Take 1 tablet by mouth Daily., Disp: 30 tablet, Rfl: 5  •  VESICARE 10 MG tablet, Take 10 mg by mouth Daily., Disp: , Rfl:   •  vitamin B-12 (CYANOCOBALAMIN) 100 MCG tablet, Take 50 mcg by mouth Daily., Disp: , Rfl:   •  zolpidem (AMBIEN) 10 MG tablet, Take 1 tablet by mouth At Night As Needed for Sleep., Disp: 30  "tablet, Rfl: 2.  he denies medication side effects.    All of the other chronic condition(s) listed above are stable w/o issues.    /80   Pulse 68   Temp 98 °F (36.7 °C) (Oral)   Resp 16   Ht 177.8 cm (70\")   Wt 110 kg (242 lb)   BMI 34.72 kg/m²     Results for orders placed or performed in visit on 08/21/20   Comprehensive Metabolic Panel    Specimen: Blood   Result Value Ref Range    Glucose 118 74 - 124 mg/dL    BUN 21 (H) 6 - 20 mg/dL    Creatinine 1.35 (H) 0.70 - 1.30 mg/dL    Sodium 139 134 - 145 mmol/L    Potassium 4.6 3.5 - 4.7 mmol/L    Chloride 104 98 - 107 mmol/L    CO2 25.3 22.0 - 29.0 mmol/L    Calcium 9.9 8.5 - 10.2 mg/dL    Total Protein 6.9 6.3 - 8.0 g/dL    Albumin 4.10 3.50 - 5.20 g/dL    ALT (SGPT) 21 0 - 41 U/L    AST (SGOT) 17 0 - 40 U/L    Alkaline Phosphatase 104 38 - 116 U/L    Total Bilirubin 0.7 0.2 - 1.2 mg/dL    eGFR Non African Amer 53 (L) >60 mL/min/1.73    Globulin 2.8 1.8 - 3.5 gm/dL    A/G Ratio 1.5 1.1 - 2.4 g/dL    BUN/Creatinine Ratio 15.6 7.3 - 30.0    Anion Gap 9.7 5.0 - 15.0 mmol/L   Iron Profile    Specimen: Blood   Result Value Ref Range    Iron 82 59 - 158 mcg/dL    Iron Saturation 26 14 - 48 %    Transferrin 222 200 - 360 mg/dL    TIBC 311 249 - 505 mcg/dL   Ferritin    Specimen: Blood   Result Value Ref Range    Ferritin 91.90 30.00 - 400.00 ng/mL   Vitamin B12    Specimen: Blood   Result Value Ref Range    Vitamin B-12 >2,000 (H) 211 - 946 pg/mL   Folate RBC    Specimen: Blood   Result Value Ref Range    Folate, Hemolysate 313.0 Not Estab. ng/mL    Hematocrit 43.4 37.5 - 51.0 %    RBC Folate 721 >498 ng/mL   CBC Auto Differential    Specimen: Blood   Result Value Ref Range    WBC 4.29 3.40 - 10.80 10*3/mm3    RBC 4.87 4.14 - 5.80 10*6/mm3    Hemoglobin 14.6 13.0 - 17.7 g/dL    Hematocrit 45.4 37.5 - 51.0 %    MCV 93.2 79.0 - 97.0 fL    MCH 30.0 26.6 - 33.0 pg    MCHC 32.2 31.5 - 35.7 g/dL    RDW 13.0 12.3 - 15.4 %    RDW-SD 43.9 37.0 - 54.0 fl    MPV 10.6 6.0 " - 12.0 fL    Platelets 125 (L) 140 - 450 10*3/mm3    Neutrophil % 62.5 42.7 - 76.0 %    Lymphocyte % 24.9 19.6 - 45.3 %    Monocyte % 9.3 5.0 - 12.0 %    Eosinophil % 2.6 0.3 - 6.2 %    Basophil % 0.5 0.0 - 1.5 %    Immature Grans % 0.2 0.0 - 0.5 %    Neutrophils, Absolute 2.68 1.70 - 7.00 10*3/mm3    Lymphocytes, Absolute 1.07 0.70 - 3.10 10*3/mm3    Monocytes, Absolute 0.40 0.10 - 0.90 10*3/mm3    Eosinophils, Absolute 0.11 0.00 - 0.40 10*3/mm3    Basophils, Absolute 0.02 0.00 - 0.20 10*3/mm3    Immature Grans, Absolute 0.01 0.00 - 0.05 10*3/mm3    nRBC 0.0 0.0 - 0.2 /100 WBC           The following portions of the patient's history were reviewed and updated as appropriate: allergies, current medications, past family history, past medical history, past social history, past surgical history and problem list.    Review of Systems   Constitutional: Negative for activity change, chills, fatigue and fever.   Respiratory: Negative for cough and shortness of breath.    Cardiovascular: Negative for chest pain and palpitations.   Gastrointestinal: Negative for abdominal pain.   Endocrine: Negative for cold intolerance.   Psychiatric/Behavioral: Negative for behavioral problems and dysphoric mood. The patient is not nervous/anxious.        Objective   Physical Exam  Vitals signs and nursing note reviewed.   Constitutional:       Appearance: He is well-developed.   Neck:      Musculoskeletal: Neck supple.      Thyroid: No thyromegaly.   Cardiovascular:      Rate and Rhythm: Normal rate and regular rhythm.      Heart sounds: No murmur.   Pulmonary:      Effort: Pulmonary effort is normal.      Breath sounds: Normal breath sounds.   Abdominal:      General: Bowel sounds are normal.      Tenderness: There is no abdominal tenderness.   Psychiatric:         Behavior: Behavior normal.     Pt UTD with is hematologist and receives his labs there.  The patient has read and signed the [facility] Controlled Substance Contract.  I  will continue to see patient for regular follow up appointments.  They are well controlled on their medication.  JESUS has been reviewed by me and is updated every 3 months. The patient is aware of the potential for addiction and dependence.    Assessment/Plan   Luigi was seen today for medicare wellness and insomnia.    Diagnoses and all orders for this visit:    Welcome to Medicare preventive visit    Primary insomnia  -     zolpidem (AMBIEN) 10 MG tablet; Take 1 tablet by mouth At Night As Needed for Sleep.    Acquired hypothyroidism  -     levothyroxine (Synthroid) 88 MCG tablet; Take 1 tablet by mouth Daily.    Immunization due  -     Fluad Quad >65 years  -     Pneumococcal Polysaccharide Vaccine 23-Valent Greater Than or Equal To 1yo Subcutaneous / IM

## 2020-09-15 NOTE — PROGRESS NOTES
The ABCs of the Annual Wellness Visit  Welcome to Medicare Visit    Chief Complaint   Patient presents with   • medicare wellness     due    • Insomnia     med refill - no labs   - nora        Subjective   History of Present Illness:  Luigi Steel is a 65 y.o. male who presents for a  Welcome to Medicare Visit.    HEALTH RISK ASSESSMENT    Recent Hospitalizations:  No hospitalization(s) within the last year.    Current Medical Providers:  Patient Care Team:  Jake Echeverria MD as PCP - General  Edyta Ye MD as Consulting Physician (Cardiology)  Leeann Mena MD PhD as Consulting Physician (Hematology and Oncology)  Jake Echeverria MD as Referring Physician (Family Medicine)  Prashant Crouch MD as Consulting Physician (Nephrology)    Smoking Status:  Social History     Tobacco Use   Smoking Status Never Smoker   Smokeless Tobacco Never Used       Alcohol Consumption:  Social History     Substance and Sexual Activity   Alcohol Use No       Depression Screen:   PHQ-2/PHQ-9 Depression Screening 2/5/2020   Little interest or pleasure in doing things 0   Feeling down, depressed, or hopeless 0   Trouble falling or staying asleep, or sleeping too much -   Feeling tired or having little energy -   Poor appetite or overeating -   Feeling bad about yourself - or that you are a failure or have let yourself or your family down -   Trouble concentrating on things, such as reading the newspaper or watching television -   Moving or speaking so slowly that other people could have noticed. Or the opposite - being so fidgety or restless that you have been moving around a lot more than usual -   Thoughts that you would be better off dead, or of hurting yourself in some way -   Total Score 0       Fall Risk Screen:  STEADI Fall Risk Assessment was completed, and patient is at LOW risk for falls.Assessment completed on:9/15/2020    Health Habits and Functional and Cognitive Screening:  Functional & Cognitive Status  9/15/2020   Do you have difficulty preparing food and eating? No   Do you have difficulty bathing yourself, getting dressed or grooming yourself? No   Do you have difficulty using the toilet? No   Do you have difficulty moving around from place to place? No   Do you have trouble with steps or getting out of a bed or a chair? No   Current Diet Well Balanced Diet   Dental Exam Up to date   Eye Exam Up to date   Exercise (times per week) 2 times per week   Current Exercise Activities Include Walking   Do you need help using the phone?  No   Are you deaf or do you have serious difficulty hearing?  No   Do you need help with transportation? No   Do you need help shopping? No   Do you need help preparing meals?  No   Do you need help with housework?  No   Do you need help with laundry? No   Do you need help taking your medications? No   Do you need help managing money? No   Do you ever drive or ride in a car without wearing a seat belt? No   Have you felt unusual stress, anger or loneliness in the last month? No   Who do you live with? Alone   If you need help, do you have trouble finding someone available to you? Yes   Have you been bothered in the last four weeks by sexual problems? No   Do you have difficulty concentrating, remembering or making decisions? No         Does the patient have evidence of cognitive impairment? No    Asprin use counseling:Taking ASA appropriately as indicated    Visual Acuity:    No exam data present    Age-appropriate Screening Schedule:  Refer to the list below for future screening recommendations based on patient's age, sex and/or medical conditions. Orders for these recommended tests are listed in the plan section. The patient has been provided with a written plan.    Health Maintenance   Topic Date Due   • URINE MICROALBUMIN  1955   • HEMOGLOBIN A1C  02/13/2018   • COLONOSCOPY  01/01/2019   • TDAP/TD VACCINES (2 - Td) 09/15/2020 (Originally 1/1/2018)   • INFLUENZA VACCINE   11/11/2020 (Originally 8/1/2020)   • ZOSTER VACCINE (2 of 3) 09/23/2021 (Originally 5/21/2016)   • LIPID PANEL  06/08/2021   • DIABETIC FOOT EXAM  Discontinued   • DIABETIC EYE EXAM  Discontinued          The following portions of the patient's history were reviewed and updated as appropriate: allergies, current medications, past family history, past medical history, past social history, past surgical history and problem list.    Outpatient Medications Prior to Visit   Medication Sig Dispense Refill   • zolpidem (AMBIEN) 10 MG tablet Take 1 tablet by mouth At Night As Needed for Sleep. 30 tablet 2   • acetaminophen (TYLENOL) 325 MG tablet Take 2 tablets by mouth Every 4 (Four) Hours As Needed for Mild Pain .     • aspirin 81 MG chewable tablet Chew 1 tablet Daily.     • atorvastatin (LIPITOR) 20 MG tablet Take 1 tablet by mouth every night at bedtime. 90 tablet 3   • carvedilol (COREG) 3.125 MG tablet Take 2 tablets by mouth Every 12 (Twelve) Hours. 30 tablet 0   • Ferrous Gluconate (IRON 27 PO) Take  by mouth Daily.     • VESICARE 10 MG tablet Take 10 mg by mouth Daily.     • vitamin B-12 (CYANOCOBALAMIN) 100 MCG tablet Take 50 mcg by mouth Daily.     • levothyroxine (Synthroid) 88 MCG tablet Take 1 tablet by mouth Daily. 30 tablet 5     No facility-administered medications prior to visit.        Patient Active Problem List   Diagnosis   • Colon cancer (CMS/HCC)   • Elevated blood pressure reading without diagnosis of hypertension   • Acquired hypothyroidism   • Insomnia   • Osteoarthritis, multiple sites   • Hyperlipidemia   • Thrombocytopenia (CMS/HCC)   • Vitamin B12 deficiency   • CAD (coronary artery disease)   • S/P CABG (coronary artery bypass graft)   • Iron deficiency anemia   • S/P coronary artery stent placement   • Essential hypertension   • Iron deficiency   • Syncope and collapse   • Parainfluenza infection   • Hx of acute myocardial infarction of anterior wall       Advanced Care Planning:  ACP  "discussion was held with the patient during this visit. Patient does not have an advance directive, information provided.    Review of Systems    Compared to one year ago, the patient feels his physical health is the same.  Compared to one year ago, the patient feels his mental health is the same.    Reviewed chart for potential of high risk medication in the elderly: yes  Reviewed chart for potential of harmful drug interactions in the elderly:yes    Objective         Vitals:    09/15/20 1614   BP: 132/80   Pulse: 68   Resp: 16   Temp: 98 °F (36.7 °C)   TempSrc: Oral   Weight: 110 kg (242 lb)   Height: 177.8 cm (70\")   PainSc: 0-No pain       Body mass index is 34.72 kg/m².  Discussed the patient's BMI with him. The BMI is above average; BMI management plan is completed.    Physical Exam          Procedures    Assessment/Plan   Medicare Risks and Personalized Health Plan  CMS Preventative Services Quick Reference  Cardiovascular risk    The above risks/problems have been discussed with the patient.  Pertinent information has been shared with the patient in the After Visit Summary.  Follow up plans and orders are seen below in the Assessment/Plan Section.    Diagnoses and all orders for this visit:    1. Welcome to Medicare preventive visit (Primary)    2. Primary insomnia  -     zolpidem (AMBIEN) 10 MG tablet; Take 1 tablet by mouth At Night As Needed for Sleep.  Dispense: 30 tablet; Refill: 2    3. Acquired hypothyroidism  -     levothyroxine (Synthroid) 88 MCG tablet; Take 1 tablet by mouth Daily.  Dispense: 30 tablet; Refill: 5    4. Immunization due  -     Fluad Quad >65 years  -     Pneumococcal Polysaccharide Vaccine 23-Valent Greater Than or Equal To 1yo Subcutaneous / IM        Follow Up:  Return in about 6 months (around 3/15/2021), or if symptoms worsen or fail to improve.     An After Visit Summary and PPPS were given to the patient.         "

## 2021-01-05 ENCOUNTER — OFFICE VISIT (OUTPATIENT)
Dept: CARDIOLOGY | Facility: CLINIC | Age: 66
End: 2021-01-05

## 2021-01-05 VITALS
HEART RATE: 72 BPM | BODY MASS INDEX: 35.19 KG/M2 | DIASTOLIC BLOOD PRESSURE: 78 MMHG | HEIGHT: 70 IN | WEIGHT: 245.8 LBS | SYSTOLIC BLOOD PRESSURE: 142 MMHG

## 2021-01-05 DIAGNOSIS — R07.2 PRECORDIAL PAIN: ICD-10-CM

## 2021-01-05 DIAGNOSIS — I25.10 CORONARY ARTERY DISEASE INVOLVING NATIVE CORONARY ARTERY OF NATIVE HEART WITHOUT ANGINA PECTORIS: Primary | ICD-10-CM

## 2021-01-05 DIAGNOSIS — I10 ESSENTIAL HYPERTENSION: ICD-10-CM

## 2021-01-05 DIAGNOSIS — Z95.1 S/P CABG (CORONARY ARTERY BYPASS GRAFT): ICD-10-CM

## 2021-01-05 DIAGNOSIS — I25.2 HX OF ACUTE MYOCARDIAL INFARCTION OF ANTERIOR WALL: ICD-10-CM

## 2021-01-05 DIAGNOSIS — E78.5 HYPERLIPIDEMIA, UNSPECIFIED HYPERLIPIDEMIA TYPE: ICD-10-CM

## 2021-01-05 DIAGNOSIS — Z95.5 S/P CORONARY ARTERY STENT PLACEMENT: ICD-10-CM

## 2021-01-05 PROCEDURE — 93000 ELECTROCARDIOGRAM COMPLETE: CPT | Performed by: INTERNAL MEDICINE

## 2021-01-05 PROCEDURE — 99214 OFFICE O/P EST MOD 30 MIN: CPT | Performed by: INTERNAL MEDICINE

## 2021-01-05 RX ORDER — NITROGLYCERIN 0.4 MG/1
0.4 TABLET SUBLINGUAL
Qty: 30 TABLET | Refills: 11 | Status: SHIPPED | OUTPATIENT
Start: 2021-01-05

## 2021-01-05 NOTE — PROGRESS NOTES
Subjective:     Encounter Date:01/05/2021      Patient ID: Luigi Steel is a 65 y.o. male.    Chief Complaint:  History of Present Illness    This is a 65-year-old with a history of hypothyroidism, osteoarthritis, prior colon cancer status post partial colectomy, thrombocytopenia, hyperlipidemia, coronary artery disease status post anterior myocardial infarction and drug eluting stent placement of the mid LAD on 8/13/2017, followed by coronary artery bypass surgery ×3 on 8/25/2017, who presents for follow-up.     He presents today for urgent follow-up.  I saw the patient last in 7/2019 at which time he was doing well.  He was caring for his wife who is suffering from cancer and admitted that he had not been able to exercise like he had in the past.  He did not make any changes to his management at that time.  Since then he saw VASILIY Denton for routine follow-up in 8/2020.  At that office visit he was noted to have frequent premature ventricular contractions.  A Holter monitor was placed showing occasional PVCs with a total burden of 5.8% but no episodes of nonsustained ventricular tachycardia.  At which time the patient denies any significant complaints and the plan was to see the patient back again in 6 months.    Returns for earlier follow-up due to intermittent chest tightness over the last 6 weeks.  He describes as a chest tightness and heaviness that occurs about 2-3 times a week.  Symptoms occur at rest.  He denies any exacerbating or relieving factors that he is aware of.  Denies any associated dyspnea on exertion.  Denies any diaphoresis or nausea.  Symptoms can last anywhere from a few seconds to a few minutes.  He feels like the symptoms have become a little bit more intense lately.  He does report there are some similarities to the symptoms he had a the time of his prior myocardial infarction except that the discomfort does not radiate to his arms.  He is continue to put on weight over the  last year.  He admits he really has not gotten back into an exercise routine since his wife was diagnosed and subsequently passed away from cancer.    Prior History:  I began following the patient when he presented with an anterior myocardial infarction on 8/13/2017.  The patient reported sudden onset of chest discomfort 2 hours prior to his arrival.  On arrival to the hospital is brought directly to the cardiac catheterization laboratory and was found to have a thrombotic occlusion of his mid LAD.  Additionally was found to have multivessel coronary artery disease including proximal LAD, left circumflex artery, and posterior descending artery.  He underwent drug-eluting stent placement of his mid LAD at the time.  The remainder of his hospital position was uneventful.  He did have an echocardiogram performed following his event that showed mildly depressed left ventricular systolic function with ejection fraction of 45%.     The patient was brought back a week later for coronary artery bypass surgery.  His clopidogrel was stopped and he was placed on Integrilin therapy preoperatively.  He also underwent a repeat echocardiogram during that admission that showed normalization of his left ventricle systolic function and wall motion with an estimated ejection fraction of 70% and no significant valvular disease.  On 8/25/2017 he underwent CABG ×3 with a LIMA to LAD, saphenous vein graft to the PDA, and a saphenous vein graft to his OM 2.  He was resumed on clopidogrel following his surgery to ensure patency of his mid LAD stent.  Postoperatively he did pretty well except he did suffer a syncopal episode on postoperative day 4 that was felt to be due to vasovagal syncope.        In 11/2018 he was seen by SHILOH Oliva with complaints of chest tightness and heaviness consistent with nausea, dizziness, shortness of breath.  He was set up with a stress test which was performed on 11/20/2018 was negative for ischemia.     When he was seen in 1/2019 he appeared stable from a cardiac standpoint so I went ahead and discontinued his clopidogrel.       Review of Systems   Constitution: Negative for malaise/fatigue.   HENT: Negative for hearing loss, hoarse voice, nosebleeds and sore throat.    Eyes: Negative for pain.   Cardiovascular: Positive for chest pain, dyspnea on exertion and palpitations. Negative for claudication, cyanosis, irregular heartbeat, leg swelling, near-syncope, orthopnea, paroxysmal nocturnal dyspnea and syncope.   Respiratory: Negative for shortness of breath and snoring.    Endocrine: Negative for cold intolerance, heat intolerance, polydipsia, polyphagia and polyuria.   Skin: Negative for itching and rash.   Musculoskeletal: Negative for arthritis, falls, joint pain, joint swelling, muscle cramps, muscle weakness and myalgias.   Gastrointestinal: Negative for constipation, diarrhea, dysphagia, heartburn, hematemesis, hematochezia, melena, nausea and vomiting.   Genitourinary: Negative for frequency, hematuria and hesitancy.   Neurological: Positive for light-headedness. Negative for excessive daytime sleepiness, dizziness, headaches, numbness and weakness.   Psychiatric/Behavioral: Negative for depression. The patient is not nervous/anxious.          Current Outpatient Medications:   •  acetaminophen (TYLENOL) 325 MG tablet, Take 2 tablets by mouth Every 4 (Four) Hours As Needed for Mild Pain ., Disp: , Rfl:   •  aspirin 81 MG chewable tablet, Chew 1 tablet Daily., Disp: , Rfl:   •  atorvastatin (LIPITOR) 20 MG tablet, Take 1 tablet by mouth every night at bedtime., Disp: 90 tablet, Rfl: 3  •  carvedilol (COREG) 3.125 MG tablet, Take 2 tablets by mouth Every 12 (Twelve) Hours. (Patient taking differently: Take 3.125 mg by mouth Every 12 (Twelve) Hours.), Disp: 30 tablet, Rfl: 0  •  Ferrous Gluconate (IRON 27 PO), Take  by mouth Daily., Disp: , Rfl:   •  levothyroxine (Synthroid) 88 MCG tablet, Take 1 tablet by  mouth Daily., Disp: 30 tablet, Rfl: 5  •  vitamin B-12 (CYANOCOBALAMIN) 100 MCG tablet, Take 50 mcg by mouth Daily., Disp: , Rfl:   •  zolpidem (AMBIEN) 10 MG tablet, Take 1 tablet by mouth At Night As Needed for Sleep., Disp: 30 tablet, Rfl: 2  •  nitroglycerin (Nitrostat) 0.4 MG SL tablet, Place 1 tablet under the tongue Every 5 (Five) Minutes As Needed for Chest Pain. Take no more than 3 doses in 15 minutes., Disp: 30 tablet, Rfl: 11    Past Medical History:   Diagnosis Date   • Anterior myocardial infarction (CMS/HCC) 8/13/2017   • Arthritis     Hands, knees   • Colon cancer (CMS/HCC)    • Coronary artery disease    • Elevated blood pressure reading without diagnosis of hypertension    • H/O complete eye exam 2014    due   • Hypertension    • Hypothyroidism, acquired    • Insomnia    • Kidney stones    • Obesity    • Osteoarthritis, multiple sites    • PONV (postoperative nausea and vomiting)        Past Surgical History:   Procedure Laterality Date   • CARDIAC CATHETERIZATION N/A 8/13/2017    Procedure: Left Heart Cath;  Surgeon: Edyta Ye MD;  Location: [facility] INVASIVE LOCATION;  Service:    • CARDIAC CATHETERIZATION N/A 8/13/2017    Procedure: Stent RK coronary;  Surgeon: Edyta Ye MD;  Location: [facility] INVASIVE LOCATION;  Service:    • COLECTOMY PARTIAL / TOTAL  1994   • COLON RESECTION RIGHT Right 1990    for cancer   • COLONOSCOPY  03/18/2009    Dr. Tillman   • CORONARY ARTERY BYPASS GRAFT N/A 8/25/2017    Procedure: JAYLON STERNOTOMY CORONARY ARTERY BYPASS GRAFT TIMES 3 USING LEFT INTERNAL  MAMMARY ARTERY AND RIGHT SAPHENOUS VEIN GRAFT PER ENDOSCOPIC VEIN HARVESTING;  Surgeon: Ana Lynn MD;  Location: [facility] OR;  Service:    • HERNIA REPAIR  04/02/2009    hiatal   • HERNIA REPAIR Bilateral 08/2010    ABDOMINAL x3   • INCISIONAL HERNIA REPAIR  09/2011   • INCISIONAL HERNIA REPAIR  04/2009   • INCISIONAL HERNIA REPAIR  1996   • KIDNEY STONE SURGERY     • SMALL INTESTINE SURGERY   "    for adhesions       Family History   Problem Relation Age of Onset   • Arthritis Mother    • Stroke Mother    • Heart attack Mother    • Hypertension Mother    • Gallbladder disease Mother    • Heart disease Mother    • Hypertension Father    • Gallbladder disease Father    • Colon cancer Father 65   • Gallbladder disease Sister    • Arthritis Brother    • Cancer Brother    • Colon cancer Brother 51   • Heart attack Brother    • Heart disease Brother    • Hypertension Brother    • Gallbladder disease Brother    • Heart disease Brother    • No Known Problems Maternal Grandmother    • No Known Problems Maternal Grandfather    • No Known Problems Paternal Grandmother    • No Known Problems Paternal Grandfather        Social History     Tobacco Use   • Smoking status: Never Smoker   • Smokeless tobacco: Never Used   Substance Use Topics   • Alcohol use: No   • Drug use: No         ECG 12 Lead    Date/Time: 1/5/2021 1:07 PM  Performed by: Edyta Ye MD  Authorized by: Edyta Ye MD   Comparison: compared with previous ECG   Similar to previous ECG  Rhythm: sinus rhythm               Objective:     Visit Vitals  /78 (BP Location: Right arm, Patient Position: Sitting)   Pulse 72   Ht 177.8 cm (70\")   Wt 111 kg (245 lb 12.8 oz)   BMI 35.27 kg/m²         Constitutional:       Appearance: Normal appearance. Well-developed.   HENT:      Head: Normocephalic and atraumatic.   Neck:      Vascular: No carotid bruit or JVD.   Pulmonary:      Effort: Pulmonary effort is normal.      Breath sounds: Normal breath sounds.   Cardiovascular:      Normal rate. Regular rhythm.      No gallop.   Pulses:     Radial: 2+ bilaterally.  Edema:     Peripheral edema absent.   Abdominal:      Palpations: Abdomen is soft.   Skin:     General: Skin is warm and dry.   Neurological:      Mental Status: Alert and oriented to person, place, and time.             Assessment:          Diagnosis Plan   1. Coronary artery disease " involving native coronary artery of native heart without angina pectoris  Stress Test With Myocardial Perfusion One Day    Adult Transthoracic Echo Complete w/ Color, Spectral and Contrast if Necessary Per Protocol   2. S/P CABG (coronary artery bypass graft)     3. S/P coronary artery stent placement     4. Essential hypertension     5. Hx of acute myocardial infarction of anterior wall     6. Hyperlipidemia, unspecified hyperlipidemia type     7. Precordial pain  Stress Test With Myocardial Perfusion One Day    Adult Transthoracic Echo Complete w/ Color, Spectral and Contrast if Necessary Per Protocol          Plan:       1.  Chest discomfort.  EKG is unchanged.  He is not having any ventricular ectopy this time.  His symptoms have typical and atypical sounding features.  We will proceed with a stress test and an echocardiogram.  In the meanwhile we will send a prescription in for sublingual nitroglycerin.  If he has any worsening episodes the patient is to contact us or go to the emergency room.  2.  Coronary artery disease.  History of anterior myocardial infarction requiring mid LAD stent placement followed by subsequent CABG x3 in 8/2017.  Last stress test was in 11/2018 which showed no ischemia.  Plan as per #1.  Otherwise continue current medical management.  3.  Hypertension.  Borderline elevated in the office today.  Suspect with his weight gain there is blood pressures may be chronically elevated.  Asked the patient to start monitoring his blood pressure so we can determine if we need to make adjustments to his carvedilol dosage.  4.  Hyperlipidemia.  On low-dose atorvastatin.  His last lipid panel showed his lipids were at goal with a total cholesterol of 114, triglycerides of 131, and an LDL of 58.  His HDL was low at 30.  5.  Hypothyroidism  6.  Chronic thrombocytopenia  7.  Chronic kidney disease  8.  PVCs.  None present at this time.    We will call and discuss results of the stress test and  echocardiogram and determine further recommendations based on those results.

## 2021-01-21 ENCOUNTER — TRANSCRIBE ORDERS (OUTPATIENT)
Dept: SLEEP MEDICINE | Facility: HOSPITAL | Age: 66
End: 2021-01-21

## 2021-01-21 DIAGNOSIS — Z01.818 OTHER SPECIFIED PRE-OPERATIVE EXAMINATION: Primary | ICD-10-CM

## 2021-01-25 ENCOUNTER — LAB (OUTPATIENT)
Dept: LAB | Facility: HOSPITAL | Age: 66
End: 2021-01-25

## 2021-01-25 DIAGNOSIS — Z01.818 OTHER SPECIFIED PRE-OPERATIVE EXAMINATION: ICD-10-CM

## 2021-01-25 PROCEDURE — C9803 HOPD COVID-19 SPEC COLLECT: HCPCS

## 2021-01-25 PROCEDURE — U0004 COV-19 TEST NON-CDC HGH THRU: HCPCS

## 2021-01-26 LAB — SARS-COV-2 RNA RESP QL NAA+PROBE: NOT DETECTED

## 2021-01-27 ENCOUNTER — HOSPITAL ENCOUNTER (OUTPATIENT)
Dept: CARDIOLOGY | Facility: HOSPITAL | Age: 66
Discharge: HOME | End: 2021-01-27

## 2021-01-27 VITALS
HEART RATE: 61 BPM | WEIGHT: 244.71 LBS | DIASTOLIC BLOOD PRESSURE: 75 MMHG | SYSTOLIC BLOOD PRESSURE: 139 MMHG | HEIGHT: 70 IN | BODY MASS INDEX: 35.03 KG/M2

## 2021-01-27 LAB
BH CV NUCLEAR PRIOR STUDY: 2
BH CV STRESS BP STAGE 1: NORMAL
BH CV STRESS COMMENTS STAGE 1: NORMAL
BH CV STRESS DOSE REGADENOSON STAGE 1: 0.4
BH CV STRESS DURATION MIN STAGE 1: 0
BH CV STRESS DURATION SEC STAGE 1: 10
BH CV STRESS HR STAGE 1: 90
BH CV STRESS PROTOCOL 1: NORMAL
BH CV STRESS RECOVERY BP: NORMAL MMHG
BH CV STRESS RECOVERY HR: 79 BPM
BH CV STRESS STAGE 1: 1
LV EF NUC BP: 63 %
MAXIMAL PREDICTED HEART RATE: 155 BPM
PERCENT MAX PREDICTED HR: 58.06 %
STRESS BASELINE BP: NORMAL MMHG
STRESS BASELINE HR: 57 BPM
STRESS PERCENT HR: 68 %
STRESS POST EXERCISE DUR SEC: 10 SEC
STRESS POST PEAK BP: NORMAL MMHG
STRESS POST PEAK HR: 90 BPM
STRESS TARGET HR: 132 BPM

## 2021-01-27 PROCEDURE — 93306 TTE W/DOPPLER COMPLETE: CPT

## 2021-01-27 PROCEDURE — 78492 MYOCRD IMG PET MLT RST&STRS: CPT | Performed by: INTERNAL MEDICINE

## 2021-01-27 PROCEDURE — 93017 CV STRESS TEST TRACING ONLY: CPT

## 2021-01-27 PROCEDURE — 93016 CV STRESS TEST SUPVJ ONLY: CPT | Performed by: INTERNAL MEDICINE

## 2021-01-27 PROCEDURE — 93306 TTE W/DOPPLER COMPLETE: CPT | Performed by: INTERNAL MEDICINE

## 2021-01-27 PROCEDURE — 0 RUBIDIUM CHLORIDE: Performed by: INTERNAL MEDICINE

## 2021-01-27 PROCEDURE — 78492 MYOCRD IMG PET MLT RST&STRS: CPT

## 2021-01-27 PROCEDURE — 93018 CV STRESS TEST I&R ONLY: CPT | Performed by: INTERNAL MEDICINE

## 2021-01-27 PROCEDURE — A9555 RB82 RUBIDIUM: HCPCS | Performed by: INTERNAL MEDICINE

## 2021-01-27 PROCEDURE — 25010000002 REGADENOSON 0.4 MG/5ML SOLUTION: Performed by: INTERNAL MEDICINE

## 2021-01-27 RX ADMIN — REGADENOSON 0.4 MG: 0.08 INJECTION, SOLUTION INTRAVENOUS at 07:52

## 2021-01-28 LAB
AORTIC ARCH: 3.5 CM
ASCENDING AORTA: 3.2 CM
BH CV ECHO MEAS - ACS: 2.1 CM
BH CV ECHO MEAS - AI DEC SLOPE: 119 CM/SEC^2
BH CV ECHO MEAS - AI MAX PG: 16.8 MMHG
BH CV ECHO MEAS - AI MAX VEL: 205 CM/SEC
BH CV ECHO MEAS - AI P1/2T: 504.6 MSEC
BH CV ECHO MEAS - AO MAX PG (FULL): 1.3 MMHG
BH CV ECHO MEAS - AO MAX PG: 5.7 MMHG
BH CV ECHO MEAS - AO MEAN PG (FULL): 1 MMHG
BH CV ECHO MEAS - AO MEAN PG: 3 MMHG
BH CV ECHO MEAS - AO ROOT AREA (BSA CORRECTED): 1.7
BH CV ECHO MEAS - AO ROOT AREA: 11.3 CM^2
BH CV ECHO MEAS - AO ROOT DIAM: 3.8 CM
BH CV ECHO MEAS - AO V2 MAX: 119 CM/SEC
BH CV ECHO MEAS - AO V2 MEAN: 80.2 CM/SEC
BH CV ECHO MEAS - AO V2 VTI: 23.4 CM
BH CV ECHO MEAS - ASC AORTA: 3.2 CM
BH CV ECHO MEAS - AVA(I,A): 3.7 CM^2
BH CV ECHO MEAS - AVA(I,D): 3.7 CM^2
BH CV ECHO MEAS - AVA(V,A): 3.3 CM^2
BH CV ECHO MEAS - AVA(V,D): 3.3 CM^2
BH CV ECHO MEAS - BSA(HAYCOCK): 2.4 M^2
BH CV ECHO MEAS - BSA: 2.3 M^2
BH CV ECHO MEAS - BZI_BMI: 35.2 KILOGRAMS/M^2
BH CV ECHO MEAS - BZI_METRIC_HEIGHT: 177.8 CM
BH CV ECHO MEAS - BZI_METRIC_WEIGHT: 111.1 KG
BH CV ECHO MEAS - EDV(MOD-SP2): 74 ML
BH CV ECHO MEAS - EDV(MOD-SP4): 83 ML
BH CV ECHO MEAS - EDV(TEICH): 153.7 ML
BH CV ECHO MEAS - EF(CUBED): 75.6 %
BH CV ECHO MEAS - EF(MOD-BP): 56.5 %
BH CV ECHO MEAS - EF(MOD-SP2): 58.1 %
BH CV ECHO MEAS - EF(MOD-SP4): 59 %
BH CV ECHO MEAS - EF(TEICH): 66.9 %
BH CV ECHO MEAS - ESV(MOD-SP2): 31 ML
BH CV ECHO MEAS - ESV(MOD-SP4): 34 ML
BH CV ECHO MEAS - ESV(TEICH): 50.9 ML
BH CV ECHO MEAS - FS: 37.5 %
BH CV ECHO MEAS - IVS/LVPW: 0.85
BH CV ECHO MEAS - IVSD: 1.1 CM
BH CV ECHO MEAS - LAT PEAK E' VEL: 10.2 CM/SEC
BH CV ECHO MEAS - LV DIASTOLIC VOL/BSA (35-75): 36.5 ML/M^2
BH CV ECHO MEAS - LV MASS(C)D: 280.5 GRAMS
BH CV ECHO MEAS - LV MASS(C)DI: 123.3 GRAMS/M^2
BH CV ECHO MEAS - LV MAX PG: 4.3 MMHG
BH CV ECHO MEAS - LV MEAN PG: 2 MMHG
BH CV ECHO MEAS - LV SYSTOLIC VOL/BSA (12-30): 14.9 ML/M^2
BH CV ECHO MEAS - LV V1 MAX: 104 CM/SEC
BH CV ECHO MEAS - LV V1 MEAN: 72.2 CM/SEC
BH CV ECHO MEAS - LV V1 VTI: 22.5 CM
BH CV ECHO MEAS - LVIDD: 5.6 CM
BH CV ECHO MEAS - LVIDS: 3.5 CM
BH CV ECHO MEAS - LVLD AP2: 8.3 CM
BH CV ECHO MEAS - LVLD AP4: 7.9 CM
BH CV ECHO MEAS - LVLS AP2: 7.2 CM
BH CV ECHO MEAS - LVLS AP4: 6.3 CM
BH CV ECHO MEAS - LVOT AREA (M): 3.8 CM^2
BH CV ECHO MEAS - LVOT AREA: 3.8 CM^2
BH CV ECHO MEAS - LVOT DIAM: 2.2 CM
BH CV ECHO MEAS - LVPWD: 1.3 CM
BH CV ECHO MEAS - MED PEAK E' VEL: 9 CM/SEC
BH CV ECHO MEAS - MR MAX PG: 60.5 MMHG
BH CV ECHO MEAS - MR MAX VEL: 389 CM/SEC
BH CV ECHO MEAS - MV A MAX VEL: 70.4 CM/SEC
BH CV ECHO MEAS - MV DEC SLOPE: 211.2 CM/SEC^2
BH CV ECHO MEAS - MV DEC TIME: 0.26 SEC
BH CV ECHO MEAS - MV E MAX VEL: 63.7 CM/SEC
BH CV ECHO MEAS - MV E/A: 0.9
BH CV ECHO MEAS - MV MAX PG: 2.9 MMHG
BH CV ECHO MEAS - MV MEAN PG: 1 MMHG
BH CV ECHO MEAS - MV P1/2T MAX VEL: 43.7 CM/SEC
BH CV ECHO MEAS - MV P1/2T: 60.6 MSEC
BH CV ECHO MEAS - MV V2 MAX: 84.6 CM/SEC
BH CV ECHO MEAS - MV V2 MEAN: 45.2 CM/SEC
BH CV ECHO MEAS - MV V2 VTI: 31.9 CM
BH CV ECHO MEAS - MVA P1/2T LCG: 5 CM^2
BH CV ECHO MEAS - MVA(P1/2T): 3.6 CM^2
BH CV ECHO MEAS - MVA(VTI): 2.7 CM^2
BH CV ECHO MEAS - PA ACC TIME: 0.14 SEC
BH CV ECHO MEAS - PA MAX PG (FULL): 10.9 MMHG
BH CV ECHO MEAS - PA MAX PG: 12.4 MMHG
BH CV ECHO MEAS - PA PR(ACCEL): 16 MMHG
BH CV ECHO MEAS - PA V2 MAX: 176 CM/SEC
BH CV ECHO MEAS - PULM A REVS DUR: 0.09 SEC
BH CV ECHO MEAS - PULM A REVS VEL: 23.5 CM/SEC
BH CV ECHO MEAS - PULM DIAS VEL: 44.8 CM/SEC
BH CV ECHO MEAS - PULM S/D: 1.1
BH CV ECHO MEAS - PULM SYS VEL: 47.4 CM/SEC
BH CV ECHO MEAS - PVA(V,A): 1.4 CM^2
BH CV ECHO MEAS - PVA(V,D): 1.4 CM^2
BH CV ECHO MEAS - QP/QS: 0.64
BH CV ECHO MEAS - RAP SYSTOLE: 3 MMHG
BH CV ECHO MEAS - RV MAX PG: 1.5 MMHG
BH CV ECHO MEAS - RV MEAN PG: 1 MMHG
BH CV ECHO MEAS - RV V1 MAX: 61.4 CM/SEC
BH CV ECHO MEAS - RV V1 MEAN: 38.2 CM/SEC
BH CV ECHO MEAS - RV V1 VTI: 13.2 CM
BH CV ECHO MEAS - RVOT AREA: 4.2 CM^2
BH CV ECHO MEAS - RVOT DIAM: 2.3 CM
BH CV ECHO MEAS - SI(AO): 116.6 ML/M^2
BH CV ECHO MEAS - SI(CUBED): 58.3 ML/M^2
BH CV ECHO MEAS - SI(LVOT): 37.6 ML/M^2
BH CV ECHO MEAS - SI(MOD-SP2): 18.9 ML/M^2
BH CV ECHO MEAS - SI(MOD-SP4): 21.5 ML/M^2
BH CV ECHO MEAS - SI(TEICH): 45.2 ML/M^2
BH CV ECHO MEAS - SV(AO): 265.4 ML
BH CV ECHO MEAS - SV(CUBED): 132.7 ML
BH CV ECHO MEAS - SV(LVOT): 85.5 ML
BH CV ECHO MEAS - SV(MOD-SP2): 43 ML
BH CV ECHO MEAS - SV(MOD-SP4): 49 ML
BH CV ECHO MEAS - SV(RVOT): 54.8 ML
BH CV ECHO MEAS - SV(TEICH): 102.8 ML
BH CV ECHO MEAS - TAPSE (>1.6): 1.6 CM
BH CV ECHO MEASUREMENTS AVERAGE E/E' RATIO: 6.64
BH CV XLRA - RV BASE: 2.9 CM
BH CV XLRA - RV LENGTH: 7.1 CM
BH CV XLRA - RV MID: 2.5 CM
BH CV XLRA - TDI S': 14.1 CM/SEC
LEFT ATRIUM VOLUME INDEX: 34 ML/M2
MAXIMAL PREDICTED HEART RATE: 155 BPM
SINUS: 3.4 CM
STJ: 3 CM
STRESS TARGET HR: 132 BPM

## 2021-01-29 ENCOUNTER — TELEPHONE (OUTPATIENT)
Dept: CARDIOLOGY | Facility: CLINIC | Age: 66
End: 2021-01-29

## 2021-01-29 NOTE — TELEPHONE ENCOUNTER
Called him again ( I had called and left a message with his normal results yesterday) and left another message letting him know the same.

## 2021-03-07 NOTE — PROGRESS NOTES
Subjective   Luigi Steel is a 65 y.o. male.     History of Present Illness     Chief Complaint:   Chief Complaint   Patient presents with   • Hypothyroidism     med refil    • Insomnia     declines welcome to medicare physical per pt        Luigi Steel 65 y.o. male who presents today for Medical Management of the below listed issues and medication refills.  he has a problem list of   Patient Active Problem List   Diagnosis   • Colon cancer (CMS/HCC)   • Elevated blood pressure reading without diagnosis of hypertension   • Acquired hypothyroidism   • Insomnia   • Osteoarthritis, multiple sites   • Hyperlipidemia   • Thrombocytopenia (CMS/HCC)   • Vitamin B12 deficiency   • CAD (coronary artery disease)   • S/P CABG (coronary artery bypass graft)   • Iron deficiency anemia   • S/P coronary artery stent placement   • Essential hypertension   • Iron deficiency   • Syncope and collapse   • Parainfluenza infection   • Hx of acute myocardial infarction of anterior wall   .  Since the last visit, he has overall felt well.  he has been compliant with   Current Outpatient Medications:   •  acetaminophen (TYLENOL) 325 MG tablet, Take 2 tablets by mouth Every 4 (Four) Hours As Needed for Mild Pain ., Disp: , Rfl:   •  aspirin 81 MG chewable tablet, Chew 1 tablet Daily., Disp: , Rfl:   •  atorvastatin (LIPITOR) 20 MG tablet, Take 1 tablet by mouth every night at bedtime., Disp: 90 tablet, Rfl: 3  •  carvedilol (COREG) 3.125 MG tablet, Take 2 tablets by mouth Every 12 (Twelve) Hours. (Patient taking differently: Take 3.125 mg by mouth Every 12 (Twelve) Hours.), Disp: 30 tablet, Rfl: 0  •  Ferrous Gluconate (IRON 27 PO), Take  by mouth Daily., Disp: , Rfl:   •  levothyroxine (Synthroid) 88 MCG tablet, Take 1 tablet by mouth Daily., Disp: 30 tablet, Rfl: 5  •  nitroglycerin (Nitrostat) 0.4 MG SL tablet, Place 1 tablet under the tongue Every 5 (Five) Minutes As Needed for Chest Pain. Take no more than 3 doses in 15 minutes.,  "Disp: 30 tablet, Rfl: 11  •  vitamin B-12 (CYANOCOBALAMIN) 100 MCG tablet, Take 50 mcg by mouth Daily., Disp: , Rfl:   •  zolpidem (AMBIEN) 10 MG tablet, Take 1 tablet by mouth At Night As Needed for Sleep., Disp: 30 tablet, Rfl: 2.  he denies medication side effects.    All of the other chronic condition(s) listed above are stable w/o issues.    /72   Pulse 70   Temp 97.4 °F (36.3 °C) (Oral)   Resp 16   Ht 177.8 cm (70\")   Wt 112 kg (247 lb)   BMI 35.44 kg/m²     Results for orders placed or performed in visit on 01/25/21   COVID-19,BIOTAP, NP/OP SWAB IN TRANSPORT MEDIA OR SALINE 24-36 HR TAT - Swab, Nasopharynx    Specimen: Nasopharynx; Swab   Result Value Ref Range    SARS-CoV-2 PCR Not Detected Not Detected           The following portions of the patient's history were reviewed and updated as appropriate: allergies, current medications, past family history, past medical history, past social history, past surgical history and problem list.    Review of Systems   Constitutional: Negative for activity change, chills and fever.   Respiratory: Negative for cough.    Cardiovascular: Negative for chest pain.   Psychiatric/Behavioral: Negative for dysphoric mood.       Objective   Physical Exam  Constitutional:       General: He is not in acute distress.     Appearance: He is well-developed.   Cardiovascular:      Rate and Rhythm: Normal rate and regular rhythm.   Pulmonary:      Effort: Pulmonary effort is normal.      Breath sounds: Normal breath sounds.   Neurological:      Mental Status: He is alert and oriented to person, place, and time.   Psychiatric:         Behavior: Behavior normal.         Thought Content: Thought content normal.       The patient has read and signed the [facility] Controlled Substance Contract.  I will continue to see patient for regular follow up appointments.  They are well controlled on their medication.  JESUS has been reviewed by me and is updated every 3 months. The " patient is aware of the potential for addiction and dependence.    Assessment/Plan   Diagnoses and all orders for this visit:    1. Acquired hypothyroidism (Primary)  -     levothyroxine (Synthroid) 88 MCG tablet; Take 1 tablet by mouth Daily.  Dispense: 30 tablet; Refill: 5    2. Primary insomnia  -     zolpidem (AMBIEN) 10 MG tablet; Take 1 tablet by mouth At Night As Needed for Sleep.  Dispense: 30 tablet; Refill: 2

## 2021-03-08 ENCOUNTER — OFFICE VISIT (OUTPATIENT)
Dept: FAMILY MEDICINE CLINIC | Facility: CLINIC | Age: 66
End: 2021-03-08

## 2021-03-08 VITALS
WEIGHT: 247 LBS | RESPIRATION RATE: 16 BRPM | SYSTOLIC BLOOD PRESSURE: 122 MMHG | HEART RATE: 70 BPM | BODY MASS INDEX: 35.36 KG/M2 | TEMPERATURE: 97.4 F | DIASTOLIC BLOOD PRESSURE: 72 MMHG | HEIGHT: 70 IN

## 2021-03-08 DIAGNOSIS — F51.01 PRIMARY INSOMNIA: Chronic | ICD-10-CM

## 2021-03-08 DIAGNOSIS — E03.9 ACQUIRED HYPOTHYROIDISM: Primary | Chronic | ICD-10-CM

## 2021-03-08 PROCEDURE — 99214 OFFICE O/P EST MOD 30 MIN: CPT | Performed by: FAMILY MEDICINE

## 2021-03-08 RX ORDER — LEVOTHYROXINE SODIUM 88 UG/1
88 TABLET ORAL DAILY
Qty: 30 TABLET | Refills: 5 | Status: SHIPPED | OUTPATIENT
Start: 2021-03-08 | End: 2021-07-02

## 2021-03-08 RX ORDER — ZOLPIDEM TARTRATE 10 MG/1
10 TABLET ORAL NIGHTLY PRN
Qty: 30 TABLET | Refills: 2 | Status: SHIPPED | OUTPATIENT
Start: 2021-03-08 | End: 2021-08-03 | Stop reason: SDUPTHER

## 2021-03-13 DIAGNOSIS — R93.89 ABNORMAL CHEST CT: Primary | ICD-10-CM

## 2021-03-18 ENCOUNTER — TELEPHONE (OUTPATIENT)
Dept: FAMILY MEDICINE CLINIC | Facility: CLINIC | Age: 66
End: 2021-03-18

## 2021-07-01 ENCOUNTER — TRANSCRIBE ORDERS (OUTPATIENT)
Dept: ADMINISTRATIVE | Facility: HOSPITAL | Age: 66
End: 2021-07-01

## 2021-07-01 DIAGNOSIS — E03.9 ACQUIRED HYPOTHYROIDISM: Chronic | ICD-10-CM

## 2021-07-01 DIAGNOSIS — J84.9 ILD (INTERSTITIAL LUNG DISEASE) (HCC): Primary | ICD-10-CM

## 2021-07-02 RX ORDER — LEVOTHYROXINE SODIUM 88 UG/1
TABLET ORAL
Qty: 90 TABLET | Refills: 0 | Status: SHIPPED | OUTPATIENT
Start: 2021-07-02 | End: 2021-08-03 | Stop reason: SDUPTHER

## 2021-07-08 ENCOUNTER — OFFICE VISIT (OUTPATIENT)
Dept: CARDIOLOGY | Facility: CLINIC | Age: 66
End: 2021-07-08

## 2021-07-08 VITALS
WEIGHT: 242 LBS | HEIGHT: 70 IN | BODY MASS INDEX: 34.65 KG/M2 | RESPIRATION RATE: 20 BRPM | OXYGEN SATURATION: 97 % | DIASTOLIC BLOOD PRESSURE: 72 MMHG | SYSTOLIC BLOOD PRESSURE: 128 MMHG | HEART RATE: 77 BPM

## 2021-07-08 DIAGNOSIS — E78.5 HYPERLIPIDEMIA, UNSPECIFIED HYPERLIPIDEMIA TYPE: ICD-10-CM

## 2021-07-08 DIAGNOSIS — I25.2 HX OF ACUTE MYOCARDIAL INFARCTION OF ANTERIOR WALL: ICD-10-CM

## 2021-07-08 DIAGNOSIS — D69.6 THROMBOCYTOPENIA (HCC): ICD-10-CM

## 2021-07-08 DIAGNOSIS — E03.9 ACQUIRED HYPOTHYROIDISM: ICD-10-CM

## 2021-07-08 DIAGNOSIS — Z95.1 S/P CABG (CORONARY ARTERY BYPASS GRAFT): ICD-10-CM

## 2021-07-08 DIAGNOSIS — I10 ESSENTIAL HYPERTENSION: ICD-10-CM

## 2021-07-08 DIAGNOSIS — I25.10 CORONARY ARTERY DISEASE INVOLVING NATIVE CORONARY ARTERY OF NATIVE HEART WITHOUT ANGINA PECTORIS: Primary | ICD-10-CM

## 2021-07-08 DIAGNOSIS — Z95.5 S/P CORONARY ARTERY STENT PLACEMENT: ICD-10-CM

## 2021-07-08 PROCEDURE — 99213 OFFICE O/P EST LOW 20 MIN: CPT | Performed by: NURSE PRACTITIONER

## 2021-07-08 PROCEDURE — 93000 ELECTROCARDIOGRAM COMPLETE: CPT | Performed by: NURSE PRACTITIONER

## 2021-07-08 RX ORDER — ATORVASTATIN CALCIUM 20 MG/1
20 TABLET, FILM COATED ORAL
Qty: 90 TABLET | Refills: 1 | Status: SHIPPED | OUTPATIENT
Start: 2021-07-08

## 2021-07-08 RX ORDER — CARVEDILOL 3.12 MG/1
3.12 TABLET ORAL EVERY 12 HOURS SCHEDULED
Qty: 180 TABLET | Refills: 1 | Status: SHIPPED | OUTPATIENT
Start: 2021-07-08

## 2021-07-08 NOTE — PROGRESS NOTES
Date of Office Visit: 21  Encounter Provider: VASILIY Grimes  Primary Cardiologist: Dr. Ye  Place of Service: [facility]  Patient Name: Luigi Steel  :1955      Subjective:     Chief Complaint:  6 month CAD follow up    History of Present Illness:  Luigi Steel is a pleasant 66 y.o. male who I have seen once before.  Outside records have been requested and reviewed by me if available.     This is a patient of Dr. Ye with a history of coronary artery disease status post anterior MI and drug-eluting stent placement to mid LAD 2017 followed by coronary bypass surgery x3 with LIMA to LAD, SVG to PDA, SVG to OM 2 17, hyperlipidemia, hypothyroidism, osteoarthritis, prior colon cancer status post partial colectomy, thrombocytopenia, mild cardiomyopathy EF 45% post MI that improved to 70%.    At the time of his CABG patient had a syncopal episode postop day 4 felt to be vasovagal syncopal.    2018 in follow-up patient had chest tightness and heaviness consistent with nausea, dizziness, shortness of breath.  He was set up for stress test 2018- for ischemia.    2019 he was stable so his Plavix was discontinued.    2019 patient was last in the office for 6-month follow-up visit.  He was feeling well.  He had chronic stable dyspnea on exertion and intermittent palpitations sometimes associated with some lightheadedness that were brief and unchanged in frequency.  No chest pain, PND, orthopnea, syncope, near syncope or lower extremity edema.  He was caring for his wife who was diagnosed with cancer and was not exercising much.  1 year follow-up was recommended.      2020 I first saw the patient for yearly follow-up.  He was feeling well.  He was noted to have ventricular bigeminy and trigeminy that was really asymptomatic.  On occasion he would feel some skipped beats associated with lightheadedness every couple weeks but it  is not getting worse or significantly problematic.  He been overeating eating and gaining weight but otherwise had no chest pain, dyspnea, lower extremity edema, PND, orthopnea, syncope, near syncope.      Patient had 24-hour monitor which showed occasional PVCs with ventricular bigeminy and trigeminy with a burden of 5.8%, no ventricular tachycardia or other significant arrhythmias noted, average heart rate was 70, minimum heart rate 47, max heart rate 96.This monitor was discussed with Dr. Ye and since he was not having any symptoms we opted to just increase his carvedilol to 6.25 mg twice daily versus proceeding with stress test or echocardiogram  1/5/2021 patient return for urgent follow-up.  He had intermittent chest tightness over the last 6 weeks described as tightness/heaviness occurring 2-3 times a week.  Symptoms would occur at rest and there is no reported dyspnea on exertion nausea, diaphoresis and he had no exacerbating or relieving factors he was aware of.  Symptoms were lasting a few seconds to a few minutes and it became a little more intense and were somewhat similar to the time of his prior MI except for it did not radiate to his arms.  He continued to put on weight over the last year and and not really gotten back into an exercise regimen since his wife was diagnosed and subsequently passed away from cancer.  He was sent in a prescription for sublingual nitroglycerin and advised to go to the ED with worsening symptoms.  Due to some typical and atypical features she was started with a stress test and an echocardiogram both performed 1/27/2021 echo showed EF was normal 56% with mild concentric LVH, normal LV cavity size and LV wall thickness/diastolic function, trace MR and TR as well as AI and pulmonic regurgitation with mildly increased left atrial volume but no other significant abnormalities and stress PET was negative for ischemia with EF 63% and was low risk.     Since last visit patient  has had an evaluation with pulmonary Dr. Doshi for abnormal CT scan of the chest is undergoing work-up for possible idiopathic pulmonary fibrosis/interstitial lung disease.      Patient is presenting today for 6-month follow-up.  He overall feels well.  He is not having any chest pain, shortness of breath, palpitations, rapid irregular heartbeat or skipped beat, lower extremity edema.  He will have occasional periods of lightheadedness but no falls, syncope, near syncope.  These occur a couple times a week and typically can occur if he stands up too quickly and sometimes after he has already been standing.  At home his blood pressure when he checks it is typically 130s to 140s.  He denies any bleeding issues.  He does do some walking but admits he does not do as much activity as he should.  His weight tends to be up and down.  There are no PVCs detected on ECG or exam today.  At some point time patient went back to 3.125 mg twice daily of the carvedilol instead of 6.25 mg twice daily.  I discussed checking orthostatics given his lightheaded symptoms but he felt things were stable and would call if symptoms worsen and left before orthostatics checked.    Past Medical History:   Diagnosis Date   • Anterior myocardial infarction (CMS/HCC) 8/13/2017   • Arthritis     Hands, knees   • Colon cancer (CMS/HCC)    • Coronary artery disease    • Elevated blood pressure reading without diagnosis of hypertension    • H/O complete eye exam 2014    due   • Hypertension    • Hypothyroidism, acquired    • Insomnia    • Kidney stones    • Obesity    • Osteoarthritis, multiple sites    • PONV (postoperative nausea and vomiting)      Past Surgical History:   Procedure Laterality Date   • CARDIAC CATHETERIZATION N/A 8/13/2017    Procedure: Left Heart Cath;  Surgeon: Edyta Ye MD;  Location: [facility] CATH INVASIVE LOCATION;  Service:    • CARDIAC CATHETERIZATION N/A 8/13/2017    Procedure: Stent RK coronary;  Surgeon: Edyta  MD Cassi;  Location: [facility] INVASIVE LOCATION;  Service:    • COLECTOMY PARTIAL / TOTAL  1994   • COLON RESECTION RIGHT Right 1990    for cancer   • COLONOSCOPY  03/18/2009    Dr. Tillman   • CORONARY ARTERY BYPASS GRAFT N/A 8/25/2017    Procedure: JAYLON STERNOTOMY CORONARY ARTERY BYPASS GRAFT TIMES 3 USING LEFT INTERNAL  MAMMARY ARTERY AND RIGHT SAPHENOUS VEIN GRAFT PER ENDOSCOPIC VEIN HARVESTING;  Surgeon: Ana Lynn MD;  Location: [facility] OR;  Service:    • HERNIA REPAIR  04/02/2009    hiatal   • HERNIA REPAIR Bilateral 08/2010    ABDOMINAL x3   • INCISIONAL HERNIA REPAIR  09/2011   • INCISIONAL HERNIA REPAIR  04/2009   • INCISIONAL HERNIA REPAIR  1996   • KIDNEY STONE SURGERY     • SMALL INTESTINE SURGERY      for adhesions     Outpatient Medications Prior to Visit   Medication Sig Dispense Refill   • acetaminophen (TYLENOL) 325 MG tablet Take 2 tablets by mouth Every 4 (Four) Hours As Needed for Mild Pain .     • aspirin 81 MG chewable tablet Chew 1 tablet Daily.     • Euthyrox 88 MCG tablet Take 1 tablet by mouth once daily 90 tablet 0   • Ferrous Gluconate (IRON 27 PO) Take  by mouth Daily.     • nitroglycerin (Nitrostat) 0.4 MG SL tablet Place 1 tablet under the tongue Every 5 (Five) Minutes As Needed for Chest Pain. Take no more than 3 doses in 15 minutes. 30 tablet 11   • vitamin B-12 (CYANOCOBALAMIN) 100 MCG tablet Take 50 mcg by mouth Daily.     • zolpidem (AMBIEN) 10 MG tablet Take 1 tablet by mouth At Night As Needed for Sleep. 30 tablet 2   • atorvastatin (LIPITOR) 20 MG tablet Take 1 tablet by mouth every night at bedtime. 90 tablet 3   • carvedilol (COREG) 3.125 MG tablet Take 2 tablets by mouth Every 12 (Twelve) Hours. (Patient taking differently: Take 3.125 mg by mouth Every 12 (Twelve) Hours.) 30 tablet 0     No facility-administered medications prior to visit.       Allergies as of 07/08/2021 - Reviewed 07/08/2021   Allergen Reaction Noted   • Dilaudid [hydromorphone hcl] Nausea And  Vomiting 11/11/2018   • Hydromorphone Shortness Of Breath and Nausea And Vomiting 08/21/2014   • Demerol [meperidine] Nausea And Vomiting 08/20/2014     Social History     Socioeconomic History   • Marital status:      Spouse name: Brittany   • Number of children: Not on file   • Years of education: High School   • Highest education level: Not on file   Tobacco Use   • Smoking status: Never Smoker   • Smokeless tobacco: Never Used   Substance and Sexual Activity   • Alcohol use: No   • Drug use: No   • Sexual activity: Defer     Family History   Problem Relation Age of Onset   • Arthritis Mother    • Stroke Mother    • Heart attack Mother    • Hypertension Mother    • Gallbladder disease Mother    • Heart disease Mother    • Hypertension Father    • Gallbladder disease Father    • Colon cancer Father 65   • Gallbladder disease Sister    • Arthritis Brother    • Cancer Brother    • Colon cancer Brother 51   • Heart attack Brother    • Heart disease Brother    • Hypertension Brother    • Gallbladder disease Brother    • Heart disease Brother    • No Known Problems Maternal Grandmother    • No Known Problems Maternal Grandfather    • No Known Problems Paternal Grandmother    • No Known Problems Paternal Grandfather      Review of Systems   Constitutional: Negative for chills, fever, weight gain and weight loss.   Cardiovascular: Negative for chest pain, dyspnea on exertion and leg swelling.   Respiratory: Negative for cough, shortness of breath, snoring and wheezing.    Hematologic/Lymphatic: Negative for bleeding problem. Does not bruise/bleed easily.   Skin: Negative for color change.   Musculoskeletal: Negative for falls, joint pain and myalgias.   Gastrointestinal: Negative for melena.   Genitourinary: Negative for hematuria.   Neurological: Negative for excessive daytime sleepiness.   Psychiatric/Behavioral: Negative for depression. The patient is not nervous/anxious.           Objective:     Vitals:     "07/08/21 1306   BP: 128/72   Pulse: 77   Resp: 20   SpO2: 97%   Weight: 110 kg (242 lb)   Height: 177.8 cm (70\")     Body mass index is 34.72 kg/m².    PHYSICAL EXAM:  Physical Exam  Vitals and nursing note reviewed.   Constitutional:       General: He is not in acute distress.     Appearance: He is well-developed. He is not diaphoretic.      Comments: Obese   HENT:      Head: Normocephalic and atraumatic.   Eyes:      Conjunctiva/sclera: Conjunctivae normal.   Neck:      Vascular: No carotid bruit or JVD.   Cardiovascular:      Rate and Rhythm: Normal rate and regular rhythm. Frequent extrasystoles are present.     Pulses: Intact distal pulses.           Radial pulses are 2+ on the right side and 2+ on the left side.        Posterior tibial pulses are 2+ on the right side and 2+ on the left side.      Heart sounds: Normal heart sounds. No murmur heard.     Pulmonary:      Effort: Pulmonary effort is normal. No tachypnea, accessory muscle usage or respiratory distress.      Breath sounds: Normal breath sounds. No decreased breath sounds, wheezing, rhonchi or rales.      Comments: No pitting lower extremity edema  Chest:      Chest wall: No tenderness.   Abdominal:      General: Bowel sounds are normal. There is no distension.      Palpations: Abdomen is soft.      Tenderness: There is no abdominal tenderness. There is no guarding or rebound.      Comments: Obese abdomen   Musculoskeletal:         General: No edema. Normal range of motion.   Skin:     General: Skin is warm and dry.      Findings: No erythema.   Neurological:      Mental Status: He is alert and oriented to person, place, and time.   Psychiatric:         Speech: Speech normal.         Behavior: Behavior normal.         Thought Content: Thought content normal.         Judgment: Judgment normal.           ECG 12 Lead    Date/Time: 7/8/2021 1:08 PM  Performed by: Ramandeep Escoto APRN  Authorized by: Ramandeep Escoto APRN   Comparison: compared with " previous ECG from 1/5/2021  Similar to previous ECG  Rhythm: sinus rhythm  Ectopy: couplets  Rate: normal  BPM: 78  Q waves: III    QRS axis: normal    Clinical impression: non-specific ECG  Comments: Artifact present no significant ECG changes  Indication: CAD with prior CABG and stenting, PVCs            Most recent lab review:  6/8/2020 CBC within normal limits except for mild thrombocytopenia that was stable 136, CMP with mild renal insufficiency creatinine 1.33 with a GFR 54 which was stable compared to previous, lipid panel total cholesterol 114, glycerides 131, HDL low 30, LDL 58, TSH normal.  Assessment:       Diagnosis Plan   1. Coronary artery disease involving native coronary artery of native heart without angina pectoris  Lipid Panel    Comprehensive Metabolic Panel    atorvastatin (LIPITOR) 20 MG tablet    ECG 12 Lead   2. S/P CABG (coronary artery bypass graft)  Lipid Panel    Comprehensive Metabolic Panel    ECG 12 Lead   3. S/P coronary artery stent placement  Lipid Panel    Comprehensive Metabolic Panel    ECG 12 Lead   4. Essential hypertension  Lipid Panel    Comprehensive Metabolic Panel    carvedilol (COREG) 3.125 MG tablet    ECG 12 Lead   5. Hyperlipidemia, unspecified hyperlipidemia type  Lipid Panel    Comprehensive Metabolic Panel    ECG 12 Lead   6. Hx of acute myocardial infarction of anterior wall  Lipid Panel    Comprehensive Metabolic Panel    ECG 12 Lead   7. Thrombocytopenia (CMS/HCC)  Lipid Panel    Comprehensive Metabolic Panel    ECG 12 Lead   8. Acquired hypothyroidism  Lipid Panel    Comprehensive Metabolic Panel    ECG 12 Lead       Plan:     1.  Coronary artery disease:  Status post CABG x3 and prior drug-eluting stent placement of the mid LAD August 2017.    EF returned to normal after anterior MI in 2017.  11/2018 stress test without ischemia as well as 1/2021 stress test without ischemia and echo 1/2021 with normal EF was unremarkable-from for some atypical chest pain.  He  appears stable and asymptomatic.  Continue current management with aspirin, statin and carvedilol.  2.  Hypertension: BP is stable in office today.  3.  Hyperlipidemia: On statin therapy.  Lipids June 2020 stable.  LDL at goal below 70 (58), HDL low 30.  Encouraged regular physical activity.  Due for repeat fasting lipid panel.  Order placed new we will try to go get all of his updated labs with his PCP.  4.  Chronic thrombocytopenia: Has been stable in the past.  Denies bleeding issues.  Followed by hematology.  5.  Hypothyroidism: On replacement hormone and recent thyroid labs June 2020 stable.  Due for repeat labs with PCP and I have reviewed that with him and he will call and make an appointment.  6.  Renal insufficiency: sees urologist Dr. Pete 8/21/2020 creatinine elevated 1.35 with a GFR 53 BUN 21 the rest within normal..  Update labs.  7.  Hx of PVCs/ventricular bigeminy and trigeminy: 8/2020.  Was overall asymptomatic with 5.8% burden.  I increased his carvedilol at that time but at some point time he went back down to 3.125 mg twice daily of his carvedilol.  8.  Abnormal CT chest: Undergoing work-up with pulmonology for possible idiopathic pulmonary fibrosis/ILD. Saw Dr. Doshi 6/29/21.  9.  Obesity: Per BMI 34.72.  We discussed the importance of weight loss and increasing his physical activity for his overall as well as cardiovascular health.  He is aware he needs to lose weight and increase his physical activity.  10.  Intermittent lightheadedness: No syncope or near syncope.  We discussed checking orthostatics.  Patient felt he was okay and left before performed.  I asked him to call me if the symptoms worsen or he develops falls or limits his daily life or he has any syncope/near syncope.  He verbalized understanding.      I advised on the importance of medication compliance, good blood pressure, blood sugar and cholesterol control, as well as heart healthy diet, regular exercise and avoidance of  tobacco for cardiovascular disease risk factor modification.   Recommend he contact us after he gets his updated lab work done so we can review.    Follow up with Dr. Ye in 6 months, unless otherwise needed sooner based on test results.  I advised the patient to contact our office with any questions or concerns.  Need medication refills addressed today.    I have reviewed this case with Dr. Ye. It has been a pleasure to participate in this patient's care. Please feel free to contact me with any questions or concerns.     Ramandeep Escoto, VASILIY  07/08/21             Your medication list          Accurate as of July 8, 2021  1:29 PM. If you have any questions, ask your nurse or doctor.            CONTINUE taking these medications      Instructions Last Dose Given Next Dose Due   acetaminophen 325 MG tablet  Commonly known as: TYLENOL      Take 2 tablets by mouth Every 4 (Four) Hours As Needed for Mild Pain .       aspirin 81 MG chewable tablet      Chew 1 tablet Daily.       atorvastatin 20 MG tablet  Commonly known as: LIPITOR      Take 1 tablet by mouth every night at bedtime.       carvedilol 3.125 MG tablet  Commonly known as: COREG      Take 1 tablet by mouth Every 12 (Twelve) Hours.       Euthyrox 88 MCG tablet  Generic drug: levothyroxine      Take 1 tablet by mouth once daily       IRON 27 PO      Take  by mouth Daily.       nitroglycerin 0.4 MG SL tablet  Commonly known as: Nitrostat      Place 1 tablet under the tongue Every 5 (Five) Minutes As Needed for Chest Pain. Take no more than 3 doses in 15 minutes.       vitamin B-12 100 MCG tablet  Commonly known as: CYANOCOBALAMIN      Take 50 mcg by mouth Daily.       zolpidem 10 MG tablet  Commonly known as: AMBIEN      Take 1 tablet by mouth At Night As Needed for Sleep.             Where to Get Your Medications      These medications were sent to [address], [place] [address] [facility] [phone] [facility] [phone]  [place]  [address] [facility] [address]    Phone: [phone]   · atorvastatin 20 MG tablet  · carvedilol 3.125 MG tablet         The above medication changes may not have been made by this provider.  Medication list was updated to reflect medications patient is currently taking including medication changes and discontinuations made by other healthcare providers or the patients themselves.     Dictated utilizing Dragon Dictation System.    Vitals and nursing note reviewed.   Constitutional:       General: Not in acute distress.     Appearance: Well-developed. Not diaphoretic.      Comments: Obese   Eyes:      Conjunctiva/sclera: Conjunctivae normal.   HENT:      Head: Normocephalic and atraumatic.   Neck:      Vascular: No carotid bruit or JVD.   Pulmonary:      Effort: Pulmonary effort is normal. No tachypnea, accessory muscle usage or respiratory distress.      Breath sounds: Normal breath sounds. No decreased breath sounds. No wheezing. No rhonchi. No rales.      Comments: No pitting lower extremity edema  Chest:      Chest wall: Not tender to palpatation.   Cardiovascular:      Normal rate. Regular rhythm. Normal heart sounds.   Pulses:     Intact distal pulses.      Radial: 2+ on the left side and 2+ on the right side.     Posterior tibial: 2+ on the left side and 2+ on the right side.  Edema:     Peripheral edema absent.   Abdominal:      General: Bowel sounds are normal. There is no distension.      Palpations: Abdomen is soft.      Tenderness: There is no abdominal tenderness. There is no guarding or rebound.      Comments: Obese abdomen   Musculoskeletal: Normal range of motion. Skin:     General: Skin is warm and dry.      Findings: No erythema.   Neurological:      Mental Status: Alert and oriented to person, place, and time.   Psychiatric:         Speech: Speech normal.         Behavior: Behavior normal.         Thought Content: Thought content normal.         Judgment: Judgment normal.

## 2021-08-02 ENCOUNTER — HOSPITAL ENCOUNTER (OUTPATIENT)
Dept: CT IMAGING | Facility: HOSPITAL | Age: 66
Discharge: HOME | End: 2021-08-02
Admitting: INTERNAL MEDICINE

## 2021-08-02 DIAGNOSIS — J84.9 ILD (INTERSTITIAL LUNG DISEASE) (HCC): ICD-10-CM

## 2021-08-02 PROCEDURE — 71250 CT THORAX DX C-: CPT

## 2021-08-03 NOTE — PROGRESS NOTES
Subjective   Luigi Steel is a 66 y.o. male.     History of Present Illness     Chief Complaint:   Chief Complaint   Patient presents with   • Hypothyroidism   • Insomnia       Luigi Steel 66 y.o. male who presents today for Medical Management of the below listed issues and medication refills.  he has a problem list of   Patient Active Problem List   Diagnosis   • Colon cancer (CMS/HCC)   • Elevated blood pressure reading without diagnosis of hypertension   • Acquired hypothyroidism   • Insomnia   • Osteoarthritis, multiple sites   • Hyperlipidemia   • Thrombocytopenia (CMS/HCC)   • Vitamin B12 deficiency   • CAD (coronary artery disease)   • S/P CABG (coronary artery bypass graft)   • Iron deficiency anemia   • S/P coronary artery stent placement   • Essential hypertension   • Iron deficiency   • Syncope and collapse   • Parainfluenza infection   • Hx of acute myocardial infarction of anterior wall   .  Since the last visit, he has overall felt well although has some LBP issues that has flared up the past 3-4 days, mainly on the left side.  he has been compliant with   Current Outpatient Medications:   •  acetaminophen (TYLENOL) 325 MG tablet, Take 2 tablets by mouth Every 4 (Four) Hours As Needed for Mild Pain ., Disp: , Rfl:   •  aspirin 81 MG chewable tablet, Chew 1 tablet Daily., Disp: , Rfl:   •  atorvastatin (LIPITOR) 20 MG tablet, Take 1 tablet by mouth every night at bedtime., Disp: 90 tablet, Rfl: 1  •  carvedilol (COREG) 3.125 MG tablet, Take 1 tablet by mouth Every 12 (Twelve) Hours., Disp: 180 tablet, Rfl: 1  •  nitroglycerin (Nitrostat) 0.4 MG SL tablet, Place 1 tablet under the tongue Every 5 (Five) Minutes As Needed for Chest Pain. Take no more than 3 doses in 15 minutes., Disp: 30 tablet, Rfl: 11  •  vitamin B-12 (CYANOCOBALAMIN) 100 MCG tablet, Take 50 mcg by mouth Daily., Disp: , Rfl:   •  baclofen (LIORESAL) 10 MG tablet, Take 1 tablet by mouth 2 (Two) Times a Day., Disp: 40 tablet, Rfl:  "2  •  levothyroxine (Euthyrox) 88 MCG tablet, Take 1 tablet by mouth Daily., Disp: 90 tablet, Rfl: 1  •  zolpidem (AMBIEN) 10 MG tablet, Take 1 tablet by mouth At Night As Needed for Sleep., Disp: 30 tablet, Rfl: 2.  he denies medication side effects.    All of the other chronic condition(s) listed above are stable w/o issues.    /76   Pulse 72   Temp 97.1 °F (36.2 °C)   Resp 16   Ht 177.8 cm (70\")   Wt 112 kg (247 lb)   SpO2 98%   BMI 35.44 kg/m²     Results for orders placed or performed in visit on 01/25/21   COVID-19,BIOTAP, NP/OP SWAB IN TRANSPORT MEDIA OR SALINE 24-36 HR TAT - Swab, Nasopharynx    Specimen: Nasopharynx; Swab   Result Value Ref Range    SARS-CoV-2 PCR Not Detected Not Detected           The following portions of the patient's history were reviewed and updated as appropriate: allergies, current medications, past family history, past medical history, past social history, past surgical history and problem list.    Review of Systems   Constitutional: Negative for activity change, chills and fever.   Respiratory: Negative for cough.    Cardiovascular: Negative for chest pain.   Musculoskeletal: Positive for back pain.   Psychiatric/Behavioral: Negative for dysphoric mood.       Objective   Physical Exam  Constitutional:       General: He is not in acute distress.     Appearance: He is well-developed.   Cardiovascular:      Rate and Rhythm: Normal rate and regular rhythm.   Pulmonary:      Effort: Pulmonary effort is normal.      Breath sounds: Normal breath sounds.   Neurological:      Mental Status: He is alert and oriented to person, place, and time.   Psychiatric:         Behavior: Behavior normal.         Thought Content: Thought content normal.       The patient has read and signed the [facility] Controlled Substance Contract.  I will continue to see patient for regular follow up appointments.  They are well controlled on their medication.  JESUS has been reviewed by me and is " updated every 3 months. The patient is aware of the potential for addiction and dependence.    Assessment/Plan   Diagnoses and all orders for this visit:    1. Primary insomnia (Primary)  -     zolpidem (AMBIEN) 10 MG tablet; Take 1 tablet by mouth At Night As Needed for Sleep.  Dispense: 30 tablet; Refill: 2    2. Acquired hypothyroidism  -     levothyroxine (Euthyrox) 88 MCG tablet; Take 1 tablet by mouth Daily.  Dispense: 90 tablet; Refill: 1    3. Strain of lumbar region, initial encounter  -     baclofen (LIORESAL) 10 MG tablet; Take 1 tablet by mouth 2 (Two) Times a Day.  Dispense: 40 tablet; Refill: 2

## 2021-08-04 ENCOUNTER — OFFICE VISIT (OUTPATIENT)
Dept: FAMILY MEDICINE CLINIC | Facility: CLINIC | Age: 66
End: 2021-08-04

## 2021-08-04 VITALS
SYSTOLIC BLOOD PRESSURE: 122 MMHG | TEMPERATURE: 97.1 F | BODY MASS INDEX: 35.36 KG/M2 | HEIGHT: 70 IN | WEIGHT: 247 LBS | HEART RATE: 72 BPM | RESPIRATION RATE: 16 BRPM | DIASTOLIC BLOOD PRESSURE: 76 MMHG | OXYGEN SATURATION: 98 %

## 2021-08-04 DIAGNOSIS — E03.9 ACQUIRED HYPOTHYROIDISM: Chronic | ICD-10-CM

## 2021-08-04 DIAGNOSIS — S39.012A STRAIN OF LUMBAR REGION, INITIAL ENCOUNTER: ICD-10-CM

## 2021-08-04 DIAGNOSIS — F51.01 PRIMARY INSOMNIA: Primary | Chronic | ICD-10-CM

## 2021-08-04 PROCEDURE — 99214 OFFICE O/P EST MOD 30 MIN: CPT | Performed by: FAMILY MEDICINE

## 2021-08-04 RX ORDER — BACLOFEN 10 MG/1
10 TABLET ORAL 2 TIMES DAILY
Qty: 40 TABLET | Refills: 2 | Status: SHIPPED | OUTPATIENT
Start: 2021-08-04

## 2021-08-04 RX ORDER — LEVOTHYROXINE SODIUM 88 UG/1
88 TABLET ORAL DAILY
Qty: 90 TABLET | Refills: 1 | Status: SHIPPED | OUTPATIENT
Start: 2021-08-04

## 2021-08-04 RX ORDER — ZOLPIDEM TARTRATE 10 MG/1
10 TABLET ORAL NIGHTLY PRN
Qty: 30 TABLET | Refills: 2 | Status: SHIPPED | OUTPATIENT
Start: 2021-08-04

## 2021-08-05 LAB
ALBUMIN SERPL-MCNC: 4.2 G/DL (ref 3.8–4.8)
ALBUMIN/GLOB SERPL: 1.8 {RATIO} (ref 1.2–2.2)
ALP SERPL-CCNC: 111 IU/L (ref 48–121)
ALT SERPL-CCNC: 15 IU/L (ref 0–44)
AST SERPL-CCNC: 12 IU/L (ref 0–40)
BILIRUB SERPL-MCNC: 0.9 MG/DL (ref 0–1.2)
BUN SERPL-MCNC: 18 MG/DL (ref 8–27)
BUN/CREAT SERPL: 16 (ref 10–24)
CALCIUM SERPL-MCNC: 9.7 MG/DL (ref 8.6–10.2)
CHLORIDE SERPL-SCNC: 107 MMOL/L (ref 96–106)
CHOLEST SERPL-MCNC: 103 MG/DL (ref 100–199)
CO2 SERPL-SCNC: 22 MMOL/L (ref 20–29)
CREAT SERPL-MCNC: 1.13 MG/DL (ref 0.76–1.27)
GLOBULIN SER CALC-MCNC: 2.4 G/DL (ref 1.5–4.5)
GLUCOSE SERPL-MCNC: 103 MG/DL (ref 65–99)
HDLC SERPL-MCNC: 32 MG/DL
LDLC SERPL CALC-MCNC: 49 MG/DL (ref 0–99)
POTASSIUM SERPL-SCNC: 4.4 MMOL/L (ref 3.5–5.2)
PROT SERPL-MCNC: 6.6 G/DL (ref 6–8.5)
SODIUM SERPL-SCNC: 141 MMOL/L (ref 134–144)
TRIGL SERPL-MCNC: 121 MG/DL (ref 0–149)
VLDLC SERPL CALC-MCNC: 22 MG/DL (ref 5–40)

## 2021-08-05 NOTE — PROGRESS NOTES
Called and reviewed labs with the patient.  He can follow-up on elevated glucose with his PCP: There is slight elevation in chloride level LFTs and renal function back in range.  He has persistently low HDL 32 that has been overall stable LDL was at goal below 70 and was 49The rest within normal limits.  I did not make any medication changes.  Continue lifestyle modification and he will keep January 2022 scheduled appointment with Dr. Ye.

## 2021-09-03 ENCOUNTER — LAB (OUTPATIENT)
Dept: LAB | Facility: HOSPITAL | Age: 66
End: 2021-09-03

## 2021-09-03 ENCOUNTER — OFFICE VISIT (OUTPATIENT)
Dept: ONCOLOGY | Facility: CLINIC | Age: 66
End: 2021-09-03

## 2021-09-03 VITALS
HEART RATE: 85 BPM | BODY MASS INDEX: 35.66 KG/M2 | WEIGHT: 240.8 LBS | OXYGEN SATURATION: 96 % | HEIGHT: 69 IN | SYSTOLIC BLOOD PRESSURE: 114 MMHG | TEMPERATURE: 97.7 F | RESPIRATION RATE: 16 BRPM | DIASTOLIC BLOOD PRESSURE: 74 MMHG

## 2021-09-03 DIAGNOSIS — E53.8 VITAMIN B12 DEFICIENCY: ICD-10-CM

## 2021-09-03 DIAGNOSIS — D69.6 THROMBOCYTOPENIA (HCC): Primary | ICD-10-CM

## 2021-09-03 DIAGNOSIS — D50.9 IRON DEFICIENCY ANEMIA, UNSPECIFIED IRON DEFICIENCY ANEMIA TYPE: ICD-10-CM

## 2021-09-03 DIAGNOSIS — E61.1 IRON DEFICIENCY: Primary | ICD-10-CM

## 2021-09-03 LAB
ALBUMIN SERPL-MCNC: 4.1 G/DL (ref 3.5–5.2)
ALBUMIN/GLOB SERPL: 1.6 G/DL (ref 1.1–2.4)
ALP SERPL-CCNC: 121 U/L (ref 38–116)
ALT SERPL W P-5'-P-CCNC: 12 U/L (ref 0–41)
ANION GAP SERPL CALCULATED.3IONS-SCNC: 9 MMOL/L (ref 5–15)
AST SERPL-CCNC: 13 U/L (ref 0–40)
BASOPHILS # BLD AUTO: 0.01 10*3/MM3 (ref 0–0.2)
BASOPHILS NFR BLD AUTO: 0.2 % (ref 0–1.5)
BILIRUB SERPL-MCNC: 0.8 MG/DL (ref 0.2–1.2)
BUN SERPL-MCNC: 16 MG/DL (ref 6–20)
BUN/CREAT SERPL: 12.7 (ref 7.3–30)
CALCIUM SPEC-SCNC: 9.6 MG/DL (ref 8.5–10.2)
CHLORIDE SERPL-SCNC: 106 MMOL/L (ref 98–107)
CO2 SERPL-SCNC: 27 MMOL/L (ref 22–29)
CREAT SERPL-MCNC: 1.26 MG/DL (ref 0.7–1.3)
DEPRECATED RDW RBC AUTO: 43.8 FL (ref 37–54)
EOSINOPHIL # BLD AUTO: 0.05 10*3/MM3 (ref 0–0.4)
EOSINOPHIL NFR BLD AUTO: 1 % (ref 0.3–6.2)
ERYTHROCYTE [DISTWIDTH] IN BLOOD BY AUTOMATED COUNT: 12.7 % (ref 12.3–15.4)
FERRITIN SERPL-MCNC: 85.8 NG/ML (ref 30–400)
GFR SERPL CREATININE-BSD FRML MDRD: 57 ML/MIN/1.73
GLOBULIN UR ELPH-MCNC: 2.5 GM/DL (ref 1.8–3.5)
GLUCOSE SERPL-MCNC: 110 MG/DL (ref 74–124)
HCT VFR BLD AUTO: 42.7 % (ref 37.5–51)
HGB BLD-MCNC: 13.4 G/DL (ref 13–17.7)
IMM GRANULOCYTES # BLD AUTO: 0.02 10*3/MM3 (ref 0–0.05)
IMM GRANULOCYTES NFR BLD AUTO: 0.4 % (ref 0–0.5)
IRON 24H UR-MRATE: 73 MCG/DL (ref 59–158)
IRON SATN MFR SERPL: 24 % (ref 14–48)
LYMPHOCYTES # BLD AUTO: 0.92 10*3/MM3 (ref 0.7–3.1)
LYMPHOCYTES NFR BLD AUTO: 19.2 % (ref 19.6–45.3)
MCH RBC QN AUTO: 29.8 PG (ref 26.6–33)
MCHC RBC AUTO-ENTMCNC: 31.4 G/DL (ref 31.5–35.7)
MCV RBC AUTO: 95.1 FL (ref 79–97)
MONOCYTES # BLD AUTO: 0.43 10*3/MM3 (ref 0.1–0.9)
MONOCYTES NFR BLD AUTO: 9 % (ref 5–12)
NEUTROPHILS NFR BLD AUTO: 3.37 10*3/MM3 (ref 1.7–7)
NEUTROPHILS NFR BLD AUTO: 70.2 % (ref 42.7–76)
NRBC BLD AUTO-RTO: 0 /100 WBC (ref 0–0.2)
PLATELET # BLD AUTO: 135 10*3/MM3 (ref 140–450)
PMV BLD AUTO: 10.5 FL (ref 6–12)
POTASSIUM SERPL-SCNC: 4.4 MMOL/L (ref 3.5–4.7)
PROT SERPL-MCNC: 6.6 G/DL (ref 6.3–8)
RBC # BLD AUTO: 4.49 10*6/MM3 (ref 4.14–5.8)
SODIUM SERPL-SCNC: 142 MMOL/L (ref 134–145)
TIBC SERPL-MCNC: 301 MCG/DL (ref 249–505)
TRANSFERRIN SERPL-MCNC: 215 MG/DL (ref 200–360)
WBC # BLD AUTO: 4.8 10*3/MM3 (ref 3.4–10.8)

## 2021-09-03 PROCEDURE — 82728 ASSAY OF FERRITIN: CPT

## 2021-09-03 PROCEDURE — 36415 COLL VENOUS BLD VENIPUNCTURE: CPT

## 2021-09-03 PROCEDURE — 85025 COMPLETE CBC W/AUTO DIFF WBC: CPT

## 2021-09-03 PROCEDURE — 83540 ASSAY OF IRON: CPT

## 2021-09-03 PROCEDURE — 80053 COMPREHEN METABOLIC PANEL: CPT

## 2021-09-03 PROCEDURE — 99213 OFFICE O/P EST LOW 20 MIN: CPT | Performed by: INTERNAL MEDICINE

## 2021-09-03 PROCEDURE — 84466 ASSAY OF TRANSFERRIN: CPT

## 2021-09-03 RX ORDER — TAMSULOSIN HYDROCHLORIDE 0.4 MG/1
1 CAPSULE ORAL DAILY
COMMUNITY
Start: 2021-08-15 | End: 2022-01-20

## 2021-09-03 NOTE — PROGRESS NOTES
REASON FOR FOLLOWUP:     1.  Chronic mild thrombocytopenia dating back at least to March 2016.    2.  Vitamin B12 deficiency diagnosed in middle of August 2017, responded to oral B12 supplementation.   3.  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  Patient was asked to start oral iron once a day.  Patient had trace iron deficiency with iron saturation 17% despite ferritin 116.2 ng/mL he was taking oral iron once a day, with good tolerance.  He is already improved, and oral iron was discontinued in September 2019.        HISTORY OF PRESENT ILLNESS:  The patient is a 66 y.o. male  presenting today for annual follow-up.      Patient reports doing well.  He received 2 doses of COVID-19 vaccination.  Patient reports no fever sweating chills.  No weight loss.  Has excellent performance status ECOG 0.  Denies easy bleeding or bruising.  Denies chest pain.  He continues taking oral vitamin B12.    Patient was seen recently by pulmonologist Dr. Jose J Doshi, for interstitial pulmonary disease, and the was referred to rheumatology for further evaluation because of elevated REBA antibody.  And is scheduled to see Dr. Elen Carranza.    Laboratory study today on 9/3/2021 showed trending down hemoglobin 13.4, stable platelets 135,000 and normal WBC 4800 including ANC 3370 lymphocytes 9.1.             Medical History         Past Medical History:   Diagnosis Date   • Arthritis     • Colon cancer, post surgery and adjuvant chemotherapy in 1990.      • Elevated blood pressure reading without diagnosis of hypertension     • H/O complete eye exam 2016   • Hypertension     • Hypothyroidism, acquired     • Insomnia     • Kidney stones     • Osteoarthritis, multiple sites            Surgical History          Past Surgical History:   Procedure Laterality Date   • CARDIAC CATHETERIZATION N/A 8/13/2017     Procedure: Left Heart Cath;  Surgeon: Edyta Ye MD;  Location: [facility] INVASIVE LOCATION;  Service:    •  CARDIAC CATHETERIZATION N/A 8/13/2017     Procedure: Stent RK coronary;  Surgeon: Edyta Ye MD;  Location: [facility] CATH INVASIVE LOCATION;  Service:    • COLECTOMY PARTIAL / TOTAL   1994   • COLONOSCOPY   03/18/2009   • HERNIA REPAIR   04/02/2009     hiatal       Triple-vessel CABG bypass surgery on 8/25/2017 by Dr. Ye.      HEMATOLOGIC/ONCOLOGIC HISTORY: The patient is a 62 y.o. year old male whom we are consulted for evaluation of thrombocytopenia.      This patient to was admitted for first episodes of heart attack in mid August 2017.  Patient had cardiac stent placement this hospitalization.  Currently has no chest pain.  This patient has no history of coronary artery disease previously and was not taking aspirin or Plavix.      At time of this admission, in the very early morning on 8/13/2017, laboratory study reported platelets 133,000, hemoglobin 14.2 and WBC 7700 including neutrophils 5200 and lymphocytes 1900.  His chemistry lab reported creatinine 1.29, normal electrolytes in the liver function panel, mildly elevated glucose 135.  Patient had cardiac catheterization and drug-eluting stent placement in the morning of August 13, 2017.  In the morning of 8/14/2017, repeated CBC showed a decreased platelets at 104,000, hemoglobin 12.3 and WBC 6100 without a differentiation.  Repeated laboratory study on 8/15/2017 reported a platelets 126,000 and hemoglobin 14, WBC 6600.  Today on 8/16/2017, laboratory study showed a platelets 119,000, hemoglobin 14.1 and a WBC 5600.  I obtained laboratory study in the afternoon, showed a marginally elevated IPF 6.8%, platelet counts 125,000.  His vitamin B12 level was very marginal at 261 pg/mL, negative for rheumatoid factor, and had normal LDH at 210.     I reviewed his previous laboratory study available in the [facility] electronic medical records, dating back to 3/25/2016 where he had platelets 132,000, hemoglobin 13.8 and WBC 5700 with normal distribution.  Lab  results on 4/25/2016 showed a platelets 151,000, hemoglobin 14.5 and WBC 6800.  On 3/20/2017 his platelets was 153,000, hemoglobin 14.9 and WBC 6700.  He also had a normal thyroid screening test in March 2016 and March 2017.  His chemistries study showed a stable creatinine level between 1.2 and 1.3.      I also searched medical records in the [facility], back on 8/21/2014 he had a CBC showed a platelets 117,000 and hemoglobin 14.1 WBC 5000 with normal distribution.       On 10/20/2014 he had platelets 141,000 and hemoglobin 15.4 WBC 10,700 including neutrophils 9100.  His abdomen CT scan examination on 10/20/2014 reported no splenomegaly no hepatomegaly.  It is seems that he had visited to emergency room at that time because of chosen no cold, about 1 months after he had lithotripsy for kidney stone.  Urinalysis was positive for RBC and a trace of bacteria.      Patient reports that he has no easy bleeding or bruising.  He previously had multiple surgery noticeably with colon resection for colon cancer 27 years ago.  He did receive chemotherapy for 6 months treated by Dr. Jasso at that time.  He also had repair for abdominal wall hernia, however he had no significant postoperatively bleeding.     He was given vitamin B12 injection and started oral B12 supplementation. Patient reports that he has been taking 2500 mcg vitamin B12 daily.     This patient subsequently had triple vessel bypass surgery on 08/25/2017 at [facility]. At that time he still had mild thrombocytopenia with platelets 128,000 and hemoglobin 13.1, WBC 6100. Postsurgery, his platelets deteriorated all the way down to 76,000 on 08/27/2017 and hemoglobin 8.9; however, gradually improved afterwards with platelets recovered at 130,000 on 08/29/2017 and hemoglobin 9.3 when he was discharged. Since that time laboratory study on 09/05/2017 reported normalized platelets at 341,000, hemoglobin 10.8 and WBC 7300. Today,  laboratory study reported platelets 239,000, hemoglobin 12.5 and WBC 5800.     Patient reports after surgery he is feeling better and more energetic. He is still in recovery. He still has a small wound in the middle chest not healed completely with slight yellowness but no active drainage. Home visiting nurse comes to his house twice a week inspecting that. Patient reports no fever, sweating, chills.    Patient was tested negative for comprehensive REBA panel on 8/16/2017, and also negative for rheumatoid factor.  Serum protein immunofixation was negative, including IgG 675, IgA 91 IgM 104, he also had a normal LDH.     When I saw him on 9/15/2017 he had normalized platelets at 239,000, and WBC 5800 and a mild but much improved anemia with hemoglobin 12.5.      He had negative laboratory test for rheumatoid factor and comprehensive REBA panel, and no evidence of destruction of platelets and no significant elevation of IPF.  After CABG surgery on August 25, 2017, he did have expected brief thrombocytopenia with max at 76,000 on August 27, 2017 and then subsequently rebounded afterwards.  His platelets normalized at 341,000 on 9/5/2017 and then on 9/15/2017 it's 239,000.  This could be part of postsurgical changes.      Laboratory study on 11/2/2017 reported worsened platelets at 134,000, WBC 4240 and neutrophils 2170, lymphocytes 1440.  His hemoglobin is about the same at 12.7.  Serum vitamin B12 level was 1045 pg/mL, folic acid 6.06 ng/mL, iron 50 TIBC 295 iron saturation 17% and ferritin 116 ng/mL.  Patient was asked to start oral folic acid and oral iron supplementation once a day.      Laboratory study on 3/1/2018 showed the platelets at 116,000, slightly improved hemoglobin 13.0 and normal WBC 5070 including neutrophils 2650 and lymphocytes 1650.  His vitamin B12 level was 1383 pg/mL, folic acid > 20 ng/mL.  Serum ferritin 83.1 ng/m, iron 73 TIBC 314 and iron saturation 23%.      Lab study on 8/17/2018 reported  ferritin 73, free iron 51 TIBC 297 and iron saturation 17%.  B12 was 1514 pg/mL and RBC folate 862 ng/mL.  Hemoglobin was 13.2.    Laboratory study performed 08/30/2019 reported stable mild thrombocytopenia with platelets 129,000, marginal hemoglobin 13.9 MCV 96.3 and MCHC 31.4.  WBC normal 6300 including ANC 3970, lymphocytes 1610 and monocytes 660.  Iron study reported ferritin 104.5 ng/mL and iron saturation 29%.  Oral iron was discontinued.     Laboratory study 8/21/2020 reported persistent mild thrombocytopenia with platelets 125,000, otherwise normal hemoglobin 14.6 MCV 93.2 MCHC 32.2 and  normal WBC 4290.  Iron study reported ferritin 91.9 ng/mL iron saturation 26% with free iron 82 and TIBC 311..  He had a supratherapeutic B12 level more than 2000 pg/mL.  He also had a normal RBC folate 721 ng/mL.      MEDICATIONS:The current medication list was reviewed with the patient and updated in the EMR this date per the Medical Assistant. Medication dosages and frequencies were confirmed to be accurate.       ALLERGIES:          Allergies   Allergen Reactions   • Hydromorphone Nausea And Vomiting and Shortness Of Breath   • Demerol [Meperidine] Nausea And Vomiting         SOCIAL HISTORY: never smoked cigarettes.       FAMILY HISTORY: Father was diagnosed of colon cancer at age 65. Brother diagnosed of colon cancer at age 51.       REVIEW OF SYSTEMS:  Review of Systems   Constitutional: Negative for activity change, appetite change, chills, diaphoresis, fatigue, fever and unexpected weight change.   HENT: Negative for facial swelling, mouth sores, sore throat and trouble swallowing.    Eyes: Negative for photophobia and visual disturbance.   Respiratory: Negative for cough, chest tightness and shortness of breath.    Cardiovascular: Negative for chest pain, palpitations and leg swelling.   Gastrointestinal: Negative for abdominal pain, blood in stool and nausea.   Endocrine: Negative for cold intolerance and  "heat intolerance.   Genitourinary: Negative for dysuria and hematuria.   Musculoskeletal: Negative for arthralgias and joint swelling.   Skin: Negative for color change and rash.   Allergic/Immunologic: Negative for immunocompromised state.   Neurological: Negative for dizziness, speech difficulty, numbness and headaches.   Hematological: Negative for adenopathy. Does not bruise/bleed easily.   Psychiatric/Behavioral: Negative for agitation and confusion.     Objective   Vitals:    09/03/21 1229   BP: 114/74   Pulse: 85   Resp: 16   Temp: 97.7 °F (36.5 °C)   TempSrc: Infrared   SpO2: 96%   Weight: 109 kg (240 lb 12.8 oz)   Height: 175 cm (68.9\")  Comment: new ht   PainSc: 0-No pain   ECOG 1.        PHYSICAL EXAM:    GENERAL:  Well-developed, well-nourished male in no acute distress.    SKIN:  Warm, dry without rashes, purpura or petechiae.  HEENT:  Normocephalic.  Wearing mask.   LYMPHATICS:  No cervical, supraclavicular adenopathy.  CHEST: Normal respiratory effort.  Lungs clear to auscultation.  No wheezing.  CARDIAC:  Regular rate and rhythm without murmurs. Normal S1,S2.  ABDOMEN:  Soft, nontender with no organomegaly or masses.  Bowel sounds normal.  EXTREMITIES:  No clubbing, cyanosis or edema.  NEUROLOGICAL:  Grossly intact.    PSYCHIATRIC:  Normal affect and mood.         RECENT LABS:        Lab Results   Component Value Date    WBC 4.80 09/03/2021    HGB 13.4 09/03/2021    HCT 42.7 09/03/2021    MCV 95.1 09/03/2021     (L) 09/03/2021     Lab Results   Component Value Date    NEUTROABS 3.37 09/03/2021     Lab Results   Component Value Date    YBYAOABS36 >2,000 (H) 08/21/2020       Pending results for iron studies, CMP.    Assessment/Plan       1.  Vitamin B12 deficiency.  Able to maintain supratherapeutic B12 level on oral supplementation. He'll continue treatment for now.    · On 08/19/2018 the patient had further improved B12 level.   · 8/30/2019 patient had supratherapeutic B12 level > 2000 pg/mL.  "   · On 8/21/2020, patient states he continues taking oral Vitamin B-12 supplementation daily. Vitamin B-12 level was supratherapeutic > 2000 pg/mL.    2.  Mild thrombocytopenia, asymptomatic.  The exact etiology is not clear.  It does not look like related to vitamin B12 deficiency since he has worsening thrombocytopenia when his vitamin B12 becomes supratherapeutic.  We'll continue to monitor.    · Platelets are somewhat low today, 08/30/2019, at 129,000.   · Stable thrombocytopenia platelets 125,000 on 8/21/2020.  Patient reports no bleeding or bruising.    · On 9/3/2021 is platelets is 135,000.  Patient is asymptomatic.  Continue to monitor.    3.  Mild anemia with iron deficiency.    · This patient had borderline normal hemoglobin level prior to his bypass surgery in August 2017.  After surgery his hemoglobin dropped as low as 8.9 g/dL on 8/20/2017 and subsequently improved afterwards.  His iron study showed a reasonable ferritin level, however iron saturation was slightly low.  His folic acid level were also marginally normal.    · He has been on both oral iron and folic acid since November 2017.    · On 8/30/2019 laboratory studies show hemoglobin of 13.9 with hematocrit of 44.2%.  His iron study improved with normal iron saturation 29%, and ferritin 104.5 ng/mL.  We instructed patient to discontinue oral iron treatment.    · On 8/21/2020, patient had ferritin 91.9 and iron saturation 26%, and normal hemoglobin 14.6.  Is supratherapeutic B12 level.  · On 9/3/2021 patient has trending down hemoglobin 13.4.  Iron studies pending.    4.  COVID-19 vaccine.  · Patient reports he is not fully vaccinated against COVID-19.    PLAN:   1. Continue oral vitamin B12 supplementation at 2500 µg daily.   2. Continue oral folic acid 800 µg daily.  3. Keep appointment with rheumatology service Dr. Carranza.  4. Continue follow-up with the pulmonologist Dr. Doshi.  5. I will call the patient with results when available.      6. Returns in 12 months for MD visit and labs cbc, CMP, ferritin, iron levels.       Leeann Mena MD PhD  9/3/2021    ADDENDUM:  Lab Results   Component Value Date    IRON 73 09/03/2021    TIBC 301 09/03/2021    FERRITIN 85.80 09/03/2021   Iron sats: 24% on 9/3/2021     Lab Results   Component Value Date    GLUCOSE 110 09/03/2021    BUN 16 09/03/2021    CREATININE 1.26 09/03/2021    EGFRIFNONA 57 (L) 09/03/2021    EGFRIFAFRI 78 08/04/2021    BCR 12.7 09/03/2021    K 4.4 09/03/2021    CO2 27.0 09/03/2021    CALCIUM 9.6 09/03/2021    PROTENTOTREF 6.6 08/04/2021    ALBUMIN 4.10 09/03/2021    LABIL2 1.8 08/04/2021    AST 13 09/03/2021    ALT 12 09/03/2021     Patient has marginally trending down ferritin level however with stable iron saturation compared to those from August 2020.  No need for resumption of oral iron treatment.        Leeann Mena MD PhD      CC:  Edyta Ye M.D.    Jake Echeverria M.D.    MD Elen Patton MD

## 2021-09-17 ENCOUNTER — TELEPHONE (OUTPATIENT)
Dept: ONCOLOGY | Facility: CLINIC | Age: 66
End: 2021-09-17

## 2021-09-17 NOTE — TELEPHONE ENCOUNTER
Left message and informed patient to call with any questions or concerns        ----- Message from Leeann Mena MD PhD sent at 9/17/2021 11:06 AM EDT -----  Katie,     would you please call this patient let him know that his iron study on 9/3/2021 looks fair, similar to data from last year, no need to take oral iron.    Thank you very much!    Rajesh

## 2022-01-20 ENCOUNTER — OFFICE VISIT (OUTPATIENT)
Dept: CARDIOLOGY | Facility: CLINIC | Age: 67
End: 2022-01-20

## 2022-01-20 VITALS
HEIGHT: 70 IN | OXYGEN SATURATION: 97 % | WEIGHT: 251.4 LBS | BODY MASS INDEX: 35.99 KG/M2 | SYSTOLIC BLOOD PRESSURE: 124 MMHG | HEART RATE: 87 BPM | DIASTOLIC BLOOD PRESSURE: 82 MMHG

## 2022-01-20 DIAGNOSIS — Z95.1 S/P CABG (CORONARY ARTERY BYPASS GRAFT): ICD-10-CM

## 2022-01-20 DIAGNOSIS — Z95.5 S/P CORONARY ARTERY STENT PLACEMENT: ICD-10-CM

## 2022-01-20 DIAGNOSIS — I25.10 CORONARY ARTERY DISEASE INVOLVING NATIVE CORONARY ARTERY OF NATIVE HEART WITHOUT ANGINA PECTORIS: Primary | ICD-10-CM

## 2022-01-20 DIAGNOSIS — I10 ESSENTIAL HYPERTENSION: ICD-10-CM

## 2022-01-20 DIAGNOSIS — E78.5 HYPERLIPIDEMIA, UNSPECIFIED HYPERLIPIDEMIA TYPE: ICD-10-CM

## 2022-01-20 DIAGNOSIS — I25.2 HX OF ACUTE MYOCARDIAL INFARCTION OF ANTERIOR WALL: ICD-10-CM

## 2022-01-20 PROCEDURE — 99214 OFFICE O/P EST MOD 30 MIN: CPT | Performed by: INTERNAL MEDICINE

## 2022-01-20 PROCEDURE — 93000 ELECTROCARDIOGRAM COMPLETE: CPT | Performed by: INTERNAL MEDICINE

## 2022-01-20 NOTE — PROGRESS NOTES
Subjective:     Encounter Date:01/20/2022      Patient ID: Luigi Steel is a 66 y.o. male.    Chief Complaint:  History of Present Illness    This is a 66-year-old with a history of hypothyroidism, osteoarthritis, prior colon cancer status post partial colectomy, thrombocytopenia, hyperlipidemia, coronary artery disease status post anterior myocardial infarction and drug eluting stent placement of the mid LAD on 8/13/2017, followed by coronary artery bypass surgery ×3 on 8/25/2017, who presents for follow-up.     He presents today for routine 6-month follow-up.  Since I saw the patient last he has moved to [place] back in October.  He is just here in [place] for his doctor's appointment.  He has not established care with any physicians down there yet.  He is currently having near [place] but will be moving closer to [place].  Once he is settled into his new place he plans on establishing care with physicians down there.  In the meanwhile he lanced to come appear periodically for his doctors appointments.    He has been feeling well.  He denies any chest pain, shortness of breath, palpitation, orthopnea, near-syncope or syncope, or lower extremity edema.  He denies any significant changes in his medications.     Prior History:  I began following the patient when he presented with an anterior myocardial infarction on 8/13/2017.  The patient reported sudden onset of chest discomfort 2 hours prior to his arrival.  On arrival to the hospital is brought directly to the cardiac catheterization laboratory and was found to have a thrombotic occlusion of his mid LAD.  Additionally was found to have multivessel coronary artery disease including proximal LAD, left circumflex artery, and posterior descending artery.  He underwent drug-eluting stent placement of his mid LAD at the time.  The remainder of his hospital position was uneventful.  He did have an echocardiogram performed following his event that showed mildly  depressed left ventricular systolic function with ejection fraction of 45%.     The patient was brought back a week later for coronary artery bypass surgery.  His clopidogrel was stopped and he was placed on Integrilin therapy preoperatively.  He also underwent a repeat echocardiogram during that admission that showed normalization of his left ventricle systolic function and wall motion with an estimated ejection fraction of 70% and no significant valvular disease.  On 8/25/2017 he underwent CABG ×3 with a LIMA to LAD, saphenous vein graft to the PDA, and a saphenous vein graft to his OM 2.  He was resumed on clopidogrel following his surgery to ensure patency of his mid LAD stent.  Postoperatively he did pretty well except he did suffer a syncopal episode on postoperative day 4 that was felt to be due to vasovagal syncope.        In 11/2018 he was seen by SHILOH Oliva with complaints of chest tightness and heaviness consistent with nausea, dizziness, shortness of breath.  He was set up with a stress test which was performed on 11/20/2018 was negative for ischemia.    When he was seen in 1/2019 he appeared stable from a cardiac standpoint so I went ahead and discontinued his clopidogrel.     In 8/2020 he reported frequent premature ventricular contractions.  A Holter monitor was placed showing occasional PVCs with a total burden of 5.8% but no episodes of nonsustained ventricular tachycardia.       In 1/2021 he reported episodes of chest tightness and heaviness that occurs about 2-3 times a week occurring at rest. He did report there are some similarities to the symptoms he had a the time of his prior myocardial infarction except that the discomfort does not radiate to his arms.    He also admitted to gaining weight not having gone back to an exercise routine after his wife was diagnosed with cancer and subsequently passed away.  That office visit I recommended pursuing further cardiac work-up with a stress  test and an echocardiogram.  Testing was performed on 1/27/2021.  Echocardiogram showed normal left ventricular systolic function and wall motion with an EF of 57%, mildly dilated left atrium, and normal diastolic function.  Stress test showed no evidence of ischemia.     Review of Systems   Constitutional: Negative for malaise/fatigue.   HENT: Negative for hearing loss, hoarse voice, nosebleeds and sore throat.    Eyes: Negative for pain.   Cardiovascular: Negative for chest pain, claudication, cyanosis, dyspnea on exertion, irregular heartbeat, leg swelling, near-syncope, orthopnea, palpitations, paroxysmal nocturnal dyspnea and syncope.   Respiratory: Negative for shortness of breath and snoring.    Endocrine: Negative for cold intolerance, heat intolerance, polydipsia, polyphagia and polyuria.   Skin: Negative for itching and rash.   Musculoskeletal: Negative for arthritis, falls, joint pain, joint swelling, muscle cramps, muscle weakness and myalgias.   Gastrointestinal: Negative for constipation, diarrhea, dysphagia, heartburn, hematemesis, hematochezia, melena, nausea and vomiting.   Genitourinary: Negative for frequency, hematuria and hesitancy.   Neurological: Negative for excessive daytime sleepiness, dizziness, headaches, light-headedness, numbness and weakness.   Psychiatric/Behavioral: Negative for depression. The patient is not nervous/anxious.          Current Outpatient Medications:   •  acetaminophen (TYLENOL) 325 MG tablet, Take 2 tablets by mouth Every 4 (Four) Hours As Needed for Mild Pain ., Disp: , Rfl:   •  aspirin 81 MG chewable tablet, Chew 1 tablet Daily., Disp: , Rfl:   •  atorvastatin (LIPITOR) 20 MG tablet, Take 1 tablet by mouth every night at bedtime., Disp: 90 tablet, Rfl: 1  •  baclofen (LIORESAL) 10 MG tablet, Take 1 tablet by mouth 2 (Two) Times a Day., Disp: 40 tablet, Rfl: 2  •  carvedilol (COREG) 3.125 MG tablet, Take 1 tablet by mouth Every 12 (Twelve) Hours., Disp: 180 tablet,  Rfl: 1  •  levothyroxine (Euthyrox) 88 MCG tablet, Take 1 tablet by mouth Daily., Disp: 90 tablet, Rfl: 1  •  nitroglycerin (Nitrostat) 0.4 MG SL tablet, Place 1 tablet under the tongue Every 5 (Five) Minutes As Needed for Chest Pain. Take no more than 3 doses in 15 minutes., Disp: 30 tablet, Rfl: 11  •  vitamin B-12 (CYANOCOBALAMIN) 100 MCG tablet, Take 50 mcg by mouth Daily., Disp: , Rfl:   •  Zinc Sulfate (ZINC 15 PO), Take  by mouth., Disp: , Rfl:   •  zolpidem (AMBIEN) 10 MG tablet, Take 1 tablet by mouth At Night As Needed for Sleep., Disp: 30 tablet, Rfl: 2    Past Medical History:   Diagnosis Date   • Anterior myocardial infarction (HCC) 8/13/2017   • Arthritis     Hands, knees   • Colon cancer (HCC)    • Coronary artery disease    • Elevated blood pressure reading without diagnosis of hypertension    • H/O complete eye exam 2014    due   • Hypertension    • Hypothyroidism, acquired    • Insomnia    • Kidney stones    • Obesity    • Osteoarthritis, multiple sites    • PONV (postoperative nausea and vomiting)        Past Surgical History:   Procedure Laterality Date   • CARDIAC CATHETERIZATION N/A 8/13/2017    Procedure: Left Heart Cath;  Surgeon: Edyta Ye MD;  Location: [facility] INVASIVE LOCATION;  Service:    • CARDIAC CATHETERIZATION N/A 8/13/2017    Procedure: Stent RK coronary;  Surgeon: Edyta Ye MD;  Location: [facility] CATH INVASIVE LOCATION;  Service:    • COLECTOMY PARTIAL / TOTAL  1994   • COLON RESECTION RIGHT Right 1990    for cancer   • COLONOSCOPY  03/18/2009    Dr. Tillman   • CORONARY ARTERY BYPASS GRAFT N/A 8/25/2017    Procedure: JAYLON STERNOTOMY CORONARY ARTERY BYPASS GRAFT TIMES 3 USING LEFT INTERNAL  MAMMARY ARTERY AND RIGHT SAPHENOUS VEIN GRAFT PER ENDOSCOPIC VEIN HARVESTING;  Surgeon: Ana [place] MD;  Location: [facility] OR;  Service:    • HERNIA REPAIR  04/02/2009    hiatal   • HERNIA REPAIR Bilateral 08/2010    ABDOMINAL x3   • INCISIONAL HERNIA REPAIR  09/2011   •  "INCISIONAL HERNIA REPAIR  04/2009   • INCISIONAL HERNIA REPAIR  1996   • KIDNEY STONE SURGERY     • SMALL INTESTINE SURGERY      for adhesions       Family History   Problem Relation Age of Onset   • Arthritis Mother    • Stroke Mother    • Heart attack Mother    • Hypertension Mother    • Gallbladder disease Mother    • Heart disease Mother    • Hypertension Father    • Gallbladder disease Father    • Colon cancer Father 65   • Gallbladder disease Sister    • Arthritis Brother    • Cancer Brother    • Colon cancer Brother 51   • Heart attack Brother    • Heart disease Brother    • Hypertension Brother    • Gallbladder disease Brother    • Heart disease Brother    • No Known Problems Maternal Grandmother    • No Known Problems Maternal Grandfather    • No Known Problems Paternal Grandmother    • No Known Problems Paternal Grandfather        Social History     Tobacco Use   • Smoking status: Never Smoker   • Smokeless tobacco: Never Used   Substance Use Topics   • Alcohol use: No   • Drug use: No         ECG 12 Lead    Date/Time: 1/20/2022 12:14 PM  Performed by: Edyta Ye MD  Authorized by: Edyta Ye MD   Comparison: compared with previous ECG   Similar to previous ECG  Rhythm: sinus rhythm  Q waves: III                   Objective:     Visit Vitals  /82 (BP Location: Right arm, Patient Position: Sitting, Cuff Size: Large Adult)   Pulse 87   Ht 177.8 cm (70\")   Wt 114 kg (251 lb 6.4 oz)   SpO2 97%   BMI 36.07 kg/m²         Constitutional:       Appearance: Normal appearance. Well-developed.   HENT:      Head: Normocephalic and atraumatic.   Neck:      Vascular: No carotid bruit or JVD.   Pulmonary:      Effort: Pulmonary effort is normal.      Breath sounds: Normal breath sounds.   Cardiovascular:      Normal rate. Regular rhythm.      No gallop.   Pulses:     Radial: 2+ bilaterally.  Edema:     Peripheral edema absent.   Abdominal:      Palpations: Abdomen is soft.   Skin:     General: Skin is " warm and dry.   Neurological:      Mental Status: Alert and oriented to person, place, and time.             Assessment:          Diagnosis Plan   1. Coronary artery disease involving native coronary artery of native heart without angina pectoris     2. Hx of acute myocardial infarction of anterior wall     3. S/P CABG (coronary artery bypass graft)     4. S/P coronary artery stent placement     5. Hyperlipidemia, unspecified hyperlipidemia type     6. Essential hypertension            Plan:       1.  Coronary artery disease.  He appears to be stable and asymptomatic.  EKG shows no significant changes.  Test in 1/2021 showed no evidence of ischemia.  Recommend continuing current medical management.  2.  Hypertension.  Well-controlled on his current regimen medications.  Continue the same.  3.  Hyperlipidemia.  On low-dose atorvastatin.  Last lipid panel in 8/2021 showed that his lipids were at goal including an LDL of 49.  He will need to have another lipid panel later this year.  4.  Hypothyroidism  5.  Chronic thrombocytopenia  6.  Chronic kidney disease  7.  History of PVCs.  None present at this time.    I will plan on seeing the patient back again in 1 year or sooner if further issues arise.

## 2022-09-09 ENCOUNTER — APPOINTMENT (OUTPATIENT)
Dept: LAB | Facility: HOSPITAL | Age: 67
End: 2022-09-09

## 2022-09-19 ENCOUNTER — LAB (OUTPATIENT)
Dept: LAB | Facility: HOSPITAL | Age: 67
End: 2022-09-19

## 2022-09-19 ENCOUNTER — TELEPHONE (OUTPATIENT)
Dept: ONCOLOGY | Facility: CLINIC | Age: 67
End: 2022-09-19

## 2022-09-19 ENCOUNTER — OFFICE VISIT (OUTPATIENT)
Dept: ONCOLOGY | Facility: CLINIC | Age: 67
End: 2022-09-19

## 2022-09-19 VITALS
RESPIRATION RATE: 18 BRPM | SYSTOLIC BLOOD PRESSURE: 132 MMHG | TEMPERATURE: 97.1 F | BODY MASS INDEX: 36.61 KG/M2 | HEIGHT: 70 IN | DIASTOLIC BLOOD PRESSURE: 73 MMHG | OXYGEN SATURATION: 94 % | WEIGHT: 255.7 LBS | HEART RATE: 81 BPM

## 2022-09-19 DIAGNOSIS — D69.6 THROMBOCYTOPENIA: ICD-10-CM

## 2022-09-19 DIAGNOSIS — D69.6 THROMBOCYTOPENIA: Primary | ICD-10-CM

## 2022-09-19 DIAGNOSIS — E53.8 VITAMIN B12 DEFICIENCY: ICD-10-CM

## 2022-09-19 DIAGNOSIS — D50.9 IRON DEFICIENCY ANEMIA, UNSPECIFIED IRON DEFICIENCY ANEMIA TYPE: ICD-10-CM

## 2022-09-19 LAB
ALBUMIN SERPL-MCNC: 4.2 G/DL (ref 3.5–5.2)
ALBUMIN/GLOB SERPL: 1.6 G/DL (ref 1.1–2.4)
ALP SERPL-CCNC: 120 U/L (ref 38–116)
ALT SERPL W P-5'-P-CCNC: 16 U/L (ref 0–41)
ANION GAP SERPL CALCULATED.3IONS-SCNC: 10.8 MMOL/L (ref 5–15)
AST SERPL-CCNC: 14 U/L (ref 0–40)
BASOPHILS # BLD AUTO: 0.01 10*3/MM3 (ref 0–0.2)
BASOPHILS NFR BLD AUTO: 0.2 % (ref 0–1.5)
BILIRUB SERPL-MCNC: 1 MG/DL (ref 0.2–1.2)
BUN SERPL-MCNC: 14 MG/DL (ref 6–20)
BUN/CREAT SERPL: 10.3 (ref 7.3–30)
CALCIUM SPEC-SCNC: 9.7 MG/DL (ref 8.5–10.2)
CHLORIDE SERPL-SCNC: 106 MMOL/L (ref 98–107)
CO2 SERPL-SCNC: 25.2 MMOL/L (ref 22–29)
CREAT SERPL-MCNC: 1.36 MG/DL (ref 0.7–1.3)
DEPRECATED RDW RBC AUTO: 44.4 FL (ref 37–54)
EGFRCR SERPLBLD CKD-EPI 2021: 57 ML/MIN/1.73
EOSINOPHIL # BLD AUTO: 0.07 10*3/MM3 (ref 0–0.4)
EOSINOPHIL NFR BLD AUTO: 1.4 % (ref 0.3–6.2)
ERYTHROCYTE [DISTWIDTH] IN BLOOD BY AUTOMATED COUNT: 12.8 % (ref 12.3–15.4)
FERRITIN SERPL-MCNC: 57.6 NG/ML (ref 30–400)
GLOBULIN UR ELPH-MCNC: 2.6 GM/DL (ref 1.8–3.5)
GLUCOSE SERPL-MCNC: 141 MG/DL (ref 74–124)
HCT VFR BLD AUTO: 42.5 % (ref 37.5–51)
HGB BLD-MCNC: 13.4 G/DL (ref 13–17.7)
IMM GRANULOCYTES # BLD AUTO: 0.01 10*3/MM3 (ref 0–0.05)
IMM GRANULOCYTES NFR BLD AUTO: 0.2 % (ref 0–0.5)
IRON 24H UR-MRATE: 75 MCG/DL (ref 59–158)
IRON SATN MFR SERPL: 22 % (ref 14–48)
LYMPHOCYTES # BLD AUTO: 0.9 10*3/MM3 (ref 0.7–3.1)
LYMPHOCYTES NFR BLD AUTO: 17.6 % (ref 19.6–45.3)
MCH RBC QN AUTO: 29.8 PG (ref 26.6–33)
MCHC RBC AUTO-ENTMCNC: 31.5 G/DL (ref 31.5–35.7)
MCV RBC AUTO: 94.7 FL (ref 79–97)
MONOCYTES # BLD AUTO: 0.34 10*3/MM3 (ref 0.1–0.9)
MONOCYTES NFR BLD AUTO: 6.7 % (ref 5–12)
NEUTROPHILS NFR BLD AUTO: 3.78 10*3/MM3 (ref 1.7–7)
NEUTROPHILS NFR BLD AUTO: 73.9 % (ref 42.7–76)
NRBC BLD AUTO-RTO: 0 /100 WBC (ref 0–0.2)
PLATELET # BLD AUTO: 146 10*3/MM3 (ref 140–450)
PMV BLD AUTO: 10.4 FL (ref 6–12)
POTASSIUM SERPL-SCNC: 4.5 MMOL/L (ref 3.5–4.7)
PROT SERPL-MCNC: 6.8 G/DL (ref 6.3–8)
RBC # BLD AUTO: 4.49 10*6/MM3 (ref 4.14–5.8)
SODIUM SERPL-SCNC: 142 MMOL/L (ref 134–145)
TIBC SERPL-MCNC: 336 MCG/DL (ref 249–505)
TRANSFERRIN SERPL-MCNC: 240 MG/DL (ref 200–360)
WBC NRBC COR # BLD: 5.11 10*3/MM3 (ref 3.4–10.8)

## 2022-09-19 PROCEDURE — 85025 COMPLETE CBC W/AUTO DIFF WBC: CPT

## 2022-09-19 PROCEDURE — 99214 OFFICE O/P EST MOD 30 MIN: CPT | Performed by: INTERNAL MEDICINE

## 2022-09-19 PROCEDURE — 84466 ASSAY OF TRANSFERRIN: CPT

## 2022-09-19 PROCEDURE — 82728 ASSAY OF FERRITIN: CPT

## 2022-09-19 PROCEDURE — 36415 COLL VENOUS BLD VENIPUNCTURE: CPT

## 2022-09-19 PROCEDURE — 83540 ASSAY OF IRON: CPT

## 2022-09-19 PROCEDURE — 80053 COMPREHEN METABOLIC PANEL: CPT

## 2022-09-19 NOTE — TELEPHONE ENCOUNTER
Reviewed labs with Dr Mena. Dr Mena ordered patient to take OTC Iron supplement 1 tablet daily and patient to follow up in 12 months with Dr Mena and labs CBC, Ferritin and Iron panel.  Patient v/u

## 2023-01-09 ENCOUNTER — TELEPHONE (OUTPATIENT)
Dept: CARDIOLOGY | Facility: CLINIC | Age: 68
End: 2023-01-09

## 2023-01-09 NOTE — TELEPHONE ENCOUNTER
I called pt and rescheduled his appt for later the same day as Dr. Ye was rounding in the hospital that morning.    Thank you,    Aliyah Greene RN  Triage [facility]  01/09/23 13:58 EST

## 2023-01-09 NOTE — TELEPHONE ENCOUNTER
Caller: Riana Zambrano    Relationship to patient: Emergency Contact    Best call back number: [phone]    Type of visit: FOLLOW UP    Requested date: COMPARABLE TO 02.02.2023     If rescheduling, when is the original appointment: 02.02.2023     Additional notes: PT AND DAUGHTER CALLED IN TO CONFIRM/CLARIFY APPT - APPT NEEDING RSD AND FIRST AVAIL IS IN July - PT WANTING TO SEE DR FERNANDEZ AND July IS TOO FAR OUT

## 2023-02-02 ENCOUNTER — OFFICE VISIT (OUTPATIENT)
Dept: CARDIOLOGY | Facility: CLINIC | Age: 68
End: 2023-02-02
Payer: MEDICARE

## 2023-02-02 VITALS
BODY MASS INDEX: 36.08 KG/M2 | WEIGHT: 252 LBS | OXYGEN SATURATION: 98 % | HEIGHT: 70 IN | SYSTOLIC BLOOD PRESSURE: 130 MMHG | HEART RATE: 70 BPM | DIASTOLIC BLOOD PRESSURE: 80 MMHG

## 2023-02-02 DIAGNOSIS — I25.10 CORONARY ARTERY DISEASE INVOLVING NATIVE CORONARY ARTERY OF NATIVE HEART WITHOUT ANGINA PECTORIS: Primary | ICD-10-CM

## 2023-02-02 DIAGNOSIS — Z95.5 S/P CORONARY ARTERY STENT PLACEMENT: ICD-10-CM

## 2023-02-02 DIAGNOSIS — Z95.1 S/P CABG (CORONARY ARTERY BYPASS GRAFT): ICD-10-CM

## 2023-02-02 DIAGNOSIS — I10 ESSENTIAL HYPERTENSION: ICD-10-CM

## 2023-02-02 DIAGNOSIS — E78.5 HYPERLIPIDEMIA, UNSPECIFIED HYPERLIPIDEMIA TYPE: ICD-10-CM

## 2023-02-02 DIAGNOSIS — I25.2 HX OF ACUTE MYOCARDIAL INFARCTION OF ANTERIOR WALL: ICD-10-CM

## 2023-02-02 PROCEDURE — 99214 OFFICE O/P EST MOD 30 MIN: CPT | Performed by: INTERNAL MEDICINE

## 2023-02-02 PROCEDURE — 93000 ELECTROCARDIOGRAM COMPLETE: CPT | Performed by: INTERNAL MEDICINE

## 2023-02-02 NOTE — PROGRESS NOTES
Subjective:     Encounter Date:02/02/2023      Patient ID: Luigi Steel is a 67 y.o. male.    Chief Complaint:  History of Present Illness    This is a 67-year-old with hypothyroidism, osteoarthritis, prior colon cancer status post partial colectomy, thrombocytopenia, hyperlipidemia, coronary artery disease status post anterior myocardial infarction and drug eluting stent placement of the mid LAD on 8/13/2017, followed by coronary artery bypass surgery ×3 on 8/25/2017, who presents for follow-up.     He presents today for annual follow-up.  He is now living in [place] near [place] on a full-time basis.  He comes back primarily for his doctors appointments with myself and with Dr. Barrow.  He has establish care with a primary care physician in [place].  He believes he has had blood work performed the last year although I do not have any recent results available.    He denies any chest pain, shortness of breath, orthopnea, near-syncope or syncope, or lower extremity edema.  He reports brief episodes of twinges of chest pain or fluttering associated sometimes with lightheadedness but this is very brief.  He remains [place] the beach without any significant issues.     Prior History:  I began following the patient when he presented with an anterior myocardial infarction on 8/13/2017.  The patient reported sudden onset of chest discomfort 2 hours prior to his arrival.  On arrival to the hospital is brought directly to the cardiac catheterization laboratory and was found to have a thrombotic occlusion of his mid LAD.  Additionally was found to have multivessel coronary artery disease including proximal LAD, left circumflex artery, and posterior descending artery.  He underwent drug-eluting stent placement of his mid LAD at the time.  The remainder of his hospital position was uneventful.  He did have an echocardiogram performed following his event that showed mildly depressed left ventricular systolic  function with ejection fraction of 45%.     The patient was brought back a week later for coronary artery bypass surgery.  His clopidogrel was stopped and he was placed on Integrilin therapy preoperatively.  He also underwent a repeat echocardiogram during that admission that showed normalization of his left ventricle systolic function and wall motion with an estimated ejection fraction of 70% and no significant valvular disease.  On 8/25/2017 he underwent CABG ×3 with a LIMA to LAD, saphenous vein graft to the PDA, and a saphenous vein graft to his OM 2.  He was resumed on clopidogrel following his surgery to ensure patency of his mid LAD stent.  Postoperatively he did pretty well except he did suffer a syncopal episode on postoperative day 4 that was felt to be due to vasovagal syncope.        In 11/2018 he was seen by SHILOH Oliva with complaints of chest tightness and heaviness consistent with nausea, dizziness, shortness of breath.  He was set up with a stress test which was performed on 11/20/2018 was negative for ischemia.    When he was seen in 1/2019 he appeared stable from a cardiac standpoint so I went ahead and discontinued his clopidogrel.      In 8/2020 he reported frequent premature ventricular contractions.  A Holter monitor was placed showing occasional PVCs with a total burden of 5.8% but no episodes of nonsustained ventricular tachycardia.       In 1/2021 he reported episodes of chest tightness and heaviness that occurs about 2-3 times a week occurring at rest. He did report there are some similarities to the symptoms he had a the time of his prior myocardial infarction except that the discomfort does not radiate to his arms.    He also admitted to gaining weight not having gone back to an exercise routine after his wife was diagnosed with cancer and subsequently passed away.  That office visit I recommended pursuing further cardiac work-up with a stress test and an echocardiogram.  Testing  was performed on 1/27/2021.  Echocardiogram showed normal left ventricular systolic function and wall motion with an EF of 57%, mildly dilated left atrium, and normal diastolic function.  Stress test showed no evidence of ischemia.     Review of Systems   Constitutional: Negative for malaise/fatigue.   HENT: Negative for hearing loss, hoarse voice, nosebleeds and sore throat.    Eyes: Negative for pain.   Cardiovascular: Positive for palpitations. Negative for chest pain, claudication, cyanosis, dyspnea on exertion, irregular heartbeat, leg swelling, near-syncope, orthopnea, paroxysmal nocturnal dyspnea and syncope.   Respiratory: Negative for shortness of breath and snoring.    Endocrine: Negative for cold intolerance, heat intolerance, polydipsia, polyphagia and polyuria.   Skin: Negative for itching and rash.   Musculoskeletal: Negative for arthritis, falls, joint pain, joint swelling, muscle cramps, muscle weakness and myalgias.   Gastrointestinal: Negative for constipation, diarrhea, dysphagia, heartburn, hematemesis, hematochezia, melena, nausea and vomiting.   Genitourinary: Negative for frequency, hematuria and hesitancy.   Neurological: Positive for light-headedness. Negative for excessive daytime sleepiness, dizziness, headaches, numbness and weakness.   Psychiatric/Behavioral: Negative for depression. The patient is not nervous/anxious.          Current Outpatient Medications:   •  acetaminophen (TYLENOL) 325 MG tablet, Take 2 tablets by mouth Every 4 (Four) Hours As Needed for Mild Pain ., Disp: , Rfl:   •  aspirin 81 MG chewable tablet, Chew 1 tablet Daily., Disp: , Rfl:   •  atorvastatin (LIPITOR) 20 MG tablet, Take 1 tablet by mouth every night at bedtime., Disp: 90 tablet, Rfl: 1  •  baclofen (LIORESAL) 10 MG tablet, Take 1 tablet by mouth 2 (Two) Times a Day., Disp: 40 tablet, Rfl: 2  •  carvedilol (COREG) 3.125 MG tablet, Take 1 tablet by mouth Every 12 (Twelve) Hours., Disp: 180 tablet, Rfl: 1  •   levothyroxine (Euthyrox) 88 MCG tablet, Take 1 tablet by mouth Daily., Disp: 90 tablet, Rfl: 1  •  nitroglycerin (Nitrostat) 0.4 MG SL tablet, Place 1 tablet under the tongue Every 5 (Five) Minutes As Needed for Chest Pain. Take no more than 3 doses in 15 minutes., Disp: 30 tablet, Rfl: 11  •  vitamin B-12 (CYANOCOBALAMIN) 100 MCG tablet, Take 50 mcg by mouth Daily., Disp: , Rfl:   •  Zinc Sulfate (ZINC 15 PO), Take  by mouth., Disp: , Rfl:   •  zolpidem (AMBIEN) 10 MG tablet, Take 1 tablet by mouth At Night As Needed for Sleep., Disp: 30 tablet, Rfl: 2    Past Medical History:   Diagnosis Date   • Anterior myocardial infarction (HCC) 8/13/2017   • Arthritis     Hands, knees   • Colon cancer (HCC)    • Coronary artery disease    • Elevated blood pressure reading without diagnosis of hypertension    • H/O complete eye exam 2014    due   • Hypertension    • Hypothyroidism, acquired    • Insomnia    • Kidney stones    • Obesity    • Osteoarthritis, multiple sites    • PONV (postoperative nausea and vomiting)        Past Surgical History:   Procedure Laterality Date   • CARDIAC CATHETERIZATION N/A 8/13/2017    Procedure: Left Heart Cath;  Surgeon: Edyta Ye MD;  Location: [facility] CATH INVASIVE LOCATION;  Service:    • CARDIAC CATHETERIZATION N/A 8/13/2017    Procedure: Stent RK coronary;  Surgeon: Edyta Ye MD;  Location: [facility] CATH INVASIVE LOCATION;  Service:    • COLECTOMY PARTIAL / TOTAL  1994   • COLON RESECTION RIGHT Right 1990    for cancer   • COLONOSCOPY  03/18/2009    Dr. Tillman   • CORONARY ARTERY BYPASS GRAFT N/A 8/25/2017    Procedure: JAYLON STERNOTOMY CORONARY ARTERY BYPASS GRAFT TIMES 3 USING LEFT INTERNAL  MAMMARY ARTERY AND RIGHT SAPHENOUS VEIN GRAFT PER ENDOSCOPIC VEIN HARVESTING;  Surgeon: Ana [place] MD;  Location: [facility] OR;  Service:    • HERNIA REPAIR  04/02/2009    hiatal   • HERNIA REPAIR Bilateral 08/2010    ABDOMINAL x3   • INCISIONAL HERNIA REPAIR  09/2011   • INCISIONAL  "HERNIA REPAIR  04/2009   • INCISIONAL HERNIA REPAIR  1996   • KIDNEY STONE SURGERY     • SMALL INTESTINE SURGERY      for adhesions       Family History   Problem Relation Age of Onset   • Arthritis Mother    • Stroke Mother    • Heart attack Mother    • Hypertension Mother    • Gallbladder disease Mother    • Heart disease Mother    • Hypertension Father    • Gallbladder disease Father    • Colon cancer Father 65   • Gallbladder disease Sister    • Arthritis Brother    • Cancer Brother    • Colon cancer Brother 51   • Heart attack Brother    • Heart disease Brother    • Hypertension Brother    • Gallbladder disease Brother    • Heart disease Brother    • No Known Problems Maternal Grandmother    • No Known Problems Maternal Grandfather    • No Known Problems Paternal Grandmother    • No Known Problems Paternal Grandfather        Social History     Tobacco Use   • Smoking status: Never   • Smokeless tobacco: Never   Substance Use Topics   • Alcohol use: No   • Drug use: No         ECG 12 Lead    Date/Time: 2/2/2023 10:31 AM  Performed by: Edyta Ye MD  Authorized by: Edyta Ye MD   Comparison: compared with previous ECG   Similar to previous ECG  Rhythm: sinus rhythm               Objective:     Visit Vitals  /80 (BP Location: Right arm, Patient Position: Sitting, Cuff Size: Adult)   Pulse 70   Ht 177.8 cm (70\")   Wt 114 kg (252 lb)   SpO2 98%   BMI 36.16 kg/m²         Constitutional:       Appearance: Normal appearance. Well-developed.   HENT:      Head: Normocephalic and atraumatic.   Neck:      Vascular: No carotid bruit or JVD.   Pulmonary:      Effort: Pulmonary effort is normal.      Breath sounds: Normal breath sounds.   Cardiovascular:      Normal rate. Regular rhythm.      No gallop.   Pulses:     Radial: 2+ bilaterally.  Edema:     Peripheral edema absent.   Abdominal:      Palpations: Abdomen is soft.   Skin:     General: Skin is warm and dry.   Neurological:      Mental Status: Alert " and oriented to person, place, and time.             Assessment:          Diagnosis Plan   1. Coronary artery disease involving native coronary artery of native heart without angina pectoris        2. S/P CABG (coronary artery bypass graft)        3. S/P coronary artery stent placement        4. Essential hypertension        5. Hx of acute myocardial infarction of anterior wall        6. Hyperlipidemia, unspecified hyperlipidemia type               Plan:         1.  Coronary artery disease.  He appears to be stable and asymptomatic.  Last stress test in 1/2021 which shows no evidence of ischemia.  EKG today shows no changes.  Continue current medical management.  2.  Hypertension.  Well-controlled on his current regimen medications.  Continue the same.  3.  Hyperlipidemia.  On atorvastatin for goal LDL of 70 or below.  I do not have any recent lipid panels to review.  I have given him a written order for a fasting lipid panel.  Unfortunately he ate this morning and is leaving early tomorrow morning.  I asked him to have sleeping have this done with his local PCP or to have any results faxed over to us.  4.  Hypothyroidism.    We will plan on seeing the patient back again in 1 year or sooner if further issues arise.

## 2023-08-28 DIAGNOSIS — D69.6 THROMBOCYTOPENIA: ICD-10-CM

## 2023-08-28 DIAGNOSIS — D50.9 IRON DEFICIENCY ANEMIA, UNSPECIFIED IRON DEFICIENCY ANEMIA TYPE: Primary | ICD-10-CM

## 2023-08-29 ENCOUNTER — TELEPHONE (OUTPATIENT)
Dept: ONCOLOGY | Facility: CLINIC | Age: 68
End: 2023-08-29

## 2023-08-29 NOTE — TELEPHONE ENCOUNTER
Caller: Paul Steel    Relationship: Self    Best call back number: [phone]    What is the best time to reach you: ANY    Who are you requesting to speak with (clinical staff, provider,  specific staff member): CLINICAL     What was the call regarding: PAUL IS CALLING TO CANCEL APPT ON 9-19  DID NOT WANT TO RESCHEDULE     Is it okay if the provider responds through MyChart: NO

## (undated) DEVICE — ST. SORBAVIEW ULTIMATE IJ SYSTEM A,C: Brand: CENTURION

## (undated) DEVICE — ROTATING SURGICAL PUNCHES, 1 PER POUCH: Brand: A&E MEDICAL / ROTATING SURGICAL PUNCHES

## (undated) DEVICE — Device

## (undated) DEVICE — TEMP PACING WIRE: Brand: MYO/WIRE

## (undated) DEVICE — CONN STR 1/2INX3/8IN

## (undated) DEVICE — CANN ART DLP 1PC EDPA A/ 20F

## (undated) DEVICE — NC TREK CORONARY DILATATION CATHETER 3.0 MM X 15 MM / RAPID-EXCHANGE: Brand: NC TREK

## (undated) DEVICE — ST PERFUS M/

## (undated) DEVICE — GW EMR FIX EXCHG J STD .035 3MM 260CM

## (undated) DEVICE — BNDG ELAS ELITE V/CLOSE 4IN 5YD LF STRL

## (undated) DEVICE — PK ATS CUST W CARDIOTOMY RESEVOIR

## (undated) DEVICE — CATH VENT MIV RADL PIG ST TIP 5F 110CM

## (undated) DEVICE — TB SXN DLP RIGD MACROSUCKER 6F 3IN

## (undated) DEVICE — ADHS SKIN DERMABOND PROPEN

## (undated) DEVICE — DRP SLUSH MACH FOR STND ALONE OM-ORS-321

## (undated) DEVICE — CATH IV INSYTE AUTOGARD SHLD 20G 1.88IN

## (undated) DEVICE — 6F .070 XB 3.5 100CM: Brand: VISTA BRITE TIP

## (undated) DEVICE — TBG ART PRESS 60 IN

## (undated) DEVICE — GLV SURG BIOGEL LTX PF 6 1/2

## (undated) DEVICE — INSUFFLATION TUBING,LAPAROSCOPIC: Brand: DEROYAL

## (undated) DEVICE — GLV SURG BIOGEL LTX PF 7

## (undated) DEVICE — OASIS DRAIN, SINGLE, INLINE & ATS COMPATIBLE: Brand: OASIS

## (undated) DEVICE — ADHS SKIN DERMABOND TOP ADVANCED

## (undated) DEVICE — HEMOCONCENTRATOR PERFUS LPS06

## (undated) DEVICE — PK HEART OPN 40

## (undated) DEVICE — CATH DIAG IMPULSE FR4 5F 100CM

## (undated) DEVICE — DRSNG BRDR MEPILEX P/OP SIL 4X12IN

## (undated) DEVICE — GLV SURG BIOGEL LTX PF 8

## (undated) DEVICE — GOWN ,SIRUS,NONREINFORCED 3XL: Brand: MEDLINE

## (undated) DEVICE — BIOPATCH™ ANTIMICROBIAL DRESSING WITH CHLORHEXIDINE GLUCONATE IS A HYDROPHILLIC POLYURETHANE ABSORPTIVE FOAM WITH CHLORHEXIDINE GLUCONATE (CHG) WHICH INHIBITS BACTERIAL GROWTH UNDER THE DRESSING. THE DRESSING IS INTENDED TO BE USED TO ABSORB EXUDATE, COVER A WOUND CAUSED BY VASCULAR AND NONVASCULAR PERCUTANEOUS MEDICAL DEVICES DURING SURGERY, AS WELL AS REDUCE LOCAL INFECTION AND COLONIZATION OF MICROORGANISMS.: Brand: BIOPATCH

## (undated) DEVICE — Device: Brand: MEDEX

## (undated) DEVICE — CLAMP INSERT: Brand: STEALTH® CLAMP INSERT

## (undated) DEVICE — KT MANIFLD CARDIAC

## (undated) DEVICE — CATH DIAG IMPULSE FL3.5 5F 100CM

## (undated) DEVICE — SPNG DISECTOR KTNER XRAY COTN 1/4X9/16IN PK/5

## (undated) DEVICE — MARKR SKIN W/RULR AND LBL

## (undated) DEVICE — BNDG ELAS ELITE V/CLOSE 6IN 5YD LF STRL

## (undated) DEVICE — AMD ANTIMICROBIAL GAUZE SPONGES,12 PLY USP TYPE VII, 0.2% POLYHEXAMETHYLENE BIGUANIDE HCI (PHMB): Brand: CURITY

## (undated) DEVICE — GLIDESHEATH BASIC HYDROPHILIC COATED INTRODUCER SHEATH: Brand: GLIDESHEATH

## (undated) DEVICE — CVR PROB 96IN LF STRL

## (undated) DEVICE — SPNG GZ WOVN 4X4IN 12PLY 10/BX STRL

## (undated) DEVICE — 8 FOOT DISPOSABLE EXTENSION CABLE WITH SAFE CONNECT / ALLIGATOR CLIP

## (undated) DEVICE — CANN RETRGR STYLET RSCP 15F

## (undated) DEVICE — CORONARY ARTERY BYPASS GRAFT MARKERS, STAINLESS STEEL, DISTAL, WITHOUT HOLDER: Brand: ANASTOMARK CORONARY ARTERY BYPASS GRAFT MARKERS, STAINLESS STEEL, DISTAL

## (undated) DEVICE — CANN AORT ROOT DLP VNT 14G 7F

## (undated) DEVICE — BLOWER/MISTER AXIOUS OPCAB W/TBG

## (undated) DEVICE — ST TOURNI COMPL A/ 7IN

## (undated) DEVICE — SENSR CERBRL O2 PK/2

## (undated) DEVICE — CANN VESL FREE FLO 2MM

## (undated) DEVICE — ACCESSRAIL PLATFORM (STANDARD BLADE): Brand: ACCESSRAIL PLATFORM (STANDARD BLADE)

## (undated) DEVICE — PK CATH CARD 40

## (undated) DEVICE — DRSNG SURESITE WNDW 2.38X2.75

## (undated) DEVICE — PK PERFUS CUST W/CARDIOPLEGIA

## (undated) DEVICE — 28 FR STRAIGHT – SOFT PVC CATHETER: Brand: PVC THORACIC CATHETERS

## (undated) DEVICE — BND PRESS RADL COMFRT 14IN STRL

## (undated) DEVICE — VASOVIEW HEMOPRO: Brand: VASOVIEW HEMOPRO

## (undated) DEVICE — TP CLTH MEDIPORE SFT 2IN 10YD

## (undated) DEVICE — NDL PERC 1PRT THNWALL W/BASEPLT 18G 7CM

## (undated) DEVICE — RUNTHROUGH NS EXTRA FLOPPY PTCA GUIDEWIRE: Brand: RUNTHROUGH

## (undated) DEVICE — SOL ISO/ALC RUB 70PCT 4OZ

## (undated) DEVICE — CANN IRR VEN BVL TP

## (undated) DEVICE — SCANLAN® VASCU-STATT® II SINGLE-USE BULLDOG CLAMP W/FIRMER CLAMPING PRESS - MIDI ANGLED 45° (YELLOW), CLAMPING PRESSURE 75-80 G (2/STERILE PKG): Brand: SCANLAN® VASCU-STATT® II SINGLE-USE BULLDOG CLAMP W/FIRMER CLAMPING PRESS

## (undated) DEVICE — CATH ASPIR EXPORTADVANCE 6F .014IN 140CM

## (undated) DEVICE — HI-TORQUE BALANCE MIDDLEWEIGHT GUIDE WIRE .014 STRAIGHT TIP 3.0 CM X 190 CM: Brand: HI-TORQUE BALANCE MIDDLEWEIGHT